# Patient Record
Sex: FEMALE | Race: WHITE | NOT HISPANIC OR LATINO | Employment: OTHER | URBAN - METROPOLITAN AREA
[De-identification: names, ages, dates, MRNs, and addresses within clinical notes are randomized per-mention and may not be internally consistent; named-entity substitution may affect disease eponyms.]

---

## 2017-01-06 ENCOUNTER — OFFICE VISIT (OUTPATIENT)
Dept: MEDICAL GROUP | Facility: MEDICAL CENTER | Age: 72
End: 2017-01-06
Payer: MEDICARE

## 2017-01-06 VITALS
RESPIRATION RATE: 16 BRPM | OXYGEN SATURATION: 90 % | TEMPERATURE: 98.1 F | WEIGHT: 231 LBS | HEART RATE: 84 BPM | HEIGHT: 60 IN | SYSTOLIC BLOOD PRESSURE: 108 MMHG | DIASTOLIC BLOOD PRESSURE: 58 MMHG | BODY MASS INDEX: 45.35 KG/M2

## 2017-01-06 DIAGNOSIS — M25.562 CHRONIC PAIN OF LEFT KNEE: ICD-10-CM

## 2017-01-06 DIAGNOSIS — G89.29 CHRONIC PAIN OF LEFT KNEE: ICD-10-CM

## 2017-01-06 PROCEDURE — 20610 DRAIN/INJ JOINT/BURSA W/O US: CPT | Performed by: FAMILY MEDICINE

## 2017-01-06 PROCEDURE — 99214 OFFICE O/P EST MOD 30 MIN: CPT | Mod: 25 | Performed by: FAMILY MEDICINE

## 2017-01-06 RX ORDER — METHYLPREDNISOLONE ACETATE 80 MG/ML
80 INJECTION, SUSPENSION INTRA-ARTICULAR; INTRALESIONAL; INTRAMUSCULAR; SOFT TISSUE ONCE
Status: COMPLETED | OUTPATIENT
Start: 2017-01-06 | End: 2017-01-06

## 2017-01-06 RX ADMIN — METHYLPREDNISOLONE ACETATE 80 MG: 80 INJECTION, SUSPENSION INTRA-ARTICULAR; INTRALESIONAL; INTRAMUSCULAR; SOFT TISSUE at 10:22

## 2017-01-06 NOTE — PROGRESS NOTES
CC: Left knee pain    HPI:   Kishan presents today with left knee pain, she is here to get steroid intraarticular knee joint injection. She has h/o bilateral osteoarthritis of the knee joints, associated with effusion. Had a right knee injections two times , last one was 3 month ago, and helped her a lot for good 3 month, was the best treatmernt that helped her knee pain. Came in today to have an other intraarticular steroid injection for the left knee joints.     Patient Active Problem List    Diagnosis Date Noted   • Morbid obesity ([facility]) 06/16/2015     Priority: High   • Diastolic congestive heart failure ([facility]) 12/05/2011     Priority: High   • COPD (chronic obstructive pulmonary disease) ([facility]) 04/13/2011     Priority: High   • Aortic stenosis 06/07/2012     Priority: Medium   • Aortic regurgitation 12/05/2011     Priority: Medium   • Anxiety 04/04/2016   • Carotid bruit 08/23/2013   • MEDICAL HOME 11/09/2012   • DJD (degenerative joint disease) 10/19/2012   • Obesity 04/13/2011   • Osteoporosis, idiopathic 04/13/2011       Current Outpatient Prescriptions   Medication Sig Dispense Refill   • prometh-phenylephrine-codeine (PHENERGAN VC CODEINE) 5-6.25-10 MG/5ML Syrup Take 5 mL by mouth every 8 hours as needed. 280 mL 0   • furosemide (LASIX) 20 MG Tab Take 1 Tab by mouth 3 times a day. 270 Tab 1   • potassium chloride SA (K-DUR) 20 MEQ Tab CR Take 1 Tab by mouth every day. 90 Tab 1   • albuterol 108 (90 BASE) MCG/ACT Aero Soln inhalation aerosol Inhale 2 Puffs by mouth every 6 hours as needed. 3 Inhaler 3   • SPIRIVA HANDIHALER 18 MCG Cap INHALE 1 CAP BY MOUTH EVERY DAY. 90 Cap 1   • albuterol (PROVENTIL) 2.5mg/3ml Nebu Soln solution for nebulization USE 3 ML BY NEBULIZATION ROUTE EVERY 6 HOURS AS NEEDED FOR SHORTNESS OF BREATH. 375 mL 2   • triamcinolone acetonide (KENALOG) 0.025 % Cream APPLY TO AFFECTED AREA(S) 2 TIMES A DAY. 15 g 1   • Misc. Devices Misc Oxygen concentrator to be used at night and prn at 2 lpm.  Dx: COPD, CHF, hypoxemia. 1 Units 0   • ciprofloxacin (CILOXIN) 0.3 % Solution Place 1 Drop in both eyes every 12 hours. 1 Bottle 0   • busPIRone (BUSPAR) 7.5 MG tablet Take 1 Tab by mouth every day. TAKE 1 TAB BY MOUTH EVERY DAY. 90 Tab 1   • omeprazole (PRILOSEC) 20 MG delayed-release capsule Take 1 Cap by mouth every day. 90 Cap 1   • clobetasol (TEMOVATE) 0.05 % external solution Apply sparingly bid 50 mL 0   • clotrimazole-betamethasone (LOTRISONE) 1-0.05 % CREA Apply 1 Application to affected area(s) 2 times a day. 1 Tube 1   • Lactobacillus Rhamnosus, GG, (CULTURELLE PO) Take  by mouth.     • Clobetasol Propionate 0.05 % LOTN Apply to affected area bid 1 Bottle 1   • Misc. Devices MISC 4 wheel walker, needed for osteoarthritis of the knees. 1 Each 0   • vitamin D (CHOLECALCIFEROL) 1000 UNIT TABS Take 2,000 Units by mouth every day.     • Multiple Vitamin (MULTI-VITAMIN PO) Take  by mouth.     • aspirin 81 MG tablet Take 81 mg by mouth every day.       Current Facility-Administered Medications   Medication Dose Route Frequency Provider Last Rate Last Dose   • methylPREDNISolone acetate (DEPO-MEDROL) injection 80 mg  80 mg Intramuscular Once Ana Hartley M.D.             Allergies as of 01/06/2017 - Kevin as Reviewed 01/06/2017   Allergen Reaction Noted   • Fosamax  09/15/2011   • Other drug  07/06/2013   • Pcn [penicillins]  04/13/2011   • Sulfa drugs  04/13/2011        ROS: Denies any chest pain, Shortness of breath, Changes bowel or bladder, Lower extremity edema.    Physical Exam:  /58 mmHg  Pulse 84  Temp(Src) 36.7 °C (98.1 °F)  Resp 16  Ht 1.524 m (5')  Wt 104.781 kg (231 lb)  BMI 45.11 kg/m2  SpO2 90%  Gen.: Obese, no apparent distress,pleasant and cooperative with the examination  Left Knee joint: Swelling( effusion),mild tenderness, mild decrease in ROM    Procedure:  Patient was consented. Risks and benefits discussed.  Left knee was exposed. Site of injection was marked ( A  point between lateral patellar ligament, lateral tibial plateau, and lateral femoral condyle). The skin was sterilized with (2% chlorhexidine , and 70% Isopropyl alcohol), the area was numbed with ethyl cholride , then 1 ml of DepoMedrol 80 mg + 4 ml of Lidocaine 1% were injected into the joint.      Assessment and Plan.   71 y.o. female     1. Chronic pain of left knee  Chronic osteoarthritis.  Intra-articular injection of the left knee  joint is given.Patient joint movement improved to about 50% immediately after the injection.Precaustion was given to avoid bleeding . Patient advised to to uses worm compresses 2 times daily for 3 days, and RTC if any continuous pain on the joint.  Weight loss is recommended.    - [place] DRAIN/INJECT LARGE JOINT/BURSA  - methylPREDNISolone acetate (DEPO-MEDROL) injection 80 mg; 1 mL by Intramuscular route Once.

## 2017-01-30 DIAGNOSIS — J44.1 COPD WITH EXACERBATION (HCC): ICD-10-CM

## 2017-01-30 RX ORDER — TIOTROPIUM BROMIDE 18 UG/1
CAPSULE ORAL; RESPIRATORY (INHALATION)
Qty: 90 CAP | Refills: 2 | Status: SHIPPED | OUTPATIENT
Start: 2017-01-30 | End: 2018-01-19 | Stop reason: SDUPTHER

## 2017-02-06 DIAGNOSIS — J44.9 CHRONIC OBSTRUCTIVE PULMONARY DISEASE, UNSPECIFIED COPD TYPE (HCC): ICD-10-CM

## 2017-02-06 RX ORDER — ALBUTEROL SULFATE 90 UG/1
2 AEROSOL, METERED RESPIRATORY (INHALATION) EVERY 6 HOURS PRN
Qty: 3 INHALER | Refills: 0 | Status: SHIPPED | OUTPATIENT
Start: 2017-02-06 | End: 2018-04-04 | Stop reason: SDUPTHER

## 2017-02-23 ENCOUNTER — TELEPHONE (OUTPATIENT)
Dept: CARDIOLOGY | Facility: MEDICAL CENTER | Age: 72
End: 2017-02-23

## 2017-02-23 NOTE — TELEPHONE ENCOUNTER
LM for pt to call back to possibly reschedule FU appointment if pt is unable to get Echo sooner.     ----- Message -----      From: Harini Linton      Sent: 2/23/2017   2:06 PM        To: Karin Spears R.N.   Subject: Pt has question about echo in regards to den*     REHANA/Derick,     Patient states her Echo is scheduled for 3/22 after her scheduled appt with REHANA 3/17. Pt wants to know if she should still come to appt?can be reached at [phone] for a call back.

## 2017-03-02 ENCOUNTER — HOSPITAL ENCOUNTER (OUTPATIENT)
Dept: RADIOLOGY | Facility: MEDICAL CENTER | Age: 72
End: 2017-03-02
Attending: INTERNAL MEDICINE
Payer: MEDICARE

## 2017-03-02 DIAGNOSIS — Z13.9 SCREENING: ICD-10-CM

## 2017-03-02 PROCEDURE — 77063 BREAST TOMOSYNTHESIS BI: CPT

## 2017-03-15 ENCOUNTER — OFFICE VISIT (OUTPATIENT)
Dept: MEDICAL GROUP | Facility: MEDICAL CENTER | Age: 72
End: 2017-03-15
Payer: MEDICARE

## 2017-03-15 VITALS
HEART RATE: 82 BPM | WEIGHT: 231.2 LBS | OXYGEN SATURATION: 91 % | BODY MASS INDEX: 45.39 KG/M2 | TEMPERATURE: 99 F | HEIGHT: 60 IN | DIASTOLIC BLOOD PRESSURE: 68 MMHG | RESPIRATION RATE: 16 BRPM | SYSTOLIC BLOOD PRESSURE: 90 MMHG

## 2017-03-15 DIAGNOSIS — G89.29 CHRONIC PAIN OF RIGHT KNEE: ICD-10-CM

## 2017-03-15 DIAGNOSIS — M25.561 CHRONIC PAIN OF RIGHT KNEE: ICD-10-CM

## 2017-03-15 PROCEDURE — 99214 OFFICE O/P EST MOD 30 MIN: CPT | Mod: 25 | Performed by: FAMILY MEDICINE

## 2017-03-15 PROCEDURE — 3017F COLORECTAL CA SCREEN DOC REV: CPT | Performed by: FAMILY MEDICINE

## 2017-03-15 PROCEDURE — 1036F TOBACCO NON-USER: CPT | Performed by: FAMILY MEDICINE

## 2017-03-15 PROCEDURE — 3014F SCREEN MAMMO DOC REV: CPT | Performed by: FAMILY MEDICINE

## 2017-03-15 PROCEDURE — 20610 DRAIN/INJ JOINT/BURSA W/O US: CPT | Performed by: FAMILY MEDICINE

## 2017-03-15 PROCEDURE — 4040F PNEUMOC VAC/ADMIN/RCVD: CPT | Performed by: FAMILY MEDICINE

## 2017-03-15 PROCEDURE — G8482 FLU IMMUNIZE ORDER/ADMIN: HCPCS | Performed by: FAMILY MEDICINE

## 2017-03-15 PROCEDURE — 1101F PT FALLS ASSESS-DOCD LE1/YR: CPT | Performed by: FAMILY MEDICINE

## 2017-03-15 PROCEDURE — G8419 CALC BMI OUT NRM PARAM NOF/U: HCPCS | Performed by: FAMILY MEDICINE

## 2017-03-15 RX ORDER — METHYLPREDNISOLONE ACETATE 80 MG/ML
80 INJECTION, SUSPENSION INTRA-ARTICULAR; INTRALESIONAL; INTRAMUSCULAR; SOFT TISSUE ONCE
Status: COMPLETED | OUTPATIENT
Start: 2017-03-15 | End: 2017-03-15

## 2017-03-15 RX ADMIN — METHYLPREDNISOLONE ACETATE 80 MG: 80 INJECTION, SUSPENSION INTRA-ARTICULAR; INTRALESIONAL; INTRAMUSCULAR; SOFT TISSUE at 12:13

## 2017-03-15 NOTE — PROGRESS NOTES
CC: Chronic pain of the right knee     HPI:     Kishan presents today with chronic pain of the right knee , she is here to get steroid intraarticular knee joint injection. She has h/o bilateral osteoarthritis of the knee joints, associated with effusion. Had a right knee injections two times , last one was 6 month ago, and helped her a lot for good 3 month, was the best treatmernt that helped her knee pain. Came in today to have an other intraarticular steroid injection. Has he also had one for the left knee joint 3 month ago, and has helped.     Patient Active Problem List    Diagnosis Date Noted   • Morbid obesity ([facility]) 06/16/2015     Priority: High   • Diastolic congestive heart failure ([facility]) 12/05/2011     Priority: High   • COPD (chronic obstructive pulmonary disease) ([facility]) 04/13/2011     Priority: High   • Aortic stenosis 06/07/2012     Priority: Medium   • Aortic regurgitation 12/05/2011     Priority: Medium   • Anxiety 04/04/2016   • Carotid bruit 08/23/2013   • MEDICAL HOME 11/09/2012   • DJD (degenerative joint disease) 10/19/2012   • Obesity 04/13/2011   • Osteoporosis, idiopathic 04/13/2011       Current Outpatient Prescriptions   Medication Sig Dispense Refill   • albuterol 108 (90 BASE) MCG/ACT Aero Soln inhalation aerosol Inhale 2 Puffs by mouth every 6 hours as needed. 3 Inhaler 0   • tiotropium (SPIRIVA HANDIHALER) 18 MCG Cap INHALE 1 CAP BY MOUTH EVERY DAY. 90 Cap 2   • prometh-phenylephrine-codeine (PHENERGAN VC CODEINE) 5-6.25-10 MG/5ML Syrup Take 5 mL by mouth every 8 hours as needed. 280 mL 0   • furosemide (LASIX) 20 MG Tab Take 1 Tab by mouth 3 times a day. 270 Tab 1   • potassium chloride SA (K-DUR) 20 MEQ Tab CR Take 1 Tab by mouth every day. 90 Tab 1   • albuterol (PROVENTIL) 2.5mg/3ml Nebu Soln solution for nebulization USE 3 ML BY NEBULIZATION ROUTE EVERY 6 HOURS AS NEEDED FOR SHORTNESS OF BREATH. 375 mL 2   • triamcinolone acetonide (KENALOG) 0.025 % Cream APPLY TO AFFECTED  AREA(S) 2 TIMES A DAY. 15 g 1   • Misc. Devices Misc Oxygen concentrator to be used at night and prn at 2 lpm. Dx: COPD, CHF, hypoxemia. 1 Units 0   • ciprofloxacin (CILOXIN) 0.3 % Solution Place 1 Drop in both eyes every 12 hours. 1 Bottle 0   • busPIRone (BUSPAR) 7.5 MG tablet Take 1 Tab by mouth every day. TAKE 1 TAB BY MOUTH EVERY DAY. 90 Tab 1   • omeprazole (PRILOSEC) 20 MG delayed-release capsule Take 1 Cap by mouth every day. 90 Cap 1   • clobetasol (TEMOVATE) 0.05 % external solution Apply sparingly bid 50 mL 0   • clotrimazole-betamethasone (LOTRISONE) 1-0.05 % CREA Apply 1 Application to affected area(s) 2 times a day. 1 Tube 1   • Lactobacillus Rhamnosus, GG, (CULTURELLE PO) Take  by mouth.     • Clobetasol Propionate 0.05 % LOTN Apply to affected area bid 1 Bottle 1   • Misc. Devices MISC 4 wheel walker, needed for osteoarthritis of the knees. 1 Each 0   • vitamin D (CHOLECALCIFEROL) 1000 UNIT TABS Take 2,000 Units by mouth every day.     • Multiple Vitamin (MULTI-VITAMIN PO) Take  by mouth.     • aspirin 81 MG tablet Take 81 mg by mouth every day.       Current Facility-Administered Medications   Medication Dose Route Frequency Provider Last Rate Last Dose   • methylPREDNISolone acetate (DEPO-MEDROL) injection 80 mg  80 mg Intramuscular Once Ana Hartley M.D.             Allergies as of 03/15/2017 - Kevin as Reviewed 01/06/2017   Allergen Reaction Noted   • Fosamax  09/15/2011   • Other drug  07/06/2013   • Pcn [penicillins]  04/13/2011   • Sulfa drugs  04/13/2011        ROS: Denies any chest pain, Shortness of breath, Changes bowel or bladder, Lower extremity edema.    Physical Exam:  BP 90/68 mmHg  Pulse 82  Temp(Src) 37.2 °C (99 °F)  Resp 16  Ht 1.524 m (5')  Wt 104.872 kg (231 lb 3.2 oz)  BMI 45.15 kg/m2  SpO2 91%  Gen.: Well-developed, well-nourished, no apparent distress,pleasant and cooperative with the examination  Right Knee joint: Swelling( effusion),mild tenderness, mild  decrease in ROM    Procedure:  Patient was consented. Risks and benefits discussed.  Right knee was exposed. Site of injection was marked ( A point between lateral patellar ligament, lateral tibial plateau, and lateral femoral condyle). The skin was sterilized with (2% chlorhexidine , and 70% Isopropyl alcohol), the area was numbed with ethyl cholride , then 1 ml of DepoMedrol 80 mg + 4 ml of Lidocaine 1% were injected into the joint.        Assessment and Plan.   71 y.o. female     1. Chronic pain of right knee  Patient has a chronic osteoarthritis.  Intra-articular injection of the right knee  joint is given.Patient joint movement improved to about 50% immediately after the injection.Precaustion was given to avoid bleeding . Patient advised to to uses worm compresses 2 times daily for 3 days, and RTC if any continuous pain on the joint.  Weight loss discussed continously    - [place] DRAIN/INJECT LARGE JOINT/BURSA  - methylPREDNISolone acetate (DEPO-MEDROL) injection 80 mg; 1 mL by Intramuscular route Once.

## 2017-03-15 NOTE — MR AVS SNAPSHOT
Kishan Mcintyre   3/15/2017 11:20 AM   Office Visit   MRN: [id number]    Department:  [address] [place] [address]   Dept Phone:  [phone]    Description:  Female : 1945   Provider:  Ana Hartley M.D.           Reason for Visit     Injections Rt knee injection    Orders Needed Wheelchair needs face to face notes per insurance.    Medication Refill Compression stockings 20-30 mmHg Size 3xL      Allergies as of 3/15/2017     Allergen Noted Reactions    Fosamax 09/15/2011       Bone pain    Other Drug 2013       Also allergies to Actenol, Bee stings and surgical tape    Pcn [Penicillins] 2011       Sulfa Drugs 2011         You were diagnosed with     Chronic pain of right knee   [[id number]]         Vital Signs     Blood Pressure Pulse Temperature Respirations Height Weight    90/68 mmHg 82 37.2 °C (99 °F) 16 1.524 m (5') 104.872 kg (231 lb 3.2 oz)    Body Mass Index Oxygen Saturation Smoking Status             45.15 kg/m2 91% Former Smoker         Basic Information     Date Of Birth Sex Race Ethnicity Preferred Language    1945 Female White Non- English      Your appointments     Mar 22, 2017  1:15 PM   ECHO with ECHO [facility] EXAM 12   ECHOCARDIOLOGY [facility] ([facility])    [address] [facility] [address]   [phone]           No prep            2017 12:40 PM   FOLLOW UP with Rufus Rodrigez M.D.   [facility] (--)    [address], [facility] [address]  [facility] [id number] [phone]              Problem List              ICD-10-CM Priority Class Noted - Resolved    COPD (chronic obstructive pulmonary disease) ([facility]) J44.9 High  2011 - Present    Obesity E66.9   2011 - Present    Osteoporosis, idiopathic M81.8   2011 - Present    Diastolic congestive heart failure ([facility]) I50.30 High  2011 - Present    Aortic regurgitation I35.1 Medium  2011 - Present    Aortic stenosis I35.0 Medium  2012 -  Present    DJD (degenerative joint disease) M19.90   10/19/2012 - Present    MEDICAL HOME    11/9/2012 - Present    Carotid bruit R09.89   8/23/2013 - Present    Morbid obesity ([facility]) E66.01 High  6/16/2015 - Present    Anxiety F41.9   4/4/2016 - Present      Health Maintenance        Date Due Completion Dates    PAP SMEAR 4/9/1967 ---    IMM ZOSTER VACCINE 4/9/2006 ---    COLON CANCER SCREENING ANNUAL FIT 9/6/2017 9/6/2016    BONE DENSITY 11/5/2017 11/5/2012    MAMMOGRAM 3/2/2018 3/2/2017, 7/7/2014, 4/19/2011    IMM DTaP/Tdap/Td Vaccine (2 - Td) 12/10/2025 12/10/2015            Current Immunizations     13-VALENT PCV PREVNAR 6/24/2016  9:36 AM    Influenza Vaccine Adult HD 9/9/2016, 9/11/2015, 9/2/2014    Pneumococcal polysaccharide vaccine (PPSV-23) 9/2/2014    Tdap Vaccine 12/10/2015  5:56 PM      Below and/or attached are the medications your provider expects you to take. Review all of your home medications and newly ordered medications with your provider and/or pharmacist. Follow medication instructions as directed by your provider and/or pharmacist. Please keep your medication list with you and share with your provider. Update the information when medications are discontinued, doses are changed, or new medications (including over-the-counter products) are added; and carry medication information at all times in the event of emergency situations     Allergies:  FOSAMAX - (reactions not documented)     OTHER DRUG - (reactions not documented)     PCN - (reactions not documented)     SULFA DRUGS - (reactions not documented)               Medications  Valid as of: March 15, 2017 - 12:03 PM    Generic Name Brand Name Tablet Size Instructions for use    Albuterol Sulfate (Nebu Soln) PROVENTIL 2.5mg/3ml USE 3 ML BY NEBULIZATION ROUTE EVERY 6 HOURS AS NEEDED FOR SHORTNESS OF BREATH.        Albuterol Sulfate (Aero Soln) albuterol 108 (90 BASE) MCG/ACT Inhale 2 Puffs by mouth every 6 hours as needed.        Aspirin  (Tab) aspirin 81 MG Take 81 mg by mouth every day.        BusPIRone HCl (Tab) BUSPAR 7.5 MG Take 1 Tab by mouth every day. TAKE 1 TAB BY MOUTH EVERY DAY.        Cholecalciferol (Tab) cholecalciferol 1000 UNIT Take 2,000 Units by mouth every day.        Ciprofloxacin HCl (Solution) CILOXIN 0.3 % Place 1 Drop in both eyes every 12 hours.        Clobetasol Propionate (Lotion) Clobetasol Propionate 0.05 % Apply to affected area bid        Clobetasol Propionate (Solution) TEMOVATE 0.05 % Apply sparingly bid        Clotrimazole-Betamethasone (Cream) LOTRISONE 1-0.05 % Apply 1 Application to affected area(s) 2 times a day.        Furosemide (Tab) LASIX 20 MG Take 1 Tab by mouth 3 times a day.        Lactobacillus Rhamnosus (GG)   Take  by mouth.        Misc. Devices (Misc) Misc. Devices  4 wheel walker, needed for osteoarthritis of the knees.        Misc. Devices (Misc) Misc. Devices  Oxygen concentrator to be used at night and prn at 2 lpm. Dx: COPD, CHF, hypoxemia.        Multiple Vitamin   Take  by mouth.        Omeprazole (CAPSULE DELAYED RELEASE) PRILOSEC 20 MG Take 1 Cap by mouth every day.        Phenyleph-Promethazine-Cod (Syrup) PHENERGAN VC CODEINE 5-6.25-10 MG/5ML Take 5 mL by mouth every 8 hours as needed.        Potassium Chloride Mandi CR (Tab CR) Kdur 20 MEQ Take 1 Tab by mouth every day.        Tiotropium Bromide Monohydrate (Cap) SPIRIVA 18 MCG INHALE 1 CAP BY MOUTH EVERY DAY.        Triamcinolone Acetonide (Cream) KENALOG 0.025 % APPLY TO AFFECTED AREA(S) 2 TIMES A DAY.        .                 Medicines prescribed today were sent to:     [id number] - [place] - [address] [address]    Phone: [phone] Fax: [phone]    Open 24 Hours?: No      Medication refill instructions:       If your prescription bottle indicates you have medication refills left, it is not necessary to call your provider’s office. Please contact your pharmacy and they will refill your medication.      If your prescription bottle indicates you do not have any refills left, you may request refills at any time through one of the following ways: The online [employer] system (except Urgent Care), by calling your provider’s office, or by asking your pharmacy to contact your provider’s office with a refill request. Medication refills are processed only during regular business hours and may not be available until the next business day. Your provider may request additional information or to have a follow-up visit with you prior to refilling your medication.   *Please Note: Medication refills are assigned a new Rx number when refilled electronically. Your pharmacy may indicate that no refills were authorized even though a new prescription for the same medication is available at the pharmacy. Please request the medicine by name with the pharmacy before contacting your provider for a refill.        Other Notes About Your Plan     Patient is enrolled in [facility] with Dr. Rushing.             Lindsay Status: Patient Declined

## 2017-03-22 ENCOUNTER — HOSPITAL ENCOUNTER (OUTPATIENT)
Dept: CARDIOLOGY | Facility: MEDICAL CENTER | Age: 72
End: 2017-03-22
Attending: INTERNAL MEDICINE
Payer: MEDICARE

## 2017-03-22 DIAGNOSIS — I35.0 AORTIC STENOSIS: ICD-10-CM

## 2017-03-22 DIAGNOSIS — I35.1 AORTIC REGURGITATION: ICD-10-CM

## 2017-03-22 DIAGNOSIS — I35.0 AORTIC VALVE STENOSIS, UNSPECIFIED ETIOLOGY: ICD-10-CM

## 2017-03-22 PROCEDURE — 93306 TTE W/DOPPLER COMPLETE: CPT

## 2017-03-22 PROCEDURE — 93306 TTE W/DOPPLER COMPLETE: CPT | Performed by: INTERNAL MEDICINE

## 2017-03-23 LAB
LV EJECT FRACT  99904: 55
LV EJECT FRACT MOD 2C 99903: 57.79
LV EJECT FRACT MOD 4C 99902: 57.79

## 2017-03-27 ENCOUNTER — PATIENT OUTREACH (OUTPATIENT)
Dept: HEALTH INFORMATION MANAGEMENT | Facility: OTHER | Age: 72
End: 2017-03-27

## 2017-03-27 NOTE — PROGRESS NOTES
Attempt #:1    Verify PCP: yes    Communication Preference Obtained: yes     Annual Wellness Visit Scheduling  1. Scheduling Status:Scheduled          Care Gap Scheduling (Attempt to Schedule EACH Overdue Care Gap!)     Health Maintenance Due   Topic Date Due   • Annual Wellness Visit  1945   • IMM ZOSTER VACCINE  04/09/2006   • PFT SCREENING-FEV1 AND FEV/FVC RATIO / SPIROMETRY SHOULD BE PERFORMED ANNUALLY  07/03/2016         [employer] Activation: declined  [employer] Shannon: no  Virtual Visits: no  Opt In to Text Messages: no

## 2017-03-30 DIAGNOSIS — I10 ESSENTIAL HYPERTENSION: ICD-10-CM

## 2017-03-30 RX ORDER — FUROSEMIDE 20 MG/1
20 TABLET ORAL 3 TIMES DAILY
Qty: 270 TAB | Refills: 1 | Status: SHIPPED | OUTPATIENT
Start: 2017-03-30 | End: 2017-10-03 | Stop reason: SDUPTHER

## 2017-03-30 RX ORDER — POTASSIUM CHLORIDE 20 MEQ/1
20 TABLET, EXTENDED RELEASE ORAL
Qty: 90 TAB | Refills: 1 | Status: SHIPPED | OUTPATIENT
Start: 2017-03-30 | End: 2017-10-03 | Stop reason: SDUPTHER

## 2017-03-30 NOTE — TELEPHONE ENCOUNTER
Was the patient seen in the last year in this department? Yes   3/15/17    Does patient have an active prescription for medications requested? No     Received Request Via: Pharmacy

## 2017-04-06 ENCOUNTER — OFFICE VISIT (OUTPATIENT)
Dept: CARDIOLOGY | Facility: MEDICAL CENTER | Age: 72
End: 2017-04-06
Payer: MEDICARE

## 2017-04-06 VITALS
HEIGHT: 60 IN | HEART RATE: 78 BPM | OXYGEN SATURATION: 86 % | DIASTOLIC BLOOD PRESSURE: 60 MMHG | BODY MASS INDEX: 44.37 KG/M2 | WEIGHT: 226 LBS | SYSTOLIC BLOOD PRESSURE: 98 MMHG

## 2017-04-06 DIAGNOSIS — I50.32 CHRONIC DIASTOLIC CONGESTIVE HEART FAILURE (HCC): ICD-10-CM

## 2017-04-06 DIAGNOSIS — I35.0 AORTIC VALVE STENOSIS, UNSPECIFIED ETIOLOGY: ICD-10-CM

## 2017-04-06 DIAGNOSIS — J43.8 OTHER EMPHYSEMA (HCC): ICD-10-CM

## 2017-04-06 DIAGNOSIS — I35.1 AORTIC VALVE INSUFFICIENCY, UNSPECIFIED ETIOLOGY: ICD-10-CM

## 2017-04-06 PROCEDURE — G8419 CALC BMI OUT NRM PARAM NOF/U: HCPCS | Performed by: INTERNAL MEDICINE

## 2017-04-06 PROCEDURE — 4040F PNEUMOC VAC/ADMIN/RCVD: CPT | Performed by: INTERNAL MEDICINE

## 2017-04-06 PROCEDURE — 1036F TOBACCO NON-USER: CPT | Performed by: INTERNAL MEDICINE

## 2017-04-06 PROCEDURE — 3017F COLORECTAL CA SCREEN DOC REV: CPT | Performed by: INTERNAL MEDICINE

## 2017-04-06 PROCEDURE — G8432 DEP SCR NOT DOC, RNG: HCPCS | Performed by: INTERNAL MEDICINE

## 2017-04-06 PROCEDURE — 3014F SCREEN MAMMO DOC REV: CPT | Performed by: INTERNAL MEDICINE

## 2017-04-06 PROCEDURE — 99214 OFFICE O/P EST MOD 30 MIN: CPT | Performed by: INTERNAL MEDICINE

## 2017-04-06 PROCEDURE — 1101F PT FALLS ASSESS-DOCD LE1/YR: CPT | Performed by: INTERNAL MEDICINE

## 2017-04-06 RX ORDER — IBUPROFEN 200 MG
400 TABLET ORAL EVERY MORNING
Status: ON HOLD | COMMUNITY
End: 2019-01-25

## 2017-04-06 ASSESSMENT — ENCOUNTER SYMPTOMS
HEADACHES: 0
DOUBLE VISION: 0
SPUTUM PRODUCTION: 0
VOMITING: 0
NERVOUS/ANXIOUS: 0
NAUSEA: 0
MYALGIAS: 0
EYES NEGATIVE: 1
GASTROINTESTINAL NEGATIVE: 1
SHORTNESS OF BREATH: 1
DIZZINESS: 0
DEPRESSION: 0
PALPITATIONS: 0
NEUROLOGICAL NEGATIVE: 1
PSYCHIATRIC NEGATIVE: 1
HEMOPTYSIS: 0
BRUISES/BLEEDS EASILY: 0
FOCAL WEAKNESS: 0
WEIGHT LOSS: 1
BLURRED VISION: 0

## 2017-04-06 NOTE — PROGRESS NOTES
Subjective:   Kishan Mcintyre is a 71 y.o. female who presents for annual follow up of aortic stenosis.    HPI    Since the patients last visit on 03/11/16, she has been experiencing significant shortness of breath. She denies chest pain, palpitations, nausea/vomiting or diaphoresis. She is active enjoying her grandchildren. She enjoyed her [place] cruise.    Review of Systems   Constitutional: Positive for weight loss. Negative for malaise/fatigue.   HENT: Negative.  Negative for hearing loss.    Eyes: Negative.  Negative for blurred vision and double vision.   Respiratory: Positive for shortness of breath. Negative for hemoptysis and sputum production.    Cardiovascular: Negative for chest pain, palpitations and leg swelling.   Gastrointestinal: Negative.  Negative for nausea and vomiting.   Genitourinary: Negative.  Negative for dysuria and urgency.   Musculoskeletal: Positive for joint pain. Negative for myalgias.   Skin: Negative.  Negative for itching and rash.   Neurological: Negative.  Negative for dizziness, focal weakness and headaches.   Endo/Heme/Allergies: Negative.  Does not bruise/bleed easily.   Psychiatric/Behavioral: Negative.  Negative for depression. The patient is not nervous/anxious.         Objective:     BP 98/60 mmHg  Pulse 78  Ht 1.524 m (5')  Wt 102.513 kg (226 lb)  BMI 44.14 kg/m2  SpO2 86%    Physical Exam   Constitutional: She is oriented to person, place, and time. She appears well-developed and well-nourished.   Using walker.   HENT:   Head: Normocephalic and atraumatic.   Eyes: Pupils are equal, round, and reactive to light.   Neck: Normal range of motion. Neck supple.   Cardiovascular: Normal rate and regular rhythm.    Murmur heard.   Crescendo systolic murmur is present with a grade of 2/6   Pulmonary/Chest: Effort normal and breath sounds normal.   Abdominal: Soft. Bowel sounds are normal.   Musculoskeletal: Normal range of motion. She exhibits edema.   Neurological: She is  alert and oriented to person, place, and time.   Skin: Skin is warm and dry.   Psychiatric: She has a normal mood and affect. Thought content normal.     Medications reviewed.    Current Outpatient Prescriptions   Medication   • Ibuprofen (ADVIL) 200 MG Cap   • furosemide (LASIX) 20 MG Tab   • potassium chloride SA (KDUR) 20 MEQ Tab CR   • tiotropium (SPIRIVA HANDIHALER) 18 MCG Cap   • albuterol (PROVENTIL) 2.5mg/3ml Nebu Soln solution for nebulization   • triamcinolone acetonide (KENALOG) 0.025 % Cream   • Misc. Devices Misc   • busPIRone (BUSPAR) 7.5 MG tablet   • omeprazole (PRILOSEC) 20 MG delayed-release capsule   • clobetasol (TEMOVATE) 0.05 % external solution   • clotrimazole-betamethasone (LOTRISONE) 1-0.05 % CREA   • Lactobacillus Rhamnosus, GG, (CULTURELLE PO)   • Clobetasol Propionate 0.05 % LOTN   • vitamin D (CHOLECALCIFEROL) 1000 UNIT TABS   • Multiple Vitamin (MULTI-VITAMIN PO)   • aspirin 81 MG tablet   • albuterol 108 (90 BASE) MCG/ACT Aero Soln inhalation aerosol   • prometh-phenylephrine-codeine (PHENERGAN VC CODEINE) 5-6.25-10 MG/5ML Syrup   • ciprofloxacin (CILOXIN) 0.3 % Solution   • Misc. Devices MISC     No current facility-administered medications for this visit.     CARDIAC STUDIES/PROCEDURES:    CAROTID ULTRASOUND (08/30/13)  Tortuous but no significant plaque in carotid arteries bilaterally.  Normal vertebral flow.    ECHOCARDIOGRAM CONCLUSIONS (03/22/17)  Prior study done on 02/18/16,  Normal left ventricular systolic function.   Moderate aortic stenosis. Vmax is 3.4  m/s.   Transvalvular gradients are - Peak: 47 mmHg, Mean: 24  mmHg.  Compared to the images of the prior study done -  there has been no significant change.     ECHOCARDIOGRAM CONCLUSIONS (02/18/16)  Normal left ventricular systolic function.  Left ventricular ejection fraction is visually estimated to be 60%.  Grade I diastolic dysfunction.  Mild to moderate aortic stenosis.  Vmax 3.0 m/s. Transvalvular gradients are  Peak: 40 mmHg, Mean: 24 mmHg.  Mild aortic insufficiency.  Mild tricuspid regurgitation.  Right ventricular systolic pressure is estimated to be 60 mmHg.    ECHOCARDIOGRAM CONCLUSIONS (02/05/15)  Mild aortic stenosis. Transvalvular gradients are - Peak: 32 mmHg   Mean: 20 mmHg.  Transvalvular gradients are - Peak: 32 mmHg Mean: 20 mmHg.  Calcification of the noncoronary cusp Mild aortic insufficiency.  Mild concentric left ventricular hypertrophy.  Left ventricular ejection fraction is 60% to 65%.  Grade I diastolic dysfunction - mitral inflow E/A is <1.0.  Moderately dilated right ventricle.  Moderately dilated left atrium.  Right ventricular systolic pressure is estimated to be 46-51 mmHg.  Trace pulmonic insufficiency.    ECHOCARDIOGRAM CONCLUSIONS (08/30/13)  Normal left ventricular systolic function.   Left ventricular ejection fraction is 60% to 65%.   Mild to moderate aortic stenosis.  Mild aortic insufficiency.    ECHOCARDIOGRAM (04/23/12)  Echocardiogram showing ejection fraction of 60-65%, mild aortic stenosis and mild mitral regurgitation.    EKG performed on (08/23/13) EKG shows normal sinus rhythm.    Laboratory results of (07/23/16) were reviewed. Cholesterol profile of 171/101/60/91 noted.    LOWER EXTREMITY VENOUS ULTRASOUND (04/10/10)  Lower extremity venous study showing patent bilateral common femoral veins.    MPI CONCLUSIONS (03/02/17)  1. Small reversible defect seen in apical inferior and lateral wall    consistent with ischemia. Prominent subdiaphragmatic activity seen in stress    than rest images could attenuation defect but cannot r/o ischemia. Consider    clinical correlation.  2. Normal left ventricular wall motion, with EF of  69 %. Normal left    ventricular wall thickening. Calculated TID 1.21.    MPI CONCLUSIONS (02/18/16)  1. Small reversible defect seen in apical inferior and lateral wall   consistent with ischemia. Prominent subdiaphragmatic activity seen in stress   than rest  images could attenuation defect but cannot r/o ischemia. Consider   clinical correlation.  2. Normal left ventricular wall motion, with EF of 69 %. Normal left   ventricular wall thickening. Calculated TID 1.21.    PULMONARY FUNCTION INTERPRETATION (06/30/15)  INTERPRETATION:  Spirometry reveals severely reduced FVC at 0.94 L, 37% of    predicted and severely reduced FEV1 at 0.55 L, 28% of predicted.  FEV1/FVC    ratio is mildly reduced at 58%.  There is a very positive response to    bronchodilator with 20% improvement in FVC and 52% improvement in FEV1.  Lung    volumes could not be measured by plethysmography.  Technician could not get    the door close to the body box secondary to obesity and inability to get her    leg to bend enough to close the door per the technician notes.  Diffusion    capacity was measured and DLCO was very severely reduced at 1.18, 8% of    predicted.     ASSESSMENT:  1.  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  2.  Very positive response to bronchodilator.  3.  Inability to perform lung volume measured by plethysmography due to    inability to close the door to the body box.  4.  Severe loss of gas transfer consisted loss of alveolar capillary exchange units.  5.  Compared to prior study dated 04/14/2011.  The patient's weight has gone    up.  Spirometric volumes have significantly reduced.  There is a much larger    positive response to bronchodilator, although it was positive back then.  Room   air saturations are similar.  Lung volume measurements were performed at the    last evaluation and she had mild restrictive lung disease with TLC, 3.28, 69%    of predicted.  Diffusion capacity is dramatically worse, down from 16.95 to    1.18, down from 103% of predicted to 8% of predicted.  Clinical correlation,    especially in relation to loss of gas transfer is recommended.    Assessment:     Patient Active Problem List   Diagnoses Date Noted   • Aortic stenosis 06/07/2012      Priority: High   • Aortic regurgitation 12/05/2011     Priority: High   • Atrial flutter 12/05/2011     Priority: Medium   • Diastolic congestive heart failure 12/05/2011     Priority: Low   • COPD (chronic obstructive pulmonary disease) 04/13/2011     Plan:     1. Aortic stenosis and aortic regurgitation: She is clinically doing well. Her recent echocardiogram shows moderate aortic stensois. We will repeat an echocardiogram in 6 months.  2. Atrial Flutter (single atrial flutter event associated with respiratory failure/pnuemonia without recurrence): Sinus rhythm is maintained.  3. History of diastolic congestive heart failure: The overall volume status is adequate.  4. Chronic obstructive pulmonary disease: Her shortness of breath is severe. [facility].    We will follow up the patient in 6 months with echocardiogram.    CC Clement Webb

## 2017-04-06 NOTE — MR AVS SNAPSHOT
Kishan Corbin Miguelina   2017 12:40 PM   Office Visit   MRN: [id number]    Department:  [facility]   Dept Phone:  [phone]    Description:  Female : 1945   Provider:  Rufus Rodrigez M.D.           Reason for Visit     Follow-Up           Allergies as of 2017     Allergen Noted Reactions    Fosamax 09/15/2011       Bone pain    Other Drug 2013       Also allergies to Actenol, Bee stings and surgical tape    Pcn [Penicillins] 2011       Sulfa Drugs 2011         You were diagnosed with     Aortic valve stenosis, unspecified etiology   [8118571]       Aortic valve insufficiency, unspecified etiology   [0015650]       Chronic diastolic congestive heart failure (CMS-HCC)   [817233]       Other emphysema (CMS-[facility])   [492.8.ICD-9-CM]         Vital Signs     Blood Pressure Pulse Height Weight Body Mass Index Oxygen Saturation    98/60 mmHg 78 1.524 m (5') 102.513 kg (226 lb) 44.14 kg/m2 86%    Smoking Status                   Former Smoker           Basic Information     Date Of Birth Sex Race Ethnicity Preferred Language    1945 Female White Non- English      Your appointments     Apr 10, 2017 10:00 AM   ANNUAL WELLNESS with Ana Hartley M.D., [facility]    [facility] [address]    [address] [facility] [address]  [place] [address]   [phone]            Sep 11, 2017 12:15 PM   ECHO with ECHO [facility] EXAM 9   ECHOCARDIOLOGY [facility] ([facility])    [address] [place] [address]   [phone]           No prep            Oct 12, 2017 11:20 AM   FOLLOW UP with Rufus Rodrigez M.D.   [facility] (--)    [address]  [place] [address] [phone]              Problem List              ICD-10-CM Priority Class Noted - Resolved    COPD (chronic obstructive pulmonary disease) (CMS-HCC) J44.9 High  2011 - Present    Obesity E66.9   2011 - Present    Osteoporosis,  idiopathic M81.8   4/13/2011 - Present    Diastolic congestive heart failure (CMS-HCC) I50.30 High  12/5/2011 - Present    Aortic regurgitation I35.1 Medium  12/5/2011 - Present    Aortic stenosis I35.0 Medium  6/7/2012 - Present    DJD (degenerative joint disease) M19.90   10/19/2012 - Present    MEDICAL HOME    11/9/2012 - Present    Carotid bruit R09.89   8/23/2013 - Present    Morbid obesity (CMS-HCC) E66.01 High  6/16/2015 - Present    Anxiety F41.9   4/4/2016 - Present      Health Maintenance        Date Due Completion Dates    IMM ZOSTER VACCINE 4/9/2006 ---    COLON CANCER SCREENING ANNUAL FIT 9/6/2017 9/6/2016    BONE DENSITY 11/5/2017 11/5/2012    MAMMOGRAM 3/2/2018 3/2/2017, 7/7/2014, 4/19/2011    IMM DTaP/Tdap/Td Vaccine (2 - Td) 12/10/2025 12/10/2015            Current Immunizations     13-VALENT PCV PREVNAR 6/24/2016  9:36 AM    Influenza Vaccine Adult HD 9/9/2016, 9/11/2015, 9/2/2014    Pneumococcal polysaccharide vaccine (PPSV-23) 9/2/2014    Tdap Vaccine 12/10/2015  5:56 PM      Below and/or attached are the medications your provider expects you to take. Review all of your home medications and newly ordered medications with your provider and/or pharmacist. Follow medication instructions as directed by your provider and/or pharmacist. Please keep your medication list with you and share with your provider. Update the information when medications are discontinued, doses are changed, or new medications (including over-the-counter products) are added; and carry medication information at all times in the event of emergency situations     Allergies:  FOSAMAX - (reactions not documented)     OTHER DRUG - (reactions not documented)     PCN - (reactions not documented)     SULFA DRUGS - (reactions not documented)               Medications  Valid as of: April 06, 2017 -  1:25 PM    Generic Name Brand Name Tablet Size Instructions for use    Albuterol Sulfate (Nebu Soln) PROVENTIL 2.5mg/3ml USE 3 ML BY  NEBULIZATION ROUTE EVERY 6 HOURS AS NEEDED FOR SHORTNESS OF BREATH.        Albuterol Sulfate (Aero Soln) albuterol 108 (90 BASE) MCG/ACT Inhale 2 Puffs by mouth every 6 hours as needed.        Aspirin (Tab) aspirin 81 MG Take 81 mg by mouth every day.        BusPIRone HCl (Tab) BUSPAR 7.5 MG Take 1 Tab by mouth every day. TAKE 1 TAB BY MOUTH EVERY DAY.        Cholecalciferol (Tab) cholecalciferol 1000 UNIT Take 2,000 Units by mouth every day.        Ciprofloxacin HCl (Solution) CILOXIN 0.3 % Place 1 Drop in both eyes every 12 hours.        Clobetasol Propionate (Lotion) Clobetasol Propionate 0.05 % Apply to affected area bid        Clobetasol Propionate (Solution) TEMOVATE 0.05 % Apply sparingly bid        Clotrimazole-Betamethasone (Cream) LOTRISONE 1-0.05 % Apply 1 Application to affected area(s) 2 times a day.        Furosemide (Tab) LASIX 20 MG Take 1 Tab by mouth 3 times a day.        Ibuprofen (Cap) Ibuprofen 200 MG Take  by mouth.        Lactobacillus Rhamnosus (GG)   Take  by mouth.        Misc. Devices (Misc) Misc. Devices  4 wheel walker, needed for osteoarthritis of the knees.        Misc. Devices (Misc) Misc. Devices  Oxygen concentrator to be used at night and prn at 2 lpm. Dx: COPD, CHF, hypoxemia.        Multiple Vitamin   Take  by mouth.        Omeprazole (CAPSULE DELAYED RELEASE) PRILOSEC 20 MG Take 1 Cap by mouth every day.        Phenyleph-Promethazine-Cod (Syrup) PHENERGAN VC CODEINE 5-6.25-10 MG/5ML Take 5 mL by mouth every 8 hours as needed.        Potassium Chloride Mandi CR (Tab CR) Kdur 20 MEQ Take 1 Tab by mouth every day.        Tiotropium Bromide Monohydrate (Cap) SPIRIVA 18 MCG INHALE 1 CAP BY MOUTH EVERY DAY.        Triamcinolone Acetonide (Cream) KENALOG 0.025 % APPLY TO AFFECTED AREA(S) 2 TIMES A DAY.        .                 Medicines prescribed today were sent to:     [id number] - [place] - [address] [place] [address]    Phone: [phone] Fax: [phone]       Open 24 Hours?: No      Medication refill instructions:       If your prescription bottle indicates you have medication refills left, it is not necessary to call your provider’s office. Please contact your pharmacy and they will refill your medication.    If your prescription bottle indicates you do not have any refills left, you may request refills at any time through one of the following ways: The online [employer] system (except Urgent Care), by calling your provider’s office, or by asking your pharmacy to contact your provider’s office with a refill request. Medication refills are processed only during regular business hours and may not be available until the next business day. Your provider may request additional information or to have a follow-up visit with you prior to refilling your medication.   *Please Note: Medication refills are assigned a new Rx number when refilled electronically. Your pharmacy may indicate that no refills were authorized even though a new prescription for the same medication is available at the pharmacy. Please request the medicine by name with the pharmacy before contacting your provider for a refill.        Your To Do List     Future Labs/Procedures Complete By Expires    ECHOCARDIOGRAM COMP W/O CONT  As directed 4/7/2018      Referral     A referral request has been sent to our patient care coordination department. Please allow 3-5 business days for us to process this request and contact you either by phone or mail. If you do not hear from us by the 5th business day, please call us at [phone].        Other Notes About Your Plan     Patient is enrolled in [facility] with Dr. Rushing.             [facility] Status: Patient Declined

## 2017-04-07 ENCOUNTER — TELEPHONE (OUTPATIENT)
Dept: MEDICAL GROUP | Facility: MEDICAL CENTER | Age: 72
End: 2017-04-07

## 2017-04-07 NOTE — TELEPHONE ENCOUNTER
Future Appointments       Provider Department Center    4/10/2017 10:00 AM Ana Hartley M.D.; [place]  [facility] [address]    9/11/2017 12:15 PM [facility] EXAM 9; [facility] [address]    10/12/2017 11:20 AM Rufus Rodrigez M.D. [facility]       ANNUAL WELLNESS VISIT PRE-VISIT PLANNING     1.  Reviewed last PCP office visit assessment and plan notes: Yes    2.  If any orders were placed last visit do we have Results/Consult Notes?        •  Labs? No order       •  Imaging? Yes cardio       •  Referrals? Yes lung doctor    3.  Patient Care Coordination Note was updated with diagnosis information:  Yes    4.  Patient is due for these Health Maintenance Topics:   Health Maintenance Due   Topic Date Due   • Annual Wellness Visit  1945   • IMM ZOSTER VACCINE  04/09/2006   • PFT SCREENING-FEV1 AND FEV/FVC RATIO / SPIROMETRY SHOULD BE PERFORMED ANNUALLY  07/03/2016             5.  Immunizations were updated in [employer] using [employer]?: Yes       •  [employer] Recommendations:  Shingles, patient states that she had already here a while ago, not seen in chart       •  Is patient due for Tdap/Shingles? Yes.  If yes, was patient alerted of copay? Yes    6.  Patient has:       •   COPD: will see pulmonary          7.  Updated Care Team with [employer] and all specialists?        •   Gait devices, O2, CPAP, etc: yes        •   Eye professional: yes       •   Other specialists (GYN, cardiology, endo, etc): yes    8.  Is patient in need of any refills prior to office visit? No       •    Separate refill encounter created?: N\A    9.  Patient was informed to arrive 15 min prior to their scheduled appointment and bring in their medication bottles? yes    10.  Patient was advised: “This is a free wellness visit. The provider will screen for medical conditions to help you stay healthy. If you have other concerns to address you may be  asked to discuss these at a separate visit or there may be an additional fee.”  Yes

## 2017-04-10 ENCOUNTER — TELEPHONE (OUTPATIENT)
Dept: MEDICAL GROUP | Facility: MEDICAL CENTER | Age: 72
End: 2017-04-10

## 2017-04-10 ENCOUNTER — OFFICE VISIT (OUTPATIENT)
Dept: MEDICAL GROUP | Facility: MEDICAL CENTER | Age: 72
End: 2017-04-10
Payer: MEDICARE

## 2017-04-10 VITALS
HEIGHT: 59 IN | RESPIRATION RATE: 16 BRPM | HEART RATE: 70 BPM | BODY MASS INDEX: 46.16 KG/M2 | WEIGHT: 229 LBS | TEMPERATURE: 98.1 F | SYSTOLIC BLOOD PRESSURE: 100 MMHG | OXYGEN SATURATION: 93 % | DIASTOLIC BLOOD PRESSURE: 60 MMHG

## 2017-04-10 DIAGNOSIS — M17.0 PRIMARY OSTEOARTHRITIS OF BOTH KNEES: ICD-10-CM

## 2017-04-10 DIAGNOSIS — I35.0 AORTIC VALVE STENOSIS, UNSPECIFIED ETIOLOGY: ICD-10-CM

## 2017-04-10 DIAGNOSIS — F41.9 ANXIETY: ICD-10-CM

## 2017-04-10 DIAGNOSIS — Z00.00 HEALTH CARE MAINTENANCE: ICD-10-CM

## 2017-04-10 DIAGNOSIS — E66.01 MORBID OBESITY, UNSPECIFIED OBESITY TYPE (HCC): ICD-10-CM

## 2017-04-10 DIAGNOSIS — M81.0 OSTEOPOROSIS: ICD-10-CM

## 2017-04-10 DIAGNOSIS — J44.9 CHRONIC OBSTRUCTIVE PULMONARY DISEASE, UNSPECIFIED COPD TYPE (HCC): ICD-10-CM

## 2017-04-10 DIAGNOSIS — K21.9 GASTROESOPHAGEAL REFLUX DISEASE WITHOUT ESOPHAGITIS: ICD-10-CM

## 2017-04-10 PROCEDURE — 3017F COLORECTAL CA SCREEN DOC REV: CPT | Performed by: FAMILY MEDICINE

## 2017-04-10 PROCEDURE — 3014F SCREEN MAMMO DOC REV: CPT | Performed by: FAMILY MEDICINE

## 2017-04-10 PROCEDURE — 1036F TOBACCO NON-USER: CPT | Performed by: FAMILY MEDICINE

## 2017-04-10 PROCEDURE — 99212 OFFICE O/P EST SF 10 MIN: CPT | Mod: 25 | Performed by: FAMILY MEDICINE

## 2017-04-10 PROCEDURE — G8419 CALC BMI OUT NRM PARAM NOF/U: HCPCS | Performed by: FAMILY MEDICINE

## 2017-04-10 PROCEDURE — 1101F PT FALLS ASSESS-DOCD LE1/YR: CPT | Performed by: FAMILY MEDICINE

## 2017-04-10 PROCEDURE — 4040F PNEUMOC VAC/ADMIN/RCVD: CPT | Performed by: FAMILY MEDICINE

## 2017-04-10 ASSESSMENT — PATIENT HEALTH QUESTIONNAIRE - PHQ9: CLINICAL INTERPRETATION OF PHQ2 SCORE: 0

## 2017-04-10 ASSESSMENT — PAIN SCALES - GENERAL: PAINLEVEL: NO PAIN

## 2017-04-10 NOTE — PROGRESS NOTES
Chief Complaint   Patient presents with   • Annual Exam     AWV         HPI:  Kishan is a 72 y.o. female here for Medicare Annual Wellness Visit    Bilateral chronic knee pain because of osteoarthritis, patient has been on otc pain medication, has had cortisone injection few times, and it helped for few month. Patient uses a walker, but for out doors activity she uses a wheel chair, hers is broken needs a new one.    Patient Active Problem List    Diagnosis Date Noted   • Morbid obesity (CMS-HCC) 06/16/2015     Priority: High   • Diastolic congestive heart failure (CMS-HCC) 12/05/2011     Priority: High   • COPD (chronic obstructive pulmonary disease) (CMS-HCC) 04/13/2011     Priority: High   • Aortic stenosis 06/07/2012     Priority: Medium   • Aortic regurgitation 12/05/2011     Priority: Medium   • Anxiety 04/04/2016   • Carotid bruit 08/23/2013   • MEDICAL HOME 11/09/2012   • DJD (degenerative joint disease) 10/19/2012   • Obesity 04/13/2011   • Osteoporosis, idiopathic 04/13/2011       Current Outpatient Prescriptions   Medication Sig Dispense Refill   • Ibuprofen (ADVIL) 200 MG Cap Take  by mouth.     • furosemide (LASIX) 20 MG Tab Take 1 Tab by mouth 3 times a day. 270 Tab 1   • potassium chloride SA (KDUR) 20 MEQ Tab CR Take 1 Tab by mouth every day. 90 Tab 1   • albuterol 108 (90 BASE) MCG/ACT Aero Soln inhalation aerosol Inhale 2 Puffs by mouth every 6 hours as needed. 3 Inhaler 0   • tiotropium (SPIRIVA HANDIHALER) 18 MCG Cap INHALE 1 CAP BY MOUTH EVERY DAY. 90 Cap 2   • albuterol (PROVENTIL) 2.5mg/3ml Nebu Soln solution for nebulization USE 3 ML BY NEBULIZATION ROUTE EVERY 6 HOURS AS NEEDED FOR SHORTNESS OF BREATH. 375 mL 2   • triamcinolone acetonide (KENALOG) 0.025 % Cream APPLY TO AFFECTED AREA(S) 2 TIMES A DAY. 15 g 1   • Misc. Devices Misc Oxygen concentrator to be used at night and prn at 2 lpm. Dx: COPD, CHF, hypoxemia. 1 Units 0   • busPIRone (BUSPAR) 7.5 MG tablet Take 1 Tab by mouth every day.  TAKE 1 TAB BY MOUTH EVERY DAY. 90 Tab 1   • omeprazole (PRILOSEC) 20 MG delayed-release capsule Take 1 Cap by mouth every day. 90 Cap 1   • clobetasol (TEMOVATE) 0.05 % external solution Apply sparingly bid 50 mL 0   • clotrimazole-betamethasone (LOTRISONE) 1-0.05 % CREA Apply 1 Application to affected area(s) 2 times a day. 1 Tube 1   • Lactobacillus Rhamnosus, GG, (CULTURELLE PO) Take  by mouth.     • Clobetasol Propionate 0.05 % LOTN Apply to affected area bid 1 Bottle 1   • Misc. Devices MISC 4 wheel walker, needed for osteoarthritis of the knees. 1 Each 0   • vitamin D (CHOLECALCIFEROL) 1000 UNIT TABS Take 2,000 Units by mouth every day.     • Multiple Vitamin (MULTI-VITAMIN PO) Take  by mouth.     • aspirin 81 MG tablet Take 81 mg by mouth every day.     • prometh-phenylephrine-codeine (PHENERGAN VC CODEINE) 5-6.25-10 MG/5ML Syrup Take 5 mL by mouth every 8 hours as needed. 280 mL 0   • ciprofloxacin (CILOXIN) 0.3 % Solution Place 1 Drop in both eyes every 12 hours. 1 Bottle 0     No current facility-administered medications for this visit.      Current supplements as per medication list.   Chronic narcotic pain medicines: no  Allergies: Fosamax; Other drug; Pcn; and Sulfa drugs    Current social contact/activities: [facility]     Is patient current with immunizations?  Yes.     She  reports that she quit smoking about 10 years ago. Her smoking use included Cigarettes. She has quit using smokeless tobacco. She reports that she drinks alcohol. She reports that she does not use illicit drugs.  Counseling given: Not Answered        DPA/Advanced Directive:  Patient has Advanced Directive on file.     ROS:    Gait: Uses a walker   Ostomy: no   Other tubes: no   Amputations: no   Chronic oxygen use yes   Last eye exam Last year   Wears hearing aids: no   : Denies incontinence.       Screening:    COPD  1. Is patient under the care of a pulmonologist? No Appt coming up      a. If yes, Care Teams was  updated: No. Date of last visit: None    2. Has patient ever completed a PFT or spirometry? yes      a. If yes, when? 6 years ago    3.  If applicable, was CareTeam updated with oxygen/CPAP supplier? yes    4. Has patient ever had instruction on inhaler technique by health care professional? Yes    5. Does patient have an action plan for when they have trouble breathing? yes    6. Is patient interested in a referral to respiratory therapy for more information on COPD, inhaler technique, and/or information on establishing an action plan?  yes  Depression Screening    Little interest or pleasure in doing things?  0 - not at all  Feeling down, depressed, or hopeless?  0 - not at all  Patient Health Questionnaire Score: 0  If depressive symptoms identified deferred to follow up visit unless specifically addressed in assessment and plan.    Screening for Cognitive Impairment    Three Minute Recall (banana, sunrise, fence)  3/3    Draw clock face with all 12 numbers set to the hand to show 10 minutes past 11 o'clock  1 5/5  If cognitive concerns identified deferred to follow up visit unless specifically addressed in assessment and plan.    Fall Risk Assessment    Has the patient had two or more falls in the last year or any fall with injury in the last year?  No  If Fall Risk identified deferred to follow up visit unless specifically addressed in assessment and plan.    Safety Assessment    Throw rugs on floor.  Yes  Handrails on all stairs.  Yes  Good lighting in all hallways.  Yes  Difficulty hearing.  No  Patient counseled about all safety risks that were identified.    Functional Assessment ADLs    Are there any barriers preventing you from cooking for yourself or meeting nutritional needs?  No.    Are there any barriers preventing you from driving safely or obtaining transportation?  No.    Are there any barriers preventing you from using a telephone or calling for help?  No.    Are there any barriers preventing you  from shopping?  No.    Are there any barriers preventing you from taking care of your own finances?  No.    Are there any barriers preventing you from managing your medications?  No.    Are currently engaging any exercise or physical activity?  Yes.  Stretching everyday of legs    Health Maintenance Summary                Annual Wellness Visit Overdue 1945     PFT SCREENING-FEV1 AND FEV/FVC RATIO / SPIROMETRY SHOULD BE PERFORMED ANNUALLY Overdue 7/3/2016      Done 7/3/2015 PFT DICTATED RESULTS     Patient has more history with this topic...    COLON CANCER SCREENING ANNUAL FIT Next Due 9/6/2017      Done 9/6/2016 OCCULT BLOOD FECES IMMUNOASSAY (A)    BONE DENSITY Next Due 11/5/2017      Done 11/5/2012 DS-BONE DENSITY STUDY (DEXA)    MAMMOGRAM Next Due 3/2/2018      Done 3/2/2017 MA-MAMMO SCREENING BILAT W/TOMOSYNTHESIS W/CAD     Patient has more history with this topic...    IMM DTaP/Tdap/Td Vaccine Next Due 12/10/2025      Done 12/10/2015 Imm Admin: Tdap Vaccine          Patient Care Team:  Ana Hartley M.D. as PCP - General (Geriatrics)  Rufus Rodrigez M.D. as Consulting Physician (Cardiology)    Social History   Substance Use Topics   • Smoking status: Former Smoker     Types: Cigarettes     Quit date: 06/24/2006   • Smokeless tobacco: Former User      Comment: quit 25 years ago   • Alcohol Use: 0.0 oz/week     0 Standard drinks or equivalent per week      Comment: occasionally     Family History   Problem Relation Age of Onset   • Heart Disease Father    • Alcohol/Drug Mother    • Cancer Maternal Aunt      She  has a past medical history of COPD; CHF (congestive heart failure) (CMS-HCC); Arrhythmia; Arthritis; and MEDICAL HOME (11/9/2012). She also has no past medical history of Depression.   Past Surgical History   Procedure Laterality Date   • Tonsillectomy     • Appendectomy     • Primary c section     • Tubal coagulation laparoscopic bilateral     • Gastric bypass laparoscopic    "          Exam:     Blood pressure 100/60, pulse 70, temperature 36.7 °C (98.1 °F), resp. rate 16, height 1.505 m (4' 11.25\"), weight 103.874 kg (229 lb), SpO2 93 %. Body mass index is 45.86 kg/(m^2).    Hearing good    Dentition , good  Alert, oriented in no acute distress.  Eye contact is good, speech goal directed, affect calm      Assessment and Plan. The following treatment and monitoring plan is recommended:    72 y.o. female     1. Chronic obstructive pulmonary disease, unspecified COPD type (CMS-HCC)  Stable.  Has been on Spiriva daily, and albuterol as needed. Recommend adding adviair/ or Symbicort. However patient has an appointment with pulmonology next week.  Uses oxygen at night only.    2. Aortic valve stenosis, unspecified etiology  Moderate. Asymptomatic.  Continue follow up with cardiology.    3. Osteoporosis  Tried the fosamax before she did not like .  Decided to take  calcium, and vit D.    4. Morbid obesity, unspecified obesity type (CMS-HCC)  BMI is 45.86.  Patient is counseled about life style modification( low carb and fat diet, and exercise).    5. Health care maintenance  Pneumonia vaccine, mammogram, flu shot are UTD.    6. Gastroesophageal reflux disease without esophagitis  Has been doing fine on Omeprazole .    7. Primary osteoarthritis of both knees  Chronic, tried cortisone knee injections couple of times and it stays for few month. Has been using a walker, but for out doors activity she uses her wheel chair, hers is broken need a new one because :  The patient has mobility limitation that significantly impairs his/her ability to participate in one or more mobility-related activities of daily living    The mobility limitation cannot sufficiently be resolved by the use of a walker    The patient is able to safely use the wheelchair  The functional mobility deficit can be sufficiently resolved with use of a wheelchair    Will send an order for wheel chair.    8. Anxiety  Mainly at " night, and Buspar has been working.      Health Care Screening recommendations as per orders if indicated.  Referrals offered: PT/OT/Nutrition counseling/Behavioral Health/Smoking cessation as per orders if indicated.    Discussion today about general wellness and lifestyle habits:    · Prevent falls and reduce trip hazards; Cautioned about securing or removing rugs.  · Have a working fire alarm and carbon monoxide detector;   · Engage in regular physical activity and social activities

## 2017-04-10 NOTE — MR AVS SNAPSHOT
"        Kishan Mcintyre   4/10/2017 10:00 AM   Office Visit   MRN: [id number]    Department:  [address] [place] [address]   Dept Phone:  [phone]    Description:  Female : 1945   Provider:  Ana Hartley M.D.; HEALTH            Reason for Visit     Annual Exam AWV      Allergies as of 4/10/2017     Allergen Noted Reactions    Fosamax 09/15/2011       Bone pain    Other Drug 2013       Also allergies to Actenol, Bee stings and surgical tape    Pcn [Penicillins] 2011       Sulfa Drugs 2011         You were diagnosed with     Chronic obstructive pulmonary disease, unspecified COPD type (CMS-HCC)   [6512988]       Aortic valve stenosis, unspecified etiology   [8564603]       Osteoporosis   [5748980]       Morbid obesity, unspecified obesity type (CMS-HCC)   [3474345]       Health care maintenance   [020991]       Gastroesophageal reflux disease without esophagitis   [360531]       Primary osteoarthritis of both knees   [474675]       Anxiety   [525452]         Vital Signs     Blood Pressure Pulse Temperature Respirations Height Weight    100/60 mmHg 70 36.7 °C (98.1 °F) 16 1.505 m (4' 11.25\") 103.874 kg (229 lb)    Body Mass Index Oxygen Saturation Smoking Status             45.86 kg/m2 93% Former Smoker         Basic Information     Date Of Birth Sex Race Ethnicity Preferred Language    1945 Female White Non- English      Your appointments     Sep 11, 2017 12:15 PM   ECHO with [facility] EXAM 9   [facility]    [address] [place] [address]   [phone]           No prep            Oct 12, 2017 11:20 AM   FOLLOW UP with Rufus Rodrigez M.D.   [facility] (--)    [address]  [place] [id number] [phone]              Problem List              ICD-10-CM Priority Class Noted - Resolved    COPD (chronic obstructive pulmonary disease) (CMS-HCC) J44.9 High  2011 - Present    Obesity " E66.9   4/13/2011 - Present    Osteoporosis, idiopathic M81.8   4/13/2011 - Present    Diastolic congestive heart failure (CMS-HCC) I50.30 High  12/5/2011 - Present    Aortic regurgitation I35.1 Medium  12/5/2011 - Present    Aortic stenosis I35.0 Medium  6/7/2012 - Present    DJD (degenerative joint disease) M19.90   10/19/2012 - Present    MEDICAL HOME    11/9/2012 - Present    Carotid bruit R09.89   8/23/2013 - Present    Morbid obesity (CMS-HCC) E66.01 High  6/16/2015 - Present    Anxiety F41.9   4/4/2016 - Present      Health Maintenance        Date Due Completion Dates    COLON CANCER SCREENING ANNUAL FIT 9/6/2017 9/6/2016    BONE DENSITY 11/5/2017 11/5/2012    MAMMOGRAM 3/2/2018 3/2/2017, 7/7/2014, 4/19/2011    IMM DTaP/Tdap/Td Vaccine (2 - Td) 12/10/2025 12/10/2015            Current Immunizations     13-VALENT PCV PREVNAR 6/24/2016  9:36 AM    Influenza Vaccine Adult HD 9/9/2016, 9/11/2015, 9/2/2014    Pneumococcal polysaccharide vaccine (PPSV-23) 9/2/2014    Tdap Vaccine 12/10/2015  5:56 PM      Below and/or attached are the medications your provider expects you to take. Review all of your home medications and newly ordered medications with your provider and/or pharmacist. Follow medication instructions as directed by your provider and/or pharmacist. Please keep your medication list with you and share with your provider. Update the information when medications are discontinued, doses are changed, or new medications (including over-the-counter products) are added; and carry medication information at all times in the event of emergency situations     Allergies:  FOSAMAX - (reactions not documented)     OTHER DRUG - (reactions not documented)     PCN - (reactions not documented)     SULFA DRUGS - (reactions not documented)               Medications  Valid as of: April 10, 2017 - 11:06 AM    Generic Name Brand Name Tablet Size Instructions for use    Albuterol Sulfate (Nebu Soln) PROVENTIL 2.5mg/3ml USE 3 ML  BY NEBULIZATION ROUTE EVERY 6 HOURS AS NEEDED FOR SHORTNESS OF BREATH.        Albuterol Sulfate (Aero Soln) albuterol 108 (90 BASE) MCG/ACT Inhale 2 Puffs by mouth every 6 hours as needed.        Aspirin (Tab) aspirin 81 MG Take 81 mg by mouth every day.        BusPIRone HCl (Tab) BUSPAR 7.5 MG Take 1 Tab by mouth every day. TAKE 1 TAB BY MOUTH EVERY DAY.        Cholecalciferol (Tab) cholecalciferol 1000 UNIT Take 2,000 Units by mouth every day.        Ciprofloxacin HCl (Solution) CILOXIN 0.3 % Place 1 Drop in both eyes every 12 hours.        Clobetasol Propionate (Lotion) Clobetasol Propionate 0.05 % Apply to affected area bid        Clobetasol Propionate (Solution) TEMOVATE 0.05 % Apply sparingly bid        Clotrimazole-Betamethasone (Cream) LOTRISONE 1-0.05 % Apply 1 Application to affected area(s) 2 times a day.        Furosemide (Tab) LASIX 20 MG Take 1 Tab by mouth 3 times a day.        Ibuprofen (Cap) Ibuprofen 200 MG Take  by mouth.        Lactobacillus Rhamnosus (GG)   Take  by mouth.        Misc. Devices (Misc) Misc. Devices  4 wheel walker, needed for osteoarthritis of the knees.        Misc. Devices (Misc) Misc. Devices  Oxygen concentrator to be used at night and prn at 2 lpm. Dx: COPD, CHF, hypoxemia.        Multiple Vitamin   Take  by mouth.        Omeprazole (CAPSULE DELAYED RELEASE) PRILOSEC 20 MG Take 1 Cap by mouth every day.        Phenyleph-Promethazine-Cod (Syrup) PHENERGAN VC CODEINE 5-6.25-10 MG/5ML Take 5 mL by mouth every 8 hours as needed.        Potassium Chloride Mandi CR (Tab CR) Kdur 20 MEQ Take 1 Tab by mouth every day.        Tiotropium Bromide Monohydrate (Cap) SPIRIVA 18 MCG INHALE 1 CAP BY MOUTH EVERY DAY.        Triamcinolone Acetonide (Cream) KENALOG 0.025 % APPLY TO AFFECTED AREA(S) 2 TIMES A DAY.        .                 Medicines prescribed today were sent to:     [id number] - [place] - [address] [place] [address]    Phone: [phone] Fax:  [phone]    Open 24 Hours?: No      Medication refill instructions:       If your prescription bottle indicates you have medication refills left, it is not necessary to call your provider’s office. Please contact your pharmacy and they will refill your medication.    If your prescription bottle indicates you do not have any refills left, you may request refills at any time through one of the following ways: The online [email or web address] system (except Urgent Care), by calling your provider’s office, or by asking your pharmacy to contact your provider’s office with a refill request. Medication refills are processed only during regular business hours and may not be available until the next business day. Your provider may request additional information or to have a follow-up visit with you prior to refilling your medication.   *Please Note: Medication refills are assigned a new Rx number when refilled electronically. Your pharmacy may indicate that no refills were authorized even though a new prescription for the same medication is available at the pharmacy. Please request the medicine by name with the pharmacy before contacting your provider for a refill.        Other Notes About Your Plan     Patient is enrolled in [facility] with Dr. Rushing.             SatishGowen Status: Patient Declined

## 2017-04-10 NOTE — TELEPHONE ENCOUNTER
1. Caller Name: Kishan                      Call Back Number: [phone] (home)     2. Message: I left VM stating to call us back in regards to wheelchair order. I want to find out what durable medical equipment supplier is? I need to know this so I can order a wheelchair for Pt when she calls us back.    3. Patient approves office to leave a detailed voicemail/MyChart message: N\A

## 2017-04-25 ENCOUNTER — TELEPHONE (OUTPATIENT)
Dept: MEDICAL GROUP | Facility: MEDICAL CENTER | Age: 72
End: 2017-04-25

## 2017-04-25 NOTE — TELEPHONE ENCOUNTER
1. Caller Name: Kishan                      Call Back Number: [phone] (home)     2. Message: States is leaving town at the end of June. She just had a knee injection in April. She is wondering if she needs another knee injection before she goes out of town in June?    3. Patient approves office to leave a detailed voicemail/MyChart message: N\A

## 2017-05-25 ENCOUNTER — TELEPHONE (OUTPATIENT)
Dept: MEDICAL GROUP | Facility: MEDICAL CENTER | Age: 72
End: 2017-05-25

## 2017-06-05 DIAGNOSIS — G47.9 SLEEP DISORDER: ICD-10-CM

## 2017-06-05 RX ORDER — BUSPIRONE HYDROCHLORIDE 7.5 MG/1
7.5 TABLET ORAL
Qty: 90 TAB | Refills: 1 | Status: SHIPPED | OUTPATIENT
Start: 2017-06-05 | End: 2017-12-10 | Stop reason: SDUPTHER

## 2017-06-15 ENCOUNTER — OFFICE VISIT (OUTPATIENT)
Dept: MEDICAL GROUP | Facility: MEDICAL CENTER | Age: 72
End: 2017-06-15
Payer: MEDICARE

## 2017-06-15 VITALS
OXYGEN SATURATION: 88 % | DIASTOLIC BLOOD PRESSURE: 60 MMHG | SYSTOLIC BLOOD PRESSURE: 98 MMHG | TEMPERATURE: 98.4 F | WEIGHT: 202.38 LBS | HEIGHT: 59 IN | BODY MASS INDEX: 40.8 KG/M2 | RESPIRATION RATE: 14 BRPM | HEART RATE: 81 BPM

## 2017-06-15 DIAGNOSIS — M17.11 PRIMARY OSTEOARTHRITIS OF RIGHT KNEE: ICD-10-CM

## 2017-06-15 PROCEDURE — 20610 DRAIN/INJ JOINT/BURSA W/O US: CPT | Performed by: FAMILY MEDICINE

## 2017-06-15 PROCEDURE — 99999 PR NO CHARGE: CPT | Performed by: FAMILY MEDICINE

## 2017-06-15 RX ORDER — METHYLPREDNISOLONE ACETATE 80 MG/ML
80 INJECTION, SUSPENSION INTRA-ARTICULAR; INTRALESIONAL; INTRAMUSCULAR; SOFT TISSUE ONCE
Status: COMPLETED | OUTPATIENT
Start: 2017-06-15 | End: 2017-06-15

## 2017-06-15 RX ADMIN — METHYLPREDNISOLONE ACETATE 80 MG: 80 INJECTION, SUSPENSION INTRA-ARTICULAR; INTRALESIONAL; INTRAMUSCULAR; SOFT TISSUE at 11:05

## 2017-06-15 NOTE — MR AVS SNAPSHOT
"        Kishan Corbin Miguelina   6/15/2017 10:20 AM   Office Visit   MRN: [id number]    Department:  [address] [place] [address]   Dept Phone:  [phone]    Description:  Female : 1945   Provider:  Ana Hartley M.D.           Reason for Visit     Injections right knee injection      Allergies as of 6/15/2017     Allergen Noted Reactions    Fosamax 09/15/2011       Bone pain    Other Drug 2013       Also allergies to Actenol, Bee stings and surgical tape    Pcn [Penicillins] 2011       Sulfa Drugs 2011         You were diagnosed with     Primary osteoarthritis of right knee   [387116]         Vital Signs     Blood Pressure Pulse Temperature Respirations Height Weight    98/60 mmHg 81 36.9 °C (98.4 °F) 14 1.505 m (4' 11.25\") 91.8 kg (202 lb 6.1 oz)    Body Mass Index Oxygen Saturation Smoking Status             40.53 kg/m2 88% Former Smoker         Basic Information     Date Of Birth Sex Race Ethnicity Preferred Language    1945 Female White Non- English      Your appointments     Aug 02, 2017  8:30 AM   Pulmonary Function Test with PFT-[address] Pulmonary Medicine (--)    [address]  [address]  [place] [id number] [phone]            Aug 02, 2017  9:40 AM   XRAY 15 with PULMONARY DX 1   [facility] - Pulmonary ([address])    [address] [facility]  [place] [address]               Aug 02, 2017 10:00 AM   New Patient Pulmonary with A Rotation   [facility] (--)    [address]  [address]  [place] [id number] [phone]            Sep 11, 2017 12:15 PM   ECHO with ECHO [facility], [facility] EXAM 9   ECHOCARDIOLOGY [facility] ([facility])    [address] [facility]  [place] [id number] [phone]            Oct 12, 2017 11:20 AM   FOLLOW UP with Rufus Rodrigez M.D.   [facility] (--)    [address], [address]  [place] [id number] [phone]              Problem List              ICD-10-CM Priority Class Noted - Resolved  "    COPD (chronic obstructive pulmonary disease) (CMS-HCC) J44.9 High  4/13/2011 - Present    Obesity E66.9   4/13/2011 - Present    Osteoporosis, idiopathic M81.8   4/13/2011 - Present    Diastolic congestive heart failure (CMS-HCC) I50.30 High  12/5/2011 - Present    Aortic regurgitation I35.1 Medium  12/5/2011 - Present    Aortic stenosis I35.0 Medium  6/7/2012 - Present    DJD (degenerative joint disease) M19.90   10/19/2012 - Present    MEDICAL HOME    11/9/2012 - Present    Carotid bruit R09.89   8/23/2013 - Present    Morbid obesity (CMS-HCC) E66.01 High  6/16/2015 - Present    Anxiety F41.9   4/4/2016 - Present      Health Maintenance        Date Due Completion Dates    COLON CANCER SCREENING ANNUAL FIT 9/6/2017 9/6/2016    BONE DENSITY 11/5/2017 11/5/2012    MAMMOGRAM 3/2/2018 3/2/2017, 7/7/2014, 4/19/2011    IMM DTaP/Tdap/Td Vaccine (2 - Td) 12/10/2025 12/10/2015            Current Immunizations     13-VALENT PCV PREVNAR 6/24/2016  9:36 AM    Influenza Vaccine Adult HD 9/9/2016, 9/11/2015, 9/2/2014    Pneumococcal polysaccharide vaccine (PPSV-23) 9/2/2014    Tdap Vaccine 12/10/2015  5:56 PM      Below and/or attached are the medications your provider expects you to take. Review all of your home medications and newly ordered medications with your provider and/or pharmacist. Follow medication instructions as directed by your provider and/or pharmacist. Please keep your medication list with you and share with your provider. Update the information when medications are discontinued, doses are changed, or new medications (including over-the-counter products) are added; and carry medication information at all times in the event of emergency situations     Allergies:  FOSAMAX - (reactions not documented)     OTHER DRUG - (reactions not documented)     PCN - (reactions not documented)     SULFA DRUGS - (reactions not documented)               Medications  Valid as of: Asmita 15, 2017 - 10:53 AM    Generic Name Brand  Name Tablet Size Instructions for use    Albuterol Sulfate (Nebu Soln) PROVENTIL 2.5mg/3ml USE 3 ML BY NEBULIZATION ROUTE EVERY 6 HOURS AS NEEDED FOR SHORTNESS OF BREATH.        Albuterol Sulfate (Aero Soln) albuterol 108 (90 BASE) MCG/ACT Inhale 2 Puffs by mouth every 6 hours as needed.        Aspirin (Tab) aspirin 81 MG Take 81 mg by mouth every day.        BusPIRone HCl (Tab) BUSPAR 7.5 MG Take 1 Tab by mouth every day. TAKE 1 TAB BY MOUTH EVERY DAY.        Cholecalciferol (Tab) cholecalciferol 1000 UNIT Take 2,000 Units by mouth every day.        Ciprofloxacin HCl (Solution) CILOXIN 0.3 % Place 1 Drop in both eyes every 12 hours.        Clobetasol Propionate (Lotion) Clobetasol Propionate 0.05 % Apply to affected area bid        Clobetasol Propionate (Solution) TEMOVATE 0.05 % Apply sparingly bid        Clotrimazole-Betamethasone (Cream) LOTRISONE 1-0.05 % Apply 1 Application to affected area(s) 2 times a day.        Furosemide (Tab) LASIX 20 MG Take 1 Tab by mouth 3 times a day.        Ibuprofen (Cap) Ibuprofen 200 MG Take  by mouth.        Lactobacillus Rhamnosus (GG)   Take  by mouth.        Misc. Devices (Misc) Misc. Devices  4 wheel walker, needed for osteoarthritis of the knees.        Misc. Devices (Misc) Misc. Devices  Oxygen concentrator to be used at night and prn at 2 lpm. Dx: COPD, CHF, hypoxemia.        Multiple Vitamin   Take  by mouth.        Omeprazole (CAPSULE DELAYED RELEASE) PRILOSEC 20 MG Take 1 Cap by mouth every day.        Phenyleph-Promethazine-Cod (Syrup) PHENERGAN VC CODEINE 5-6.25-10 MG/5ML Take 5 mL by mouth every 8 hours as needed.        Potassium Chloride Mandi CR (Tab CR) Kdur 20 MEQ Take 1 Tab by mouth every day.        Tiotropium Bromide Monohydrate (Cap) SPIRIVA 18 MCG INHALE 1 CAP BY MOUTH EVERY DAY.        Triamcinolone Acetonide (Cream) KENALOG 0.025 % APPLY TO AFFECTED AREA(S) 2 TIMES A DAY.        .                 Medicines prescribed today were sent to:     [id number] [place] - [address] [place] [address]    Phone: [phone] Fax: [phone]    Open 24 Hours?: No      Medication refill instructions:       If your prescription bottle indicates you have medication refills left, it is not necessary to call your provider’s office. Please contact your pharmacy and they will refill your medication.    If your prescription bottle indicates you do not have any refills left, you may request refills at any time through one of the following ways: The online [employer] system (except Urgent Care), by calling your provider’s office, or by asking your pharmacy to contact your provider’s office with a refill request. Medication refills are processed only during regular business hours and may not be available until the next business day. Your provider may request additional information or to have a follow-up visit with you prior to refilling your medication.   *Please Note: Medication refills are assigned a new Rx number when refilled electronically. Your pharmacy may indicate that no refills were authorized even though a new prescription for the same medication is available at the pharmacy. Please request the medicine by name with the pharmacy before contacting your provider for a refill.        Other Notes About Your Plan     Patient is enrolled in [facility] with Dr. Rushing.             [employer] Status: Patient Declined

## 2017-07-25 ENCOUNTER — TELEPHONE (OUTPATIENT)
Dept: PULMONOLOGY | Facility: HOSPICE | Age: 72
End: 2017-07-25

## 2017-07-25 DIAGNOSIS — R06.00 DYSPNEA, UNSPECIFIED TYPE: ICD-10-CM

## 2017-07-25 NOTE — MR AVS SNAPSHOT
Kishan Mcintyre   2017 10:00 AM   Office Visit   MRN: [id number]    Department:  [address] [place] [address]   Dept Phone:  [phone]    Description:  Female : 1945   Provider:  Ana Hartley M.D.           Reason for Visit     Injections left knee injection      Allergies as of 2017     Allergen Noted Reactions    Fosamax 09/15/2011       Bone pain    Other Drug 2013       Also allergies to Actenol, Bee stings and surgical tape    Pcn [Penicillins] 2011       Sulfa Drugs 2011         You were diagnosed with     Chronic pain of left knee   [444640]         Vital Signs     Blood Pressure Pulse Temperature Respirations Height Weight    108/58 mmHg 84 36.7 °C (98.1 °F) 16 1.524 m (5') 104.781 kg (231 lb)    Body Mass Index Oxygen Saturation Smoking Status             45.11 kg/m2 90% Former Smoker         Basic Information     Date Of Birth Sex Race Ethnicity Preferred Language    1945 Female White Non- English      Your appointments     2017  1:30 PM   MA [id number] with [facility] MG 3   [facility] ([facility])    [address]  [place] [address] [phone]           No deodorant, powder, perfume or lotion under the arm or breast area.            Mar 15, 2017 11:20 AM   Established Patient with Ana Hartley M.D.   [facility] [address]    [address] [place] [address] [address]  [place] [address] [phone]           You will be receiving a confirmation call a few days before your appointment from our automated call confirmation system.              Problem List              ICD-10-CM Priority Class Noted - Resolved    COPD (chronic obstructive pulmonary disease) (HCC) J44.9 High  2011 - Present    Obesity E66.9   2011 - Present    Osteoporosis, idiopathic M81.8   2011 - Present    Diastolic congestive heart failure (HCC) I50.30 High  2011 - Present    Aortic regurgitation  I35.1 Medium  12/5/2011 - Present    Aortic stenosis I35.0 Medium  6/7/2012 - Present    DJD (degenerative joint disease) M19.90   10/19/2012 - Present    MEDICAL HOME    11/9/2012 - Present    Carotid bruit R09.89   8/23/2013 - Present    Morbid obesity (HCC) E66.01 High  6/16/2015 - Present    Anxiety F41.9   4/4/2016 - Present      Health Maintenance        Date Due Completion Dates    PAP SMEAR 4/9/1967 ---    IMM ZOSTER VACCINE 4/9/2006 ---    MAMMOGRAM 7/7/2015 7/7/2014, 4/19/2011    BONE DENSITY 11/5/2017 11/5/2012    COLONOSCOPY 10/13/2020 10/13/2010 (Done)    Override on 10/13/2010: Done (approx)    IMM DTaP/Tdap/Td Vaccine (2 - Td) 12/10/2025 12/10/2015            Current Immunizations     13-VALENT PCV PREVNAR 6/24/2016  9:36 AM    Influenza Vaccine Adult HD 9/9/2016, 9/11/2015, 9/2/2014    Pneumococcal polysaccharide vaccine (PPSV-23) 9/2/2014    Tdap Vaccine 12/10/2015  5:56 PM      Below and/or attached are the medications your provider expects you to take. Review all of your home medications and newly ordered medications with your provider and/or pharmacist. Follow medication instructions as directed by your provider and/or pharmacist. Please keep your medication list with you and share with your provider. Update the information when medications are discontinued, doses are changed, or new medications (including over-the-counter products) are added; and carry medication information at all times in the event of emergency situations     Allergies:  FOSAMAX - (reactions not documented)     OTHER DRUG - (reactions not documented)     PCN - (reactions not documented)     SULFA DRUGS - (reactions not documented)               Medications  Valid as of: January 06, 2017 - 10:20 AM    Generic Name Brand Name Tablet Size Instructions for use    Albuterol Sulfate (Nebu Soln) PROVENTIL 2.5mg/3ml USE 3 ML BY NEBULIZATION ROUTE EVERY 6 HOURS AS NEEDED FOR SHORTNESS OF BREATH.        Albuterol Sulfate (Aero Soln)  25-Jul-2017 06:27 albuterol 108 (90 BASE) MCG/ACT Inhale 2 Puffs by mouth every 6 hours as needed.        Aspirin (Tab) aspirin 81 MG Take 81 mg by mouth every day.        BusPIRone HCl (Tab) BUSPAR 7.5 MG Take 1 Tab by mouth every day. TAKE 1 TAB BY MOUTH EVERY DAY.        Cholecalciferol (Tab) cholecalciferol 1000 UNIT Take 2,000 Units by mouth every day.        Ciprofloxacin HCl (Solution) CILOXIN 0.3 % Place 1 Drop in both eyes every 12 hours.        Clobetasol Propionate (Lotion) Clobetasol Propionate 0.05 % Apply to affected area bid        Clobetasol Propionate (Solution) TEMOVATE 0.05 % Apply sparingly bid        Clotrimazole-Betamethasone (Cream) LOTRISONE 1-0.05 % Apply 1 Application to affected area(s) 2 times a day.        Furosemide (Tab) LASIX 20 MG Take 1 Tab by mouth 3 times a day.        Lactobacillus Rhamnosus (GG)   Take  by mouth.        Misc. Devices (Misc) Misc. Devices  4 wheel walker, needed for osteoarthritis of the knees.        Misc. Devices (Misc) Misc. Devices  Oxygen concentrator to be used at night and prn at 2 lpm. Dx: COPD, CHF, hypoxemia.        Multiple Vitamin   Take  by mouth.        Omeprazole (CAPSULE DELAYED RELEASE) PRILOSEC 20 MG Take 1 Cap by mouth every day.        Phenyleph-Promethazine-Cod (Syrup) PHENERGAN VC CODEINE 5-6.25-10 MG/5ML Take 5 mL by mouth every 8 hours as needed.        Potassium Chloride Mandi CR (Tab CR) K-DUR 20 MEQ Take 1 Tab by mouth every day.        Tiotropium Bromide Monohydrate (Cap) SPIRIVA HANDIHALER 18 MCG INHALE 1 CAP BY MOUTH EVERY DAY.        Triamcinolone Acetonide (Cream) KENALOG 0.025 % APPLY TO AFFECTED AREA(S) 2 TIMES A DAY.        .                 Medicines prescribed today were sent to:     [id number] - [place] - [address] [place] [address]    Phone: [phone] Fax: [phone]    Open 24 Hours?: No      Medication refill instructions:       If your prescription bottle indicates you have medication refills left, it is not  necessary to call your provider’s office. Please contact your pharmacy and they will refill your medication.    If your prescription bottle indicates you do not have any refills left, you may request refills at any time through one of the following ways: The online [employer] system (except Urgent Care), by calling your provider’s office, or by asking your pharmacy to contact your provider’s office with a refill request. Medication refills are processed only during regular business hours and may not be available until the next business day. Your provider may request additional information or to have a follow-up visit with you prior to refilling your medication.   *Please Note: Medication refills are assigned a new Rx number when refilled electronically. Your pharmacy may indicate that no refills were authorized even though a new prescription for the same medication is available at the pharmacy. Please request the medicine by name with the pharmacy before contacting your provider for a refill.        Other Notes About Your Plan     Patient is enrolled in [facility] with Dr. Rushing.             [facility] Status: Patient Declined

## 2017-08-02 ENCOUNTER — NON-PROVIDER VISIT (OUTPATIENT)
Dept: PULMONOLOGY | Facility: HOSPICE | Age: 72
End: 2017-08-02
Payer: MEDICARE

## 2017-08-02 ENCOUNTER — OFFICE VISIT (OUTPATIENT)
Dept: PULMONOLOGY | Facility: HOSPICE | Age: 72
End: 2017-08-02
Payer: MEDICARE

## 2017-08-02 ENCOUNTER — TELEPHONE (OUTPATIENT)
Dept: PULMONOLOGY | Facility: HOSPICE | Age: 72
End: 2017-08-02

## 2017-08-02 VITALS
RESPIRATION RATE: 16 BRPM | TEMPERATURE: 98.1 F | WEIGHT: 202 LBS | OXYGEN SATURATION: 93 % | HEIGHT: 59 IN | DIASTOLIC BLOOD PRESSURE: 80 MMHG | HEART RATE: 72 BPM | SYSTOLIC BLOOD PRESSURE: 124 MMHG | BODY MASS INDEX: 40.72 KG/M2

## 2017-08-02 DIAGNOSIS — I50.32 CHRONIC DIASTOLIC CONGESTIVE HEART FAILURE (HCC): ICD-10-CM

## 2017-08-02 DIAGNOSIS — R09.89 PULMONARY HYPERINFLATION: ICD-10-CM

## 2017-08-02 DIAGNOSIS — R06.00 DYSPNEA, UNSPECIFIED TYPE: ICD-10-CM

## 2017-08-02 DIAGNOSIS — J44.9 CHRONIC OBSTRUCTIVE PULMONARY DISEASE, UNSPECIFIED COPD TYPE (HCC): ICD-10-CM

## 2017-08-02 DIAGNOSIS — I35.0 AORTIC VALVE STENOSIS, UNSPECIFIED ETIOLOGY: ICD-10-CM

## 2017-08-02 PROCEDURE — 94060 EVALUATION OF WHEEZING: CPT | Performed by: INTERNAL MEDICINE

## 2017-08-02 PROCEDURE — 94726 PLETHYSMOGRAPHY LUNG VOLUMES: CPT | Performed by: INTERNAL MEDICINE

## 2017-08-02 PROCEDURE — 99204 OFFICE O/P NEW MOD 45 MIN: CPT | Performed by: INTERNAL MEDICINE

## 2017-08-02 PROCEDURE — 94729 DIFFUSING CAPACITY: CPT | Performed by: INTERNAL MEDICINE

## 2017-08-02 RX ORDER — BUDESONIDE AND FORMOTEROL FUMARATE DIHYDRATE 160; 4.5 UG/1; UG/1
2 AEROSOL RESPIRATORY (INHALATION) 2 TIMES DAILY
Qty: 1 INHALER | Refills: 11 | Status: SHIPPED | OUTPATIENT
Start: 2017-08-02 | End: 2018-01-03

## 2017-08-02 ASSESSMENT — PULMONARY FUNCTION TESTS
FEV1: .94
FEV1_PREDICTED: 1.77
FEV1/FVC_PERCENT_PREDICTED: 75
FEV1/FVC_PERCENT_PREDICTED: 104
FVC_PREDICTED: 2.36
FVC: 1.22
FEV1_PERCENT_PREDICTED: 53
FVC: 1
FEV1/FVC_PERCENT_PREDICTED: 90
FEV1_PERCENT_CHANGE: 36
FEV1/FVC: 77.05
FEV1_PERCENT_CHANGE: 21
FEV1/FVC_PERCENT_CHANGE: 171
FEV1_PERCENT_PREDICTED: 38
FVC_PERCENT_PREDICTED: 51
FVC_PERCENT_PREDICTED: 42
FEV1/FVC: 69
FEV1: .69

## 2017-08-02 NOTE — PROGRESS NOTES
Chief Complaint   Patient presents with   • New Patient     Shortness of Breath       HPI:  The patient is a 72-year-old woman with a history of chronic obstructive pulmonary disease with a strong asthmatic component. Her dyspnea has been worsening gradually over the past 8 years. She also has an aortic valve problem and is being followed closely by Dr. Rodrigez. In general she does okay at rest but gets short of breath with minimal activity. She does have a daily cough which is not changed much and she does bring up discolored sputum on a regular basis. Her wheezing is occasional. She does say that she gets some chest tightness several times a month. She can only walk about 20 feet before she gets short of breath. She was hospitalized several years ago with significant peripheral edema that required aggressive diuresis. She does have pulmonary hypertension. Besides her valvular problem she has diastolic dysfunction. Her most recent echocardiogram was in March 2017 showing moderate aortic stenosis with normal left ventricular systolic function. She does have left ventricular hypertrophy, severely dilated right ventricle, with previous pulmonary artery pressures estimated at about 60 mmHg. On her most recent echo the RVSP was not able to be measured.  Pulmonary function testing today demonstrated an FEV1 of 0.69 L which is 38% of predicted. She had a significant response to an inhaled bronchodilator improving to 0.94 L which is 53% of predicted. This is relative 36% improvement. Her total lung capacity was mildly reduced at 76%. Her DLCO is in the normal range at 112% of predicted. A prior DLCO was very low and was most likely spurious.  Her current medications include Spiriva, ProAir, home nebulizer, and supplemental oxygen at night at 2.5 L/m. She has not been on an inhaled steroid or long-acting beta adrenergic agent. Besides her shortness of breath with activity she also gets a bit short of breath when exposed to  nonspecific irritants. She is retired hairdresser.  She tells me that she has always had issues post operatively. She was hospitalized after a  and spent time in an ICU. After gastric bypass surgery she also spent time in the intensive care unit. She is a former smoker of one half a pack of cigarettes per day for 26 years. She quit smoking 33 years ago.    Past Medical History   Diagnosis Date   • COPD    • CHF (congestive heart failure) (CMS-HCC)    • Arrhythmia    • Arthritis    • MEDICAL HOME 2012   • Weight loss    • Toothache    • Palpitations    • Swelling of lower extremity    • Difficulty breathing    • Shortness of breath    • Daytime sleepiness    • Heartburn    • Painful joint    • Rash    • Chickenpox    • Chinese measles    • Mumps        ROS:   Constitutional: Denies fevers, chills, night sweats, fatigue or weight loss  Eyes: Denies vision loss, pain, drainage, double vision  Ears, Nose, Throat: Denies earache, tinnitus, hoarseness  Cardiovascular: Denies chest pain, tightness, palpitations currently  Respiratory: See history of present illness  Sleep: Denies, snoring, apnea. She is on supplemental oxygen at night.  GI: Denies abdominal pain, nausea, vomiting, diarrhea  : Denies frequent urination, hematuria, painful urination  Musculoskeletal: She has chronic painful knees. Surgery has been avoided.  Neurological: Denies headaches, seizures  Skin: She has chronic discoloration and edema of her lower extremities right greater than left  Psychiatric: Denies depression or thoughts of suicide  Hematologic: Denies bleeding tendency or clotting tendency  Allergic/Immunologic: Denies rhinitis, skin sensitivity    Social History     Social History   • Marital Status:      Spouse Name: N/A   • Number of Children: N/A   • Years of Education: N/A     Occupational History   • Not on file.     Social History Main Topics   • Smoking status: Former Smoker     Types: Cigarettes     Quit date:  06/24/2006   • Smokeless tobacco: Former User      Comment: quit 25 years ago   • Alcohol Use: 0.0 oz/week     0 Standard drinks or equivalent per week      Comment: occasionally   • Drug Use: No   • Sexual Activity: Not Currently     Other Topics Concern   • Not on file     Social History Narrative     Fosamax; Other drug; Pcn; and Sulfa drugs  Current Outpatient Prescriptions on File Prior to Visit   Medication Sig Dispense Refill   • busPIRone (BUSPAR) 7.5 MG tablet Take 1 Tab by mouth every day. TAKE 1 TAB BY MOUTH EVERY DAY. 90 Tab 1   • Ibuprofen (ADVIL) 200 MG Cap Take  by mouth.     • furosemide (LASIX) 20 MG Tab Take 1 Tab by mouth 3 times a day. 270 Tab 1   • potassium chloride SA (KDUR) 20 MEQ Tab CR Take 1 Tab by mouth every day. 90 Tab 1   • albuterol 108 (90 BASE) MCG/ACT Aero Soln inhalation aerosol Inhale 2 Puffs by mouth every 6 hours as needed. 3 Inhaler 0   • tiotropium (SPIRIVA HANDIHALER) 18 MCG Cap INHALE 1 CAP BY MOUTH EVERY DAY. 90 Cap 2   • albuterol (PROVENTIL) 2.5mg/3ml Nebu Soln solution for nebulization USE 3 ML BY NEBULIZATION ROUTE EVERY 6 HOURS AS NEEDED FOR SHORTNESS OF BREATH. 375 mL 2   • triamcinolone acetonide (KENALOG) 0.025 % Cream APPLY TO AFFECTED AREA(S) 2 TIMES A DAY. 15 g 1   • omeprazole (PRILOSEC) 20 MG delayed-release capsule Take 1 Cap by mouth every day. 90 Cap 1   • clobetasol (TEMOVATE) 0.05 % external solution Apply sparingly bid 50 mL 0   • clotrimazole-betamethasone (LOTRISONE) 1-0.05 % CREA Apply 1 Application to affected area(s) 2 times a day. 1 Tube 1   • Lactobacillus Rhamnosus, GG, (CULTURELLE PO) Take  by mouth.     • Clobetasol Propionate 0.05 % LOTN Apply to affected area bid 1 Bottle 1   • Misc. Devices MISC 4 wheel walker, needed for osteoarthritis of the knees. 1 Each 0   • vitamin D (CHOLECALCIFEROL) 1000 UNIT TABS Take 2,000 Units by mouth every day.     • Multiple Vitamin (MULTI-VITAMIN PO) Take  by mouth.     • aspirin 81 MG tablet Take 81 mg by  "mouth every day.     • prometh-phenylephrine-codeine (PHENERGAN VC CODEINE) 5-6.25-10 MG/5ML Syrup Take 5 mL by mouth every 8 hours as needed. 280 mL 0   • Misc. Devices Misc Oxygen concentrator to be used at night and prn at 2 lpm. Dx: COPD, CHF, hypoxemia. 1 Units 0   • ciprofloxacin (CILOXIN) 0.3 % Solution Place 1 Drop in both eyes every 12 hours. 1 Bottle 0     No current facility-administered medications on file prior to visit.     Blood pressure 124/80, pulse 72, temperature 36.7 °C (98.1 °F), resp. rate 16, height 1.505 m (4' 11.25\"), weight 91.627 kg (202 lb), SpO2 93 %.  Family History   Problem Relation Age of Onset   • Heart Disease Father    • Alcohol/Drug Mother    • Cancer Maternal Aunt        Physical Exam:  No distress at rest on room air. She is in wheelchair because of her knees  HEENT: PERRLA, EOMI, no scleral icterus, no nasal or oral lesions  Neck: No thyromegaly, no adenopathy, no bruits  Mallampatti: Grade III  Lungs: Equal breath sounds, no wheezes or crackles  Heart: Regular rate and rhythm, she has a 3/6 systolic murmur  Abdomen: Soft, benign, no organomegaly  Extremities: Chronic skin changes and discoloration with chronic edema right greater than left  Neurologic: Cranial nerve, motor, and sensory exam are normal    1. Dyspnea, unspecified type    2. Chronic obstructive pulmonary disease, unspecified COPD type (CMS-HCC)    3. Chronic diastolic congestive heart failure (CMS-[facility])    4. Aortic valve stenosis, unspecified etiology    5. Pulmonary hyperinflation        Her dyspnea is due to combined cardiac and respiratory issues.  Cardiology is following her aortic stenosis on a regular basis.  From a pulmonary standpoint she has significant COPD with a strong asthmatic component.  The addition of a topical steroid and long-acting beta adrenergic may be beneficial. We will prescribe Symbicort 160/4.52 puffs twice a day with a spacer and rinse  She will continue her supplemental oxygen at " night  We will see her back in 6-8 weeks for reassessment to see if the addition of Symbicort has helped.

## 2017-08-02 NOTE — MR AVS SNAPSHOT
Kishansunil Chaideze Miguelina   2017 8:30 AM   Non-Provider Visit   MRN: [id number]    Department:  [facility]   Dept Phone:  [phone]    Description:  Female : 1945   Provider:  Sammy Martinez M.D.           Reason for Visit     COPD     Shortness of Breath           Allergies as of 2017     Allergen Noted Reactions    Fosamax 09/15/2011       Bone pain    Other Drug 2013       Also allergies to Actenol, Bee stings and surgical tape    Pcn [Penicillins] 2011       Sulfa Drugs 2011         You were diagnosed with     Dyspnea, unspecified type   [4927311]         Vital Signs     Smoking Status                   Former Smoker           Basic Information     Date Of Birth Sex Race Ethnicity Preferred Language    1945 Female White Non- English      Your appointments     Aug 02, 2017 10:00 AM   New Patient Pulmonary with A Rotation   [facility] (--)    [address]  [address]  [place] [id number] [phone]            Sep 11, 2017 12:15 PM   [facility] with [facility] EXAM 9   [facility] [place] [facility])    [address] [facility]  [place] [address]   [phone]            Oct 12, 2017 11:20 AM   FOLLOW UP with Rufus Rodrigez M.D.   [facility] (--)    [address], [address]  [place] [id number] [phone]              Problem List              ICD-10-CM Priority Class Noted - Resolved    COPD (chronic obstructive pulmonary disease) (CMS-HCC) J44.9 High  2011 - Present    Obesity E66.9   2011 - Present    Osteoporosis, idiopathic M81.8   2011 - Present    Diastolic congestive heart failure (CMS-HCC) I50.30 High  2011 - Present    Aortic regurgitation I35.1 Medium  2011 - Present    Aortic stenosis I35.0 Medium  2012 - Present    DJD (degenerative joint disease) M19.90   10/19/2012 - Present    MEDICAL HOME    2012 - Present    Carotid bruit R09.89   2013 -  Present    Morbid obesity (CMS-[facility]) E66.01 High  6/16/2015 - Present    Anxiety F41.9   4/4/2016 - Present      Health Maintenance        Date Due Completion Dates    IMM INFLUENZA (1) 9/1/2017 9/9/2016, 9/11/2015, 9/2/2014    COLON CANCER SCREENING ANNUAL FIT 9/6/2017 9/6/2016    BONE DENSITY 11/5/2017 11/5/2012    MAMMOGRAM 3/2/2018 3/2/2017, 7/7/2014, 4/19/2011    IMM DTaP/Tdap/Td Vaccine (2 - Td) 12/10/2025 12/10/2015            Current Immunizations     13-VALENT PCV PREVNAR 6/24/2016  9:36 AM    Influenza Vaccine Adult HD 9/9/2016, 9/11/2015, 9/2/2014    Pneumococcal polysaccharide vaccine (PPSV-23) 9/2/2014    Tdap Vaccine 12/10/2015  5:56 PM      Below and/or attached are the medications your provider expects you to take. Review all of your home medications and newly ordered medications with your provider and/or pharmacist. Follow medication instructions as directed by your provider and/or pharmacist. Please keep your medication list with you and share with your provider. Update the information when medications are discontinued, doses are changed, or new medications (including over-the-counter products) are added; and carry medication information at all times in the event of emergency situations     Allergies:  FOSAMAX - (reactions not documented)     OTHER DRUG - (reactions not documented)     PCN - (reactions not documented)     SULFA DRUGS - (reactions not documented)               Medications  Valid as of: August 02, 2017 -  9:05 AM    Generic Name Brand Name Tablet Size Instructions for use    Albuterol Sulfate (Nebu Soln) PROVENTIL 2.5mg/3ml USE 3 ML BY NEBULIZATION ROUTE EVERY 6 HOURS AS NEEDED FOR SHORTNESS OF BREATH.        Albuterol Sulfate (Aero Soln) albuterol 108 (90 BASE) MCG/ACT Inhale 2 Puffs by mouth every 6 hours as needed.        Aspirin (Tab) aspirin 81 MG Take 81 mg by mouth every day.        BusPIRone HCl (Tab) BUSPAR 7.5 MG Take 1 Tab by mouth every day. TAKE 1 TAB BY MOUTH EVERY DAY.          Cholecalciferol (Tab) cholecalciferol 1000 UNIT Take 2,000 Units by mouth every day.        Ciprofloxacin HCl (Solution) CILOXIN 0.3 % Place 1 Drop in both eyes every 12 hours.        Clobetasol Propionate (Lotion) Clobetasol Propionate 0.05 % Apply to affected area bid        Clobetasol Propionate (Solution) TEMOVATE 0.05 % Apply sparingly bid        Clotrimazole-Betamethasone (Cream) LOTRISONE 1-0.05 % Apply 1 Application to affected area(s) 2 times a day.        Furosemide (Tab) LASIX 20 MG Take 1 Tab by mouth 3 times a day.        Ibuprofen (Cap) Ibuprofen 200 MG Take  by mouth.        Lactobacillus Rhamnosus (GG)   Take  by mouth.        Misc. Devices (Misc) Misc. Devices  4 wheel walker, needed for osteoarthritis of the knees.        Misc. Devices (Misc) Misc. Devices  Oxygen concentrator to be used at night and prn at 2 lpm. Dx: COPD, CHF, hypoxemia.        Multiple Vitamin   Take  by mouth.        Omeprazole (CAPSULE DELAYED RELEASE) PRILOSEC 20 MG Take 1 Cap by mouth every day.        Phenyleph-Promethazine-Cod (Syrup) PHENERGAN VC CODEINE 5-6.25-10 MG/5ML Take 5 mL by mouth every 8 hours as needed.        Potassium Chloride Mandi CR (Tab CR) Kdur 20 MEQ Take 1 Tab by mouth every day.        Tiotropium Bromide Monohydrate (Cap) SPIRIVA 18 MCG INHALE 1 CAP BY MOUTH EVERY DAY.        Triamcinolone Acetonide (Cream) KENALOG 0.025 % APPLY TO AFFECTED AREA(S) 2 TIMES A DAY.        .                 Medicines prescribed today were sent to:     [facility] #[id number] - [place] - [address] [address]    Phone: [phone] Fax: [phone]    Open 24 Hours?: No      Medication refill instructions:       If your prescription bottle indicates you have medication refills left, it is not necessary to call your provider’s office. Please contact your pharmacy and they will refill your medication.    If your prescription bottle indicates you do not have any refills left, you may request refills  at any time through one of the following ways: The online [employer] system (except Urgent Care), by calling your provider’s office, or by asking your pharmacy to contact your provider’s office with a refill request. Medication refills are processed only during regular business hours and may not be available until the next business day. Your provider may request additional information or to have a follow-up visit with you prior to refilling your medication.   *Please Note: Medication refills are assigned a new Rx number when refilled electronically. Your pharmacy may indicate that no refills were authorized even though a new prescription for the same medication is available at the pharmacy. Please request the medicine by name with the pharmacy before contacting your provider for a refill.        Other Notes About Your Plan     Patient is enrolled in [facility] with Dr. Rushing.             Lindsay Status: Patient Declined

## 2017-08-02 NOTE — MR AVS SNAPSHOT
"        Kishan Yoderjustine   2017 10:00 AM   Office Visit   MRN: [id number]    Department:  [facility]   Dept Phone:  [phone]    Description:  Female : 1945   Provider:  Sammy Martinez M.D.           Reason for Visit     New Patient Shortness of Breath      Allergies as of 2017     Allergen Noted Reactions    Fosamax 09/15/2011       Bone pain    Other Drug 2013       Also allergies to Actenol, Bee stings and surgical tape    Pcn [Penicillins] 2011       Sulfa Drugs 2011         You were diagnosed with     Dyspnea, unspecified type   [[id number]]       Chronic obstructive pulmonary disease, unspecified COPD type (CMS-[facility])   [9852931]       Chronic diastolic congestive heart failure (CMS-HCC)   [761556]       Aortic valve stenosis, unspecified etiology   [1335872]       Pulmonary hyperinflation   [231330]         Vital Signs     Blood Pressure Pulse Temperature Respirations Height Weight    124/80 mmHg 72 36.7 °C (98.1 °F) 16 1.505 m (4' 11.25\") 91.627 kg (202 lb)    Body Mass Index Oxygen Saturation Smoking Status             40.45 kg/m2 93% Former Smoker         Basic Information     Date Of Birth Sex Race Ethnicity Preferred Language    1945 Female White Non- English      Your appointments     Sep 11, 2017 12:15 PM   [place] [facility] [place]AM 9   [place] [facility] [place]    [address] [facility] [address]   [phone]            Oct 12, 2017 11:20 AM   FOLLOW UP with Rufus Rodrigez M.D.   [facility] (--)    [address], [facility] [address]  [facility] [id number] [phone]              Problem List              ICD-10-CM Priority Class Noted - Resolved    COPD (chronic obstructive pulmonary disease) (CMS-HCC) J44.9 High  2011 - Present    Obesity E66.9   2011 - Present    Osteoporosis, idiopathic M81.8   2011 - Present    Diastolic congestive heart failure (CMS-[facility]) I50.30 High  2011 - Present  "    Aortic regurgitation I35.1 Medium  12/5/2011 - Present    Aortic stenosis I35.0 Medium  6/7/2012 - Present    DJD (degenerative joint disease) M19.90   10/19/2012 - Present    MEDICAL HOME    11/9/2012 - Present    Carotid bruit R09.89   8/23/2013 - Present    Morbid obesity (CMS-HCC) E66.01 High  6/16/2015 - Present    Anxiety F41.9   4/4/2016 - Present      Health Maintenance        Date Due Completion Dates    IMM INFLUENZA (1) 9/1/2017 9/9/2016, 9/11/2015, 9/2/2014    COLON CANCER SCREENING ANNUAL FIT 9/6/2017 9/6/2016    BONE DENSITY 11/5/2017 11/5/2012    MAMMOGRAM 3/2/2018 3/2/2017, 7/7/2014, 4/19/2011    IMM DTaP/Tdap/Td Vaccine (2 - Td) 12/10/2025 12/10/2015            Current Immunizations     13-VALENT PCV PREVNAR 6/24/2016  9:36 AM    Influenza Vaccine Adult HD 9/9/2016, 9/11/2015, 9/2/2014    Pneumococcal polysaccharide vaccine (PPSV-23) 9/2/2014    Tdap Vaccine 12/10/2015  5:56 PM      Below and/or attached are the medications your provider expects you to take. Review all of your home medications and newly ordered medications with your provider and/or pharmacist. Follow medication instructions as directed by your provider and/or pharmacist. Please keep your medication list with you and share with your provider. Update the information when medications are discontinued, doses are changed, or new medications (including over-the-counter products) are added; and carry medication information at all times in the event of emergency situations     Allergies:  FOSAMAX - (reactions not documented)     OTHER DRUG - (reactions not documented)     PCN - (reactions not documented)     SULFA DRUGS - (reactions not documented)               Medications  Valid as of: August 02, 2017 - 10:24 AM    Generic Name Brand Name Tablet Size Instructions for use    Albuterol Sulfate (Nebu Soln) PROVENTIL 2.5mg/3ml USE 3 ML BY NEBULIZATION ROUTE EVERY 6 HOURS AS NEEDED FOR SHORTNESS OF BREATH.        Albuterol Sulfate (Aero  Soln) albuterol 108 (90 BASE) MCG/ACT Inhale 2 Puffs by mouth every 6 hours as needed.        Aspirin (Tab) aspirin 81 MG Take 81 mg by mouth every day.        Budesonide-Formoterol Fumarate (Aerosol) SYMBICORT 160-4.5 MCG/ACT Inhale 2 Puffs by mouth 2 Times a Day. Use with spacer.  Rinse mouth after each use.        BusPIRone HCl (Tab) BUSPAR 7.5 MG Take 1 Tab by mouth every day. TAKE 1 TAB BY MOUTH EVERY DAY.        Cholecalciferol (Tab) cholecalciferol 1000 UNIT Take 2,000 Units by mouth every day.        Ciprofloxacin HCl (Solution) CILOXIN 0.3 % Place 1 Drop in both eyes every 12 hours.        Clobetasol Propionate (Lotion) Clobetasol Propionate 0.05 % Apply to affected area bid        Clobetasol Propionate (Solution) TEMOVATE 0.05 % Apply sparingly bid        Clotrimazole-Betamethasone (Cream) LOTRISONE 1-0.05 % Apply 1 Application to affected area(s) 2 times a day.        Furosemide (Tab) LASIX 20 MG Take 1 Tab by mouth 3 times a day.        Ibuprofen (Cap) Ibuprofen 200 MG Take  by mouth.        Lactobacillus Rhamnosus (GG)   Take  by mouth.        Misc. Devices (Misc) Misc. Devices  4 wheel walker, needed for osteoarthritis of the knees.        Misc. Devices (Misc) Misc. Devices  Oxygen concentrator to be used at night and prn at 2 lpm. Dx: COPD, CHF, hypoxemia.        Multiple Vitamin   Take  by mouth.        Omeprazole (CAPSULE DELAYED RELEASE) PRILOSEC 20 MG Take 1 Cap by mouth every day.        Phenyleph-Promethazine-Cod (Syrup) PHENERGAN VC CODEINE 5-6.25-10 MG/5ML Take 5 mL by mouth every 8 hours as needed.        Potassium Chloride Mandi CR (Tab CR) Kdur 20 MEQ Take 1 Tab by mouth every day.        Tiotropium Bromide Monohydrate (Cap) SPIRIVA 18 MCG INHALE 1 CAP BY MOUTH EVERY DAY.        Triamcinolone Acetonide (Cream) KENALOG 0.025 % APPLY TO AFFECTED AREA(S) 2 TIMES A DAY.        .                 Medicines prescribed today were sent to:     [facility]/PHARMACY #[id number] - [place] - [address]  [place] [address]    Phone: [phone] Fax: [phone]    Open 24 Hours?: No      Medication refill instructions:       If your prescription bottle indicates you have medication refills left, it is not necessary to call your provider’s office. Please contact your pharmacy and they will refill your medication.    If your prescription bottle indicates you do not have any refills left, you may request refills at any time through one of the following ways: The online [employer] system (except Urgent Care), by calling your provider’s office, or by asking your pharmacy to contact your provider’s office with a refill request. Medication refills are processed only during regular business hours and may not be available until the next business day. Your provider may request additional information or to have a follow-up visit with you prior to refilling your medication.   *Please Note: Medication refills are assigned a new Rx number when refilled electronically. Your pharmacy may indicate that no refills were authorized even though a new prescription for the same medication is available at the pharmacy. Please request the medicine by name with the pharmacy before contacting your provider for a refill.        Other Notes About Your Plan     Patient is enrolled in [facility] with Dr. Rushing.             Grow [employer]itch Status: Patient Declined

## 2017-08-02 NOTE — PROCEDURES
FEV1 is 0.69 L which is 38% of predicted. FEV1 FVC ratio is reduced at 69%. MVV is severely reduced at 34% of predicted.  After administration of an inhaled bronchodilator there is improvement in FEV1 up to 0.94 L which is 53% of predicted. This is a relative 36% improvement.  Total lung capacity is mildly reduced at 76% of predicted. RV/TLC ratio is increased to 154% of predicted.  DLCO is 112% of predicted.  Airways resistance is increased.    This study demonstrates presence of severe obstructive lung disease with a definite reversible component. The lack of severe hyperinflation indicates a coexisting restrictive process which may be due to the patient's weight. Her diffusing capacity is normal.

## 2017-09-11 ENCOUNTER — HOSPITAL ENCOUNTER (OUTPATIENT)
Dept: CARDIOLOGY | Facility: MEDICAL CENTER | Age: 72
End: 2017-09-11
Attending: INTERNAL MEDICINE
Payer: MEDICARE

## 2017-09-11 DIAGNOSIS — I35.0 AORTIC VALVE STENOSIS, UNSPECIFIED ETIOLOGY: ICD-10-CM

## 2017-09-11 LAB
LV EJECT FRACT  99904: 55
LV EJECT FRACT MOD 2C 99903: 58.64
LV EJECT FRACT MOD 4C 99902: 54.94
LV EJECT FRACT MOD BP 99901: 56.11

## 2017-09-11 PROCEDURE — 93306 TTE W/DOPPLER COMPLETE: CPT | Mod: 26 | Performed by: INTERNAL MEDICINE

## 2017-09-11 PROCEDURE — 93306 TTE W/DOPPLER COMPLETE: CPT

## 2017-09-13 ENCOUNTER — TELEPHONE (OUTPATIENT)
Dept: CARDIOLOGY | Facility: MEDICAL CENTER | Age: 72
End: 2017-09-13

## 2017-09-13 NOTE — TELEPHONE ENCOUNTER
Called pt and no answer. LM to please call office back regarding echo results.     1400: pt called back and notified her of results. She appreciated.     ----- Message from Rufus Rodrigez M.D. sent at 9/11/2017  5:13 PM PDT -----  Please call patient with results showing mild to moderate aortic stenosis without change from last study. Please repeat an echocardiogram in one year.    Thanks.  EDWARD

## 2017-10-03 DIAGNOSIS — I10 ESSENTIAL HYPERTENSION: ICD-10-CM

## 2017-10-03 RX ORDER — FUROSEMIDE 20 MG/1
20 TABLET ORAL 3 TIMES DAILY
Qty: 270 TAB | Refills: 1 | Status: SHIPPED | OUTPATIENT
Start: 2017-10-03 | End: 2018-03-18 | Stop reason: SDUPTHER

## 2017-10-03 RX ORDER — POTASSIUM CHLORIDE 20 MEQ/1
20 TABLET, EXTENDED RELEASE ORAL
Qty: 90 TAB | Refills: 1 | Status: SHIPPED | OUTPATIENT
Start: 2017-10-03 | End: 2018-03-20 | Stop reason: SDUPTHER

## 2017-10-12 ENCOUNTER — OFFICE VISIT (OUTPATIENT)
Dept: CARDIOLOGY | Facility: MEDICAL CENTER | Age: 72
End: 2017-10-12
Payer: MEDICARE

## 2017-10-12 VITALS
SYSTOLIC BLOOD PRESSURE: 122 MMHG | BODY MASS INDEX: 39.07 KG/M2 | OXYGEN SATURATION: 94 % | HEIGHT: 60 IN | HEART RATE: 70 BPM | WEIGHT: 199 LBS | DIASTOLIC BLOOD PRESSURE: 76 MMHG

## 2017-10-12 DIAGNOSIS — I35.0 AORTIC VALVE STENOSIS, ETIOLOGY OF CARDIAC VALVE DISEASE UNSPECIFIED: ICD-10-CM

## 2017-10-12 DIAGNOSIS — I50.30 DIASTOLIC CONGESTIVE HEART FAILURE, UNSPECIFIED CONGESTIVE HEART FAILURE CHRONICITY: ICD-10-CM

## 2017-10-12 DIAGNOSIS — I35.1 AORTIC VALVE INSUFFICIENCY, ETIOLOGY OF CARDIAC VALVE DISEASE UNSPECIFIED: ICD-10-CM

## 2017-10-12 PROCEDURE — 99213 OFFICE O/P EST LOW 20 MIN: CPT | Performed by: INTERNAL MEDICINE

## 2017-10-12 ASSESSMENT — ENCOUNTER SYMPTOMS
BRUISES/BLEEDS EASILY: 0
DIZZINESS: 0
SHORTNESS OF BREATH: 1
VOMITING: 0
WEIGHT LOSS: 1
EYES NEGATIVE: 1
NEUROLOGICAL NEGATIVE: 1
MYALGIAS: 0
FOCAL WEAKNESS: 0
SPUTUM PRODUCTION: 0
DEPRESSION: 0
BLURRED VISION: 0
NERVOUS/ANXIOUS: 0
NAUSEA: 0
PALPITATIONS: 0
HEADACHES: 0
GASTROINTESTINAL NEGATIVE: 1
PSYCHIATRIC NEGATIVE: 1
DOUBLE VISION: 0
HEMOPTYSIS: 0

## 2017-10-12 NOTE — PROGRESS NOTES
Subjective:   Kishan Mcintyre is a 72 y.o. female who presents for annual follow up of aortic stenosis.    HPI    Since the patients last visit on 04/06/17, she has been experiencing significant shortness of breath. She denies chest pain, palpitations, nausea/vomiting or diaphoresis. She underwent catarct surgery. She is active enjoying her grandchildren. She enjoyed her [place] cruise.    Review of Systems   Constitutional: Positive for weight loss. Negative for malaise/fatigue.   HENT: Negative.  Negative for hearing loss.    Eyes: Negative.  Negative for blurred vision and double vision.   Respiratory: Positive for shortness of breath. Negative for hemoptysis and sputum production.    Cardiovascular: Negative for chest pain, palpitations and leg swelling.   Gastrointestinal: Negative.  Negative for nausea and vomiting.   Genitourinary: Negative.  Negative for dysuria and urgency.   Musculoskeletal: Positive for joint pain. Negative for myalgias.   Skin: Negative.  Negative for itching and rash.   Neurological: Negative.  Negative for dizziness, focal weakness and headaches.   Endo/Heme/Allergies: Negative.  Does not bruise/bleed easily.   Psychiatric/Behavioral: Negative.  Negative for depression. The patient is not nervous/anxious.         Objective:     /76   Pulse 70   Ht 1.524 m (5')   Wt 90.3 kg (199 lb)   SpO2 94%   BMI 38.86 kg/m²     Physical Exam   Constitutional: She is oriented to person, place, and time. She appears well-developed and well-nourished.   Using walker.   HENT:   Head: Normocephalic and atraumatic.   Eyes: Pupils are equal, round, and reactive to light.   Neck: Normal range of motion. Neck supple.   Cardiovascular: Normal rate and regular rhythm.    Murmur heard.   Crescendo systolic murmur is present with a grade of 2/6   Pulmonary/Chest: Effort normal and breath sounds normal.   Abdominal: Soft. Bowel sounds are normal.   Musculoskeletal: Normal range of motion. She exhibits  edema.   Neurological: She is alert and oriented to person, place, and time.   Skin: Skin is warm and dry.   Psychiatric: She has a normal mood and affect. Thought content normal.     Medications reviewed.    Current Outpatient Prescriptions   Medication   • potassium chloride SA (KDUR) 20 MEQ Tab CR   • furosemide (LASIX) 20 MG Tab   • budesonide-formoterol (SYMBICORT) 160-4.5 MCG/ACT Aerosol   • busPIRone (BUSPAR) 7.5 MG tablet   • Ibuprofen (ADVIL) 200 MG Cap   • albuterol 108 (90 BASE) MCG/ACT Aero Soln inhalation aerosol   • tiotropium (SPIRIVA HANDIHALER) 18 MCG Cap   • albuterol (PROVENTIL) 2.5mg/3ml Nebu Soln solution for nebulization   • triamcinolone acetonide (KENALOG) 0.025 % Cream   • Misc. Devices Misc   • ciprofloxacin (CILOXIN) 0.3 % Solution   • omeprazole (PRILOSEC) 20 MG delayed-release capsule   • clobetasol (TEMOVATE) 0.05 % external solution   • clotrimazole-betamethasone (LOTRISONE) 1-0.05 % CREA   • Lactobacillus Rhamnosus, GG, (CULTURELLE PO)   • Clobetasol Propionate 0.05 % LOTN   • Misc. Devices MISC   • vitamin D (CHOLECALCIFEROL) 1000 UNIT TABS   • Multiple Vitamin (MULTI-VITAMIN PO)   • aspirin 81 MG tablet   • prometh-phenylephrine-codeine (PHENERGAN VC CODEINE) 5-6.25-10 MG/5ML Syrup     No current facility-administered medications for this visit.      CARDIAC STUDIES/PROCEDURES:    CAROTID ULTRASOUND (08/30/13)  Tortuous but no significant plaque in carotid arteries bilaterally.  Normal vertebral flow.    ECHOCARDIOGRAM CONCLUSIONS (09/11/17)  Prior echo 2-23-17; compared to the report of the study done - there   has been no significant change.   Normal left ventricular systolic function.  Left ventricular ejection fraction is visually estimated to be 55%.  Mild mitral regurgitation.  Mild to moderate aortic stenosis.  Vmax is 3.03 m/s.  Transvalvular gradients are - Peak: 37 mmHg, Mean: 21 mmHg.  Mild aortic insufficiency.  Mild tricuspid regurgitation.  Estimated right ventricular  systolic pressure is 50 mmHg.    ECHOCARDIOGRAM CONCLUSIONS (03/22/17)  Prior study done on 02/18/16,  Normal left ventricular systolic function.   Moderate aortic stenosis. Vmax is 3.4  m/s.   Transvalvular gradients are - Peak: 47 mmHg, Mean: 24  mmHg.  Compared to the images of the prior study done -  there has been no significant change.     ECHOCARDIOGRAM CONCLUSIONS (02/18/16)  Normal left ventricular systolic function.  Left ventricular ejection fraction is visually estimated to be 60%.  Grade I diastolic dysfunction.  Mild to moderate aortic stenosis.  Vmax 3.0 m/s. Transvalvular gradients are Peak: 40 mmHg, Mean: 24 mmHg.  Mild aortic insufficiency.  Mild tricuspid regurgitation.  Right ventricular systolic pressure is estimated to be 60 mmHg.    ECHOCARDIOGRAM CONCLUSIONS (02/05/15)  Mild aortic stenosis. Transvalvular gradients are - Peak: 32 mmHg   Mean: 20 mmHg.  Transvalvular gradients are - Peak: 32 mmHg Mean: 20 mmHg.  Calcification of the noncoronary cusp Mild aortic insufficiency.  Mild concentric left ventricular hypertrophy.  Left ventricular ejection fraction is 60% to 65%.  Grade I diastolic dysfunction - mitral inflow E/A is <1.0.  Moderately dilated right ventricle.  Moderately dilated left atrium.  Right ventricular systolic pressure is estimated to be 46-51 mmHg.  Trace pulmonic insufficiency.    ECHOCARDIOGRAM CONCLUSIONS (08/30/13)  Normal left ventricular systolic function.   Left ventricular ejection fraction is 60% to 65%.   Mild to moderate aortic stenosis.  Mild aortic insufficiency.    ECHOCARDIOGRAM (04/23/12)  Echocardiogram showing ejection fraction of 60-65%, mild aortic stenosis and mild mitral regurgitation.    EKG performed on (08/23/13) EKG shows normal sinus rhythm.    Laboratory results of (07/23/16) were reviewed. Cholesterol profile of 171/101/60/91 noted.    LOWER EXTREMITY VENOUS ULTRASOUND (04/10/10)  Lower extremity venous study showing patent bilateral common  femoral veins.    MPI CONCLUSIONS (03/02/17)  1. Small reversible defect seen in apical inferior and lateral wall    consistent with ischemia. Prominent subdiaphragmatic activity seen in stress    than rest images could attenuation defect but cannot r/o ischemia. Consider    clinical correlation.  2. Normal left ventricular wall motion, with EF of  69 %. Normal left    ventricular wall thickening. Calculated TID 1.21.    MPI CONCLUSIONS (02/18/16)  1. Small reversible defect seen in apical inferior and lateral wall   consistent with ischemia. Prominent subdiaphragmatic activity seen in stress   than rest images could attenuation defect but cannot r/o ischemia. Consider   clinical correlation.  2. Normal left ventricular wall motion, with EF of 69 %. Normal left   ventricular wall thickening. Calculated TID 1.21.    PULMONARY FUNCTION INTERPRETATION (08/02/17)  FEV1 is 0.69 L which is 38% of predicted. FEV1 FVC ratio is reduced at 69%. MVV is severely reduced at 34% of predicted.  After administration of an inhaled bronchodilator there is improvement in FEV1 up to 0.94 L which is 53% of predicted. This is a relative 36% improvement.  Total lung capacity is mildly reduced at 76% of predicted. RV/TLC ratio is increased to 154% of predicted.  DLCO is 112% of predicted.  Airways resistance is increased.   This study demonstrates presence of severe obstructive lung disease with a definite reversible component. The lack of severe hyperinflation indicates a coexisting restrictive process which may be due to the patient's weight. Her diffusing capacity is normal.    Assessment:     Patient Active Problem List   Diagnoses Date Noted   • Aortic stenosis 06/07/2012     Priority: High   • Aortic regurgitation 12/05/2011     Priority: High   • Atrial flutter 12/05/2011     Priority: Medium   • Diastolic congestive heart failure 12/05/2011     Priority: Low   • COPD (chronic obstructive pulmonary disease) 04/13/2011     Plan:      1. Aortic stenosis and aortic regurgitation: She is clinically doing well. Her recent echocardiogram shows mild to moderate aortic stensois. We will repeat an echocardiogram in one year  2. Atrial flutter (single atrial flutter event associated with respiratory failure/pnuemonia without recurrence): Sinus rhythm is maintained.  3. History of diastolic congestive heart failure: The overall volume status is adequate.  4. Chronic obstructive pulmonary disease (managed by Dr. Martinez)    We will follow up the patient in one year with echocardiogram.    CC Clement Webb and Sammy Davis

## 2017-11-13 ENCOUNTER — TELEPHONE (OUTPATIENT)
Dept: PULMONOLOGY | Facility: HOSPICE | Age: 72
End: 2017-11-13

## 2017-11-13 NOTE — TELEPHONE ENCOUNTER
Patient was seen 08/02/17, she was instructed to call back if the addition of Symbicort didn't work. It has not helped, she feels she is worse. She mentioned there were 3 others she could try. If so, please write trial rx to be faxed to the [facility] pharmacy.     Thanks, Dinora

## 2017-11-14 ENCOUNTER — TELEPHONE (OUTPATIENT)
Dept: PULMONOLOGY | Facility: HOSPICE | Age: 72
End: 2017-11-14

## 2017-11-14 NOTE — TELEPHONE ENCOUNTER
Pt called to inform us that the [facility] is out of the Breo 200 samples and requested that an rx for 30 day only be called into her local pharmacy.  I called one month supply into her local pharmacy and spoke to Shairta.

## 2017-11-20 ENCOUNTER — OFFICE VISIT (OUTPATIENT)
Dept: MEDICAL GROUP | Facility: MEDICAL CENTER | Age: 72
End: 2017-11-20
Payer: MEDICARE

## 2017-11-20 VITALS
HEIGHT: 60 IN | RESPIRATION RATE: 16 BRPM | DIASTOLIC BLOOD PRESSURE: 72 MMHG | TEMPERATURE: 98.2 F | WEIGHT: 199 LBS | SYSTOLIC BLOOD PRESSURE: 120 MMHG | HEART RATE: 72 BPM | BODY MASS INDEX: 39.07 KG/M2 | OXYGEN SATURATION: 94 %

## 2017-11-20 DIAGNOSIS — E66.9 OBESITY (BMI 30-39.9): ICD-10-CM

## 2017-11-20 DIAGNOSIS — M25.561 CHRONIC PAIN OF RIGHT KNEE: ICD-10-CM

## 2017-11-20 DIAGNOSIS — J44.9 CHRONIC OBSTRUCTIVE PULMONARY DISEASE, UNSPECIFIED COPD TYPE (HCC): ICD-10-CM

## 2017-11-20 DIAGNOSIS — I27.20 PULMONARY HYPERTENSION (HCC): ICD-10-CM

## 2017-11-20 DIAGNOSIS — G89.29 CHRONIC PAIN OF RIGHT KNEE: ICD-10-CM

## 2017-11-20 PROCEDURE — 20610 DRAIN/INJ JOINT/BURSA W/O US: CPT | Performed by: FAMILY MEDICINE

## 2017-11-20 PROCEDURE — 99213 OFFICE O/P EST LOW 20 MIN: CPT | Mod: 25 | Performed by: FAMILY MEDICINE

## 2017-11-20 RX ORDER — METHYLPREDNISOLONE ACETATE 80 MG/ML
80 INJECTION, SUSPENSION INTRA-ARTICULAR; INTRALESIONAL; INTRAMUSCULAR; SOFT TISSUE ONCE
Status: COMPLETED | OUTPATIENT
Start: 2017-11-20 | End: 2017-11-20

## 2017-11-20 RX ADMIN — METHYLPREDNISOLONE ACETATE 80 MG: 80 INJECTION, SUSPENSION INTRA-ARTICULAR; INTRALESIONAL; INTRAMUSCULAR; SOFT TISSUE at 14:57

## 2017-12-10 DIAGNOSIS — G47.9 SLEEP DISORDER: ICD-10-CM

## 2017-12-12 RX ORDER — BUSPIRONE HYDROCHLORIDE 7.5 MG/1
7.5 TABLET ORAL
Qty: 90 TAB | Refills: 1 | Status: SHIPPED | OUTPATIENT
Start: 2017-12-12 | End: 2018-12-06 | Stop reason: SDUPTHER

## 2018-01-03 ENCOUNTER — TELEPHONE (OUTPATIENT)
Dept: PULMONOLOGY | Facility: HOSPICE | Age: 73
End: 2018-01-03

## 2018-01-03 DIAGNOSIS — G47.9 SLEEP DISORDER: ICD-10-CM

## 2018-01-03 RX ORDER — BUSPIRONE HYDROCHLORIDE 7.5 MG/1
7.5 TABLET ORAL
Qty: 90 TAB | Refills: 1 | Status: SHIPPED | OUTPATIENT
Start: 2018-01-03 | End: 2018-03-12

## 2018-01-03 NOTE — TELEPHONE ENCOUNTER
1. Caller Name: Pt                      Call Back Number: [phone] (home)     2. Message: She has completed her sample of Breo 200-25mcg. She would like an rx refill for Breo 200-25mcg sent to her [facility] pharmacy on [address]. She also asks if there is another alternate to Breo that she could get a sample of as well.    3. Patient approves office to leave a detailed voicemail/MyChart message: yes

## 2018-01-05 NOTE — TELEPHONE ENCOUNTER
I called the patient to let her know that Breo has been sent to her pharmacy and [facility] pharmacy.

## 2018-01-16 ENCOUNTER — TELEPHONE (OUTPATIENT)
Dept: MEDICAL GROUP | Facility: MEDICAL CENTER | Age: 73
End: 2018-01-16

## 2018-01-16 NOTE — TELEPHONE ENCOUNTER
VOICEMAIL  1. Caller Name: Kishan Mcintyre                        Call Back Number: [phone] (home)       2. Message: would like to know if you could prescribe here doxicillin for the infection in her lungs or do you want her to come in and see her    3. Patient approves office to leave a detailed voicemail/MyChart message: yes

## 2018-01-18 ENCOUNTER — OFFICE VISIT (OUTPATIENT)
Dept: MEDICAL GROUP | Facility: MEDICAL CENTER | Age: 73
End: 2018-01-18
Payer: MEDICARE

## 2018-01-18 VITALS
TEMPERATURE: 98.3 F | WEIGHT: 202 LBS | HEART RATE: 81 BPM | BODY MASS INDEX: 39.66 KG/M2 | SYSTOLIC BLOOD PRESSURE: 126 MMHG | DIASTOLIC BLOOD PRESSURE: 74 MMHG | OXYGEN SATURATION: 86 % | RESPIRATION RATE: 16 BRPM | HEIGHT: 60 IN

## 2018-01-18 DIAGNOSIS — J44.1 COPD WITH EXACERBATION (HCC): ICD-10-CM

## 2018-01-18 PROCEDURE — 99214 OFFICE O/P EST MOD 30 MIN: CPT | Performed by: FAMILY MEDICINE

## 2018-01-18 RX ORDER — DOXYCYCLINE HYCLATE 100 MG
100 TABLET ORAL 2 TIMES DAILY
Qty: 14 TAB | Refills: 0 | Status: SHIPPED | OUTPATIENT
Start: 2018-01-18 | End: 2018-10-18

## 2018-01-18 RX ORDER — PREDNISONE 10 MG/1
TABLET ORAL
Qty: 30 TAB | Refills: 0 | Status: SHIPPED | OUTPATIENT
Start: 2018-01-18 | End: 2018-03-12

## 2018-01-18 RX ORDER — ALBUTEROL SULFATE 90 UG/1
2 AEROSOL, METERED RESPIRATORY (INHALATION) EVERY 6 HOURS PRN
Qty: 8.5 G | Refills: 3 | Status: SHIPPED | OUTPATIENT
Start: 2018-01-18 | End: 2018-01-19 | Stop reason: SDUPTHER

## 2018-01-18 RX ORDER — GUAIFENESIN AND DEXTROMETHORPHAN HYDROBROMIDE 100; 10 MG/5ML; MG/5ML
10 SOLUTION ORAL EVERY 6 HOURS PRN
Qty: 560 ML | Refills: 0 | Status: SHIPPED | OUTPATIENT
Start: 2018-01-18 | End: 2018-01-19 | Stop reason: SDUPTHER

## 2018-01-18 RX ORDER — PREDNISONE 10 MG/1
TABLET ORAL
Qty: 30 TAB | Refills: 0 | Status: SHIPPED | OUTPATIENT
Start: 2018-01-18 | End: 2018-01-18 | Stop reason: SDUPTHER

## 2018-01-18 RX ORDER — GUAIFENESIN AND DEXTROMETHORPHAN HYDROBROMIDE 100; 10 MG/5ML; MG/5ML
10 SOLUTION ORAL EVERY 6 HOURS PRN
Qty: 560 ML | Refills: 0 | Status: SHIPPED | OUTPATIENT
Start: 2018-01-18 | End: 2018-01-18 | Stop reason: SDUPTHER

## 2018-01-18 RX ORDER — DOXYCYCLINE HYCLATE 100 MG
100 TABLET ORAL 2 TIMES DAILY
Qty: 14 TAB | Refills: 0 | Status: SHIPPED | OUTPATIENT
Start: 2018-01-18 | End: 2018-01-18 | Stop reason: SDUPTHER

## 2018-01-18 NOTE — PROGRESS NOTES
CC: Cough/ SOB    HPI:   Kishan with h/o COPD, presents today because she has a productive cough with yellow thick phlegm for a week, started as a running nose, and mild cough that gradually becomes productive. She reported feeling hot sometimes however her temp in the clinic today is normal. She has has been having SOB.Has been on oxygen at home, at 2.5 L/min . Her oxygen saturation today is 86 % in RA, she does not have a portable oxygen. It goes up to 92% on 2L/min. Denies any chest pain.      Patient Active Problem List    Diagnosis Date Noted   • Morbid obesity (CMS-HCC) 06/16/2015     Priority: High   • Diastolic congestive heart failure (CMS-HCC) 12/05/2011     Priority: High   • COPD (chronic obstructive pulmonary disease) (CMS-HCC) 04/13/2011     Priority: High   • Aortic stenosis 06/07/2012     Priority: Medium   • Aortic regurgitation 12/05/2011     Priority: Medium   • Obesity (BMI 35.0-39.9 without comorbidity) ([facility]) 11/20/2017   • Obesity (BMI 30-39.9) 11/20/2017   • Anxiety 04/04/2016   • Carotid bruit 08/23/2013   • MEDICAL HOME 11/09/2012   • DJD (degenerative joint disease) 10/19/2012   • Obesity 04/13/2011   • Osteoporosis, idiopathic 04/13/2011       Current Outpatient Prescriptions   Medication Sig Dispense Refill   • predniSONE (DELTASONE) 10 MG Tab Take 20 mg bid for 2 days, 10 mg bid for 2 days, 10 mg daily for 2 days, and 5 mg daily for 2 days. 30 Tab 0   • doxycycline (VIBRAMYCIN) 100 MG Tab Take 1 Tab by mouth 2 times a day. 14 Tab 0   • albuterol 108 (90 Base) MCG/ACT Aero Soln inhalation aerosol Inhale 2 Puffs by mouth every 6 hours as needed for Shortness of Breath. 8.5 g 3   • busPIRone (BUSPAR) 7.5 MG tablet TAKE 1 TAB BY MOUTH EVERY DAY. TAKE 1 TAB BY MOUTH EVERY DAY. 90 Tab 1   • Fluticasone Furoate-Vilanterol (BREO ELLIPTA) 200-25 MCG/INH AEROSOL POWDER, BREATH ACTIVATED Inhale 1 Puff by mouth every day. Rinse mouth after use. 2 Each 0   • busPIRone (BUSPAR) 7.5 MG tablet TAKE 1 TAB  BY MOUTH EVERY DAY. TAKE 1 TAB BY MOUTH EVERY DAY. 90 Tab 1   • potassium chloride SA (KDUR) 20 MEQ Tab CR Take 1 Tab by mouth every day. 90 Tab 1   • furosemide (LASIX) 20 MG Tab Take 1 Tab by mouth 3 times a day. 270 Tab 1   • Ibuprofen (ADVIL) 200 MG Cap Take  by mouth.     • albuterol 108 (90 BASE) MCG/ACT Aero Soln inhalation aerosol Inhale 2 Puffs by mouth every 6 hours as needed. 3 Inhaler 0   • tiotropium (SPIRIVA HANDIHALER) 18 MCG Cap INHALE 1 CAP BY MOUTH EVERY DAY. 90 Cap 2   • albuterol (PROVENTIL) 2.5mg/3ml Nebu Soln solution for nebulization USE 3 ML BY NEBULIZATION ROUTE EVERY 6 HOURS AS NEEDED FOR SHORTNESS OF BREATH. 375 mL 2   • triamcinolone acetonide (KENALOG) 0.025 % Cream APPLY TO AFFECTED AREA(S) 2 TIMES A DAY. 15 g 1   • Misc. Devices Misc Oxygen concentrator to be used at night and prn at 2 lpm. Dx: COPD, CHF, hypoxemia. 1 Units 0   • ciprofloxacin (CILOXIN) 0.3 % Solution Place 1 Drop in both eyes every 12 hours. 1 Bottle 0   • omeprazole (PRILOSEC) 20 MG delayed-release capsule Take 1 Cap by mouth every day. 90 Cap 1   • clobetasol (TEMOVATE) 0.05 % external solution Apply sparingly bid 50 mL 0   • clotrimazole-betamethasone (LOTRISONE) 1-0.05 % CREA Apply 1 Application to affected area(s) 2 times a day. 1 Tube 1   • Lactobacillus Rhamnosus, GG, (CULTURELLE PO) Take  by mouth.     • Clobetasol Propionate 0.05 % LOTN Apply to affected area bid 1 Bottle 1   • Misc. Devices MISC 4 wheel walker, needed for osteoarthritis of the knees. 1 Each 0   • vitamin D (CHOLECALCIFEROL) 1000 UNIT TABS Take 2,000 Units by mouth every day.     • Multiple Vitamin (MULTI-VITAMIN PO) Take  by mouth.     • aspirin 81 MG tablet Take 81 mg by mouth every day.       No current facility-administered medications for this visit.          Allergies as of 01/18/2018 - Reviewed 01/18/2018   Allergen Reaction Noted   • Fosamax  09/15/2011   • Other drug  07/06/2013   • Pcn [penicillins]  04/13/2011   • Sulfa drugs   04/13/2011        ROS: Denies any chest pain, Shortness of breath, Changes bowel or bladder, Lower extremity edema.    Physical Exam:  /74   Pulse 81   Temp 36.8 °C (98.3 °F)   Resp 16   Ht 1.524 m (5')   Wt 91.6 kg (202 lb)   SpO2 (!) 86%   BMI 39.45 kg/m²   Gen.: Well-developed, well-nourished, no apparent distress,pleasant and cooperative with the examination  Skin:  Warm and dry with good turgor. No rashes or suspicious lesions in visible areas  HEENT:Sinuses nontender with palpation, TMs clear, nares patent with pink mucosa and clear rhinorrhea,no septal deviation ,polyps or lesions. lips without lesions, oropharynx clear.  Neck: Trachea midline,no masses or adenopathy. No JVD.  Cor: Regular rate and rhythm without murmur, gallop or rub.  Lungs: Respirations unlabored.Decreased breath sounds bilaterally. Scattered wheezes, no crackles.  Extremities: No cyanosis, no clubbing, mild edema.          Assessment and Plan.   72 y.o. female     1. COPD with exacerbation (CMS-HCC)  Oral steroid ( tapering dose), oral Abx.  Cough Supressant.  Albuterol inhaler q 6h as needed.  Sup[portive treatment as follows:  A. Drink plenty of fluids to keep yourself hydrated. Warm fluids like tea and hot soup are better.  B. Increase humidity in the house with a humidifier or place your head over a steaming pot.  C. Use Tylenol or Motrin for aches, pains, or fever.  D. Get plenty of rest to allow your body time to heal.  E. Use OTC Mucinex to loosen mucous.  F. Contact the office or return for appt. if you develope worsening symptoms or do not improve in the next week.  G. Avoid lung irritants such as tobacco smoke or blowing dust.    Need a portable oxygen, Has a big tank home oxygen at 2.5L/ min.    - predniSONE (DELTASONE) 10 MG Tab; Take 20 mg bid for 2 days, 10 mg bid for 2 days, 10 mg daily for 2 days, and 5 mg daily for 2 days.  Dispense: 30 Tab; Refill: 0  - doxycycline (VIBRAMYCIN) 100 MG Tab; Take 1 Tab by  mouth 2 times a day.  Dispense: 14 Tab; Refill: 0  - albuterol 108 (90 Base) MCG/ACT Aero Soln inhalation aerosol; Inhale 2 Puffs by mouth every 6 hours as needed for Shortness of Breath.  Dispense: 8.5 g; Refill: 3  - Dextromethorphan-Guaifenesin (TUSSIN DM)  MG/5ML Syrup; Take 10 mL by mouth every 6 hours as needed.  Dispense: 560 mL; Refill: 0

## 2018-01-19 ENCOUNTER — TELEPHONE (OUTPATIENT)
Dept: MEDICAL GROUP | Facility: MEDICAL CENTER | Age: 73
End: 2018-01-19

## 2018-01-19 DIAGNOSIS — J44.9 CHRONIC OBSTRUCTIVE PULMONARY DISEASE, UNSPECIFIED COPD TYPE (HCC): ICD-10-CM

## 2018-01-19 DIAGNOSIS — J44.1 COPD WITH EXACERBATION (HCC): ICD-10-CM

## 2018-01-19 RX ORDER — GUAIFENESIN AND DEXTROMETHORPHAN HYDROBROMIDE 100; 10 MG/5ML; MG/5ML
10 SOLUTION ORAL EVERY 6 HOURS PRN
Qty: 560 ML | Refills: 0 | Status: SHIPPED | OUTPATIENT
Start: 2018-01-19 | End: 2018-10-18

## 2018-01-19 RX ORDER — TIOTROPIUM BROMIDE 18 UG/1
CAPSULE ORAL; RESPIRATORY (INHALATION)
Qty: 90 CAP | Refills: 2 | Status: SHIPPED | OUTPATIENT
Start: 2018-01-19 | End: 2019-01-18 | Stop reason: CLARIF

## 2018-01-19 NOTE — TELEPHONE ENCOUNTER
Was the patient seen in the last year in this department? Yes     Does patient have an active prescription for medications requested? Yes     Received Request Via: Pharmacy     Sent to the wrong pharmacy

## 2018-01-19 NOTE — TELEPHONE ENCOUNTER
Was the patient seen in the last year in this department? Yes     Does patient have an active prescription for medications requested? Yes     Received Request Via: Patient   Sent to the wrong pharmacy

## 2018-01-19 NOTE — TELEPHONE ENCOUNTER
1. Caller Name: Kishan Clarkgiovani                                           Call Back Number: [phone] (home)       Patient approves a detailed voicemail message: N\A    Pt would like an order for a portable oxygen tank?

## 2018-01-21 RX ORDER — ALBUTEROL SULFATE 90 UG/1
2 AEROSOL, METERED RESPIRATORY (INHALATION) EVERY 6 HOURS PRN
Qty: 8.5 G | Refills: 3 | Status: SHIPPED | OUTPATIENT
Start: 2018-01-21 | End: 2018-04-05 | Stop reason: SDUPTHER

## 2018-01-22 RX ORDER — ALBUTEROL SULFATE 2.5 MG/3ML
SOLUTION RESPIRATORY (INHALATION)
Qty: 375 ML | Refills: 2 | Status: SHIPPED | OUTPATIENT
Start: 2018-01-22 | End: 2018-01-23 | Stop reason: SDUPTHER

## 2018-01-23 RX ORDER — ALBUTEROL SULFATE 2.5 MG/3ML
SOLUTION RESPIRATORY (INHALATION)
Qty: 375 ML | Refills: 2 | Status: SHIPPED | OUTPATIENT
Start: 2018-01-23 | End: 2018-01-24 | Stop reason: SDUPTHER

## 2018-01-23 NOTE — TELEPHONE ENCOUNTER
VOICEMAIL  1. Caller Name: ***                      Call Back Number: ***    2. Message: ***    3. Patient approves office to leave a detailed voicemail/MyChart message: {YES/NO:63}

## 2018-01-24 DIAGNOSIS — J44.9 CHRONIC OBSTRUCTIVE PULMONARY DISEASE, UNSPECIFIED COPD TYPE (HCC): ICD-10-CM

## 2018-01-24 RX ORDER — ALBUTEROL SULFATE 2.5 MG/3ML
SOLUTION RESPIRATORY (INHALATION)
Qty: 375 ML | Refills: 2 | Status: SHIPPED | OUTPATIENT
Start: 2018-01-24 | End: 2018-01-29 | Stop reason: SDUPTHER

## 2018-01-29 RX ORDER — ALBUTEROL SULFATE 2.5 MG/3ML
SOLUTION RESPIRATORY (INHALATION)
Qty: 375 ML | Refills: 0 | Status: SHIPPED | OUTPATIENT
Start: 2018-01-29

## 2018-02-15 ENCOUNTER — PATIENT OUTREACH (OUTPATIENT)
Dept: HEALTH INFORMATION MANAGEMENT | Facility: OTHER | Age: 73
End: 2018-02-15

## 2018-02-15 NOTE — PROGRESS NOTES
1. Attempt #: 1    2. [facility] Verified: yes    3. Verify PCP: yes    4. Care Team Updated:       •   DME Company (gait device, O2, CPAP, etc.): YES       •   Other Specialists (eye doctor, derm, GYN, cardiology, endo, etc): YES    5.  Reviewed/Updated the following with patient:       •   Communication Preference Obtained? NO       •   Preferred Pharmacy? YES       •   Preferred Lab? YES       •   Family History (document living status of immediate family members and if + hx of cancer, diabetes, hypertension, hyperlipidemia, heart attack, stroke) YES. Was Abstract Encounter opened and chart updated? YES    6. [employer] Activation: declined    7. [employer] Shannon: no    8. Annual Wellness Visit Scheduling  Scheduling Status:Scheduled      9. Care Gap Scheduling (Attempt to Schedule EACH Overdue Care Gap!)     Health Maintenance Due   Topic Date Due   • Annual Wellness Visit  1945   • IMM INFLUENZA (1) 09/01/2017   • COLON CANCER SCREENING ANNUAL FIT  09/06/2017   • BONE DENSITY  11/05/2017        Scheduled patient for Annual Wellness Visit and Mammogram    10. Patient was advised: “This is a free wellness visit. The provider will screen for medical conditions to help you stay healthy. If you have other concerns to address you may be asked to discuss these at a separate visit or there may be an additional fee.”     11. Patient was informed to arrive 15 min prior to their scheduled appointment and bring in their medication bottles.

## 2018-03-05 DIAGNOSIS — J44.9 CHRONIC OBSTRUCTIVE PULMONARY DISEASE, UNSPECIFIED COPD TYPE (HCC): ICD-10-CM

## 2018-03-06 NOTE — TELEPHONE ENCOUNTER
Caller Name: Kishan Mcintyre                 Call Back Number: [phone] (home)         Patient approves a detailed voicemail message: yes    Have we ever prescribed this med? Yes.  If yes, what date? 1/3/18    Last OV: 8/2/17- Dr. Martinez    Next OV: 6-8 week return- no appt on file    DX: COPD    Medications: sample of Breo

## 2018-03-12 ENCOUNTER — APPOINTMENT (OUTPATIENT)
Dept: RADIOLOGY | Facility: MEDICAL CENTER | Age: 73
End: 2018-03-12
Attending: FAMILY MEDICINE
Payer: MEDICARE

## 2018-03-12 ENCOUNTER — OFFICE VISIT (OUTPATIENT)
Dept: MEDICAL GROUP | Facility: MEDICAL CENTER | Age: 73
End: 2018-03-12
Payer: MEDICARE

## 2018-03-12 VITALS
TEMPERATURE: 99 F | HEIGHT: 59 IN | DIASTOLIC BLOOD PRESSURE: 62 MMHG | WEIGHT: 207 LBS | HEART RATE: 79 BPM | OXYGEN SATURATION: 90 % | SYSTOLIC BLOOD PRESSURE: 104 MMHG | BODY MASS INDEX: 41.73 KG/M2 | RESPIRATION RATE: 16 BRPM

## 2018-03-12 DIAGNOSIS — I27.20 PULMONARY HYPERTENSION (HCC): ICD-10-CM

## 2018-03-12 DIAGNOSIS — J44.9 CHRONIC OBSTRUCTIVE PULMONARY DISEASE, UNSPECIFIED COPD TYPE (HCC): ICD-10-CM

## 2018-03-12 DIAGNOSIS — Z12.11 COLON CANCER SCREENING: ICD-10-CM

## 2018-03-12 DIAGNOSIS — I35.0 AORTIC VALVE STENOSIS, ETIOLOGY OF CARDIAC VALVE DISEASE UNSPECIFIED: ICD-10-CM

## 2018-03-12 DIAGNOSIS — F41.9 ANXIETY: ICD-10-CM

## 2018-03-12 DIAGNOSIS — Z12.31 SCREENING MAMMOGRAM, ENCOUNTER FOR: ICD-10-CM

## 2018-03-12 DIAGNOSIS — E66.01 MORBID OBESITY (HCC): ICD-10-CM

## 2018-03-12 DIAGNOSIS — I35.1 AORTIC VALVE INSUFFICIENCY, ETIOLOGY OF CARDIAC VALVE DISEASE UNSPECIFIED: ICD-10-CM

## 2018-03-12 DIAGNOSIS — K21.9 GASTROESOPHAGEAL REFLUX DISEASE WITHOUT ESOPHAGITIS: ICD-10-CM

## 2018-03-12 DIAGNOSIS — J96.11 CHRONIC RESPIRATORY FAILURE WITH HYPOXIA (HCC): ICD-10-CM

## 2018-03-12 DIAGNOSIS — Z00.00 MEDICARE ANNUAL WELLNESS VISIT, SUBSEQUENT: ICD-10-CM

## 2018-03-12 PROBLEM — F32.A ANXIETY AND DEPRESSION: Status: RESOLVED | Noted: 2018-03-12 | Resolved: 2018-03-12

## 2018-03-12 PROBLEM — F32.A ANXIETY AND DEPRESSION: Status: ACTIVE | Noted: 2018-03-12

## 2018-03-12 PROCEDURE — 77063 BREAST TOMOSYNTHESIS BI: CPT

## 2018-03-12 PROCEDURE — G0439 PPPS, SUBSEQ VISIT: HCPCS | Performed by: FAMILY MEDICINE

## 2018-03-12 RX ORDER — CHLORAL HYDRATE 500 MG
1000 CAPSULE ORAL DAILY
COMMUNITY
End: 2020-01-11

## 2018-03-12 ASSESSMENT — ACTIVITIES OF DAILY LIVING (ADL): BATHING_REQUIRES_ASSISTANCE: 0

## 2018-03-12 ASSESSMENT — PATIENT HEALTH QUESTIONNAIRE - PHQ9: CLINICAL INTERPRETATION OF PHQ2 SCORE: 0

## 2018-03-12 NOTE — PROGRESS NOTES
Chief Complaint   Patient presents with   • Annual Wellness Visit         HPI:  Kishan is a 72 y.o. here for Medicare Annual Wellness Visit    Chronic obstructive pulmonary disease, unspecified COPD type (CMS-HCC)  She has been symptomatic only with excessive exertion.She has been on Albuterol as needed , she usually uses it 2-3 a week, she also has been on Breo Ellipta .. Has been on home oxygen 2L.min 24/7.    Anxiety  Her anxiety has been adequately controlled with Buspar. She denies any h/o depression    Pulmonary hypertension  Has been mostly asymptomatic. Denies leg swelling, however has been having SOB only with exertion. Her last echo showed normal EF( 55%), high RVSP ( 50 mmHg), and mild to moderate AS, mild AR. She has been on lasix 60 mg daily.     Gastroesophageal reflux disease without esophagitis  Denies epigastric pain, heart burn, nausea, and vomiting.She h as been doing fine on Omeprazole    Morbid obesity (CMS-HCC)  BMI is 41. Patient is counseled about the medical rationale for weight loss in obese individuals is that obesity is associated with a significant increase in mortality, and many health risks including type 2 diabetes mellitus, hypertension, dyslipidemia, and coronary heart disease. The benefits of weight loss include a reduction in the rate of progression from impaired glucose tolerance to diabetes, blood pressure in hypertensive patients, and lipid levels in higher risk patients. Other noncardiac benefits of weight loss include reductions in urinary incontinence, sleep apnea, and depression, and improvements in quality of life, physical functioning, and mobility.Recommend lifestyle modification: exercise 30 minutes per day 5 days per week. Recommend also portion control.    Aortic valve stenosis/ Regurgitation,   Last echo showed mild to moderate Aortic Stenosis,and mild AR,  jenn patient has been asymptomatic.    Medicare annual wellness visit, subsequent  Preventive, and chronic  medical issues were reviewed.    She is due for colon cancer screening         Patient Active Problem List    Diagnosis Date Noted   • Morbid obesity (CMS-HCC) 06/16/2015     Priority: High   • Diastolic congestive heart failure (CMS-HCC) 12/05/2011     Priority: High   • COPD (chronic obstructive pulmonary disease) (CMS-HCC) 04/13/2011     Priority: High   • Aortic stenosis 06/07/2012     Priority: Medium   • Aortic regurgitation 12/05/2011     Priority: Medium   • Obesity (BMI 35.0-39.9 without comorbidity) ([facility]) 11/20/2017   • Obesity (BMI 30-39.9) 11/20/2017   • Anxiety 04/04/2016   • Carotid bruit 08/23/2013   • MEDICAL HOME 11/09/2012   • DJD (degenerative joint disease) 10/19/2012   • Obesity 04/13/2011   • Osteoporosis, idiopathic 04/13/2011       Current Outpatient Prescriptions   Medication Sig Dispense Refill   • CALCIUM PO Take  by mouth.     • Omega-3 Fatty Acids (FISH OIL) 1000 MG Cap capsule Take 1,000 mg by mouth 3 times a day, with meals.     • Fluticasone Furoate-Vilanterol (BREO ELLIPTA) 200-25 MCG/INH AEROSOL POWDER, BREATH ACTIVATED Inhale 1 Puff by mouth every day. Rinse mouth after use. 2 Each 0   • albuterol (PROVENTIL) 2.5mg/3ml Nebu Soln solution for nebulization USE 3 ML BY NEBULIZATION ROUTE EVERY 6 HOURS AS NEEDED FOR SHORTNESS OF BREATH. 375 mL 0   • Misc. Devices Misc Need a battery operated portable oxygen concentrator . 1 Each 0   • albuterol 108 (90 Base) MCG/ACT Aero Soln inhalation aerosol Inhale 2 Puffs by mouth every 6 hours as needed for Shortness of Breath. 8.5 g 3   • Dextromethorphan-Guaifenesin (TUSSIN DM)  MG/5ML Syrup Take 10 mL by mouth every 6 hours as needed. 560 mL 0   • tiotropium (SPIRIVA HANDIHALER) 18 MCG Cap INHALE 1 CAP BY MOUTH EVERY DAY. 90 Cap 2   • doxycycline (VIBRAMYCIN) 100 MG Tab Take 1 Tab by mouth 2 times a day. 14 Tab 0   • busPIRone (BUSPAR) 7.5 MG tablet TAKE 1 TAB BY MOUTH EVERY DAY. TAKE 1 TAB BY MOUTH EVERY DAY. 90 Tab 1   • potassium  chloride SA (KDUR) 20 MEQ Tab CR Take 1 Tab by mouth every day. 90 Tab 1   • furosemide (LASIX) 20 MG Tab Take 1 Tab by mouth 3 times a day. 270 Tab 1   • Ibuprofen (ADVIL) 200 MG Cap Take  by mouth.     • albuterol 108 (90 BASE) MCG/ACT Aero Soln inhalation aerosol Inhale 2 Puffs by mouth every 6 hours as needed. 3 Inhaler 0   • triamcinolone acetonide (KENALOG) 0.025 % Cream APPLY TO AFFECTED AREA(S) 2 TIMES A DAY. 15 g 1   • Misc. Devices Misc Oxygen concentrator to be used at night and prn at 2 lpm. Dx: COPD, CHF, hypoxemia. 1 Units 0   • omeprazole (PRILOSEC) 20 MG delayed-release capsule Take 1 Cap by mouth every day. 90 Cap 1   • clobetasol (TEMOVATE) 0.05 % external solution Apply sparingly bid 50 mL 0   • clotrimazole-betamethasone (LOTRISONE) 1-0.05 % CREA Apply 1 Application to affected area(s) 2 times a day. 1 Tube 1   • Lactobacillus Rhamnosus, GG, (CULTURELLE PO) Take  by mouth.     • Clobetasol Propionate 0.05 % LOTN Apply to affected area bid 1 Bottle 1   • Misc. Devices MISC 4 wheel walker, needed for osteoarthritis of the knees. 1 Each 0   • vitamin D (CHOLECALCIFEROL) 1000 UNIT TABS Take 5,000 Units by mouth every day.     • Multiple Vitamin (MULTI-VITAMIN PO) Take  by mouth.     • aspirin 81 MG tablet Take 81 mg by mouth every day.     • predniSONE (DELTASONE) 10 MG Tab Take 20 mg bid for 2 days, 10 mg bid for 2 days, 10 mg daily for 2 days, and 5 mg daily for 2 days. (Patient not taking: Reported on 3/12/2018) 30 Tab 0   • busPIRone (BUSPAR) 7.5 MG tablet TAKE 1 TAB BY MOUTH EVERY DAY. TAKE 1 TAB BY MOUTH EVERY DAY. (Patient not taking: Reported on 3/12/2018) 90 Tab 1   • ciprofloxacin (CILOXIN) 0.3 % Solution Place 1 Drop in both eyes every 12 hours. (Patient not taking: Reported on 3/12/2018) 1 Bottle 0     No current facility-administered medications for this visit.         Patient is taking medications as noted in medication list.  Current supplements as per medication list.     Allergies:  Bee; Fosamax; Iodine; Other drug; Pcn [penicillins]; Sulfa drugs; and Tape    Current social contact/activities: visit with family and frieneds     Patient's perception of their health: fair    Is patient current with immunizations? Yes.    She  reports that she quit smoking about 34 years ago. Her smoking use included Cigarettes. She has a 13.00 pack-year smoking history. She has never used smokeless tobacco. She reports that she drinks about 4.2 oz of alcohol per week . She reports that she does not use drugs.  Counseling given: Not Answered        DPA/Advanced directive: Patient has Advanced Directive on file.     ROS:    Gait: Uses a 4 wheel walker with seat   Ostomy: no   Other tubes: no   Amputations: no   Chronic oxygen use yes continuous at 3 L  Last eye exam 4 months ago   Wears hearing aids: no   : Denies any urinary leakage during the last 6 months    Screening:    CHF    1.  Date of last echo: 09/11/17  2.  Has patient ever had education regarding CHF? Yes, and is NOT interested in more at this time.  3.  Is patient under the care of a cardiologist? Yes, CareTeam was updated.    COPD    1.  Is patient under the care of a pulmonologist? Yes, CareTeam was updated.  2.  Has patient ever completed a PFT or spirometry? Yes, on 08/02/17.  3.  Is patient on oxygen or CPAP? Yes, CareTeam was updated.  4.  Has patient ever had instruction on inhaler technique by health care professional? Yes  5.  Is patient interested in a referral to respiratory therapy for more information on COPD, inhaler technique, and/or information on establishing an action plan?  Yes, and is NOT interested in more at this time.      Depression Screening    Little interest or pleasure in doing things?  0 - not at all  Feeling down, depressed, or hopeless? 0 - not at all  Patient Health Questionnaire Score: 0    If depressive symptoms identified deferred to follow up visit unless specifically addressed in assessment and  plan.    Interpretation of PHQ-9 Total Score   Score Severity   1-4 No Depression   5-9 Mild Depression   10-14 Moderate Depression   15-19 Moderately Severe Depression   20-27 Severe Depression    Screening for Cognitive Impairment    Three Minute Recall (apple, watch, jaswinder)  3/3 Village, Kitchen, Baby  Draw clock face with all 12 numbers set to the hand to show 10 minutes past 11 o'clock  1 4/5 Time 3:40  If cognitive concerns identified, deferred for follow up unless specifically addressed in assessment and plan.    Fall Risk Assessment    Has the patient had two or more falls in the last year or any fall with injury in the last year?  No  If fall risk identified, deferred for follow up unless specifically addressed in assessment and plan.    Safety Assessment    Throw rugs on floor.  No  Handrails on all stairs.  Yes  Good lighting in all hallways.  Yes  Difficulty hearing.  No  Patient counseled about all safety risks that were identified.    Functional Assessment ADLs    Are there any barriers preventing you from cooking for yourself or meeting nutritional needs?  No.    Are there any barriers preventing you from driving safely or obtaining transportation?  No.    Are there any barriers preventing you from using a telephone or calling for help?  No.    Are there any barriers preventing you from shopping?  No.    Are there any barriers preventing you from taking care of your own finances?  No.    Are there any barriers preventing you from managing your medications?  No.    Are there any barriers preventing you from showering, bathing or dressing yourself?  No.    Are you currently engaging any exercise or physical activity?  Yes.       Health Maintenance Summary                Annual Wellness Visit Overdue 1945     IMM INFLUENZA Overdue 9/1/2017      Done 9/9/2016 Imm Admin: Influenza Vaccine Adult HD     Patient has more history with this topic...    COLON CANCER SCREENING ANNUAL FIT Overdue 9/6/2017       Done 9/6/2016 OCCULT BLOOD FECES IMMUNOASSAY     BONE DENSITY Overdue 11/5/2017      Done 11/5/2012 DS-BONE DENSITY STUDY (DEXA)    MAMMOGRAM Overdue 3/2/2018      Done 3/2/2017 MA-MAMMO SCREENING BILAT W/TOMOSYNTHESIS W/CAD     Patient has more history with this topic...    PFT SCREENING-FEV1 AND FEV/FVC RATIO / SPIROMETRY SHOULD BE PERFORMED ANNUALLY Next Due 8/2/2018      Done 8/2/2017 AMB PULMONARY FUNCTION TEST/LAB     Patient has more history with this topic...    IMM DTaP/Tdap/Td Vaccine Next Due 12/10/2025      Done 12/10/2015 Imm Admin: Tdap Vaccine          Patient Care Team:  Ana Hartley M.D. as PCP - General (Geriatrics)  Rufus Rodrigez M.D. as Consulting Physician (Cardiology)  Yasir Love O.D. as Consulting Physician (Optometry)  [facility]    Social History   Substance Use Topics   • Smoking status: Former Smoker     Packs/day: 0.50     Years: 26.00     Types: Cigarettes     Quit date: 1/1/1984   • Smokeless tobacco: Never Used      Comment: Started smoking at age 13   • Alcohol use 4.2 oz/week     7 Shots of liquor per week     Family History   Problem Relation Age of Onset   • Prostate cancer Father    • Dementia Father    • Alcohol abuse Mother      Cirrosis of liver   • Cancer Maternal Aunt    • Lung Disease Sister      COPD/Smoker   • Asthma Sister    • Hyperlipidemia Sister    • Breast Cancer Sister    • No Known Problems Maternal Grandmother    • No Known Problems Maternal Grandfather    • No Known Problems Paternal Grandmother    • Other Daughter    • Hypertension Daughter    • Thyroid Daughter      She  has a past medical history of Arrhythmia; Arthritis; CHF (congestive heart failure) (CMS-HCC); Chickenpox; COPD; Daytime sleepiness; Difficulty breathing; Hungarian measles; Heartburn; MEDICAL HOME (11/9/2012); Mumps; Painful joint; Palpitations; Rash; Shortness of breath; Swelling of lower extremity; Toothache; and Weight loss. She also has no past medical history of  "Depression.   Past Surgical History:   Procedure Laterality Date   • APPENDECTOMY     • GASTRIC BYPASS LAPAROSCOPIC     • PRIMARY C SECTION     • TONSILLECTOMY     • TUBAL COAGULATION LAPAROSCOPIC BILATERAL             Exam:     Blood pressure 104/62, pulse 79, temperature 37.2 °C (99 °F), resp. rate 16, height 1.499 m (4' 11\"), weight 93.9 kg (207 lb), SpO2 90 %. Body mass index is 41.81 kg/m².    Hearing, good .    Dentition, good.   Alert oriented in no acute distress.  Eye contact is good, speech goal directed, affect calm      Assessment and Plan. The following treatment and monitoring plan is recommended:    72 y.o. female     1. Chronic obstructive pulmonary disease, unspecified COPD type (CMS-HCC)  Symptomatic with excessive exertion.Continue on Albuterol as needed ( uses it once daily), she also has been on Breo Ellipta .    - Annual Wellness Visit - Includes PPPS Subsequent ()    2. Anxiety  Has been doing fine on Buspar. Denies depression    - Annual Wellness Visit - Includes PPPS Subsequent ()    3. Pulmonary hypertension  Probably as a result of Cor pulmonale.  Last echo showed normal EF( 55%), high RVSP ( 50 mmHg), and mild to moderate AS.  Continue on lasix 60 mg daily.     - Annual Wellness Visit - Includes PPPS Subsequent ()    4. Gastroesophageal reflux disease without esophagitis  Has been doing fine on Omeprazole.    - Annual Wellness Visit - Includes PPPS Subsequent ()    5. Colon cancer screening    - OCCULT BLOOD FECES IMMUNOASSAY; Future  - Annual Wellness Visit - Includes PPPS Subsequent ()    6. Morbid obesity (CMS-HCC)  BMI is 41  Patient is counseled about life style modification.    - Annual Wellness Visit - Includes PPPS Subsequent ()    7. Aortic valve stenosis, etiology of cardiac valve disease unspecified  Last echo showed mild to moderate Aortic Stenosis, simeone patient has been asymptomatic.    - Annual Wellness Visit - Includes PPPS Subsequent " ()    8. Aortic valve insufficiency, etiology of cardiac valve disease unspecified  Mild. Asymptomatic.  Will continue watch.    - Annual Wellness Visit - Includes PPPS Subsequent ()    9. Chronic respiratory failure with hypoxia (CMS-[facility])  Has been on Oxygen 24/7 for COPD.    - Annual Wellness Visit - Includes PPPS Subsequent ()    10. Medicare annual wellness visit, subsequent  Preventive, and chronic medical issues were reviewed.    - Annual Wellness Visit - Includes PPPS Subsequent ()                Services suggested:   Health Care Screening: Age-appropriate preventive services recommended by USPTF and ACIP covered by Medicare were discussed today. Services ordered if indicated and agreed upon by the patient.  Referrals offered: PT/OT/Nutrition counseling/Behavioral Health/Smoking cessation as per orders if indicated.    Discussion today about general wellness and lifestyle habits:    · Prevent falls and reduce trip hazards; Cautioned about securing or removing rugs.  · Have a working fire alarm and carbon monoxide detector;   · Engage in regular physical activity and social activities       Follow-up: No Follow-up on file.

## 2018-03-18 DIAGNOSIS — I10 ESSENTIAL HYPERTENSION: ICD-10-CM

## 2018-03-19 RX ORDER — FUROSEMIDE 20 MG/1
20 TABLET ORAL 3 TIMES DAILY
Qty: 90 TAB | Refills: 1 | Status: SHIPPED | OUTPATIENT
Start: 2018-03-19 | End: 2018-03-22 | Stop reason: SDUPTHER

## 2018-03-20 DIAGNOSIS — I10 ESSENTIAL HYPERTENSION: ICD-10-CM

## 2018-03-20 RX ORDER — POTASSIUM CHLORIDE 20 MEQ/1
20 TABLET, EXTENDED RELEASE ORAL
Qty: 90 TAB | Refills: 1 | Status: SHIPPED | OUTPATIENT
Start: 2018-03-20 | End: 2018-09-19 | Stop reason: SDUPTHER

## 2018-03-22 DIAGNOSIS — I10 ESSENTIAL HYPERTENSION: ICD-10-CM

## 2018-03-22 RX ORDER — FUROSEMIDE 20 MG/1
20 TABLET ORAL 3 TIMES DAILY
Qty: 90 TAB | Refills: 2 | Status: SHIPPED | OUTPATIENT
Start: 2018-03-22 | End: 2018-09-19 | Stop reason: SDUPTHER

## 2018-04-02 ENCOUNTER — HOSPITAL ENCOUNTER (OUTPATIENT)
Facility: MEDICAL CENTER | Age: 73
End: 2018-04-02
Attending: FAMILY MEDICINE
Payer: MEDICARE

## 2018-04-02 DIAGNOSIS — I10 ESSENTIAL HYPERTENSION: ICD-10-CM

## 2018-04-02 PROCEDURE — 82274 ASSAY TEST FOR BLOOD FECAL: CPT

## 2018-04-02 RX ORDER — FUROSEMIDE 20 MG/1
20 TABLET ORAL 3 TIMES DAILY
Qty: 270 TAB | Refills: 1 | Status: SHIPPED | OUTPATIENT
Start: 2018-04-02 | End: 2019-01-18

## 2018-04-04 DIAGNOSIS — J44.9 CHRONIC OBSTRUCTIVE PULMONARY DISEASE, UNSPECIFIED COPD TYPE (HCC): ICD-10-CM

## 2018-04-04 RX ORDER — ALBUTEROL SULFATE 90 UG/1
2 AEROSOL, METERED RESPIRATORY (INHALATION) EVERY 6 HOURS PRN
Qty: 3 INHALER | Refills: 3 | Status: SHIPPED | OUTPATIENT
Start: 2018-04-04 | End: 2018-09-19 | Stop reason: SDUPTHER

## 2018-04-05 ENCOUNTER — HOSPITAL ENCOUNTER (OUTPATIENT)
Dept: CARDIOLOGY | Facility: MEDICAL CENTER | Age: 73
End: 2018-04-05
Attending: INTERNAL MEDICINE
Payer: MEDICARE

## 2018-04-05 DIAGNOSIS — J44.1 COPD WITH EXACERBATION (HCC): ICD-10-CM

## 2018-04-05 DIAGNOSIS — I35.1 AORTIC VALVE INSUFFICIENCY, ETIOLOGY OF CARDIAC VALVE DISEASE UNSPECIFIED: ICD-10-CM

## 2018-04-05 DIAGNOSIS — I35.0 AORTIC VALVE STENOSIS, ETIOLOGY OF CARDIAC VALVE DISEASE UNSPECIFIED: ICD-10-CM

## 2018-04-05 LAB
LV EJECT FRACT  99904: 65
LV EJECT FRACT MOD 2C 99903: 52.15
LV EJECT FRACT MOD 4C 99902: 56.04
LV EJECT FRACT MOD BP 99901: 53.55

## 2018-04-05 PROCEDURE — 93306 TTE W/DOPPLER COMPLETE: CPT

## 2018-04-05 PROCEDURE — 93306 TTE W/DOPPLER COMPLETE: CPT | Mod: 26 | Performed by: INTERNAL MEDICINE

## 2018-04-05 RX ORDER — ALBUTEROL SULFATE 90 UG/1
2 AEROSOL, METERED RESPIRATORY (INHALATION) EVERY 6 HOURS PRN
Qty: 8.5 G | Refills: 3 | Status: SHIPPED | OUTPATIENT
Start: 2018-04-05

## 2018-04-06 ENCOUNTER — TELEPHONE (OUTPATIENT)
Dept: CARDIOLOGY | Facility: MEDICAL CENTER | Age: 73
End: 2018-04-06

## 2018-04-06 DIAGNOSIS — Z12.11 COLON CANCER SCREENING: ICD-10-CM

## 2018-04-06 DIAGNOSIS — I35.0 MODERATE AORTIC STENOSIS: ICD-10-CM

## 2018-04-06 LAB — HEMOCCULT STL QL IA: NEGATIVE

## 2018-04-06 NOTE — TELEPHONE ENCOUNTER
Message   Received: Yesterday   Message Contents   YUE Trinh, R.N.             Please call patient with results showing moderate aortic stenosis, which is unchanged. Please repeat an echocardiogram in 6 months.     Thanks.  REHANA.      Questions   Received: Yesterday   Message Contents   Johananju EMMYSalty Milligan, [facility]  Karin Crow, R.N.   Phone Number: [phone]             Kishan López called and stated that she had her echo done today. She wants to know if Dr. Rodrigez wants her to make an appt and when.     She would like a call back at: [phone].     Thank you so much,     Padmaja      Notified pt regarding echo results and Dr. Rodrigez's recommendations to repeat echo in 6 months. Pt states she is recovering from another bout of pneumonia but otherwise is doing okay. She feels comfortable waiting to see Dr. Rodrigez in 6 months which will be 1 year from her last appt. She will call once closer to October to schedule repeat echo and f/u.

## 2018-04-11 DIAGNOSIS — J44.9 CHRONIC OBSTRUCTIVE PULMONARY DISEASE, UNSPECIFIED COPD TYPE (HCC): ICD-10-CM

## 2018-04-11 NOTE — TELEPHONE ENCOUNTER
Caller Name: Kishan Mcintyre                 Call Back Number: [phone] (home)         Patient approves a detailed voicemail message: yes    Have we ever prescribed this med? Yes.  If yes, what date? 3/5/18    Last OV: 8/2/17- Dr. Martinez    Next OV:10/25/18    DX: COPD    Medications: sample of Breo

## 2018-04-24 ENCOUNTER — OFFICE VISIT (OUTPATIENT)
Dept: MEDICAL GROUP | Facility: MEDICAL CENTER | Age: 73
End: 2018-04-24
Payer: MEDICARE

## 2018-04-24 VITALS
WEIGHT: 208 LBS | SYSTOLIC BLOOD PRESSURE: 108 MMHG | RESPIRATION RATE: 16 BRPM | DIASTOLIC BLOOD PRESSURE: 60 MMHG | OXYGEN SATURATION: 95 % | HEART RATE: 84 BPM | BODY MASS INDEX: 41.93 KG/M2 | TEMPERATURE: 99.3 F | HEIGHT: 59 IN

## 2018-04-24 DIAGNOSIS — L40.9 PSORIASIS: ICD-10-CM

## 2018-04-24 DIAGNOSIS — M25.561 CHRONIC PAIN OF RIGHT KNEE: ICD-10-CM

## 2018-04-24 DIAGNOSIS — B37.2 INTERTRIGINOUS CANDIDIASIS: ICD-10-CM

## 2018-04-24 DIAGNOSIS — G89.29 CHRONIC PAIN OF RIGHT KNEE: ICD-10-CM

## 2018-04-24 PROCEDURE — 99214 OFFICE O/P EST MOD 30 MIN: CPT | Mod: 25 | Performed by: FAMILY MEDICINE

## 2018-04-24 PROCEDURE — 20610 DRAIN/INJ JOINT/BURSA W/O US: CPT | Performed by: FAMILY MEDICINE

## 2018-04-24 RX ORDER — CLOBETASOL PROPIONATE 0.46 MG/ML
SOLUTION TOPICAL
Qty: 50 ML | Refills: 0 | Status: SHIPPED | OUTPATIENT
Start: 2018-04-24 | End: 2019-09-06

## 2018-04-24 RX ORDER — METHYLPREDNISOLONE ACETATE 80 MG/ML
80 INJECTION, SUSPENSION INTRA-ARTICULAR; INTRALESIONAL; INTRAMUSCULAR; SOFT TISSUE ONCE
Status: COMPLETED | OUTPATIENT
Start: 2018-04-24 | End: 2018-04-24

## 2018-04-24 RX ORDER — CLOTRIMAZOLE AND BETAMETHASONE DIPROPIONATE 10; .64 MG/G; MG/G
1 CREAM TOPICAL 2 TIMES DAILY
Qty: 1 TUBE | Refills: 1 | Status: SHIPPED | OUTPATIENT
Start: 2018-04-24 | End: 2020-01-11

## 2018-04-24 RX ADMIN — METHYLPREDNISOLONE ACETATE 80 MG: 80 INJECTION, SUSPENSION INTRA-ARTICULAR; INTRALESIONAL; INTRAMUSCULAR; SOFT TISSUE at 15:57

## 2018-04-24 NOTE — TELEPHONE ENCOUNTER
Please send to patient's current pharmacy, mistakenly sent to [facility].

## 2018-04-24 NOTE — PROGRESS NOTES
CC: Chronic right knee pain, came in for intraarticular knee injection./ refill medication for psoriasis     HPI:   Kishan presents today for intraarticular knee injection.     Chronic right knee pain   She has chronic osteoarthritis, she is here to get steroid intraarticular knee joint injection, she has been taking it every 4-6 month and has been helping.Has had last one had it 4 month ago . Patient has h/o bilateral osteoarthritis of the knee joints, associated with effusion.      Psoriasis  Patient has h/o psoriasis which has been very stable, and mostly asymptomatic. She uses clobetasol  0.05 % external solution as needed. She uses it only if she has exacerbation of the skin rash)    Patient Active Problem List    Diagnosis Date Noted   • Morbid obesity (CMS-HCC) 06/16/2015     Priority: High   • Diastolic congestive heart failure (CMS-HCC) 12/05/2011     Priority: High   • COPD (chronic obstructive pulmonary disease) (CMS-HCC) 04/13/2011     Priority: High   • Aortic stenosis 06/07/2012     Priority: Medium   • Aortic regurgitation 12/05/2011     Priority: Medium   • Chronic respiratory failure with hypoxia (CMS-HCC) 03/12/2018   • Obesity (BMI 35.0-39.9 without comorbidity) ([facility]) 11/20/2017   • Obesity (BMI 30-39.9) 11/20/2017   • Anxiety 04/04/2016   • Carotid bruit 08/23/2013   • MEDICAL HOME 11/09/2012   • DJD (degenerative joint disease) 10/19/2012   • Obesity 04/13/2011       Current Outpatient Prescriptions   Medication Sig Dispense Refill   • clobetasol (TEMOVATE) 0.05 % external solution Apply sparingly bid 50 mL 0   • furosemide (LASIX) 20 MG Tab Take 1 Tab by mouth 3 times a day. 90 Tab 2   • potassium chloride SA (KDUR) 20 MEQ Tab CR TAKE 1 TAB BY MOUTH EVERY DAY. 90 Tab 1   • CALCIUM PO Take  by mouth.     • Omega-3 Fatty Acids (FISH OIL) 1000 MG Cap capsule Take 1,000 mg by mouth 3 times a day, with meals.     • albuterol (PROVENTIL) 2.5mg/3ml Nebu Soln solution for nebulization USE 3 ML BY  NEBULIZATION ROUTE EVERY 6 HOURS AS NEEDED FOR SHORTNESS OF BREATH. 375 mL 0   • Misc. Devices Misc Need a battery operated portable oxygen concentrator . 1 Each 0   • Dextromethorphan-Guaifenesin (TUSSIN DM)  MG/5ML Syrup Take 10 mL by mouth every 6 hours as needed. 560 mL 0   • doxycycline (VIBRAMYCIN) 100 MG Tab Take 1 Tab by mouth 2 times a day. 14 Tab 0   • busPIRone (BUSPAR) 7.5 MG tablet TAKE 1 TAB BY MOUTH EVERY DAY. TAKE 1 TAB BY MOUTH EVERY DAY. 90 Tab 1   • Ibuprofen (ADVIL) 200 MG Cap Take  by mouth.     • omeprazole (PRILOSEC) 20 MG delayed-release capsule Take 1 Cap by mouth every day. 90 Cap 1   • clotrimazole-betamethasone (LOTRISONE) 1-0.05 % CREA Apply 1 Application to affected area(s) 2 times a day. 1 Tube 1   • Lactobacillus Rhamnosus, GG, (CULTURELLE PO) Take  by mouth.     • Clobetasol Propionate 0.05 % LOTN Apply to affected area bid 1 Bottle 1   • Misc. Devices MISC 4 wheel walker, needed for osteoarthritis of the knees. 1 Each 0   • vitamin D (CHOLECALCIFEROL) 1000 UNIT TABS Take 5,000 Units by mouth every day.     • Multiple Vitamin (MULTI-VITAMIN PO) Take  by mouth.     • aspirin 81 MG tablet Take 81 mg by mouth every day.     • Fluticasone Furoate-Vilanterol (BREO ELLIPTA) 200-25 MCG/INH AEROSOL POWDER, BREATH ACTIVATED Inhale 1 Puff by mouth every day. Rinse mouth after use. 2 Each 0   • albuterol 108 (90 Base) MCG/ACT Aero Soln inhalation aerosol Inhale 2 Puffs by mouth every 6 hours as needed for Shortness of Breath. 8.5 g 3   • albuterol 108 (90 Base) MCG/ACT Aero Soln inhalation aerosol Inhale 2 Puffs by mouth every 6 hours as needed. 3 Inhaler 3   • furosemide (LASIX) 20 MG Tab TAKE 1 TAB BY MOUTH 3 TIMES A DAY. 270 Tab 1   • tiotropium (SPIRIVA HANDIHALER) 18 MCG Cap INHALE 1 CAP BY MOUTH EVERY DAY. 90 Cap 2   • triamcinolone acetonide (KENALOG) 0.025 % Cream APPLY TO AFFECTED AREA(S) 2 TIMES A DAY. 15 g 1   • Misc. Devices Misc Oxygen concentrator to be used at night and prn  "at 2 lpm. Dx: COPD, CHF, hypoxemia. 1 Units 0     No current facility-administered medications for this visit.          Allergies as of 04/24/2018 - Reviewed 04/24/2018   Allergen Reaction Noted   • Bee Swelling 03/12/2018   • Fosamax  09/15/2011   • Iodine Rash 03/12/2018   • Other drug  07/06/2013   • Pcn [penicillins]  04/13/2011   • Sulfa drugs  04/13/2011   • Tape  03/12/2018        ROS: Denies any chest pain, Shortness of breath, Changes bowel or bladder, Lower extremity edema.    Physical Exam:  /60   Pulse 84   Temp 37.4 °C (99.3 °F)   Resp 16   Ht 1.499 m (4' 11\")   Wt 94.3 kg (208 lb)   SpO2 95%   BMI 42.01 kg/m²   Gen.: Well-developed, well-nourished, no apparent distress,pleasant and cooperative with the examination  Right knee; Swollen, no tenderness, or redness ( joint effusion)    Procedure ( Knee injection):    Knee injection.  Patient was consented. Risks and benefits discussed.  Rt knee was exposed. Site of injection was marked ( A point between lateral patellar ligament, lateral tibial plateau, and lateral femoral condyle). The skin was sterilized with (2% chlorhexidine , and 70% Isopropyl alcohol), the area was numbed with ethyl cholride , then 1 ml of DepoMedrol 80 mg + 4 ml of Lidocaine 1% were injected into the joint.      Assessment and Plan.   73 y.o. female     1. Chronic pain of right knee  Intra-articular injection of the right knee joint is given.Patient joint movement improved to about 50% immediately after the injection.Precaustion was given to avoid bleeding . Patient advised to to uses worm compresses 2 times daily for 3 days, and RTC if any continuous pain on the joint    - [place] DRAIN/INJECT LARGE JOINT/BURSA  - methylPREDNISolone acetate (DEPO-MEDROL) injection 80 mg; 1 mL by Intramuscular route Once.    2. Psoriasis  Stable. Needs the cream to be refilled ( uses it only if she has exacerbation of the skin rash).    - clobetasol (TEMOVATE) 0.05 % external solution; Apply " sparingly bid  Dispense: 50 mL; Refill: 0

## 2018-05-15 DIAGNOSIS — J44.9 CHRONIC OBSTRUCTIVE PULMONARY DISEASE, UNSPECIFIED COPD TYPE (HCC): ICD-10-CM

## 2018-05-15 NOTE — TELEPHONE ENCOUNTER
Caller Name: Kishan Mcintyre                 Call Back Number: [phone] (home)         Patient approves a detailed voicemail message: yes     Have we ever prescribed this med? Yes.  If yes, what date? 3/5/18     Last OV: 8/2/17- Dr. Martinez     Next OV:10/25/18- TERRANCE    DX: COPD     Medications: sample of Breo

## 2018-06-11 DIAGNOSIS — J44.9 CHRONIC OBSTRUCTIVE PULMONARY DISEASE, UNSPECIFIED COPD TYPE (HCC): ICD-10-CM

## 2018-06-11 NOTE — TELEPHONE ENCOUNTER
Patient calling for Breo 200/5mcg samples from the [facility] unit for one month supply. Refilled last 5/15/18 by [facility] appt 10/25/18 with Dr Martinez

## 2018-06-15 ENCOUNTER — OFFICE VISIT (OUTPATIENT)
Dept: PULMONOLOGY | Facility: HOSPICE | Age: 73
End: 2018-06-15
Payer: MEDICARE

## 2018-06-15 VITALS
HEIGHT: 59 IN | WEIGHT: 210 LBS | DIASTOLIC BLOOD PRESSURE: 80 MMHG | OXYGEN SATURATION: 94 % | TEMPERATURE: 98.1 F | BODY MASS INDEX: 42.33 KG/M2 | SYSTOLIC BLOOD PRESSURE: 124 MMHG | HEART RATE: 64 BPM | RESPIRATION RATE: 18 BRPM

## 2018-06-15 DIAGNOSIS — I27.20 PULMONARY HYPERTENSION (HCC): ICD-10-CM

## 2018-06-15 DIAGNOSIS — R06.00 DYSPNEA, UNSPECIFIED TYPE: ICD-10-CM

## 2018-06-15 DIAGNOSIS — I50.32 CHRONIC DIASTOLIC CONGESTIVE HEART FAILURE (HCC): ICD-10-CM

## 2018-06-15 DIAGNOSIS — I35.0 AORTIC VALVE STENOSIS, UNSPECIFIED ETIOLOGY: ICD-10-CM

## 2018-06-15 DIAGNOSIS — J44.9 CHRONIC OBSTRUCTIVE PULMONARY DISEASE, UNSPECIFIED COPD TYPE (HCC): ICD-10-CM

## 2018-06-15 PROCEDURE — 99214 OFFICE O/P EST MOD 30 MIN: CPT | Performed by: INTERNAL MEDICINE

## 2018-06-15 NOTE — PROGRESS NOTES
Chief Complaint   Patient presents with   • Follow-Up     COPD       HPI:  The patient is a 73-year-old woman with severe COPD.  Her FEV1 is 0.69 L.  She does have a significant response to an inhaled bronchodilator.  She also has aortic stenosis, diastolic dysfunction, and pulmonary hypertension.  Her current medications include Breo and an albuterol nebulizer as well as HFA.  She cannot afford Spiriva.  She is now on supplemental oxygen at 3 L/min 24/7.  In January of this year she had a respiratory tract infection.  Since then she has not been able to discontinue use of oxygen during the day.  She is not having any acute respiratory issues today.  She does have severe dyspnea with minimal activity.  Her most recent echo does show some improvement in her diastolic dysfunction and her pulmonary pressures.    Past Medical History:   Diagnosis Date   • Arrhythmia    • Arthritis    • CHF (congestive heart failure) (Formerly [facility])    • Chickenpox    • COPD    • Daytime sleepiness    • Difficulty breathing    • [place] measles    • Heartburn    • [facility] HOME 11/9/2012   • Mumps    • Painful joint    • Palpitations    • Rash    • Shortness of breath    • Swelling of lower extremity    • Toothache    • Weight loss        ROS:  Constitutional: Denies fevers, chills, night sweats, fatigue or weight loss  Eyes: Denies vision loss, pain, drainage, double vision  Ears, Nose, Throat: Denies earache, tinnitus, hoarseness  Cardiovascular: Denies chest pain, tightness, palpitations at this time  Respiratory: See HPI  Sleep: Denies, snoring, apnea  GI: Denies abdominal pain, nausea, vomiting, diarrhea  : Denies frequent urination, hematuria, painful urination  Musculoskeletal: Denies back pain, painful joints, sore muscles  Neurological: Denies headaches, seizures  Skin: Denies rashes, color changes  Psychiatric: Denies depression or thoughts of suicide  Hematologic: Denies bleeding tendency or clotting tendency  Allergic/Immunologic:  Denies rhinitis, skin sensitivity    Social History     Social History   • Marital status:      Spouse name: N/A   • Number of children: N/A   • Years of education: N/A     Occupational History   • Not on file.     Social History Main Topics   • Smoking status: Former Smoker     Packs/day: 0.50     Years: 26.00     Types: Cigarettes     Quit date: 1/1/1984   • Smokeless tobacco: Never Used      Comment: Started smoking at age 13   • Alcohol use 4.2 oz/week     7 Shots of liquor per week   • Drug use: No   • Sexual activity: No     Other Topics Concern   • Not on file     Social History Narrative   • No narrative on file     Bee; Fosamax; Iodine; Other drug; Pcn [penicillins]; Sulfa drugs; and Tape  Current Outpatient Prescriptions on File Prior to Visit   Medication Sig Dispense Refill   • Fluticasone Furoate-Vilanterol (BREO ELLIPTA) 200-25 MCG/INH AEROSOL POWDER, BREATH ACTIVATED Inhale 1 Puff by mouth every day. Rinse mouth after use. 2 Each 0   • albuterol 108 (90 Base) MCG/ACT Aero Soln inhalation aerosol Inhale 2 Puffs by mouth every 6 hours as needed. 3 Inhaler 3   • furosemide (LASIX) 20 MG Tab Take 1 Tab by mouth 3 times a day. 90 Tab 2   • potassium chloride SA (KDUR) 20 MEQ Tab CR TAKE 1 TAB BY MOUTH EVERY DAY. 90 Tab 1   • CALCIUM PO Take  by mouth.     • Omega-3 Fatty Acids (FISH OIL) 1000 MG Cap capsule Take 1,000 mg by mouth 3 times a day, with meals.     • albuterol (PROVENTIL) 2.5mg/3ml Nebu Soln solution for nebulization USE 3 ML BY NEBULIZATION ROUTE EVERY 6 HOURS AS NEEDED FOR SHORTNESS OF BREATH. 375 mL 0   • tiotropium (SPIRIVA HANDIHALER) 18 MCG Cap INHALE 1 CAP BY MOUTH EVERY DAY. 90 Cap 2   • busPIRone (BUSPAR) 7.5 MG tablet TAKE 1 TAB BY MOUTH EVERY DAY. TAKE 1 TAB BY MOUTH EVERY DAY. 90 Tab 1   • Ibuprofen (ADVIL) 200 MG Cap Take  by mouth.     • omeprazole (PRILOSEC) 20 MG delayed-release capsule Take 1 Cap by mouth every day. 90 Cap 1   • vitamin D (CHOLECALCIFEROL) 1000 UNIT TABS  "Take 5,000 Units by mouth every day.     • Multiple Vitamin (MULTI-VITAMIN PO) Take  by mouth.     • aspirin 81 MG tablet Take 81 mg by mouth every day.     • Fluticasone Furoate-Vilanterol (BREO ELLIPTA) 200-25 MCG/INH AEROSOL POWDER, BREATH ACTIVATED Inhale 1 Puff by mouth every day. Rinse mouth after use. 2 Each 0   • clobetasol (TEMOVATE) 0.05 % external solution Apply sparingly bid 50 mL 0   • clotrimazole-betamethasone (LOTRISONE) 1-0.05 % Cream Apply 1 Application to affected area(s) 2 times a day. 1 Tube 1   • albuterol 108 (90 Base) MCG/ACT Aero Soln inhalation aerosol Inhale 2 Puffs by mouth every 6 hours as needed for Shortness of Breath. 8.5 g 3   • furosemide (LASIX) 20 MG Tab TAKE 1 TAB BY MOUTH 3 TIMES A DAY. 270 Tab 1   • Misc. Devices Misc Need a battery operated portable oxygen concentrator . 1 Each 0   • Dextromethorphan-Guaifenesin (TUSSIN DM)  MG/5ML Syrup Take 10 mL by mouth every 6 hours as needed. 560 mL 0   • doxycycline (VIBRAMYCIN) 100 MG Tab Take 1 Tab by mouth 2 times a day. 14 Tab 0   • triamcinolone acetonide (KENALOG) 0.025 % Cream APPLY TO AFFECTED AREA(S) 2 TIMES A DAY. 15 g 1   • Misc. Devices Misc Oxygen concentrator to be used at night and prn at 2 lpm. Dx: COPD, CHF, hypoxemia. 1 Units 0   • Lactobacillus Rhamnosus, GG, (CULTURELLE PO) Take  by mouth.     • Clobetasol Propionate 0.05 % LOTN Apply to affected area bid 1 Bottle 1   • Misc. Devices MISC 4 wheel walker, needed for osteoarthritis of the knees. 1 Each 0     No current facility-administered medications on file prior to visit.      Blood pressure 124/80, pulse 64, temperature 36.7 °C (98.1 °F), resp. rate 18, height 1.499 m (4' 11\"), weight 95.3 kg (210 lb), SpO2 94 %.  Family History   Problem Relation Age of Onset   • Prostate cancer Father    • Dementia Father    • Alcohol abuse Mother      Cirrosis of liver   • Cancer Maternal Aunt    • Lung Disease Sister      COPD/Smoker   • Asthma Sister    • Hyperlipidemia " Sister    • Breast Cancer Sister    • No Known Problems Maternal Grandmother    • No Known Problems Maternal Grandfather    • No Known Problems Paternal Grandmother    • Other Daughter    • Hypertension Daughter    • Thyroid Daughter        Physical Exam:  No distress at rest on supplemental oxygen using a four-wheel walker.  HEENT: PERRLA, EOMI, no scleral icterus, no nasal or oral lesions  Neck: No thyromegaly, no adenopathy, no bruits  Mallampatti: Grade III  Lungs: Distant breath sounds, no wheezes or crackles  Heart: Regular rate and rhythm, 3/6 systolic murmur  Abdomen: Soft, benign, no organomegaly  Extremities: She has chronic skin changes and discoloration.  Chronic edema.  Neurologic: Cranial nerve, motor, and sensory exam are normal    1. Chronic obstructive pulmonary disease, unspecified COPD type (HCC)    2. Dyspnea, unspecified type    3. Chronic diastolic congestive heart failure (HCC)    4. Aortic valve stenosis, unspecified etiology    5. Pulmonary hypertension (HCC)    6. BMI 40.0-44.9, adult (HCC)        Her dyspnea is chronic.  She now requires supplemental oxygen 24/ 7.  She is now on Breo.  She cannot afford Spiriva.  We will make the switch to Trelegy.  We will see her back in 6 months or sooner if there are any changes.

## 2018-08-07 ENCOUNTER — TELEPHONE (OUTPATIENT)
Dept: PULMONOLOGY | Facility: HOSPICE | Age: 73
End: 2018-08-07

## 2018-08-07 NOTE — TELEPHONE ENCOUNTER
Patient requesting another months samples of Tre[facility] inhalers [facility]. Rx called into sample pharmacy I spoke with Aries the pharmacist. Patient aware request completed.

## 2018-09-10 ENCOUNTER — TELEPHONE (OUTPATIENT)
Dept: PULMONOLOGY | Facility: HOSPICE | Age: 73
End: 2018-09-10

## 2018-09-10 DIAGNOSIS — J44.9 CHRONIC OBSTRUCTIVE PULMONARY DISEASE, UNSPECIFIED COPD TYPE (HCC): ICD-10-CM

## 2018-09-10 NOTE — TELEPHONE ENCOUNTER
1. Caller Name: Patient                      Call Back Number: [phone] (home)     2. Message: Patient requesting another months samples of Tre[facility] inhalers from [facility].     3. Patient approves office to leave a detailed voicemail/MyChart message: yes    Last OV: 06/15/18 with   Last Sample given on 08/07/18

## 2018-09-19 ENCOUNTER — OFFICE VISIT (OUTPATIENT)
Dept: MEDICAL GROUP | Facility: MEDICAL CENTER | Age: 73
End: 2018-09-19
Payer: MEDICARE

## 2018-09-19 VITALS
DIASTOLIC BLOOD PRESSURE: 60 MMHG | HEIGHT: 59 IN | HEART RATE: 77 BPM | BODY MASS INDEX: 44.15 KG/M2 | SYSTOLIC BLOOD PRESSURE: 108 MMHG | OXYGEN SATURATION: 91 % | RESPIRATION RATE: 16 BRPM | WEIGHT: 219 LBS | TEMPERATURE: 98.4 F

## 2018-09-19 DIAGNOSIS — J44.9 CHRONIC OBSTRUCTIVE PULMONARY DISEASE, UNSPECIFIED COPD TYPE (HCC): ICD-10-CM

## 2018-09-19 DIAGNOSIS — M25.562 CHRONIC PAIN OF LEFT KNEE: ICD-10-CM

## 2018-09-19 DIAGNOSIS — G89.29 CHRONIC PAIN OF LEFT KNEE: ICD-10-CM

## 2018-09-19 DIAGNOSIS — I10 ESSENTIAL HYPERTENSION: ICD-10-CM

## 2018-09-19 DIAGNOSIS — M17.0 PRIMARY OSTEOARTHRITIS OF BOTH KNEES: ICD-10-CM

## 2018-09-19 PROCEDURE — 20610 DRAIN/INJ JOINT/BURSA W/O US: CPT | Performed by: FAMILY MEDICINE

## 2018-09-19 PROCEDURE — 99214 OFFICE O/P EST MOD 30 MIN: CPT | Mod: 25 | Performed by: FAMILY MEDICINE

## 2018-09-19 RX ORDER — METHYLPREDNISOLONE ACETATE 80 MG/ML
80 INJECTION, SUSPENSION INTRA-ARTICULAR; INTRALESIONAL; INTRAMUSCULAR; SOFT TISSUE ONCE
Status: CANCELLED | OUTPATIENT
Start: 2018-09-19 | End: 2018-09-20

## 2018-09-19 RX ORDER — FUROSEMIDE 20 MG/1
20 TABLET ORAL 3 TIMES DAILY
Qty: 90 TAB | Refills: 2 | Status: SHIPPED | OUTPATIENT
Start: 2018-09-19 | End: 2018-10-18

## 2018-09-19 RX ORDER — METHYLPREDNISOLONE ACETATE 80 MG/ML
80 INJECTION, SUSPENSION INTRA-ARTICULAR; INTRALESIONAL; INTRAMUSCULAR; SOFT TISSUE ONCE
Status: COMPLETED | OUTPATIENT
Start: 2018-09-19 | End: 2018-09-19

## 2018-09-19 RX ORDER — ALBUTEROL SULFATE 90 UG/1
2 AEROSOL, METERED RESPIRATORY (INHALATION) EVERY 6 HOURS PRN
Qty: 3 INHALER | Refills: 3 | Status: SHIPPED | OUTPATIENT
Start: 2018-09-19 | End: 2018-10-18

## 2018-09-19 RX ORDER — METHYLPREDNISOLONE ACETATE 80 MG/ML
80 INJECTION, SUSPENSION INTRA-ARTICULAR; INTRALESIONAL; INTRAMUSCULAR; SOFT TISSUE ONCE
Status: DISCONTINUED | OUTPATIENT
Start: 2018-09-19 | End: 2018-09-19

## 2018-09-19 RX ORDER — POTASSIUM CHLORIDE 20 MEQ/1
20 TABLET, EXTENDED RELEASE ORAL
Qty: 90 TAB | Refills: 1 | Status: SHIPPED | OUTPATIENT
Start: 2018-09-19 | End: 2019-01-18

## 2018-09-19 RX ADMIN — METHYLPREDNISOLONE ACETATE 80 MG: 80 INJECTION, SUSPENSION INTRA-ARTICULAR; INTRALESIONAL; INTRAMUSCULAR; SOFT TISSUE at 15:36

## 2018-09-19 NOTE — PROGRESS NOTES
CC: Right knee pain ( need intraarticular cortisone shot), OA needs a walker    HPI:   Kishan presents today to discuss the following medical issues:    Chronic right knee pain   She has chronic osteoarthritis, she is here to get steroid intraarticular knee joint injection, she has been taking it every 4-6 month and has been helping.Has had last one had it 4 month ago . Patient has h/o bilateral osteoarthritis of the knee joints, associated with effusion.     Primary osteoarthritis of both knees  Patient has bilateral osteoarthritis. Has been using a walker, hers is broken, needs a new one.The patient has mobility limitation that significantly impairs her ability to participate in one or more mobility-related activities of daily living   andThe mobility limitation cannot sufficiently be resolved by the use of a cane or crutches and The patient is able to safely use the walker and the functional mobility deficit can be sufficiently resolved with use of a walker.    Patient Active Problem List    Diagnosis Date Noted   • Morbid obesity ([facility]) 06/16/2015     Priority: High   • Diastolic congestive heart failure ([facility]) 12/05/2011     Priority: High   • COPD (chronic obstructive pulmonary disease) ([facility]) 04/13/2011     Priority: High   • Aortic stenosis 06/07/2012     Priority: Medium   • Aortic regurgitation 12/05/2011     Priority: Medium   • Chronic respiratory failure with hypoxia ([facility]) 03/12/2018   • Obesity (BMI 35.0-39.9 without comorbidity) ([facility]) 11/20/2017   • Obesity (BMI 30-39.9) 11/20/2017   • Anxiety 04/04/2016   • Carotid bruit 08/23/2013   • [facility] HOME 11/09/2012   • DJD (degenerative joint disease) 10/19/2012   • Obesity 04/13/2011       Current Outpatient Prescriptions   Medication Sig Dispense Refill   • Misc. Devices Misc Need a 4 wheel walker with seat and a brake 1 Each 0   • Fluticasone Furoate-Vilanterol (BREO ELLIPTA) 200-25 MCG/INH AEROSOL POWDER, BREATH ACTIVATED Inhale 1 Puff by mouth every day.  Rinse mouth after use. 2 Each 0   • albuterol 108 (90 Base) MCG/ACT Aero Soln inhalation aerosol Inhale 2 Puffs by mouth every 6 hours as needed. 3 Inhaler 3   • furosemide (LASIX) 20 MG Tab Take 1 Tab by mouth 3 times a day. 90 Tab 2   • potassium chloride SA (KDUR) 20 MEQ Tab CR TAKE 1 TAB BY MOUTH EVERY DAY. 90 Tab 1   • CALCIUM PO Take  by mouth.     • Omega-3 Fatty Acids (FISH OIL) 1000 MG Cap capsule Take 1,000 mg by mouth 3 times a day, with meals.     • albuterol (PROVENTIL) 2.5mg/3ml Nebu Soln solution for nebulization USE 3 ML BY NEBULIZATION ROUTE EVERY 6 HOURS AS NEEDED FOR SHORTNESS OF BREATH. 375 mL 0   • Misc. Devices Misc Need a battery operated portable oxygen concentrator . 1 Each 0   • Dextromethorphan-Guaifenesin (TUSSIN DM)  MG/5ML Syrup Take 10 mL by mouth every 6 hours as needed. 560 mL 0   • tiotropium (SPIRIVA HANDIHALER) 18 MCG Cap INHALE 1 CAP BY MOUTH EVERY DAY. 90 Cap 2   • doxycycline (VIBRAMYCIN) 100 MG Tab Take 1 Tab by mouth 2 times a day. 14 Tab 0   • busPIRone (BUSPAR) 7.5 MG tablet TAKE 1 TAB BY MOUTH EVERY DAY. TAKE 1 TAB BY MOUTH EVERY DAY. 90 Tab 1   • Ibuprofen (ADVIL) 200 MG Cap Take  by mouth.     • triamcinolone acetonide (KENALOG) 0.025 % Cream APPLY TO AFFECTED AREA(S) 2 TIMES A DAY. 15 g 1   • Misc. Devices Misc Oxygen concentrator to be used at night and prn at 2 lpm. Dx: COPD, CHF, hypoxemia. 1 Units 0   • omeprazole (PRILOSEC) 20 MG delayed-release capsule Take 1 Cap by mouth every day. 90 Cap 1   • Lactobacillus Rhamnosus, GG, (CULTURELLE PO) Take  by mouth.     • Clobetasol Propionate 0.05 % LOTN Apply to affected area bid 1 Bottle 1   • Misc. Devices MISC 4 wheel walker, needed for osteoarthritis of the knees. 1 Each 0   • vitamin D (CHOLECALCIFEROL) 1000 UNIT TABS Take 5,000 Units by mouth every day.     • Multiple Vitamin (MULTI-VITAMIN PO) Take  by mouth.     • aspirin 81 MG tablet Take 81 mg by mouth every day.     • Fluticasone-Umeclidin-Vilant (TRELEGY  "ELLIPTA) 100-62.5-25 MCG/INH AEROSOL POWDER, BREATH ACTIVATED Inhale 1 Puff by mouth every day. 2 Each 0   • Fluticasone-Umeclidin-Vilant (TRELEGY ELLIPTA) 100-62.5-25 MCG/INH AEROSOL POWDER, BREATH ACTIVATED Inhale 1 Puff by mouth every day. 2 Each 0   • Fluticasone Furoate-Vilanterol (BREO ELLIPTA) 200-25 MCG/INH AEROSOL POWDER, BREATH ACTIVATED Inhale 1 Puff by mouth every day. Rinse mouth after use. 2 Each 0   • clobetasol (TEMOVATE) 0.05 % external solution Apply sparingly bid 50 mL 0   • clotrimazole-betamethasone (LOTRISONE) 1-0.05 % Cream Apply 1 Application to affected area(s) 2 times a day. 1 Tube 1   • albuterol 108 (90 Base) MCG/ACT Aero Soln inhalation aerosol Inhale 2 Puffs by mouth every 6 hours as needed for Shortness of Breath. 8.5 g 3   • furosemide (LASIX) 20 MG Tab TAKE 1 TAB BY MOUTH 3 TIMES A DAY. 270 Tab 1     No current facility-administered medications for this visit.          Allergies as of 09/19/2018 - Reviewed 09/19/2018   Allergen Reaction Noted   • Bee Swelling 03/12/2018   • Fosamax  09/15/2011   • Iodine Rash 03/12/2018   • Other drug  07/06/2013   • Pcn [penicillins]  04/13/2011   • Sulfa drugs  04/13/2011   • Tape  03/12/2018        ROS: Denies any chest pain, Shortness of breath, Changes bowel or bladder, Lower extremity edema.    Physical Exam:  /60 (BP Location: Right arm, Patient Position: Sitting, BP Cuff Size: Large adult)   Pulse 77   Temp 36.9 °C (98.4 °F)   Resp 16   Ht 1.499 m (4' 11\")   Wt 99.3 kg (219 lb)   SpO2 91%   BMI 44.23 kg/m²   Gen.: Well-developed, well-nourished, no apparent distress,pleasant and cooperative with the examination    Right knee; Swollen, no tenderness, or redness ( joint effusion)     Procedure ( Knee injection):     Knee injection.  Patient was consented. Risks and benefits discussed.  Rt knee was exposed. Site of injection was marked ( A point between lateral patellar ligament, lateral tibial plateau, and lateral femoral condyle). " The skin was sterilized with (2% chlorhexidine , and 70% Isopropyl alcohol), the area was numbed with ethyl cholride , then 1 ml of DepoMedrol 80 mg + 4 ml of Lidocaine 1% were injected into the joint.          Assessment and Plan.   73 y.o. female     1. Chronic pain of left knee  Intra-articular injection of the right knee joint is given.Patient joint movement improved to about 50% immediately after the injection.Precaustion was given to avoid bleeding . Patient advised to to uses worm compresses 2 times daily for 3 days, and RTC if any continuous pain on the joint    - MS DRAIN/INJECT LARGE JOINT/BURSA  - methylPREDNISolone acetate (DEPO-MEDROL) injection 80 mg; 1 mL by Intramuscular route Once.    2. Primary osteoarthritis of both knees  Patient has bilateral osteoarthritis. The patient has mobility limitation that significantly impairs her ability to participate in one or more mobility-related activities of daily living   andThe mobility limitation cannot sufficiently be resolved by the use of a cane or crutches and   The patient is able to safely use the walker and   The functional mobility deficit can be sufficiently resolved with use of a walker.    - Misc. Devices Misc; Need a 4 wheel walker with seat and a brake  Dispense: 1 Each; Refill: 0

## 2018-10-08 ENCOUNTER — HOSPITAL ENCOUNTER (OUTPATIENT)
Dept: CARDIOLOGY | Facility: MEDICAL CENTER | Age: 73
End: 2018-10-08
Attending: INTERNAL MEDICINE
Payer: MEDICARE

## 2018-10-08 DIAGNOSIS — I35.0 MODERATE AORTIC STENOSIS: ICD-10-CM

## 2018-10-08 PROCEDURE — 93306 TTE W/DOPPLER COMPLETE: CPT | Mod: 26 | Performed by: INTERNAL MEDICINE

## 2018-10-08 PROCEDURE — 93306 TTE W/DOPPLER COMPLETE: CPT

## 2018-10-08 NOTE — TELEPHONE ENCOUNTER
Have we ever prescribed this med? Yes.  If yes, what date? 9/10/18    Last OV: 6/15/18- Dr. Martinez    Next OV: 6 month return- no appt on file    DX: COPD    Medications: sample of Trelegy    Message sent to Dr. Juan joaquin ins't in today.

## 2018-10-09 ENCOUNTER — TELEPHONE (OUTPATIENT)
Dept: CARDIOLOGY | Facility: MEDICAL CENTER | Age: 73
End: 2018-10-09

## 2018-10-09 DIAGNOSIS — I35.1 AORTIC VALVE INSUFFICIENCY, ETIOLOGY OF CARDIAC VALVE DISEASE UNSPECIFIED: ICD-10-CM

## 2018-10-09 DIAGNOSIS — I35.0 AORTIC VALVE STENOSIS, ETIOLOGY OF CARDIAC VALVE DISEASE UNSPECIFIED: ICD-10-CM

## 2018-10-09 LAB
LV EJECT FRACT  99904: 60
LV EJECT FRACT MOD 2C 99903: 49.33
LV EJECT FRACT MOD 4C 99902: 64.57
LV EJECT FRACT MOD BP 99901: 59.22

## 2018-10-09 NOTE — TELEPHONE ENCOUNTER
----- Message from Rufus Rodrigez M.D. sent at 10/9/2018 12:50 PM PDT -----  Please call patient with results showing still moderate aortic stenosis. Please repeat an echocardiogram in 6 months.    Thanks.  EDWARD

## 2018-10-09 NOTE — TELEPHONE ENCOUNTER
Pt notified that sample Trelegy was approved and at the [facility] for , [phone].

## 2018-10-15 ENCOUNTER — TELEPHONE (OUTPATIENT)
Dept: CARDIOLOGY | Facility: MEDICAL CENTER | Age: 73
End: 2018-10-15

## 2018-10-15 NOTE — TELEPHONE ENCOUNTER
question about upcoming appt   Received: Today   Message Contents   ZACK Chacko/Derick       Patient wants to know since her echo came back unchanged if she still needs to keep her appt or if she can re-schedule it. Pt. #[phone].        Advised pt that she keep appt with Dr. Rodrigez as it has been a year since her last visit. Pt agrees.

## 2018-10-18 ENCOUNTER — OFFICE VISIT (OUTPATIENT)
Dept: CARDIOLOGY | Facility: MEDICAL CENTER | Age: 73
End: 2018-10-18
Payer: MEDICARE

## 2018-10-18 VITALS
BODY MASS INDEX: 43.14 KG/M2 | OXYGEN SATURATION: 91 % | HEART RATE: 76 BPM | WEIGHT: 214 LBS | SYSTOLIC BLOOD PRESSURE: 108 MMHG | DIASTOLIC BLOOD PRESSURE: 62 MMHG | HEIGHT: 59 IN

## 2018-10-18 DIAGNOSIS — I35.0 AORTIC VALVE STENOSIS, ETIOLOGY OF CARDIAC VALVE DISEASE UNSPECIFIED: ICD-10-CM

## 2018-10-18 DIAGNOSIS — I35.1 AORTIC VALVE INSUFFICIENCY, ETIOLOGY OF CARDIAC VALVE DISEASE UNSPECIFIED: ICD-10-CM

## 2018-10-18 DIAGNOSIS — I50.32 CHRONIC DIASTOLIC CONGESTIVE HEART FAILURE (HCC): ICD-10-CM

## 2018-10-18 PROCEDURE — 99214 OFFICE O/P EST MOD 30 MIN: CPT | Performed by: INTERNAL MEDICINE

## 2018-10-18 ASSESSMENT — ENCOUNTER SYMPTOMS
SHORTNESS OF BREATH: 1
PALPITATIONS: 0
MYALGIAS: 0
FEVER: 0
CHILLS: 0
LOSS OF CONSCIOUSNESS: 0
INSOMNIA: 0
PND: 0
ABDOMINAL PAIN: 0
ORTHOPNEA: 0
BLURRED VISION: 0
DIZZINESS: 0

## 2018-10-18 NOTE — PROGRESS NOTES
Chief Complaint   Patient presents with   • Aortic Stenosis       Subjective:   Kishan Mcintyre is a 73 y.o. female who presents today for annual follow up of aortic stenosis.    Since the patient's last visit on 10/12/17, she has been doing well clinically. She admits to chronic shortness of breath, dyspnea on exertion. She denies fatigue, chest pain, dizziness or syncope. She is going on a [place] cruise this winter.    Past Medical History:   Diagnosis Date   • Aortic stenosis    • Arrhythmia    • Arthritis    • CHF (congestive heart failure) (HCC)    • Chickenpox    • COPD    • Daytime sleepiness    • Difficulty breathing    • [place] measles    • Heartburn    • MEDICAL HOME 11/9/2012   • Mumps    • Painful joint    • Palpitations    • Rash    • Shortness of breath    • Swelling of lower extremity    • Toothache    • Weight loss      Past Surgical History:   Procedure Laterality Date   • APPENDECTOMY     • GASTRIC BYPASS LAPAROSCOPIC     • PRIMARY C SECTION     • TONSILLECTOMY     • TUBAL COAGULATION LAPAROSCOPIC BILATERAL       Family History   Problem Relation Age of Onset   • Prostate cancer Father    • Dementia Father    • Alcohol abuse Mother         Cirrosis of liver   • Cancer Maternal Aunt    • Lung Disease Sister         COPD/Smoker   • Asthma Sister    • Hyperlipidemia Sister    • Breast Cancer Sister    • No Known Problems Maternal Grandmother    • No Known Problems Maternal Grandfather    • No Known Problems Paternal Grandmother    • Other Daughter    • Hypertension Daughter    • Thyroid Daughter      Social History     Social History   • Marital status:      Spouse name: N/A   • Number of children: N/A   • Years of education: N/A     Occupational History   • Not on file.     Social History Main Topics   • Smoking status: Former Smoker     Packs/day: 0.50     Years: 26.00     Types: Cigarettes     Quit date: 1/1/1984   • Smokeless tobacco: Never Used      Comment: Started smoking at age 13    • Alcohol use 4.2 oz/week     7 Shots of liquor per week   • Drug use: No   • Sexual activity: No     Other Topics Concern   • Not on file     Social History Narrative   • No narrative on file     Allergies   Allergen Reactions   • Bee Swelling   • Fosamax      Bone pain   • Iodine Rash     RASH   • Other Drug      Also allergies to Actenol   • Pcn [Penicillins]    • Sulfa Drugs    • Tape      Medications reviewed.    Outpatient Encounter Prescriptions as of 10/18/2018   Medication Sig Dispense Refill   • Fluticasone-Umeclidin-Vilant (TRELEGY ELLIPTA) 100-62.5-25 MCG/INH AEROSOL POWDER, BREATH ACTIVATED Inhale 1 Puff by mouth every day. 2 Each 0   • potassium chloride SA (KDUR) 20 MEQ Tab CR Take 1 Tab by mouth every day. 90 Tab 1   • Fluticasone Furoate-Vilanterol (BREO ELLIPTA) 200-25 MCG/INH AEROSOL POWDER, BREATH ACTIVATED Inhale 1 Puff by mouth every day. Rinse mouth after use. 2 Each 0   • clobetasol (TEMOVATE) 0.05 % external solution Apply sparingly bid 50 mL 0   • clotrimazole-betamethasone (LOTRISONE) 1-0.05 % Cream Apply 1 Application to affected area(s) 2 times a day. 1 Tube 1   • albuterol 108 (90 Base) MCG/ACT Aero Soln inhalation aerosol Inhale 2 Puffs by mouth every 6 hours as needed for Shortness of Breath. 8.5 g 3   • furosemide (LASIX) 20 MG Tab TAKE 1 TAB BY MOUTH 3 TIMES A DAY. 270 Tab 1   • CALCIUM PO Take  by mouth.     • Omega-3 Fatty Acids (FISH OIL) 1000 MG Cap capsule Take 1,000 mg by mouth 3 times a day, with meals.     • albuterol (PROVENTIL) 2.5mg/3ml Nebu Soln solution for nebulization USE 3 ML BY NEBULIZATION ROUTE EVERY 6 HOURS AS NEEDED FOR SHORTNESS OF BREATH. 375 mL 0   • tiotropium (SPIRIVA HANDIHALER) 18 MCG Cap INHALE 1 CAP BY MOUTH EVERY DAY. 90 Cap 2   • busPIRone (BUSPAR) 7.5 MG tablet TAKE 1 TAB BY MOUTH EVERY DAY. TAKE 1 TAB BY MOUTH EVERY DAY. 90 Tab 1   • Ibuprofen (ADVIL) 200 MG Cap Take  by mouth.     • triamcinolone acetonide (KENALOG) 0.025 % Cream APPLY TO  AFFECTED AREA(S) 2 TIMES A DAY. 15 g 1   • omeprazole (PRILOSEC) 20 MG delayed-release capsule Take 1 Cap by mouth every day. 90 Cap 1   • Lactobacillus Rhamnosus, GG, (CULTURELLE PO) Take  by mouth.     • vitamin D (CHOLECALCIFEROL) 1000 UNIT TABS Take 5,000 Units by mouth every day.     • Multiple Vitamin (MULTI-VITAMIN PO) Take  by mouth.     • aspirin 81 MG tablet Take 81 mg by mouth every day.     • furosemide (LASIX) 20 MG Tab Take 1 Tab by mouth 3 times a day. 90 Tab 2   • albuterol 108 (90 Base) MCG/ACT Aero Soln inhalation aerosol Inhale 2 Puffs by mouth every 6 hours as needed. 3 Inhaler 3   • Misc. Devices Misc Need a 4 wheel walker with seat and a brake 1 Each 0   • Fluticasone-Umeclidin-Vilant (TRELEGY ELLIPTA) 100-62.5-25 MCG/INH AEROSOL POWDER, BREATH ACTIVATED Inhale 1 Puff by mouth every day. 2 Each 0   • Fluticasone Furoate-Vilanterol (BREO ELLIPTA) 200-25 MCG/INH AEROSOL POWDER, BREATH ACTIVATED Inhale 1 Puff by mouth every day. Rinse mouth after use. 2 Each 0   • Misc. Devices Misc Need a battery operated portable oxygen concentrator . 1 Each 0   • Dextromethorphan-Guaifenesin (TUSSIN DM)  MG/5ML Syrup Take 10 mL by mouth every 6 hours as needed. 560 mL 0   • doxycycline (VIBRAMYCIN) 100 MG Tab Take 1 Tab by mouth 2 times a day. 14 Tab 0   • Misc. Devices Misc Oxygen concentrator to be used at night and prn at 2 lpm. Dx: COPD, CHF, hypoxemia. 1 Units 0   • Clobetasol Propionate 0.05 % LOTN Apply to affected area bid 1 Bottle 1   • Misc. Devices MISC 4 wheel walker, needed for osteoarthritis of the knees. 1 Each 0     No facility-administered encounter medications on file as of 10/18/2018.      Review of Systems   Constitutional: Negative for chills and fever.   HENT: Negative for congestion.    Eyes: Negative for blurred vision.   Respiratory: Positive for shortness of breath.    Cardiovascular: Negative for chest pain, palpitations, orthopnea, leg swelling and PND.   Gastrointestinal:  "Negative for abdominal pain.   Genitourinary: Negative for dysuria.   Musculoskeletal: Negative for joint pain and myalgias.   Skin: Negative for rash.   Neurological: Negative for dizziness and loss of consciousness.   Psychiatric/Behavioral: The patient does not have insomnia.         Objective:   /62 (BP Location: Left arm, Patient Position: Sitting, BP Cuff Size: Adult)   Pulse 76   Ht 1.499 m (4' 11\")   Wt 97.1 kg (214 lb)   SpO2 91%   BMI 43.22 kg/m²     Physical Exam   Constitutional: She is oriented to person, place, and time. She appears well-developed and well-nourished.   Using oxygen.  Using wheelchair.   HENT:   Head: Normocephalic and atraumatic.   Eyes: Pupils are equal, round, and reactive to light. Conjunctivae are normal.   Neck: Normal range of motion. Neck supple.   Cardiovascular: Normal rate and regular rhythm.    Murmur heard.  Pulmonary/Chest: Effort normal and breath sounds normal.   Abdominal: Soft. Bowel sounds are normal.   Musculoskeletal: Normal range of motion.   Neurological: She is alert and oriented to person, place, and time.   Skin: Skin is warm and dry.   Psychiatric: She has a normal mood and affect.     CARDIAC STUDIES/PROCEDURES:     CAROTID ULTRASOUND (08/30/13)  Tortuous but no significant plaque in carotid arteries bilaterally.  Normal vertebral flow.    ECHOCARDIOGRAM CONCLUSIONS (10/08/18)  Prior ; compared to the report of the study done - there has   been no significant change.   Normal left ventricular systolic function.  Left ventricular ejection fraction is visually estimated to be 60%.  Moderate aortic stenosis.  Aortic valve area calculated from the continuity equation is 1.1 cm2.  Vmax is  3.03  m/s.  Transvalvular gradients are - Peak: 37 mmHg, Mean: 23 mmHg.   Mild tricuspid regurgitation.  Estimated right ventricular systolic pressure is 40 mmHg.  (study result reviewed)     ECHOCARDIOGRAM CONCLUSIONS (09/11/17)  Prior echo 2-23-17; compared " to the report of the study done - there   has been no significant change.   Normal left ventricular systolic function.  Left ventricular ejection fraction is visually estimated to be 55%.  Mild mitral regurgitation.  Mild to moderate aortic stenosis.  Vmax is 3.03 m/s.  Transvalvular gradients are - Peak: 37 mmHg, Mean: 21 mmHg.  Mild aortic insufficiency.  Mild tricuspid regurgitation.  Estimated right ventricular systolic pressure is 50 mmHg.     ECHOCARDIOGRAM CONCLUSIONS (03/22/17)  Prior study done on 02/18/16,  Normal left ventricular systolic function.   Moderate aortic stenosis. Vmax is 3.4  m/s.   Transvalvular gradients are - Peak: 47 mmHg, Mean: 24  mmHg.  Compared to the images of the prior study done -  there has been no significant change.     ECHOCARDIOGRAM CONCLUSIONS (02/18/16)  Normal left ventricular systolic function.  Left ventricular ejection fraction is visually estimated to be 60%.  Grade I diastolic dysfunction.  Mild to moderate aortic stenosis.  Vmax 3.0 m/s. Transvalvular gradients are Peak: 40 mmHg, Mean: 24 mmHg.  Mild aortic insufficiency.  Mild tricuspid regurgitation.  Right ventricular systolic pressure is estimated to be 60 mmHg.     ECHOCARDIOGRAM CONCLUSIONS (02/05/15)  Mild aortic stenosis. Transvalvular gradients are - Peak: 32 mmHg   Mean: 20 mmHg.  Transvalvular gradients are - Peak: 32 mmHg Mean: 20 mmHg.  Calcification of the noncoronary cusp Mild aortic insufficiency.  Mild concentric left ventricular hypertrophy.  Left ventricular ejection fraction is 60% to 65%.  Grade I diastolic dysfunction - mitral inflow E/A is <1.0.  Moderately dilated right ventricle.  Moderately dilated left atrium.  Right ventricular systolic pressure is estimated to be 46-51 mmHg.  Trace pulmonic insufficiency.     ECHOCARDIOGRAM CONCLUSIONS (08/30/13)  Normal left ventricular systolic function.   Left ventricular ejection fraction is 60% to 65%.   Mild to moderate aortic stenosis.  Mild aortic  insufficiency.     ECHOCARDIOGRAM (04/23/12)  Echocardiogram showing ejection fraction of 60-65%, mild aortic stenosis and mild mitral regurgitation.     EKG performed on (08/23/13) EKG shows normal sinus rhythm.     Laboratory results of (07/23/16) Cholesterol profile of 171/101/60/91 noted.     LOWER EXTREMITY VENOUS ULTRASOUND (04/10/10)  Lower extremity venous study showing patent bilateral common femoral veins.     MPI CONCLUSIONS (03/02/17)  1. Small reversible defect seen in apical inferior and lateral wall    consistent with ischemia. Prominent subdiaphragmatic activity seen in stress    than rest images could attenuation defect but cannot r/o ischemia. Consider    clinical correlation.  2. Normal left ventricular wall motion, with EF of  69 %. Normal left    ventricular wall thickening. Calculated TID 1.21.     MPI CONCLUSIONS (02/18/16)  1. Small reversible defect seen in apical inferior and lateral wall   consistent with ischemia. Prominent subdiaphragmatic activity seen in stress   than rest images could attenuation defect but cannot r/o ischemia. Consider   clinical correlation.  2. Normal left ventricular wall motion, with EF of 69 %. Normal left   ventricular wall thickening. Calculated TID 1.21.     PULMONARY FUNCTION INTERPRETATION (08/02/17)  FEV1 is 0.69 L which is 38% of predicted. FEV1 FVC ratio is reduced at 69%. MVV is severely reduced at 34% of predicted.  After administration of an inhaled bronchodilator there is improvement in FEV1 up to 0.94 L which is 53% of predicted. This is a relative 36% improvement.  Total lung capacity is mildly reduced at 76% of predicted. RV/TLC ratio is increased to 154% of predicted.  DLCO is 112% of predicted.  Airways resistance is increased.   This study demonstrates presence of severe obstructive lung disease with a definite reversible component. The lack of severe hyperinflation indicates a coexisting restrictive process which may be due to the patient's  weight. Her diffusing capacity is normal.    Assessment:     1. Aortic valve stenosis, etiology of cardiac valve disease unspecified     2. Aortic valve insufficiency, etiology of cardiac valve disease unspecified     3. Chronic diastolic congestive heart failure (HCC)         Medical Decision Making:  Today's Assessment / Status / Plan:     1. Aortic stenosis and aortic regurgitation: She is clinically doing well. Her recent echocardiogram shows mild to moderate aortic stensois. We will repeat an echocardiogram in one year  2. Atrial flutter (single atrial flutter event associated with respiratory failure/pnuemonia without recurrence): Sinus rhythm is maintained.  3. History of diastolic congestive heart failure: The overall volume status is adequate.  4. Chronic obstructive pulmonary disease on chronic oxygen use 2-3 L/min for one year (managed by Dr. Martinez)     We will follow up the patient in one year with echocardiogram.     CC Clement Webb and Sammy Davis

## 2018-10-18 NOTE — LETTER
[facility]   [address]  [place] [address]  Phone: [phone]  Fax: [phone]              Kishan Mcintyre  1945    Encounter Date: 10/18/2018    Rufus Rodrigez M.D.          PROGRESS NOTE:  Chief Complaint   Patient presents with   • Aortic Stenosis       Subjective:   Kishan Mcintyre is a 73 y.o. female who presents today for annual follow up of aortic stenosis.    Since the patient's last visit on 10/12/17, she has been doing well clinically. She denies fatigue, shortness of breath, dyspnea on exertion, chest pain, dizziness or syncope.    Past Medical History:   Diagnosis Date   • Aortic stenosis    • Arrhythmia    • Arthritis    • CHF (congestive heart failure) (HCC)    • Chickenpox    • COPD    • Daytime sleepiness    • Difficulty breathing    • Trinid[place] measles    • Heartburn    • MEDICAL HOME 11/9/2012   • Mumps    • Painful joint    • Palpitations    • Rash    • Shortness of breath    • Swelling of lower extremity    • Toothache    • Weight loss      Past Surgical History:   Procedure Laterality Date   • APPENDECTOMY     • GASTRIC BYPASS LAPAROSCOPIC     • PRIMARY C SECTION     • TONSILLECTOMY     • TUBAL COAGULATION LAPAROSCOPIC BILATERAL       Family History   Problem Relation Age of Onset   • Prostate cancer Father    • Dementia Father    • Alcohol abuse Mother         Cirrosis of liver   • Cancer Maternal Aunt    • Lung Disease Sister         COPD/Smoker   • Asthma Sister    • Hyperlipidemia Sister    • Breast Cancer Sister    • No Known Problems Maternal Grandmother    • No Known Problems Maternal Grandfather    • No Known Problems Paternal Grandmother    • Other Daughter    • Hypertension Daughter    • Thyroid Daughter      Social History     Social History   • Marital status:      Spouse name: N/A   • Number of children: N/A   • Years of education: N/A     Occupational History   • Not on file.     Social History Main  Topics   • Smoking status: Former Smoker     Packs/day: 0.50     Years: 26.00     Types: Cigarettes     Quit date: 1/1/1984   • Smokeless tobacco: Never Used      Comment: Started smoking at age 13   • Alcohol use 4.2 oz/week     7 Shots of liquor per week   • Drug use: No   • Sexual activity: No     Other Topics Concern   • Not on file     Social History Narrative   • No narrative on file     Allergies   Allergen Reactions   • Bee Swelling   • Fosamax      Bone pain   • Iodine Rash     RASH   • Other Drug      Also allergies to Actenol   • Pcn [Penicillins]    • Sulfa Drugs    • Tape      Medications reviewed.    Outpatient Encounter Prescriptions as of 10/18/2018   Medication Sig Dispense Refill   • Fluticasone-Umeclidin-Vilant (TRELEGY ELLIPTA) 100-62.5-25 MCG/INH AEROSOL POWDER, BREATH ACTIVATED Inhale 1 Puff by mouth every day. 2 Each 0   • potassium chloride SA (KDUR) 20 MEQ Tab CR Take 1 Tab by mouth every day. 90 Tab 1   • Fluticasone Furoate-Vilanterol (BREO ELLIPTA) 200-25 MCG/INH AEROSOL POWDER, BREATH ACTIVATED Inhale 1 Puff by mouth every day. Rinse mouth after use. 2 Each 0   • clobetasol (TEMOVATE) 0.05 % external solution Apply sparingly bid 50 mL 0   • clotrimazole-betamethasone (LOTRISONE) 1-0.05 % Cream Apply 1 Application to affected area(s) 2 times a day. 1 Tube 1   • albuterol 108 (90 Base) MCG/ACT Aero Soln inhalation aerosol Inhale 2 Puffs by mouth every 6 hours as needed for Shortness of Breath. 8.5 g 3   • furosemide (LASIX) 20 MG Tab TAKE 1 TAB BY MOUTH 3 TIMES A DAY. 270 Tab 1   • CALCIUM PO Take  by mouth.     • Omega-3 Fatty Acids (FISH OIL) 1000 MG Cap capsule Take 1,000 mg by mouth 3 times a day, with meals.     • albuterol (PROVENTIL) 2.5mg/3ml Nebu Soln solution for nebulization USE 3 ML BY NEBULIZATION ROUTE EVERY 6 HOURS AS NEEDED FOR SHORTNESS OF BREATH. 375 mL 0   • tiotropium (SPIRIVA HANDIHALER) 18 MCG Cap INHALE 1 CAP BY MOUTH EVERY DAY. 90 Cap 2   • busPIRone (BUSPAR) 7.5 MG  tablet TAKE 1 TAB BY MOUTH EVERY DAY. TAKE 1 TAB BY MOUTH EVERY DAY. 90 Tab 1   • Ibuprofen (ADVIL) 200 MG Cap Take  by mouth.     • triamcinolone acetonide (KENALOG) 0.025 % Cream APPLY TO AFFECTED AREA(S) 2 TIMES A DAY. 15 g 1   • omeprazole (PRILOSEC) 20 MG delayed-release capsule Take 1 Cap by mouth every day. 90 Cap 1   • Lactobacillus Rhamnosus, GG, (CULTURELLE PO) Take  by mouth.     • vitamin D (CHOLECALCIFEROL) 1000 UNIT TABS Take 5,000 Units by mouth every day.     • Multiple Vitamin (MULTI-VITAMIN PO) Take  by mouth.     • aspirin 81 MG tablet Take 81 mg by mouth every day.     • furosemide (LASIX) 20 MG Tab Take 1 Tab by mouth 3 times a day. 90 Tab 2   • albuterol 108 (90 Base) MCG/ACT Aero Soln inhalation aerosol Inhale 2 Puffs by mouth every 6 hours as needed. 3 Inhaler 3   • Misc. Devices Misc Need a 4 wheel walker with seat and a brake 1 Each 0   • Fluticasone-Umeclidin-Vilant (TRELEGY ELLIPTA) 100-62.5-25 MCG/INH AEROSOL POWDER, BREATH ACTIVATED Inhale 1 Puff by mouth every day. 2 Each 0   • Fluticasone Furoate-Vilanterol (BREO ELLIPTA) 200-25 MCG/INH AEROSOL POWDER, BREATH ACTIVATED Inhale 1 Puff by mouth every day. Rinse mouth after use. 2 Each 0   • Misc. Devices Misc Need a battery operated portable oxygen concentrator . 1 Each 0   • Dextromethorphan-Guaifenesin (TUSSIN DM)  MG/5ML Syrup Take 10 mL by mouth every 6 hours as needed. 560 mL 0   • doxycycline (VIBRAMYCIN) 100 MG Tab Take 1 Tab by mouth 2 times a day. 14 Tab 0   • Misc. Devices Misc Oxygen concentrator to be used at night and prn at 2 lpm. Dx: COPD, CHF, hypoxemia. 1 Units 0   • Clobetasol Propionate 0.05 % LOTN Apply to affected area bid 1 Bottle 1   • Misc. Devices MISC 4 wheel walker, needed for osteoarthritis of the knees. 1 Each 0     No facility-administered encounter medications on file as of 10/18/2018.      Review of Systems   Constitutional: Negative for chills and fever.   HENT: Negative for congestion.    Eyes:  "Negative for blurred vision.   Respiratory: Negative for shortness of breath.    Cardiovascular: Negative for chest pain, palpitations, orthopnea, leg swelling and PND.   Gastrointestinal: Negative for abdominal pain.   Genitourinary: Negative for dysuria.   Musculoskeletal: Negative for joint pain and myalgias.   Skin: Negative for rash.   Neurological: Negative for dizziness and loss of consciousness.   Psychiatric/Behavioral: The patient does not have insomnia.         Objective:   /62 (BP Location: Left arm, Patient Position: Sitting, BP Cuff Size: Adult)   Pulse 76   Ht 1.499 m (4' 11\")   Wt 97.1 kg (214 lb)   SpO2 91%   BMI 43.22 kg/m²      Physical Exam   Constitutional: She is oriented to person, place, and time. She appears well-developed and well-nourished.   HENT:   Head: Normocephalic and atraumatic.   Eyes: Pupils are equal, round, and reactive to light. Conjunctivae are normal.   Neck: Normal range of motion. Neck supple.   Cardiovascular: Normal rate and regular rhythm.    Murmur heard.  Pulmonary/Chest: Effort normal and breath sounds normal.   Abdominal: Soft. Bowel sounds are normal.   Musculoskeletal: Normal range of motion.   Neurological: She is alert and oriented to person, place, and time.   Skin: Skin is warm and dry.   Psychiatric: She has a normal mood and affect.     CARDIAC STUDIES/PROCEDURES:     CAROTID ULTRASOUND (08/30/13)  Tortuous but no significant plaque in carotid arteries bilaterally.  Normal vertebral flow.    ECHOCARDIOGRAM CONCLUSIONS (10/08/18)  Prior ; compared to the report of the study done - there has   been no significant change.   Normal left ventricular systolic function.  Left ventricular ejection fraction is visually estimated to be 60%.  Moderate aortic stenosis.  Aortic valve area calculated from the continuity equation is 1.1 cm2.  Vmax is  3.03  m/s.  Transvalvular gradients are - Peak: 37 mmHg, Mean: 23 mmHg.   Mild tricuspid " regurgitation.  Estimated right ventricular systolic pressure is 40 mmHg.  (study result reviewed)     ECHOCARDIOGRAM CONCLUSIONS (09/11/17)  Prior echo 2-23-17; compared to the report of the study done - there   has been no significant change.   Normal left ventricular systolic function.  Left ventricular ejection fraction is visually estimated to be 55%.  Mild mitral regurgitation.  Mild to moderate aortic stenosis.  Vmax is 3.03 m/s.  Transvalvular gradients are - Peak: 37 mmHg, Mean: 21 mmHg.  Mild aortic insufficiency.  Mild tricuspid regurgitation.  Estimated right ventricular systolic pressure is 50 mmHg.     ECHOCARDIOGRAM CONCLUSIONS (03/22/17)  Prior study done on 02/18/16,  Normal left ventricular systolic function.   Moderate aortic stenosis. Vmax is 3.4  m/s.   Transvalvular gradients are - Peak: 47 mmHg, Mean: 24  mmHg.  Compared to the images of the prior study done -  there has been no significant change.     ECHOCARDIOGRAM CONCLUSIONS (02/18/16)  Normal left ventricular systolic function.  Left ventricular ejection fraction is visually estimated to be 60%.  Grade I diastolic dysfunction.  Mild to moderate aortic stenosis.  Vmax 3.0 m/s. Transvalvular gradients are Peak: 40 mmHg, Mean: 24 mmHg.  Mild aortic insufficiency.  Mild tricuspid regurgitation.  Right ventricular systolic pressure is estimated to be 60 mmHg.     ECHOCARDIOGRAM CONCLUSIONS (02/05/15)  Mild aortic stenosis. Transvalvular gradients are - Peak: 32 mmHg   Mean: 20 mmHg.  Transvalvular gradients are - Peak: 32 mmHg Mean: 20 mmHg.  Calcification of the noncoronary cusp Mild aortic insufficiency.  Mild concentric left ventricular hypertrophy.  Left ventricular ejection fraction is 60% to 65%.  Grade I diastolic dysfunction - mitral inflow E/A is <1.0.  Moderately dilated right ventricle.  Moderately dilated left atrium.  Right ventricular systolic pressure is estimated to be 46-51 mmHg.  Trace pulmonic insufficiency.      ECHOCARDIOGRAM CONCLUSIONS (08/30/13)  Normal left ventricular systolic function.   Left ventricular ejection fraction is 60% to 65%.   Mild to moderate aortic stenosis.  Mild aortic insufficiency.     ECHOCARDIOGRAM (04/23/12)  Echocardiogram showing ejection fraction of 60-65%, mild aortic stenosis and mild mitral regurgitation.     EKG performed on (08/23/13) EKG shows normal sinus rhythm.     Laboratory results of (07/23/16) Cholesterol profile of 171/101/60/91 noted.     LOWER EXTREMITY VENOUS ULTRASOUND (04/10/10)  Lower extremity venous study showing patent bilateral common femoral veins.     MPI CONCLUSIONS (03/02/17)  1. Small reversible defect seen in apical inferior and lateral wall    consistent with ischemia. Prominent subdiaphragmatic activity seen in stress    than rest images could attenuation defect but cannot r/o ischemia. Consider    clinical correlation.  2. Normal left ventricular wall motion, with EF of  69 %. Normal left    ventricular wall thickening. Calculated TID 1.21.     MPI CONCLUSIONS (02/18/16)  1. Small reversible defect seen in apical inferior and lateral wall   consistent with ischemia. Prominent subdiaphragmatic activity seen in stress   than rest images could attenuation defect but cannot r/o ischemia. Consider   clinical correlation.  2. Normal left ventricular wall motion, with EF of 69 %. Normal left   ventricular wall thickening. Calculated TID 1.21.     PULMONARY FUNCTION INTERPRETATION (08/02/17)  FEV1 is 0.69 L which is 38% of predicted. FEV1 FVC ratio is reduced at 69%. MVV is severely reduced at 34% of predicted.  After administration of an inhaled bronchodilator there is improvement in FEV1 up to 0.94 L which is 53% of predicted. This is a relative 36% improvement.  Total lung capacity is mildly reduced at 76% of predicted. RV/TLC ratio is increased to 154% of predicted.  DLCO is 112% of predicted.  Airways resistance is increased.   This study demonstrates presence  of severe obstructive lung disease with a definite reversible component. The lack of severe hyperinflation indicates a coexisting restrictive process which may be due to the patient's weight. Her diffusing capacity is normal.    Assessment:     1. Aortic valve stenosis, etiology of cardiac valve disease unspecified     2. Aortic valve insufficiency, etiology of cardiac valve disease unspecified     3. Chronic diastolic congestive heart failure (HCC)         Medical Decision Making:  Today's Assessment / Status / Plan:     1. Aortic stenosis and aortic regurgitation: She is clinically doing well. Her recent echocardiogram shows mild to moderate aortic stensois. We will repeat an echocardiogram in one year  2. Atrial flutter (single atrial flutter event associated with respiratory failure/pnuemonia without recurrence): Sinus rhythm is maintained.  3. History of diastolic congestive heart failure: The overall volume status is adequate.  4. Chronic obstructive pulmonary disease (managed by Dr. Martinez)     We will follow up the patient in one year with echocardiogram.     CC Clement Webb and Sammy Davis              No Recipients

## 2018-11-05 DIAGNOSIS — J44.9 CHRONIC OBSTRUCTIVE PULMONARY DISEASE, UNSPECIFIED COPD TYPE (HCC): ICD-10-CM

## 2018-11-05 NOTE — TELEPHONE ENCOUNTER
Have we ever prescribed this med? Yes.  If yes, what date? 10/8/18    Last OV: 6/15/18- Dr. Martinez    Next OV: 4/9/19    DX: COPD    Medications: sample of Trelegy    Message sent to Dr. Juan joaquin isn't in.

## 2018-12-03 DIAGNOSIS — J44.9 CHRONIC OBSTRUCTIVE PULMONARY DISEASE, UNSPECIFIED COPD TYPE (HCC): ICD-10-CM

## 2018-12-03 NOTE — TELEPHONE ENCOUNTER
Have we ever prescribed this med? Yes.  If yes, what date? 11/05/18    Last OV: 06/15/18    Next OV: 12/06/18    DX: COPD    Medications: Trelegy samples

## 2018-12-04 ENCOUNTER — TELEPHONE (OUTPATIENT)
Dept: PULMONOLOGY | Facility: HOSPICE | Age: 73
End: 2018-12-04

## 2018-12-04 DIAGNOSIS — J44.9 CHRONIC OBSTRUCTIVE PULMONARY DISEASE, UNSPECIFIED COPD TYPE (HCC): ICD-10-CM

## 2018-12-04 NOTE — TELEPHONE ENCOUNTER
Dr. Martinez,    Please resend sample rx Trelegy to the [facility] for it was sent to the [email or web address] in error.    Last seen: 6/15/18- Dr. Martinez    COPD

## 2018-12-06 ENCOUNTER — OFFICE VISIT (OUTPATIENT)
Dept: PULMONOLOGY | Facility: HOSPICE | Age: 73
End: 2018-12-06
Payer: MEDICARE

## 2018-12-06 VITALS
HEIGHT: 59 IN | SYSTOLIC BLOOD PRESSURE: 128 MMHG | DIASTOLIC BLOOD PRESSURE: 82 MMHG | TEMPERATURE: 98.1 F | BODY MASS INDEX: 43.34 KG/M2 | OXYGEN SATURATION: 93 % | HEART RATE: 70 BPM | RESPIRATION RATE: 18 BRPM | WEIGHT: 215 LBS

## 2018-12-06 DIAGNOSIS — I35.0 AORTIC VALVE STENOSIS, UNSPECIFIED ETIOLOGY: ICD-10-CM

## 2018-12-06 DIAGNOSIS — R06.00 DYSPNEA, UNSPECIFIED TYPE: ICD-10-CM

## 2018-12-06 DIAGNOSIS — I50.32 CHRONIC DIASTOLIC CONGESTIVE HEART FAILURE (HCC): ICD-10-CM

## 2018-12-06 DIAGNOSIS — G47.9 SLEEP DISORDER: ICD-10-CM

## 2018-12-06 DIAGNOSIS — I27.20 PULMONARY HYPERTENSION (HCC): ICD-10-CM

## 2018-12-06 DIAGNOSIS — J44.9 CHRONIC OBSTRUCTIVE PULMONARY DISEASE, UNSPECIFIED COPD TYPE (HCC): ICD-10-CM

## 2018-12-06 PROCEDURE — 99214 OFFICE O/P EST MOD 30 MIN: CPT | Performed by: INTERNAL MEDICINE

## 2018-12-06 RX ORDER — BUSPIRONE HYDROCHLORIDE 7.5 MG/1
7.5 TABLET ORAL
Qty: 90 TAB | Refills: 3 | Status: SHIPPED | OUTPATIENT
Start: 2018-12-06 | End: 2019-01-18

## 2018-12-06 ASSESSMENT — PAIN SCALES - GENERAL: PAINLEVEL: NO PAIN

## 2018-12-06 NOTE — PROGRESS NOTES
Chief Complaint   Patient presents with   • Follow-Up     COPD       HPI:  The patient is a 73-year-old woman with severe COPD.  Her FEV1 is 0.69 L.  She does have a significant response to an inhaled bronchodilator.  She also has aortic stenosis, diastolic dysfunction, and pulmonary hypertension.  Her current medications include Trelegy and an albuterol nebulizer as well as HFA.    She is now on supplemental oxygen at 3 L/min 24/7.    She is not having any acute respiratory issues today.  She does have severe dyspnea with minimal activity.  Her most recent echo does show some improvement in her diastolic dysfunction and her pulmonary pressures.  She is scheduled to go on a cruise to [place] in the near future.  She intends to take her home oxygen concentrator as well as a portable oxygen concentrator with her.  She cannot afford inhalers.  We have been giving her samples.    Past Medical History:   Diagnosis Date   • Aortic stenosis    • Arrhythmia    • Arthritis    • CHF (congestive heart failure) ([facility])    • Chickenpox    • COPD    • Daytime sleepiness    • Difficulty breathing    • Romansh measles    • Heartburn    • MEDICAL HOME 11/9/2012   • Mumps    • Painful joint    • Palpitations    • Rash    • Shortness of breath    • Swelling of lower extremity    • Toothache    • Weight loss        ROS:   Constitutional: Denies fevers, chills, night sweats, fatigue or weight loss  Eyes: Denies vision loss, pain, drainage, double vision  Ears, Nose, Throat: Denies earache, tinnitus, hoarseness  Cardiovascular: Denies chest pain, tightness, palpitations at this time  Respiratory: See HPI  Sleep: Denies, snoring, apnea  GI: Denies abdominal pain, nausea, vomiting, diarrhea  : Denies frequent urination, hematuria, painful urination  Musculoskeletal: Denies back pain, painful joints, sore muscles.  Has chronic lower extremity edema  Neurological: Denies headaches, seizures  Skin: Denies rashes, color changes  Psychiatric:  Denies depression or thoughts of suicide  Hematologic: Denies bleeding tendency or clotting tendency  Allergic/Immunologic: Denies rhinitis, skin sensitivity    Social History     Social History   • Marital status:      Spouse name: N/A   • Number of children: N/A   • Years of education: N/A     Occupational History   • Not on file.     Social History Main Topics   • Smoking status: Former Smoker     Packs/day: 0.50     Years: 26.00     Types: Cigarettes     Quit date: 1/1/1984   • Smokeless tobacco: Never Used      Comment: Started smoking at age 13   • Alcohol use 4.2 oz/week     7 Shots of liquor per week   • Drug use: No   • Sexual activity: No     Other Topics Concern   • Not on file     Social History Narrative   • No narrative on file     Bee; Fosamax; Iodine; Pcn [penicillins]; Sulfa drugs; Other drug; and Tape  Current Outpatient Prescriptions on File Prior to Visit   Medication Sig Dispense Refill   • Fluticasone-Umeclidin-Vilant (TRELEGY ELLIPTA) 100-62.5-25 MCG/INH AEROSOL POWDER, BREATH ACTIVATED Inhale 1 Puff by mouth every day. 3 Each 4   • Fluticasone Furoate-Vilanterol (BREO ELLIPTA) 200-25 MCG/INH AEROSOL POWDER, BREATH ACTIVATED Inhale 1 Puff by mouth every day. Rinse mouth after use. 2 Each 0   • clobetasol (TEMOVATE) 0.05 % external solution Apply sparingly bid 50 mL 0   • clotrimazole-betamethasone (LOTRISONE) 1-0.05 % Cream Apply 1 Application to affected area(s) 2 times a day. 1 Tube 1   • albuterol 108 (90 Base) MCG/ACT Aero Soln inhalation aerosol Inhale 2 Puffs by mouth every 6 hours as needed for Shortness of Breath. 8.5 g 3   • furosemide (LASIX) 20 MG Tab TAKE 1 TAB BY MOUTH 3 TIMES A DAY. 270 Tab 1   • CALCIUM PO Take  by mouth.     • Omega-3 Fatty Acids (FISH OIL) 1000 MG Cap capsule Take 1,000 mg by mouth 3 times a day, with meals.     • albuterol (PROVENTIL) 2.5mg/3ml Nebu Soln solution for nebulization USE 3 ML BY NEBULIZATION ROUTE EVERY 6 HOURS AS NEEDED FOR SHORTNESS OF  "BREATH. 375 mL 0   • tiotropium (SPIRIVA HANDIHALER) 18 MCG Cap INHALE 1 CAP BY MOUTH EVERY DAY. 90 Cap 2   • busPIRone (BUSPAR) 7.5 MG tablet TAKE 1 TAB BY MOUTH EVERY DAY. TAKE 1 TAB BY MOUTH EVERY DAY. 90 Tab 1   • Ibuprofen (ADVIL) 200 MG Cap Take  by mouth.     • omeprazole (PRILOSEC) 20 MG delayed-release capsule Take 1 Cap by mouth every day. 90 Cap 1   • Lactobacillus Rhamnosus, GG, (CULTURELLE PO) Take  by mouth.     • vitamin D (CHOLECALCIFEROL) 1000 UNIT TABS Take 5,000 Units by mouth every day.     • Multiple Vitamin (MULTI-VITAMIN PO) Take  by mouth.     • aspirin 81 MG tablet Take 81 mg by mouth every day.     • Fluticasone-Umeclidin-Vilant (TRELEGY ELLIPTA) 100-62.5-25 MCG/INH AEROSOL POWDER, BREATH ACTIVATED Inhale 1 Puff by mouth every day. 2 Each 0   • potassium chloride SA (KDUR) 20 MEQ Tab CR Take 1 Tab by mouth every day. (Patient not taking: Reported on 12/6/2018) 90 Tab 1   • Misc. Devices Misc Need a 4 wheel walker with seat and a brake 1 Each 0   • Misc. Devices Misc Need a battery operated portable oxygen concentrator . 1 Each 0   • triamcinolone acetonide (KENALOG) 0.025 % Cream APPLY TO AFFECTED AREA(S) 2 TIMES A DAY. 15 g 1   • Misc. Devices Misc Oxygen concentrator to be used at night and prn at 2 lpm. Dx: COPD, CHF, hypoxemia. 1 Units 0   • Misc. Devices MISC 4 wheel walker, needed for osteoarthritis of the knees. 1 Each 0     No current facility-administered medications on file prior to visit.      Blood pressure 128/82, pulse 70, temperature 36.7 °C (98.1 °F), temperature source Oral, resp. rate 18, height 1.499 m (4' 11\"), weight 97.5 kg (215 lb), SpO2 93 %, not currently breastfeeding.  Family History   Problem Relation Age of Onset   • Prostate cancer Father    • Dementia Father    • Alcohol abuse Mother         Cirrosis of liver   • Cancer Maternal Aunt    • Lung Disease Sister         COPD/Smoker   • Asthma Sister    • Hyperlipidemia Sister    • Breast Cancer Sister    • No " Known Problems Maternal Grandmother    • No Known Problems Maternal Grandfather    • No Known Problems Paternal Grandmother    • Other Daughter    • Hypertension Daughter    • Thyroid Daughter        Physical Exam:  No distress in a wheelchair on supplemental oxygen  HEENT: PERRLA, EOMI, no scleral icterus, no nasal or oral lesions  Neck: No thyromegaly, no adenopathy, no bruits  [place]atti: Grade III  Lungs: Very distant breath sounds, no wheezes or crackles on today's exam  Heart: Regular rate and rhythm, no gallops or murmurs  Abdomen: Soft, benign, no organomegaly  Extremities: No clubbing, cyanosis, she has bipedal edema  Neurologic: Cranial nerve, motor, and sensory exam are normal    1. Chronic obstructive pulmonary disease, unspecified COPD type (HCC)    2. Dyspnea, unspecified type    3. Chronic diastolic congestive heart failure (HCC)    4. Aortic valve stenosis, unspecified etiology    5. Pulmonary hypertension (HCC)    6. BMI 40.0-44.9, adult (HCC)       Her dyspnea is chronic.  She now requires supplemental oxygen 24/ 7.  She is now on  Trelegy.  However, she depends on samples.  We will see her back in 6 months or sooner if there are any changes.

## 2018-12-10 ENCOUNTER — OFFICE VISIT (OUTPATIENT)
Dept: MEDICAL GROUP | Facility: MEDICAL CENTER | Age: 73
End: 2018-12-10
Payer: MEDICARE

## 2018-12-10 VITALS
WEIGHT: 219.36 LBS | HEIGHT: 59 IN | OXYGEN SATURATION: 90 % | SYSTOLIC BLOOD PRESSURE: 138 MMHG | RESPIRATION RATE: 20 BRPM | TEMPERATURE: 98.2 F | DIASTOLIC BLOOD PRESSURE: 70 MMHG | HEART RATE: 92 BPM | BODY MASS INDEX: 44.22 KG/M2

## 2018-12-10 DIAGNOSIS — M25.561 CHRONIC PAIN OF RIGHT KNEE: ICD-10-CM

## 2018-12-10 DIAGNOSIS — G89.29 CHRONIC PAIN OF RIGHT KNEE: ICD-10-CM

## 2018-12-10 DIAGNOSIS — I35.0 AORTIC VALVE STENOSIS, ETIOLOGY OF CARDIAC VALVE DISEASE UNSPECIFIED: ICD-10-CM

## 2018-12-10 DIAGNOSIS — I50.32 CHRONIC DIASTOLIC CONGESTIVE HEART FAILURE (HCC): ICD-10-CM

## 2018-12-10 DIAGNOSIS — J96.11 CHRONIC RESPIRATORY FAILURE WITH HYPOXIA (HCC): ICD-10-CM

## 2018-12-10 DIAGNOSIS — J44.9 CHRONIC OBSTRUCTIVE PULMONARY DISEASE, UNSPECIFIED COPD TYPE (HCC): ICD-10-CM

## 2018-12-10 PROCEDURE — 99214 OFFICE O/P EST MOD 30 MIN: CPT | Mod: 25 | Performed by: FAMILY MEDICINE

## 2018-12-10 PROCEDURE — 20610 DRAIN/INJ JOINT/BURSA W/O US: CPT | Performed by: FAMILY MEDICINE

## 2018-12-10 RX ORDER — METHYLPREDNISOLONE ACETATE 80 MG/ML
80 INJECTION, SUSPENSION INTRA-ARTICULAR; INTRALESIONAL; INTRAMUSCULAR; SOFT TISSUE ONCE
Status: COMPLETED | OUTPATIENT
Start: 2018-12-10 | End: 2018-12-10

## 2018-12-10 RX ADMIN — METHYLPREDNISOLONE ACETATE 80 MG: 80 INJECTION, SUSPENSION INTRA-ARTICULAR; INTRALESIONAL; INTRAMUSCULAR; SOFT TISSUE at 13:12

## 2018-12-10 NOTE — PROGRESS NOTES
CC: COPD, pulmonary hypertension, aortic stenosis, chronic knee pain.  HPI:   Kishan presents today to discuss the following medical issues     Chronic obstructive pulmonary disease, unspecified COPD type (HCC)  Patient has been symptomatic with mild exertion, however she has been stable, is able to walk around with her walker without a problem.  She is currently onTrelegy inhalers daily, albuterol 1-3 times a day as needed, and oxygen at 3 L/min, 24/7.    Pulmonary hypertension  Patient has history of COPD, she has been having shortness of breath with mild exertion.  However she has been tolerating it.  Her oxygen saturation has been within normal limit on 3 L/min of oxygen, 24/7.  Last echocardiogram showed normal ejection fraction, right ventricular systolic pressure was 40 mmHg, and moderate aortic stenosis.  She has been having chronic bilateral leg swelling, has been on Lasix 60 mg daily.    Aortic valve stenosis, etiology of cardiac valve disease unspecified   Most recent echo showed moderate aortic stenosis, patient denies any chest pain, and syncopal episodes.  She follows up with cardiology and regular basis    Chronic pain of right knee  Patient has a chronic bilateral knee osteoarthritis.  More in the right side. Patient stated that she was told it in the past that she needs replacement but she refused. Intra-articular knee injection has been helping her, and also she has been taking it in a regular basis.  However her fluids in the right knee has increased, patient denies any except for the pain that has been there for long time and usually improves with using of intra-articular steroids.        Patient Active Problem List    Diagnosis Date Noted   • Morbid obesity ([facility]) 06/16/2015     Priority: High   • Diastolic congestive heart failure ([facility]) 12/05/2011     Priority: High   • COPD (chronic obstructive pulmonary disease) ([facility]) 04/13/2011     Priority: High   • Aortic stenosis 06/07/2012     Priority:  Medium   • Aortic regurgitation 12/05/2011     Priority: Medium   • Chronic respiratory failure with hypoxia ([facility]) 03/12/2018   • Obesity (BMI 35.0-39.9 without comorbidity) ([facility]) 11/20/2017   • Obesity (BMI 30-39.9) 11/20/2017   • Anxiety 04/04/2016   • MEDICAL HOME 11/09/2012   • DJD (degenerative joint disease) 10/19/2012   • Obesity 04/13/2011       Current Outpatient Prescriptions   Medication Sig Dispense Refill   • busPIRone (BUSPAR) 7.5 MG tablet Take 1 Tab by mouth every day. TAKE 1 TAB BY MOUTH EVERY DAY. 90 Tab 3   • Fluticasone-Umeclidin-Vilant (TRELEGY ELLIPTA) 100-62.5-25 MCG/INH AEROSOL POWDER, BREATH ACTIVATED Inhale 1 Puff by mouth every day. 1 Each 11   • Fluticasone-Umeclidin-Vilant (TRELEGY ELLIPTA) 100-62.5-25 MCG/INH AEROSOL POWDER, BREATH ACTIVATED Inhale 1 Puff by mouth every day. 3 Each 4   • Fluticasone-Umeclidin-Vilant (TRELEGY ELLIPTA) 100-62.5-25 MCG/INH AEROSOL POWDER, BREATH ACTIVATED Inhale 1 Puff by mouth every day. 2 Each 0   • potassium chloride SA (KDUR) 20 MEQ Tab CR Take 1 Tab by mouth every day. (Patient not taking: Reported on 12/6/2018) 90 Tab 1   • Misc. Devices Misc Need a 4 wheel walker with seat and a brake 1 Each 0   • Fluticasone Furoate-Vilanterol (BREO ELLIPTA) 200-25 MCG/INH AEROSOL POWDER, BREATH ACTIVATED Inhale 1 Puff by mouth every day. Rinse mouth after use. 2 Each 0   • clobetasol (TEMOVATE) 0.05 % external solution Apply sparingly bid 50 mL 0   • clotrimazole-betamethasone (LOTRISONE) 1-0.05 % Cream Apply 1 Application to affected area(s) 2 times a day. 1 Tube 1   • albuterol 108 (90 Base) MCG/ACT Aero Soln inhalation aerosol Inhale 2 Puffs by mouth every 6 hours as needed for Shortness of Breath. 8.5 g 3   • furosemide (LASIX) 20 MG Tab TAKE 1 TAB BY MOUTH 3 TIMES A DAY. 270 Tab 1   • CALCIUM PO Take  by mouth.     • Omega-3 Fatty Acids (FISH OIL) 1000 MG Cap capsule Take 1,000 mg by mouth 3 times a day, with meals.     • albuterol (PROVENTIL) 2.5mg/3ml Nebu  "Soln solution for nebulization USE 3 ML BY NEBULIZATION ROUTE EVERY 6 HOURS AS NEEDED FOR SHORTNESS OF BREATH. 375 mL 0   • Misc. Devices Misc Need a battery operated portable oxygen concentrator . 1 Each 0   • tiotropium (SPIRIVA HANDIHALER) 18 MCG Cap INHALE 1 CAP BY MOUTH EVERY DAY. 90 Cap 2   • Ibuprofen (ADVIL) 200 MG Cap Take  by mouth.     • triamcinolone acetonide (KENALOG) 0.025 % Cream APPLY TO AFFECTED AREA(S) 2 TIMES A DAY. 15 g 1   • Misc. Devices Misc Oxygen concentrator to be used at night and prn at 2 lpm. Dx: COPD, CHF, hypoxemia. 1 Units 0   • omeprazole (PRILOSEC) 20 MG delayed-release capsule Take 1 Cap by mouth every day. 90 Cap 1   • Lactobacillus Rhamnosus, GG, (CULTURELLE PO) Take  by mouth.     • Misc. Devices MISC 4 wheel walker, needed for osteoarthritis of the knees. 1 Each 0   • vitamin D (CHOLECALCIFEROL) 1000 UNIT TABS Take 5,000 Units by mouth every day.     • Multiple Vitamin (MULTI-VITAMIN PO) Take  by mouth.     • aspirin 81 MG tablet Take 81 mg by mouth every day.       Current Facility-Administered Medications   Medication Dose Route Frequency Provider Last Rate Last Dose   • methylPREDNISolone acetate (DEPO-MEDROL) injection 80 mg  80 mg Intra-articular Once Ana Hartley M.D.             Allergies as of 12/10/2018 - Reviewed 12/10/2018   Allergen Reaction Noted   • Bee Swelling 03/12/2018   • Fosamax  09/15/2011   • Iodine Rash 03/12/2018   • Pcn [penicillins] Rash 04/13/2011   • Sulfa drugs Rash 04/13/2011   • Other drug Unspecified 07/06/2013   • Tape Unspecified 03/12/2018        ROS: Denies any chest pain, Shortness of breath, Changes bowel or bladder, Lower extremity edema.    Physical Exam:  /70 (BP Location: Right arm, Patient Position: Sitting, BP Cuff Size: Adult)   Pulse 92   Temp 36.8 °C (98.2 °F)   Resp 20   Ht 1.499 m (4' 11\")   Wt 99.5 kg (219 lb 5.7 oz)   SpO2 90%   BMI 44.30 kg/m²   Gen.: Well-developed, well-nourished, no apparent " distress,pleasant and cooperative with the examination  Skin:  Warm and dry with good turgor. No rashes or suspicious lesions in visible areas  HEENT:Sinuses nontender with palpation, TMs clear, nares patent with pink mucosa and clear rhinorrhea,no septal deviation ,polyps or lesions. lips without lesions, oropharynx clear.  Neck: Trachea midline,no masses or adenopathy. No JVD.  Cor: Regular rate and rhythm without murmur, gallop or rub.  Lungs: Respirations unlabored.decreased breath sounds bilaterally. No wheezes, rhonchi.  Extremities: No cyanosis, clubbing or edema.    Right knee: Swollen(due to joint fusion), no tenderness, decreased range of motion.    Procedure (knee injection.)  Patient was consented. Risks and benefits discussed.  Rt knee was exposed. Site of injection was marked ( A point between lateral patellar ligament, lateral tibial plateau, and lateral femoral condyle). The skin was sterilized with (2% chlorhexidine , and 70% Isopropyl alcohol), the area was numbed with ethyl cholride , then 1 ml of DepoMedrol 80 mg + 4 ml of Lidocaine 1% were injected into the joint.      Assessment and Plan.   73 y.o. female    1. Chronic obstructive pulmonary disease, unspecified COPD type (HCC)  Symptomatic with exertion.  Continue onTrelegy inhalers daily, albuterol 1-3 times a day as needed.  Oxygen at 3 L/min, 24/7.    2.  Pulmonary hypertension  Probably as a result of the COPD (cor pulmonale), she has been symptomatic with exertion.  Last echocardiogram showed normal ejection fraction, right ventricular systolic pressure was 40 mmHg, and moderate aortic stenosis.  Continue on Lasix 60 mg daily.    3. Chronic respiratory failure with hypoxia (HCC)  Has been on 3 L/min, 24/7, for COPD    4. Aortic valve stenosis, etiology of cardiac valve disease unspecified  Stable.  Most recent echo showed moderate aortic stenosis, patient denies any syncopal episodes.  Continue follow-up with cardiology.    5. Chronic pain  of right knee  Intra-articular injection of the right knee joint is given.Patient joint movement improved to about 50% immediately after the injection.Precaustion was given to avoid bleeding . Patient advised to to uses worm compresses 2 times daily for 3 days, and RTC if any continuous pain on the joint  Discussed with patient referral to an orthopedist, for reevaluation.  Patient stated that she was told it in the past that she needs replacement but she refused.  Intra-articular knee injection has been helping her, and also she has been taking it in a regular basis.    - methylPREDNISolone acetate (DEPO-MEDROL) injection 80 mg; 1 mL by Intra-articular route Once.

## 2018-12-17 ENCOUNTER — TELEPHONE (OUTPATIENT)
Dept: MEDICAL GROUP | Facility: MEDICAL CENTER | Age: 73
End: 2018-12-17

## 2018-12-17 NOTE — TELEPHONE ENCOUNTER
1. Caller Name: Kishan         Call Back Number: [phone] (home)       2. Message: Patient is leaving on a cruise tomorrow and they are requiring a letter stating she needs her O2 continuously so she can take her tank on the ship. She will stop by this afternoon around 3 to grab the letter.    3. Patient approves office to leave a detailed voicemail/MyChart message: N\A

## 2019-01-08 DIAGNOSIS — J44.9 CHRONIC OBSTRUCTIVE PULMONARY DISEASE, UNSPECIFIED COPD TYPE (HCC): ICD-10-CM

## 2019-01-08 NOTE — TELEPHONE ENCOUNTER
Have we ever prescribed this med? Yes.  If yes, what date? 12/06/2018    Last OV: 12/06/2018 - DR. MCCORMACK     Next OV: 06/06/2019  - DR. MCCORMACK     DX: COPD    Medications: TRELEGY

## 2019-01-17 ENCOUNTER — TELEPHONE (OUTPATIENT)
Dept: SCHEDULING | Facility: IMAGING CENTER | Age: 74
End: 2019-01-17

## 2019-01-17 NOTE — TELEPHONE ENCOUNTER
Spoke to pt, she made an appointment for tomorrow. She stated that her shortness of breath is only when she does anything active.

## 2019-01-18 ENCOUNTER — APPOINTMENT (OUTPATIENT)
Dept: RADIOLOGY | Facility: MEDICAL CENTER | Age: 74
DRG: 291 | End: 2019-01-18
Attending: EMERGENCY MEDICINE
Payer: MEDICARE

## 2019-01-18 ENCOUNTER — HOSPITAL ENCOUNTER (INPATIENT)
Facility: MEDICAL CENTER | Age: 74
LOS: 7 days | DRG: 291 | End: 2019-01-25
Attending: EMERGENCY MEDICINE | Admitting: INTERNAL MEDICINE
Payer: MEDICARE

## 2019-01-18 ENCOUNTER — APPOINTMENT (OUTPATIENT)
Dept: CARDIOLOGY | Facility: MEDICAL CENTER | Age: 74
DRG: 291 | End: 2019-01-18
Attending: INTERNAL MEDICINE
Payer: MEDICARE

## 2019-01-18 DIAGNOSIS — R79.89 ELEVATED TROPONIN: ICD-10-CM

## 2019-01-18 DIAGNOSIS — R06.02 SOB (SHORTNESS OF BREATH): ICD-10-CM

## 2019-01-18 DIAGNOSIS — J96.21 ACUTE ON CHRONIC RESPIRATORY FAILURE WITH HYPOXIA AND HYPERCAPNIA (HCC): ICD-10-CM

## 2019-01-18 DIAGNOSIS — J44.1 COPD EXACERBATION (HCC): ICD-10-CM

## 2019-01-18 DIAGNOSIS — J96.22 ACUTE ON CHRONIC RESPIRATORY FAILURE WITH HYPOXIA AND HYPERCAPNIA (HCC): ICD-10-CM

## 2019-01-18 DIAGNOSIS — I50.33 ACUTE ON CHRONIC DIASTOLIC CONGESTIVE HEART FAILURE (HCC): ICD-10-CM

## 2019-01-18 PROBLEM — J96.20 ACUTE ON CHRONIC RESPIRATORY FAILURE (HCC): Status: ACTIVE | Noted: 2019-01-18

## 2019-01-18 LAB
ACTION RANGE TRIGGERED IACRT: YES
ALBUMIN SERPL BCP-MCNC: 3.6 G/DL (ref 3.2–4.9)
ALBUMIN/GLOB SERPL: 1.2 G/DL
ALP SERPL-CCNC: 54 U/L (ref 30–99)
ALT SERPL-CCNC: 5 U/L (ref 2–50)
ANION GAP SERPL CALC-SCNC: 7 MMOL/L (ref 0–11.9)
APPEARANCE UR: CLEAR
AST SERPL-CCNC: 18 U/L (ref 12–45)
BACTERIA #/AREA URNS HPF: ABNORMAL /HPF
BASE EXCESS BLDA CALC-SCNC: 13 MMOL/L (ref -4–3)
BASE EXCESS BLDA CALC-SCNC: 14 MMOL/L (ref -4–3)
BASE EXCESS BLDA CALC-SCNC: 14 MMOL/L (ref -4–3)
BASOPHILS # BLD AUTO: 0.2 % (ref 0–1.8)
BASOPHILS # BLD: 0.01 K/UL (ref 0–0.12)
BILIRUB SERPL-MCNC: 0.4 MG/DL (ref 0.1–1.5)
BILIRUB UR QL STRIP.AUTO: NEGATIVE
BNP SERPL-MCNC: 372 PG/ML (ref 0–100)
BODY TEMPERATURE: 37.1 CENTIGRADE
BODY TEMPERATURE: ABNORMAL CENTIGRADE
BODY TEMPERATURE: ABNORMAL DEGREES
BUN SERPL-MCNC: 14 MG/DL (ref 8–22)
CALCIUM SERPL-MCNC: 9.5 MG/DL (ref 8.5–10.5)
CHLORIDE SERPL-SCNC: 90 MMOL/L (ref 96–112)
CO2 BLDA-SCNC: 45 MMOL/L (ref 20–33)
CO2 SERPL-SCNC: 44 MMOL/L (ref 20–33)
COLOR UR: YELLOW
CREAT SERPL-MCNC: 0.47 MG/DL (ref 0.5–1.4)
EKG IMPRESSION: NORMAL
EOSINOPHIL # BLD AUTO: 0.03 K/UL (ref 0–0.51)
EOSINOPHIL NFR BLD: 0.5 % (ref 0–6.9)
EPI CELLS #/AREA URNS HPF: NEGATIVE /HPF
ERYTHROCYTE [DISTWIDTH] IN BLOOD BY AUTOMATED COUNT: 47 FL (ref 35.9–50)
FLUAV RNA SPEC QL NAA+PROBE: NEGATIVE
FLUBV RNA SPEC QL NAA+PROBE: NEGATIVE
GLOBULIN SER CALC-MCNC: 3.1 G/DL (ref 1.9–3.5)
GLUCOSE SERPL-MCNC: 133 MG/DL (ref 65–99)
GLUCOSE UR STRIP.AUTO-MCNC: NEGATIVE MG/DL
HCO3 BLDA-SCNC: 42.6 MMOL/L (ref 17–25)
HCO3 BLDA-SCNC: 45 MMOL/L (ref 17–25)
HCO3 BLDA-SCNC: 45 MMOL/L (ref 17–25)
HCT VFR BLD AUTO: 38.9 % (ref 37–47)
HGB BLD-MCNC: 11.7 G/DL (ref 12–16)
HOROWITZ INDEX BLDA+IHG-RTO: 190 MM[HG]
HYALINE CASTS #/AREA URNS LPF: ABNORMAL /LPF
IMM GRANULOCYTES # BLD AUTO: 0.02 K/UL (ref 0–0.11)
IMM GRANULOCYTES NFR BLD AUTO: 0.4 % (ref 0–0.9)
INHALED O2 FLOW RATE: 4 L/MIN (ref 2–10)
INST. QUALIFIED PATIENT IIQPT: YES
KETONES UR STRIP.AUTO-MCNC: NEGATIVE MG/DL
LACTATE BLD-SCNC: 1.5 MMOL/L (ref 0.5–2)
LEUKOCYTE ESTERASE UR QL STRIP.AUTO: ABNORMAL
LYMPHOCYTES # BLD AUTO: 0.91 K/UL (ref 1–4.8)
LYMPHOCYTES NFR BLD: 16.5 % (ref 22–41)
MAGNESIUM SERPL-MCNC: 1.7 MG/DL (ref 1.5–2.5)
MCH RBC QN AUTO: 28.7 PG (ref 27–33)
MCHC RBC AUTO-ENTMCNC: 30.1 G/DL (ref 33.6–35)
MCV RBC AUTO: 95.6 FL (ref 81.4–97.8)
MICRO URNS: ABNORMAL
MONOCYTES # BLD AUTO: 0.49 K/UL (ref 0–0.85)
MONOCYTES NFR BLD AUTO: 8.9 % (ref 0–13.4)
MUCOUS THREADS #/AREA URNS HPF: ABNORMAL /HPF
NEUTROPHILS # BLD AUTO: 4.06 K/UL (ref 2–7.15)
NEUTROPHILS NFR BLD: 73.5 % (ref 44–72)
NITRITE UR QL STRIP.AUTO: NEGATIVE
NRBC # BLD AUTO: 0 K/UL
NRBC BLD-RTO: 0 /100 WBC
O2/TOTAL GAS SETTING VFR VENT: 40 %
PCO2 BLDA: 104.1 MMHG (ref 26–37)
PCO2 BLDA: 77.7 MMHG (ref 26–37)
PCO2 BLDA: 95.1 MMHG (ref 26–37)
PCO2 TEMP ADJ BLDA: 104.6 MMHG (ref 26–37)
PCO2 TEMP ADJ BLDA: 78.4 MMHG (ref 26–37)
PH BLDA: 7.26 [PH] (ref 7.4–7.5)
PH BLDA: 7.29 [PH] (ref 7.4–7.5)
PH BLDA: 7.35 [PH] (ref 7.4–7.5)
PH TEMP ADJ BLDA: 7.26 [PH] (ref 7.4–7.5)
PH TEMP ADJ BLDA: 7.34 [PH] (ref 7.4–7.5)
PH UR STRIP.AUTO: 8 [PH]
PHOSPHATE SERPL-MCNC: 3.2 MG/DL (ref 2.5–4.5)
PLATELET # BLD AUTO: 200 K/UL (ref 164–446)
PMV BLD AUTO: 9.6 FL (ref 9–12.9)
PO2 BLDA: 104.9 MMHG (ref 64–87)
PO2 BLDA: 132.4 MMHG (ref 64–87)
PO2 BLDA: 76 MMHG (ref 64–87)
PO2 TEMP ADJ BLDA: 133 MMHG (ref 64–87)
PO2 TEMP ADJ BLDA: 77 MMHG (ref 64–87)
POTASSIUM SERPL-SCNC: 5.1 MMOL/L (ref 3.6–5.5)
PROCALCITONIN SERPL-MCNC: 0.07 NG/ML
PROT SERPL-MCNC: 6.7 G/DL (ref 6–8.2)
PROT UR QL STRIP: NEGATIVE MG/DL
RBC # BLD AUTO: 4.07 M/UL (ref 4.2–5.4)
RBC # URNS HPF: ABNORMAL /HPF
RBC UR QL AUTO: NEGATIVE
SAO2 % BLDA: 93 % (ref 93–99)
SAO2 % BLDA: 97 % (ref 93–99)
SAO2 % BLDA: 98 % (ref 93–99)
SODIUM SERPL-SCNC: 141 MMOL/L (ref 135–145)
SP GR UR STRIP.AUTO: 1.01
SPECIMEN DRAWN FROM PATIENT: ABNORMAL
TROPONIN I SERPL-MCNC: 0.28 NG/ML (ref 0–0.04)
TROPONIN I SERPL-MCNC: 0.3 NG/ML (ref 0–0.04)
TROPONIN I SERPL-MCNC: 0.31 NG/ML (ref 0–0.04)
UROBILINOGEN UR STRIP.AUTO-MCNC: 0.2 MG/DL
WBC # BLD AUTO: 5.5 K/UL (ref 4.8–10.8)
WBC #/AREA URNS HPF: ABNORMAL /HPF

## 2019-01-18 PROCEDURE — 85025 COMPLETE CBC W/AUTO DIFF WBC: CPT

## 2019-01-18 PROCEDURE — 71275 CT ANGIOGRAPHY CHEST: CPT

## 2019-01-18 PROCEDURE — 700111 HCHG RX REV CODE 636 W/ 250 OVERRIDE (IP): Performed by: STUDENT IN AN ORGANIZED HEALTH CARE EDUCATION/TRAINING PROGRAM

## 2019-01-18 PROCEDURE — 80053 COMPREHEN METABOLIC PANEL: CPT

## 2019-01-18 PROCEDURE — 700101 HCHG RX REV CODE 250: Performed by: INTERNAL MEDICINE

## 2019-01-18 PROCEDURE — 51702 INSERT TEMP BLADDER CATH: CPT

## 2019-01-18 PROCEDURE — 700111 HCHG RX REV CODE 636 W/ 250 OVERRIDE (IP): Performed by: EMERGENCY MEDICINE

## 2019-01-18 PROCEDURE — 71045 X-RAY EXAM CHEST 1 VIEW: CPT

## 2019-01-18 PROCEDURE — 83880 ASSAY OF NATRIURETIC PEPTIDE: CPT

## 2019-01-18 PROCEDURE — 87502 INFLUENZA DNA AMP PROBE: CPT

## 2019-01-18 PROCEDURE — 700102 HCHG RX REV CODE 250 W/ 637 OVERRIDE(OP): Performed by: EMERGENCY MEDICINE

## 2019-01-18 PROCEDURE — 36600 WITHDRAWAL OF ARTERIAL BLOOD: CPT

## 2019-01-18 PROCEDURE — 99291 CRITICAL CARE FIRST HOUR: CPT

## 2019-01-18 PROCEDURE — 94002 VENT MGMT INPAT INIT DAY: CPT

## 2019-01-18 PROCEDURE — 82803 BLOOD GASES ANY COMBINATION: CPT | Mod: 91

## 2019-01-18 PROCEDURE — 770022 HCHG ROOM/CARE - ICU (200)

## 2019-01-18 PROCEDURE — 87040 BLOOD CULTURE FOR BACTERIA: CPT

## 2019-01-18 PROCEDURE — 81001 URINALYSIS AUTO W/SCOPE: CPT

## 2019-01-18 PROCEDURE — A9270 NON-COVERED ITEM OR SERVICE: HCPCS | Performed by: INTERNAL MEDICINE

## 2019-01-18 PROCEDURE — 84145 PROCALCITONIN (PCT): CPT

## 2019-01-18 PROCEDURE — 84100 ASSAY OF PHOSPHORUS: CPT

## 2019-01-18 PROCEDURE — 304561 HCHG STAT O2

## 2019-01-18 PROCEDURE — 99291 CRITICAL CARE FIRST HOUR: CPT | Performed by: INTERNAL MEDICINE

## 2019-01-18 PROCEDURE — 83735 ASSAY OF MAGNESIUM: CPT

## 2019-01-18 PROCEDURE — 700105 HCHG RX REV CODE 258: Performed by: INTERNAL MEDICINE

## 2019-01-18 PROCEDURE — 83605 ASSAY OF LACTIC ACID: CPT

## 2019-01-18 PROCEDURE — 700102 HCHG RX REV CODE 250 W/ 637 OVERRIDE(OP): Performed by: INTERNAL MEDICINE

## 2019-01-18 PROCEDURE — 700111 HCHG RX REV CODE 636 W/ 250 OVERRIDE (IP): Performed by: INTERNAL MEDICINE

## 2019-01-18 PROCEDURE — A9270 NON-COVERED ITEM OR SERVICE: HCPCS | Performed by: EMERGENCY MEDICINE

## 2019-01-18 PROCEDURE — 94640 AIRWAY INHALATION TREATMENT: CPT

## 2019-01-18 PROCEDURE — 36415 COLL VENOUS BLD VENIPUNCTURE: CPT

## 2019-01-18 PROCEDURE — 84484 ASSAY OF TROPONIN QUANT: CPT

## 2019-01-18 PROCEDURE — 96375 TX/PRO/DX INJ NEW DRUG ADDON: CPT

## 2019-01-18 PROCEDURE — 303105 HCHG CATHETER EXTRA

## 2019-01-18 PROCEDURE — 700117 HCHG RX CONTRAST REV CODE 255: Performed by: EMERGENCY MEDICINE

## 2019-01-18 PROCEDURE — 93005 ELECTROCARDIOGRAM TRACING: CPT | Performed by: EMERGENCY MEDICINE

## 2019-01-18 PROCEDURE — 99222 1ST HOSP IP/OBS MODERATE 55: CPT | Performed by: INTERNAL MEDICINE

## 2019-01-18 PROCEDURE — 96374 THER/PROPH/DIAG INJ IV PUSH: CPT

## 2019-01-18 RX ORDER — ACETAMINOPHEN 500 MG
500 TABLET ORAL EVERY 6 HOURS PRN
Status: DISCONTINUED | OUTPATIENT
Start: 2019-01-18 | End: 2019-01-25 | Stop reason: HOSPADM

## 2019-01-18 RX ORDER — BUSPIRONE HYDROCHLORIDE 10 MG/1
7.5 TABLET ORAL NIGHTLY PRN
Status: DISCONTINUED | OUTPATIENT
Start: 2019-01-18 | End: 2019-01-25 | Stop reason: HOSPADM

## 2019-01-18 RX ORDER — IPRATROPIUM BROMIDE AND ALBUTEROL SULFATE 2.5; .5 MG/3ML; MG/3ML
3 SOLUTION RESPIRATORY (INHALATION)
Status: DISCONTINUED | OUTPATIENT
Start: 2019-01-18 | End: 2019-01-24 | Stop reason: ALTCHOICE

## 2019-01-18 RX ORDER — FUROSEMIDE 10 MG/ML
40 INJECTION INTRAMUSCULAR; INTRAVENOUS ONCE
Status: COMPLETED | OUTPATIENT
Start: 2019-01-18 | End: 2019-01-18

## 2019-01-18 RX ORDER — BUSPIRONE HYDROCHLORIDE 7.5 MG/1
7.5 TABLET ORAL
COMMUNITY

## 2019-01-18 RX ORDER — AMOXICILLIN 250 MG
2 CAPSULE ORAL 2 TIMES DAILY
Status: DISCONTINUED | OUTPATIENT
Start: 2019-01-18 | End: 2019-01-25

## 2019-01-18 RX ORDER — METHYLPREDNISOLONE SODIUM SUCCINATE 40 MG/ML
40 INJECTION, POWDER, LYOPHILIZED, FOR SOLUTION INTRAMUSCULAR; INTRAVENOUS EVERY 6 HOURS
Status: DISCONTINUED | OUTPATIENT
Start: 2019-01-18 | End: 2019-01-20

## 2019-01-18 RX ORDER — FUROSEMIDE 20 MG/1
60 TABLET ORAL DAILY
Status: ON HOLD | COMMUNITY
End: 2019-01-25

## 2019-01-18 RX ORDER — OMEPRAZOLE 20 MG/1
20 CAPSULE, DELAYED RELEASE ORAL DAILY
Status: DISCONTINUED | OUTPATIENT
Start: 2019-01-19 | End: 2019-01-25 | Stop reason: HOSPADM

## 2019-01-18 RX ORDER — POTASSIUM CHLORIDE 20 MEQ/1
20 TABLET, EXTENDED RELEASE ORAL EVERY MORNING
COMMUNITY
End: 2019-02-05 | Stop reason: SDUPTHER

## 2019-01-18 RX ORDER — AZITHROMYCIN 250 MG/1
250 TABLET, FILM COATED ORAL DAILY
Status: COMPLETED | OUTPATIENT
Start: 2019-01-19 | End: 2019-01-22

## 2019-01-18 RX ORDER — FLUTICASONE PROPIONATE 44 UG/1
2 AEROSOL, METERED RESPIRATORY (INHALATION)
Status: DISCONTINUED | OUTPATIENT
Start: 2019-01-18 | End: 2019-01-25 | Stop reason: HOSPADM

## 2019-01-18 RX ORDER — IBUPROFEN 200 MG
600 CAPSULE ORAL DAILY
Status: DISCONTINUED | OUTPATIENT
Start: 2019-01-18 | End: 2019-01-18

## 2019-01-18 RX ORDER — POLYETHYLENE GLYCOL 3350 17 G/17G
1 POWDER, FOR SOLUTION ORAL
Status: DISCONTINUED | OUTPATIENT
Start: 2019-01-18 | End: 2019-01-25

## 2019-01-18 RX ORDER — HALOPERIDOL 5 MG/ML
1 INJECTION INTRAMUSCULAR
Status: DISCONTINUED | OUTPATIENT
Start: 2019-01-18 | End: 2019-01-18

## 2019-01-18 RX ORDER — DIPHENHYDRAMINE HYDROCHLORIDE 50 MG/ML
25 INJECTION INTRAMUSCULAR; INTRAVENOUS ONCE
Status: COMPLETED | OUTPATIENT
Start: 2019-01-18 | End: 2019-01-18

## 2019-01-18 RX ORDER — ASPIRIN 81 MG/1
162 TABLET, CHEWABLE ORAL ONCE
Status: COMPLETED | OUTPATIENT
Start: 2019-01-18 | End: 2019-01-18

## 2019-01-18 RX ORDER — IPRATROPIUM BROMIDE AND ALBUTEROL SULFATE 2.5; .5 MG/3ML; MG/3ML
3 SOLUTION RESPIRATORY (INHALATION)
Status: DISCONTINUED | OUTPATIENT
Start: 2019-01-18 | End: 2019-01-22

## 2019-01-18 RX ORDER — AZITHROMYCIN 250 MG/1
500 TABLET, FILM COATED ORAL ONCE
Status: COMPLETED | OUTPATIENT
Start: 2019-01-18 | End: 2019-01-18

## 2019-01-18 RX ORDER — CLOTRIMAZOLE AND BETAMETHASONE DIPROPIONATE 10; .64 MG/G; MG/G
1 CREAM TOPICAL 2 TIMES DAILY
Status: DISCONTINUED | OUTPATIENT
Start: 2019-01-18 | End: 2019-01-25 | Stop reason: HOSPADM

## 2019-01-18 RX ORDER — BISACODYL 10 MG
10 SUPPOSITORY, RECTAL RECTAL
Status: CANCELLED | OUTPATIENT
Start: 2019-01-18

## 2019-01-18 RX ORDER — POLYETHYLENE GLYCOL 3350 17 G/17G
1 POWDER, FOR SOLUTION ORAL
Status: CANCELLED | OUTPATIENT
Start: 2019-01-18

## 2019-01-18 RX ORDER — FUROSEMIDE 10 MG/ML
40 INJECTION INTRAMUSCULAR; INTRAVENOUS
Status: DISCONTINUED | OUTPATIENT
Start: 2019-01-18 | End: 2019-01-21

## 2019-01-18 RX ORDER — BISACODYL 10 MG
10 SUPPOSITORY, RECTAL RECTAL
Status: DISCONTINUED | OUTPATIENT
Start: 2019-01-18 | End: 2019-01-25

## 2019-01-18 RX ORDER — AMOXICILLIN 250 MG
2 CAPSULE ORAL 2 TIMES DAILY
Status: CANCELLED | OUTPATIENT
Start: 2019-01-18

## 2019-01-18 RX ORDER — LIDOCAINE 50 MG/G
2 PATCH TOPICAL
Status: DISCONTINUED | OUTPATIENT
Start: 2019-01-18 | End: 2019-01-25 | Stop reason: HOSPADM

## 2019-01-18 RX ORDER — HALOPERIDOL 5 MG/ML
0.5 INJECTION INTRAMUSCULAR
Status: COMPLETED | OUTPATIENT
Start: 2019-01-18 | End: 2019-01-18

## 2019-01-18 RX ORDER — ALBUTEROL SULFATE 90 UG/1
2 AEROSOL, METERED RESPIRATORY (INHALATION) EVERY 6 HOURS PRN
Status: DISCONTINUED | OUTPATIENT
Start: 2019-01-18 | End: 2019-01-25 | Stop reason: HOSPADM

## 2019-01-18 RX ADMIN — METHYLPREDNISOLONE SODIUM SUCCINATE 40 MG: 40 INJECTION, POWDER, FOR SOLUTION INTRAMUSCULAR; INTRAVENOUS at 23:36

## 2019-01-18 RX ADMIN — HALOPERIDOL LACTATE 0.5 MG: 5 INJECTION, SOLUTION INTRAMUSCULAR at 23:44

## 2019-01-18 RX ADMIN — ENOXAPARIN SODIUM 40 MG: 100 INJECTION SUBCUTANEOUS at 18:12

## 2019-01-18 RX ADMIN — AZITHROMYCIN 500 MG: 250 TABLET, FILM COATED ORAL at 18:13

## 2019-01-18 RX ADMIN — ASPIRIN 81 MG 162 MG: 81 TABLET ORAL at 16:06

## 2019-01-18 RX ADMIN — METHYLPREDNISOLONE SODIUM SUCCINATE 40 MG: 40 INJECTION, POWDER, FOR SOLUTION INTRAMUSCULAR; INTRAVENOUS at 18:13

## 2019-01-18 RX ADMIN — SODIUM CHLORIDE 250 MG: 9 INJECTION, SOLUTION INTRAVENOUS at 18:12

## 2019-01-18 RX ADMIN — FUROSEMIDE 40 MG: 10 INJECTION, SOLUTION INTRAMUSCULAR; INTRAVENOUS at 14:40

## 2019-01-18 RX ADMIN — IPRATROPIUM BROMIDE AND ALBUTEROL SULFATE 3 ML: .5; 3 SOLUTION RESPIRATORY (INHALATION) at 18:21

## 2019-01-18 RX ADMIN — IPRATROPIUM BROMIDE AND ALBUTEROL SULFATE 3 ML: .5; 3 SOLUTION RESPIRATORY (INHALATION) at 22:43

## 2019-01-18 RX ADMIN — IOHEXOL 80 ML: 350 INJECTION, SOLUTION INTRAVENOUS at 17:03

## 2019-01-18 RX ADMIN — DIPHENHYDRAMINE HYDROCHLORIDE 25 MG: 50 INJECTION, SOLUTION INTRAMUSCULAR; INTRAVENOUS at 16:40

## 2019-01-18 ASSESSMENT — ENCOUNTER SYMPTOMS
SPEECH CHANGE: 0
FOCAL WEAKNESS: 0
PHOTOPHOBIA: 0
COUGH: 1
ABDOMINAL PAIN: 0
SPEECH DIFFICULTY: 0
BLURRED VISION: 0
PALPITATIONS: 0
NECK PAIN: 0
WHEEZING: 1
DOUBLE VISION: 0
CONFUSION: 1
VOMITING: 0
DEPRESSION: 0
COLOR CHANGE: 1
NUMBNESS: 0
SEIZURES: 0
SHORTNESS OF BREATH: 1
CHILLS: 0
UNEXPECTED WEIGHT CHANGE: 0
SPUTUM PRODUCTION: 1
MYALGIAS: 0
FEVER: 0
WHEEZING: 0
DIARRHEA: 0
WEAKNESS: 0
NAUSEA: 0
CONSTIPATION: 0
VOICE CHANGE: 0
FATIGUE: 0
ARTHRALGIAS: 0
SORE THROAT: 0
HEADACHES: 0
TROUBLE SWALLOWING: 0
SENSORY CHANGE: 0

## 2019-01-18 ASSESSMENT — PULMONARY FUNCTION TESTS
EPAP_CMH2O: 5

## 2019-01-18 ASSESSMENT — LIFESTYLE VARIABLES: EVER_SMOKED: YES

## 2019-01-18 ASSESSMENT — COPD QUESTIONNAIRES
HAVE YOU SMOKED AT LEAST 100 CIGARETTES IN YOUR ENTIRE LIFE: YES
DURING THE PAST 4 WEEKS HOW MUCH DID YOU FEEL SHORT OF BREATH: SOME OF THE TIME
COPD SCREENING SCORE: 6
DO YOU EVER COUGH UP ANY MUCUS OR PHLEGM?: NO/ONLY WITH OCCASIONAL COLDS OR INFECTIONS

## 2019-01-18 ASSESSMENT — PAIN SCALES - GENERAL
PAINLEVEL_OUTOF10: 0

## 2019-01-18 NOTE — ED NOTES
Pt tolerated quiros insertion without difficulty.  Will continue to monitor.    Family returned to bedside.

## 2019-01-18 NOTE — CONSULTS
Reason for Consult:  Asked by Dr Coby Cheney M.D. to see this patient with respiratory failure noted to have positive troponin  Patient's PCP: Ana Hartley M.D.    CC:   Chief Complaint   Patient presents with   • Shortness of Breath     x2 weeks       HPI: This is a 73-year-old woman with a history of morbid obesity moderate aortic stenosis coronary disease based on abnormal stress test with mild ischemia in the past who presents with progressive shortness of breath and increased lower extremity edema after she was on a cruise and was unable to walk around as much although she often uses scooter and wheelchair and is long-term O2 dependent she is noted to be in hypercarbic respiratory failure and was placed on BiPAP immediately in the emergency room.  She also has gotten a [place] and will be getting diuresis for progressive lower extremity edema.  She denies any changes in her medications or changes in diet although she was just on a cruise so likely had some dietary indiscretion with salt    Medications / Drug list prior to admission:  No current facility-administered medications on file prior to encounter.      Current Outpatient Prescriptions on File Prior to Encounter   Medication Sig Dispense Refill   • Fluticasone-Umeclidin-Vilant (TRELEGY ELLIPTA) 100-62.5-25 MCG/INH AEROSOL POWDER, BREATH ACTIVATED Inhale 1 Puff by mouth every day. 2 Each 0   • clobetasol (TEMOVATE) 0.05 % external solution Apply sparingly bid 50 mL 0   • clotrimazole-betamethasone (LOTRISONE) 1-0.05 % Cream Apply 1 Application to affected area(s) 2 times a day. 1 Tube 1   • albuterol 108 (90 Base) MCG/ACT Aero Soln inhalation aerosol Inhale 2 Puffs by mouth every 6 hours as needed for Shortness of Breath. 8.5 g 3   • CALCIUM PO Take 1 Tab by mouth every day.     • Omega-3 Fatty Acids (FISH OIL) 1000 MG Cap capsule Take 1,000 mg by mouth every day.     • albuterol (PROVENTIL) 2.5mg/3ml Nebu Soln solution for nebulization USE 3 ML  BY NEBULIZATION ROUTE EVERY 6 HOURS AS NEEDED FOR SHORTNESS OF BREATH. 375 mL 0   • ibuprofen (ADVIL) 200 MG Tab Take 400 mg by mouth every morning.     • triamcinolone acetonide (KENALOG) 0.025 % Cream APPLY TO AFFECTED AREA(S) 2 TIMES A DAY. 15 g 1   • omeprazole (PRILOSEC) 20 MG delayed-release capsule Take 1 Cap by mouth every day. 90 Cap 1   • Lactobacillus Rhamnosus, GG, (CULTURELLE PO) Take 1 Cap by mouth every morning.     • vitamin D (CHOLECALCIFEROL) 1000 UNIT TABS Take 5,000 Units by mouth every day.     • Multiple Vitamin (MULTI-VITAMIN PO) Take 1 Tab by mouth every morning.     • aspirin 81 MG tablet Take 81 mg by mouth every day.         Current list of administered Medications:  No current facility-administered medications for this encounter.     Current Outpatient Prescriptions:   •  busPIRone (BUSPAR) 7.5 MG tablet, Take 7.5 mg by mouth at bedtime as needed., Disp: , Rfl:   •  furosemide (LASIX) 20 MG Tab, Take 60 mg by mouth every day., Disp: , Rfl:   •  potassium chloride SA (KDUR) 20 MEQ Tab CR, Take 20 mEq by mouth every morning., Disp: , Rfl:   •  Fluticasone-Umeclidin-Vilant (TRELEGY ELLIPTA) 100-62.5-25 MCG/INH AEROSOL POWDER, BREATH ACTIVATED, Inhale 1 Puff by mouth every day., Disp: 2 Each, Rfl: 0  •  clobetasol (TEMOVATE) 0.05 % external solution, Apply sparingly bid, Disp: 50 mL, Rfl: 0  •  clotrimazole-betamethasone (LOTRISONE) 1-0.05 % Cream, Apply 1 Application to affected area(s) 2 times a day., Disp: 1 Tube, Rfl: 1  •  albuterol 108 (90 Base) MCG/ACT Aero Soln inhalation aerosol, Inhale 2 Puffs by mouth every 6 hours as needed for Shortness of Breath., Disp: 8.5 g, Rfl: 3  •  CALCIUM PO, Take 1 Tab by mouth every day., Disp: , Rfl:   •  Omega-3 Fatty Acids (FISH OIL) 1000 MG Cap capsule, Take 1,000 mg by mouth every day., Disp: , Rfl:   •  albuterol (PROVENTIL) 2.5mg/3ml Nebu Soln solution for nebulization, USE 3 ML BY NEBULIZATION ROUTE EVERY 6 HOURS AS NEEDED FOR SHORTNESS OF  BREATH., Disp: 375 mL, Rfl: 0  •  ibuprofen (ADVIL) 200 MG Tab, Take 400 mg by mouth every morning., Disp: , Rfl:   •  triamcinolone acetonide (KENALOG) 0.025 % Cream, APPLY TO AFFECTED AREA(S) 2 TIMES A DAY., Disp: 15 g, Rfl: 1  •  omeprazole (PRILOSEC) 20 MG delayed-release capsule, Take 1 Cap by mouth every day., Disp: 90 Cap, Rfl: 1  •  Lactobacillus Rhamnosus, GG, (CULTURELLE PO), Take 1 Cap by mouth every morning., Disp: , Rfl:   •  vitamin D (CHOLECALCIFEROL) 1000 UNIT TABS, Take 5,000 Units by mouth every day., Disp: , Rfl:   •  Multiple Vitamin (MULTI-VITAMIN PO), Take 1 Tab by mouth every morning., Disp: , Rfl:   •  aspirin 81 MG tablet, Take 81 mg by mouth every day., Disp: , Rfl:     Past Medical History:   Diagnosis Date   • Aortic stenosis    • Arrhythmia    • Arthritis    • CHF (congestive heart failure) (HCC)    • Chickenpox    • COPD    • Daytime sleepiness    • Difficulty breathing    • Greek measles    • Heartburn    • MEDICAL HOME 11/9/2012   • Mumps    • Painful joint    • Palpitations    • Rash    • Shortness of breath    • Swelling of lower extremity    • Toothache    • Weight loss        Past Surgical History:   Procedure Laterality Date   • APPENDECTOMY     • GASTRIC BYPASS LAPAROSCOPIC     • PRIMARY C SECTION     • TONSILLECTOMY     • TUBAL COAGULATION LAPAROSCOPIC BILATERAL         Family History   Problem Relation Age of Onset   • Prostate cancer Father    • Dementia Father    • Alcohol abuse Mother         Cirrosis of liver   • Cancer Maternal Aunt    • Lung Disease Sister         COPD/Smoker   • Asthma Sister    • Hyperlipidemia Sister    • Breast Cancer Sister    • No Known Problems Maternal Grandmother    • No Known Problems Maternal Grandfather    • No Known Problems Paternal Grandmother    • Other Daughter    • Hypertension Daughter    • Thyroid Daughter      Patient family history was personally reviewed, no pertinent family history to current presentation    Social History      Social History   • Marital status:      Spouse name: N/A   • Number of children: N/A   • Years of education: N/A     Occupational History   • Not on file.     Social History Main Topics   • Smoking status: Former Smoker     Packs/day: 0.50     Years: 26.00     Types: Cigarettes     Quit date: 1/1/1984   • Smokeless tobacco: Never Used      Comment: Started smoking at age 13   • Alcohol use 4.2 oz/week     7 Shots of liquor per week   • Drug use: No   • Sexual activity: No     Other Topics Concern   • Not on file     Social History Narrative   • No narrative on file       ALLERGIES:  Allergies   Allergen Reactions   • Bee Anaphylaxis   • Fosamax Unspecified     Bone pain   • Iodine Rash     RASH   • Pcn [Penicillins] Rash     Rash years ago   • Sulfa Drugs Rash     Terrible rash   • Other Drug Unspecified     Also allergies to Actenol   • Tape Unspecified     Pulls my skin       Review of systems:  A complete review of symptoms was reviewed with patient. This is reviewed in H&P and PMH. ALL OTHERS reviewed and negative    Physical exam:  Patient Vitals for the past 24 hrs:   BP Temp Temp src Pulse Resp SpO2 Height Weight   01/18/19 1453 - - - - - 95 % - -   01/18/19 1445 - - - 89 (!) 24 94 % - -   01/18/19 1415 - - - 86 (!) 25 93 % - -   01/18/19 1400 - - - 87 19 96 % - -   01/18/19 1353 - 37.2 °C (98.9 °F) Temporal - - - - -   01/18/19 1330 - - - 87 (!) 23 96 % - -   01/18/19 1315 - - - 87 16 98 % - -   01/18/19 1300 - - - 76 (!) 24 99 % - -   01/18/19 1200 109/52 - - 85 (!) 23 98 % - -   01/18/19 1107 125/46 - - 91 (!) 22 95 % - -   01/18/19 1105 - - - - - - 1.524 m (5') 103 kg (227 lb 1.2 oz)       General: Mild respiratory distress speaking in short sentences  EYES: no jaundice  HEENT: OP clear   Neck: No bruits No JVD.   CVS: RRR. S1 + S2. No M/R/G  Resp: CTAB. No wheezing or crackles/rhonchi.  Abdomen: Soft, NT, ND,  Skin: Grossly nothing acute no obvious rashes  Neurological: Alert, Moves all  extremities, no cranial nerve defects on limited exam  Extremities: Pulse 2+ in b/l LE. 4+ is 44 is a is been doing meth since high school math was in high school was like it was where the posterior like the biker gangs your patient so on the emergency room edema. No cyanosis.       Data:  Laboratory studies personally reviewed by me:  Recent Results (from the past 24 hour(s))   CBC w/ Differential    Collection Time: 01/18/19 11:12 AM   Result Value Ref Range    WBC 5.5 4.8 - 10.8 K/uL    RBC 4.07 (L) 4.20 - 5.40 M/uL    Hemoglobin 11.7 (L) 12.0 - 16.0 g/dL    Hematocrit 38.9 37.0 - 47.0 %    MCV 95.6 81.4 - 97.8 fL    MCH 28.7 27.0 - 33.0 pg    MCHC 30.1 (L) 33.6 - 35.0 g/dL    RDW 47.0 35.9 - 50.0 fL    Platelet Count 200 164 - 446 K/uL    MPV 9.6 9.0 - 12.9 fL    Neutrophils-Polys 73.50 (H) 44.00 - 72.00 %    Lymphocytes 16.50 (L) 22.00 - 41.00 %    Monocytes 8.90 0.00 - 13.40 %    Eosinophils 0.50 0.00 - 6.90 %    Basophils 0.20 0.00 - 1.80 %    Immature Granulocytes 0.40 0.00 - 0.90 %    Nucleated RBC 0.00 /100 WBC    Neutrophils (Absolute) 4.06 2.00 - 7.15 K/uL    Lymphs (Absolute) 0.91 (L) 1.00 - 4.80 K/uL    Monos (Absolute) 0.49 0.00 - 0.85 K/uL    Eos (Absolute) 0.03 0.00 - 0.51 K/uL    Baso (Absolute) 0.01 0.00 - 0.12 K/uL    Immature Granulocytes (abs) 0.02 0.00 - 0.11 K/uL    NRBC (Absolute) 0.00 K/uL   Complete Metabolic Panel (CMP)    Collection Time: 01/18/19 11:12 AM   Result Value Ref Range    Sodium 141 135 - 145 mmol/L    Potassium 5.1 3.6 - 5.5 mmol/L    Chloride 90 (L) 96 - 112 mmol/L    Co2 44 (HH) 20 - 33 mmol/L    Anion Gap 7.0 0.0 - 11.9    Glucose 133 (H) 65 - 99 mg/dL    Bun 14 8 - 22 mg/dL    Creatinine 0.47 (L) 0.50 - 1.40 mg/dL    Calcium 9.5 8.5 - 10.5 mg/dL    AST(SGOT) 18 12 - 45 U/L    ALT(SGPT) 5 2 - 50 U/L    Alkaline Phosphatase 54 30 - 99 U/L    Total Bilirubin 0.4 0.1 - 1.5 mg/dL    Albumin 3.6 3.2 - 4.9 g/dL    Total Protein 6.7 6.0 - 8.2 g/dL    Globulin 3.1 1.9 - 3.5 g/dL     A-G Ratio 1.2 g/dL   Btype Natriuretic Peptide    Collection Time: 19 11:12 AM   Result Value Ref Range    B Natriuretic Peptide 372 (H) 0 - 100 pg/mL   Troponin STAT    Collection Time: 19 11:12 AM   Result Value Ref Range    Troponin I 0.31 (H) 0.00 - 0.04 ng/mL   Magnesium    Collection Time: 19 11:12 AM   Result Value Ref Range    Magnesium 1.7 1.5 - 2.5 mg/dL   Phosphorus    Collection Time: 19 11:12 AM   Result Value Ref Range    Phosphorus 3.2 2.5 - 4.5 mg/dL   Influenza A/B By PCR    Collection Time: 19 11:12 AM   Result Value Ref Range    Influenza virus A RNA Negative Negative    Influenza virus B, PCR Negative Negative   ESTIMATED GFR    Collection Time: 19 11:12 AM   Result Value Ref Range    GFR If African American >60 >60 mL/min/1.73 m 2    GFR If Non African American >60 >60 mL/min/1.73 m 2   LACTIC ACID    Collection Time: 19 12:35 PM   Result Value Ref Range    Lactic Acid 1.5 0.5 - 2.0 mmol/L   EKG - STAT    Collection Time: 19 12:37 PM   Result Value Ref Range    Report       [facility] Emergency Dept.    Test Date:  2019  Pt Name:    IGOR CISNEROS                 Department: ER  MRN:        [id number]                      Room:       [facility]  Gender:     Female                       Technician: [id number]  :        1945                   Requested By:NIC QUIGLEY  Order #:    [id number]                    Reading MD: NIC QUIGLEY MD    Measurements  Intervals                                Axis  Rate:       90                           P:          38  IL:         156                          QRS:        47  QRSD:       92                           T:          32  QT:         380  QTc:        465    Interpretive Statements  SINUS RHYTHM  PROBABLE LEFT ATRIAL ABNORMALITY  BORDERLINE LOW VOLTAGE IN FRONTAL LEADS  No previous ECG available for comparison    Electronically Signed On 2019 13:58:45 PST by NIC COOK  MD DANN     URINALYSIS    Collection Time: 01/18/19  1:07 PM   Result Value Ref Range    Color Yellow     Character Clear     Specific Gravity 1.012 <1.035    Ph 8.0 5.0 - 8.0    Glucose Negative Negative mg/dL    Ketones Negative Negative mg/dL    Protein Negative Negative mg/dL    Bilirubin Negative Negative    Urobilinogen, Urine 0.2 Negative    Nitrite Negative Negative    Leukocyte Esterase Trace (A) Negative    Occult Blood Negative Negative    Micro Urine Req Microscopic    URINE MICROSCOPIC (W/UA)    Collection Time: 01/18/19  1:07 PM   Result Value Ref Range    WBC 0-2 /hpf    RBC 0-2 /hpf    Bacteria Few (A) None /hpf    Epithelial Cells Negative /hpf    Mucous Threads Rare /hpf    Hyaline Cast 6-10 (A) /lpf   ARTERIAL BLOOD GAS w/ O2 (LAB)    Collection Time: 01/18/19  1:54 PM   Result Value Ref Range    Ph 7.26 (LL) 7.40 - 7.50    Pco2 104.1 (HH) 26.0 - 37.0 mmHg    Po2 132.4 (H) 64.0 - 87.0 mmHg    O2 Saturation 98.0 93.0 - 99.0 %    Hco3 45 (H) 17 - 25 mmol/L    Base Excess 14 (H) -4 - 3 mmol/L    Body Temp 37.1 Centigrade    O2 Therapy 4.0 2.0 - 10.0 L/min    Ph -TC 7.26 (LL) 7.40 - 7.50    Pco2 -.6 (HH) 26.0 - 37.0 mmHg    Po2 -.0 (H) 64.0 - 87.0 mmHg       Imaging:  DX-CHEST-PORTABLE (1 VIEW)   Final Result      Pulmonary edema, pleural effusions, basilar atelectasis and cardiac silhouette enlargement      CT-CTA CHEST PULMONARY ARTERY W/ RECONS    (Results Pending)           EKG : personally reviewed by me sinus rhythm no acute ST changes    All pertinent features of laboratory and imaging reviewed including primary images where applicable      Active Problems:    COPD (chronic obstructive pulmonary disease) (HCC) POA: Yes    Diastolic congestive heart failure (HCC) POA: Yes    Morbid obesity (HCC) POA: Yes    Aortic stenosis POA: Yes      Overview: Mild to moderate on ECHO 2015    Elevated troponin POA: Yes  Resolved Problems:    * No resolved hospital problems. *      Assessment /  Plan:  Elevated troponin likely demand ischemia in the setting of known coronary disease and respiratory failure in addition to having moderate AS  Continue aspirin  Could consider heparin if her troponin is up trending otherwise just treat the underlying cause with COPD treatment and treatment for diastolic heart failure    Acute exacerbation of diastolic heart failure  This is due to her immobility based on prior weights she should be low 215 pounds so continue to diurese to that point    Moderate aortic stenosis  Avoid overdiuresis      I personally discussed her case with  Dr Coby Cheney M.D.    Future Appointments  Date Time Provider Department Center   6/6/2019 1:00 PM Sammy Martinez M.D. [facility] None       It is my pleasure to participate in the care of Ms. Mcintyre.  Please do not hesitate to contact me with questions or concerns.    Trip Enriquez MD PhD [facility]  Cardiologist [facility]    1/18/2019

## 2019-01-18 NOTE — ED TRIAGE NOTES
Chief Complaint   Patient presents with   • Shortness of Breath     x2 weeks     Pt states SOB x 2 weeks, weakness, decreased appetitie, brown productive cough, aches.  Pt normally on 2L NC.  Pt states increased home 02 to 5L for last several weeks.  Increased SOB with activity.

## 2019-01-18 NOTE — ED NOTES
from Lab called with critical result of co2 44 at 1259. Critical lab result read back to .   Dr. Cheney notified of critical lab result at 1300.  Critical lab result read back by Dr. Cheney.

## 2019-01-18 NOTE — ED NOTES
Pt remains resting in bed at this time, BIPAP in place.  No needs verbalized.  Will continue to monitor.  Pending CTA.

## 2019-01-18 NOTE — ED PROVIDER NOTES
ED Provider Note    CHIEF COMPLAINT  Chief Complaint   Patient presents with   • Shortness of Breath     x2 weeks       HPI  Kishan Mcintyre is a 73 y.o. female who presents complaining of shortness of breath.    Patient states she has been congested since she arrived home 2 weeks ago from a cruise out of [place] to [place].  Patient reports a cough that is sometimes productive of clear sputum, shortness of breath at rest, orthopnea, dyspnea on exertion.  Patient denies chest pain, nausea, vomiting, diarrhea, fever, chills, hemoptysis, calf pain, history of DVT/PE.  Patient states she has had to increase her home O2 setting which is typically 2-1/2-3 L and is currently on 5 to be comfortable with her breathing.      ALLERGIES  Allergies   Allergen Reactions   • Bee Swelling   • Fosamax      Bone pain   • Iodine Rash     RASH   • Pcn [Penicillins] Rash     Rash years ago   • Sulfa Drugs Rash     Terrible rash   • Other Drug Unspecified     Also allergies to Actenol   • Tape Unspecified     Pulls my skin       CURRENT MEDICATIONS  Home Medications     Reviewed by Elayne Whittaker (Pharmacy Tech) on 01/18/19 at 1338  Med List Status: Complete   Medication Last Dose Status   albuterol (PROVENTIL) 2.5mg/3ml Nebu Soln solution for nebulization 1/18/2019 Active   albuterol 108 (90 Base) MCG/ACT Aero Soln inhalation aerosol 1/18/2019 Active   aspirin 81 MG tablet 1/18/2019 Active   busPIRone (BUSPAR) 7.5 MG tablet 1/17/2019 Active   CALCIUM PO 1/18/2019 Active   clobetasol (TEMOVATE) 0.05 % external solution 1/16/2019 Active   clotrimazole-betamethasone (LOTRISONE) 1-0.05 % Cream 1/16/2019 Active   Fluticasone-Umeclidin-Vilant (TRELEGY ELLIPTA) 100-62.5-25 MCG/INH AEROSOL POWDER, BREATH ACTIVATED 1/18/2019 Active   furosemide (LASIX) 20 MG Tab 1/18/2019 Active   ibuprofen (ADVIL) 200 MG Tab 1/18/2019 Active   Lactobacillus Rhamnosus, GG, (CULTURELLE PO) 1/18/2019 Active   Multiple Vitamin (MULTI-VITAMIN PO)  1/18/2019 Active   Omega-3 Fatty Acids (FISH OIL) 1000 MG Cap capsule 1/18/2019 Active   omeprazole (PRILOSEC) 20 MG delayed-release capsule 1/15/2019 Active   potassium chloride SA (KDUR) 20 MEQ Tab CR 1/18/2019 Active   triamcinolone acetonide (KENALOG) 0.025 % Cream 1/16/2019 Active   vitamin D (CHOLECALCIFEROL) 1000 UNIT TABS 1/18/2019 Active                PAST MEDICAL HISTORY   has a past medical history of Aortic stenosis; Arrhythmia; Arthritis; CHF (congestive heart failure) (HCC); Chickenpox; COPD; Daytime sleepiness; Difficulty breathing; Norwegian measles; Heartburn; MEDICAL HOME (11/9/2012); Mumps; Painful joint; Palpitations; Rash; Shortness of breath; Swelling of lower extremity; Toothache; and Weight loss.    SURGICAL HISTORY   has a past surgical history that includes tonsillectomy; appendectomy; primary c section; tubal coagulation laparoscopic bilateral; and gastric bypass laparoscopic.    SOCIAL HISTORY  Social History     Social History Main Topics   • Smoking status: Former Smoker     Packs/day: 0.50     Years: 26.00     Types: Cigarettes     Quit date: 1/1/1984   • Smokeless tobacco: Never Used      Comment: Started smoking at age 13   • Alcohol use 4.2 oz/week     7 Shots of liquor per week   • Drug use: No   • Sexual activity: No       Here with her daughter, , and son-in-law    REVIEW OF SYSTEMS  See HPI for further details.  All other systems are negative except as above in HPI.      PHYSICAL EXAM  VITAL SIGNS: /46   Pulse 91   Resp (!) 22   Ht 1.524 m (5')   Wt 103 kg (227 lb 1.2 oz)   SpO2 95%   BMI 44.35 kg/m²     General:  WD obese, chronically ill-appearing, tachypneic; A+Ox3; V/S as above; afebrile, on oxygen  Skin: warm and dry; good color; no rash  HEENT: NCAT; EOMs intact; PERRL; no scleral icterus   Neck: FROM; soft, trachea midline  Cardiovascular: Regular heart rate and rhythm.  Blowing systolic murmur heard best over the aortic area, 3 out of 6; no rubs,  or gallops; pulses 2+ bilaterally radially and DP areas  Lungs: Clear to auscultation with good air movement bilaterally.  No wheezes, rhonchi, or rales.   Abdomen: BS present; soft; NTND; no rebound, guarding, or rigidity.  No organomegaly or pulsatile mass  Extremities: CANDELARIA x 4; bilateral leg edema, right greater than left; no palpable cords; neg Yulissa's  Neurologic: CNs III-XII grossly intact; speech clear; distal sensation intact; strength 5/5 UE/LEs  Psychiatric: Appropriate affect, normal mood    LABS  Results for orders placed or performed during the hospital encounter of 01/18/19   CBC w/ Differential   Result Value Ref Range    WBC 5.5 4.8 - 10.8 K/uL    RBC 4.07 (L) 4.20 - 5.40 M/uL    Hemoglobin 11.7 (L) 12.0 - 16.0 g/dL    Hematocrit 38.9 37.0 - 47.0 %    MCV 95.6 81.4 - 97.8 fL    MCH 28.7 27.0 - 33.0 pg    MCHC 30.1 (L) 33.6 - 35.0 g/dL    RDW 47.0 35.9 - 50.0 fL    Platelet Count 200 164 - 446 K/uL    MPV 9.6 9.0 - 12.9 fL    Neutrophils-Polys 73.50 (H) 44.00 - 72.00 %    Lymphocytes 16.50 (L) 22.00 - 41.00 %    Monocytes 8.90 0.00 - 13.40 %    Eosinophils 0.50 0.00 - 6.90 %    Basophils 0.20 0.00 - 1.80 %    Immature Granulocytes 0.40 0.00 - 0.90 %    Nucleated RBC 0.00 /100 WBC    Neutrophils (Absolute) 4.06 2.00 - 7.15 K/uL    Lymphs (Absolute) 0.91 (L) 1.00 - 4.80 K/uL    Monos (Absolute) 0.49 0.00 - 0.85 K/uL    Eos (Absolute) 0.03 0.00 - 0.51 K/uL    Baso (Absolute) 0.01 0.00 - 0.12 K/uL    Immature Granulocytes (abs) 0.02 0.00 - 0.11 K/uL    NRBC (Absolute) 0.00 K/uL   Complete Metabolic Panel (CMP)   Result Value Ref Range    Sodium 141 135 - 145 mmol/L    Potassium 5.1 3.6 - 5.5 mmol/L    Chloride 90 (L) 96 - 112 mmol/L    Co2 44 (HH) 20 - 33 mmol/L    Anion Gap 7.0 0.0 - 11.9    Glucose 133 (H) 65 - 99 mg/dL    Bun 14 8 - 22 mg/dL    Creatinine 0.47 (L) 0.50 - 1.40 mg/dL    Calcium 9.5 8.5 - 10.5 mg/dL    AST(SGOT) 18 12 - 45 U/L    ALT(SGPT) 5 2 - 50 U/L    Alkaline Phosphatase 54 30 - 99  U/L    Total Bilirubin 0.4 0.1 - 1.5 mg/dL    Albumin 3.6 3.2 - 4.9 g/dL    Total Protein 6.7 6.0 - 8.2 g/dL    Globulin 3.1 1.9 - 3.5 g/dL    A-G Ratio 1.2 g/dL   Btype Natriuretic Peptide   Result Value Ref Range    B Natriuretic Peptide 372 (H) 0 - 100 pg/mL   Troponin STAT   Result Value Ref Range    Troponin I 0.31 (H) 0.00 - 0.04 ng/mL   Magnesium   Result Value Ref Range    Magnesium 1.7 1.5 - 2.5 mg/dL   Phosphorus   Result Value Ref Range    Phosphorus 3.2 2.5 - 4.5 mg/dL   URINALYSIS   Result Value Ref Range    Color Yellow     Character Clear     Specific Gravity 1.012 <1.035    Ph 8.0 5.0 - 8.0    Glucose Negative Negative mg/dL    Ketones Negative Negative mg/dL    Protein Negative Negative mg/dL    Bilirubin Negative Negative    Urobilinogen, Urine 0.2 Negative    Nitrite Negative Negative    Leukocyte Esterase Trace (A) Negative    Occult Blood Negative Negative    Micro Urine Req Microscopic    Influenza A/B By PCR   Result Value Ref Range    Influenza virus A RNA Negative Negative    Influenza virus B, PCR Negative Negative   LACTIC ACID   Result Value Ref Range    Lactic Acid 1.5 0.5 - 2.0 mmol/L   ESTIMATED GFR   Result Value Ref Range    GFR If African American >60 >60 mL/min/1.73 m 2    GFR If Non African American >60 >60 mL/min/1.73 m 2   URINE MICROSCOPIC (W/UA)   Result Value Ref Range    WBC 0-2 /hpf    RBC 0-2 /hpf    Bacteria Few (A) None /hpf    Epithelial Cells Negative /hpf    Mucous Threads Rare /hpf    Hyaline Cast 6-10 (A) /lpf   EKG - STAT   Result Value Ref Range    Report       [facility]    Test Date:  2019  Pt Name:    IGOR CISNEROS                 Department: ER  MRN:        [id number]                      Room:       RD 04  Gender:     Female                       Technician: [id number]  :        [id number]                   Requested By:NIC QUIGLEY  Order #:    [id number]                    Reading MD: NIC QUIGLEY,  "MD    Measurements  Intervals                                Axis  Rate:       90                           P:          38  AR:         156                          QRS:        47  QRSD:       92                           T:          32  QT:         380  QTc:        465    Interpretive Statements  SINUS RHYTHM  PROBABLE LEFT ATRIAL ABNORMALITY  BORDERLINE LOW VOLTAGE IN FRONTAL LEADS  No previous ECG available for comparison    Electronically Signed On 1- 13:58:45 PST by NIC QUIGLEY MD         IMAGING  DX-CHEST-PORTABLE (1 VIEW)   Final Result      Pulmonary edema, pleural effusions, basilar atelectasis and cardiac silhouette enlargement      CT-CTA CHEST PULMONARY ARTERY W/ RECONS    (Results Pending)       MEDICAL RECORD  I have reviewed patient's medical record and pertinent results are listed below.      COURSE & MEDICAL DECISION MAKING  I have reviewed any medical record information, laboratory studies and radiographic results as noted.    Kishan Mcintyre is a 73 y.o. female who presents complaining of shortness of breath and generalized weakness.  Patient has a history of COPD and CHF.  All making PE a possibility.  EKG demonstrates no acute ST changes.    Chest x-ray demonstrates pulmonary edema with bilateral pleural effusions.    Troponin is elevated to 0.31.  Patient was given aspirin 162 mg p.o.  She took a baby aspirin this morning.    BNP elevated to 372.  Serum CO2 is 44.  ABG was ordered.    Patient was given Lasix 40 mg IV.    ABG demonstrates pH of 7.26, PO2 of 132.4, bicarb 45.    Given the patient's ABG results along with elevated BNP, I feel BiPAP would be beneficial for this patient.  I advised the patient and she informed me that \"I can't do it.\"  I offered her Ativan 0.5 mg IV and she is willing to try it.  RT was here to assess her for this as well.    2:25 PM  I discussed the case with Dr. Enriquez from cardiology    2:51 PM  I discussed the case with the [facility] ICU resident " who agrees to admit the patient.  She will contact Dr. Doss who is the critical care attending today.  She is aware that the CT PE study is still pending and that Dr. Enirquez from cardiology will see the patient as well.    The total critical care time on this patient is 40 minutes, resuscitating patient, speaking with admitting physician, and deciphering test results. This 40 minutes is exclusive of separately billable procedures.        FINAL IMPRESSION  1. SOB (shortness of breath)    2. Elevated troponin        Electronically signed by: Coby Cheney, 1/18/2019 11:56 AM

## 2019-01-18 NOTE — FLOWSHEET NOTE
01/18/19 1453   Events/Summary/Plan   Non-Invasive Resp Device Site Inspection Completed Intact   General Vent Information   Pulse Oximetry 95 %   Heart Rate (Monitored) 86   BiPAP for Respiratory Failure   #BiPap Initial Day  Yes   IPAP (cmH2O) 12   EPAP (cm H2O) 5   FiO2 40   Alarms Set / Checked Yes   Respiratory Rate Patient 26   Spontaneous Vt (ml) 549   Spontaneous Ve (LPM) 14.2

## 2019-01-18 NOTE — ED NOTES
Med rec complete per pt at bedside  Ok per Pt to discuss medications with visitor/s present  Allergies have been verified and updated  No oral ABX within the last 30 days  Pt Home Pharmacy:[facility]

## 2019-01-19 ENCOUNTER — APPOINTMENT (OUTPATIENT)
Dept: CARDIOLOGY | Facility: MEDICAL CENTER | Age: 74
DRG: 291 | End: 2019-01-19
Attending: INTERNAL MEDICINE
Payer: MEDICARE

## 2019-01-19 ENCOUNTER — APPOINTMENT (OUTPATIENT)
Dept: RADIOLOGY | Facility: MEDICAL CENTER | Age: 74
DRG: 291 | End: 2019-01-19
Attending: INTERNAL MEDICINE
Payer: MEDICARE

## 2019-01-19 LAB
ACTION RANGE TRIGGERED IACRT: YES
ANION GAP SERPL CALC-SCNC: 5 MMOL/L (ref 0–11.9)
ANISOCYTOSIS BLD QL SMEAR: ABNORMAL
BASE EXCESS BLDA CALC-SCNC: ABNORMAL MMOL/L (ref -4–3)
BASOPHILS # BLD AUTO: 0.3 % (ref 0–1.8)
BASOPHILS # BLD: 0.01 K/UL (ref 0–0.12)
BODY TEMPERATURE: ABNORMAL DEGREES
BUN SERPL-MCNC: 11 MG/DL (ref 8–22)
CALCIUM SERPL-MCNC: 9.5 MG/DL (ref 8.5–10.5)
CHLORIDE SERPL-SCNC: 93 MMOL/L (ref 96–112)
CO2 BLDA-SCNC: ABNORMAL MMOL/L (ref 20–33)
CO2 SERPL-SCNC: 42 MMOL/L (ref 20–33)
COMMENT 1642: NORMAL
CREAT SERPL-MCNC: 0.44 MG/DL (ref 0.5–1.4)
EOSINOPHIL # BLD AUTO: 0 K/UL (ref 0–0.51)
EOSINOPHIL NFR BLD: 0 % (ref 0–6.9)
ERYTHROCYTE [DISTWIDTH] IN BLOOD BY AUTOMATED COUNT: 47 FL (ref 35.9–50)
GLUCOSE SERPL-MCNC: 121 MG/DL (ref 65–99)
HCO3 BLDA-SCNC: ABNORMAL MMOL/L (ref 17–25)
HCT VFR BLD AUTO: 38.5 % (ref 37–47)
HGB BLD-MCNC: 11.4 G/DL (ref 12–16)
HOROWITZ INDEX BLDA+IHG-RTO: 1550 MM[HG]
IMM GRANULOCYTES # BLD AUTO: 0.03 K/UL (ref 0–0.11)
IMM GRANULOCYTES NFR BLD AUTO: 0.8 % (ref 0–0.9)
INST. QUALIFIED PATIENT IIQPT: YES
LV EJECT FRACT  99904: 65
LV EJECT FRACT MOD 2C 99903: 60.61
LV EJECT FRACT MOD 4C 99902: 71.57
LV EJECT FRACT MOD BP 99901: 65.16
LYMPHOCYTES # BLD AUTO: 0.43 K/UL (ref 1–4.8)
LYMPHOCYTES NFR BLD: 10.8 % (ref 22–41)
MACROCYTES BLD QL SMEAR: ABNORMAL
MAGNESIUM SERPL-MCNC: 1.9 MG/DL (ref 1.5–2.5)
MCH RBC QN AUTO: 28.3 PG (ref 27–33)
MCHC RBC AUTO-ENTMCNC: 29.6 G/DL (ref 33.6–35)
MCV RBC AUTO: 95.5 FL (ref 81.4–97.8)
MONOCYTES # BLD AUTO: 0.05 K/UL (ref 0–0.85)
MONOCYTES NFR BLD AUTO: 1.3 % (ref 0–13.4)
MORPHOLOGY BLD-IMP: NORMAL
NEUTROPHILS # BLD AUTO: 3.45 K/UL (ref 2–7.15)
NEUTROPHILS NFR BLD: 86.8 % (ref 44–72)
NRBC # BLD AUTO: 0 K/UL
NRBC BLD-RTO: 0 /100 WBC
O2/TOTAL GAS SETTING VFR VENT: 6 %
PCO2 BLDA: >100 MMHG (ref 26–37)
PH BLDA: 7.28 [PH] (ref 7.4–7.5)
PLATELET # BLD AUTO: 191 K/UL (ref 164–446)
PLATELET BLD QL SMEAR: NORMAL
PMV BLD AUTO: 9.6 FL (ref 9–12.9)
PO2 BLDA: 93 MMHG (ref 64–87)
POTASSIUM SERPL-SCNC: 4.4 MMOL/L (ref 3.6–5.5)
RBC # BLD AUTO: 4.03 M/UL (ref 4.2–5.4)
RBC BLD AUTO: PRESENT
SAO2 % BLDA: ABNORMAL % (ref 93–99)
SODIUM SERPL-SCNC: 140 MMOL/L (ref 135–145)
SPECIMEN DRAWN FROM PATIENT: ABNORMAL
WBC # BLD AUTO: 4 K/UL (ref 4.8–10.8)

## 2019-01-19 PROCEDURE — 83735 ASSAY OF MAGNESIUM: CPT

## 2019-01-19 PROCEDURE — 85025 COMPLETE CBC W/AUTO DIFF WBC: CPT

## 2019-01-19 PROCEDURE — 99291 CRITICAL CARE FIRST HOUR: CPT | Performed by: INTERNAL MEDICINE

## 2019-01-19 PROCEDURE — 93306 TTE W/DOPPLER COMPLETE: CPT | Mod: 26 | Performed by: INTERNAL MEDICINE

## 2019-01-19 PROCEDURE — 99233 SBSQ HOSP IP/OBS HIGH 50: CPT | Performed by: INTERNAL MEDICINE

## 2019-01-19 PROCEDURE — 94640 AIRWAY INHALATION TREATMENT: CPT

## 2019-01-19 PROCEDURE — 700102 HCHG RX REV CODE 250 W/ 637 OVERRIDE(OP): Performed by: INTERNAL MEDICINE

## 2019-01-19 PROCEDURE — 700111 HCHG RX REV CODE 636 W/ 250 OVERRIDE (IP): Performed by: INTERNAL MEDICINE

## 2019-01-19 PROCEDURE — 700101 HCHG RX REV CODE 250: Performed by: INTERNAL MEDICINE

## 2019-01-19 PROCEDURE — 71045 X-RAY EXAM CHEST 1 VIEW: CPT

## 2019-01-19 PROCEDURE — 80048 BASIC METABOLIC PNL TOTAL CA: CPT

## 2019-01-19 PROCEDURE — 93306 TTE W/DOPPLER COMPLETE: CPT

## 2019-01-19 PROCEDURE — 94003 VENT MGMT INPAT SUBQ DAY: CPT

## 2019-01-19 PROCEDURE — 700105 HCHG RX REV CODE 258: Performed by: INTERNAL MEDICINE

## 2019-01-19 PROCEDURE — 770022 HCHG ROOM/CARE - ICU (200)

## 2019-01-19 PROCEDURE — A9270 NON-COVERED ITEM OR SERVICE: HCPCS | Performed by: INTERNAL MEDICINE

## 2019-01-19 RX ORDER — FUROSEMIDE 10 MG/ML
20 INJECTION INTRAMUSCULAR; INTRAVENOUS ONCE
Status: COMPLETED | OUTPATIENT
Start: 2019-01-19 | End: 2019-01-19

## 2019-01-19 RX ORDER — MAGNESIUM SULFATE HEPTAHYDRATE 40 MG/ML
2 INJECTION, SOLUTION INTRAVENOUS ONCE
Status: COMPLETED | OUTPATIENT
Start: 2019-01-19 | End: 2019-01-19

## 2019-01-19 RX ADMIN — ASPIRIN 81 MG: 81 TABLET, COATED ORAL at 05:23

## 2019-01-19 RX ADMIN — CLOTRIMAZOLE AND BETAMETHASONE DIPROPIONATE 1 APPLICATION: 10; .5 CREAM TOPICAL at 05:24

## 2019-01-19 RX ADMIN — IPRATROPIUM BROMIDE AND ALBUTEROL SULFATE 3 ML: .5; 3 SOLUTION RESPIRATORY (INHALATION) at 07:22

## 2019-01-19 RX ADMIN — IPRATROPIUM BROMIDE AND ALBUTEROL SULFATE 3 ML: .5; 3 SOLUTION RESPIRATORY (INHALATION) at 18:57

## 2019-01-19 RX ADMIN — IPRATROPIUM BROMIDE AND ALBUTEROL SULFATE 3 ML: .5; 3 SOLUTION RESPIRATORY (INHALATION) at 03:03

## 2019-01-19 RX ADMIN — IPRATROPIUM BROMIDE AND ALBUTEROL SULFATE 3 ML: .5; 3 SOLUTION RESPIRATORY (INHALATION) at 22:16

## 2019-01-19 RX ADMIN — METHYLPREDNISOLONE SODIUM SUCCINATE 40 MG: 40 INJECTION, POWDER, FOR SOLUTION INTRAMUSCULAR; INTRAVENOUS at 05:23

## 2019-01-19 RX ADMIN — THERA TABS 1 TABLET: TAB at 05:23

## 2019-01-19 RX ADMIN — CLOTRIMAZOLE AND BETAMETHASONE DIPROPIONATE 1 APPLICATION: 10; .5 CREAM TOPICAL at 17:11

## 2019-01-19 RX ADMIN — IPRATROPIUM BROMIDE AND ALBUTEROL SULFATE 3 ML: .5; 3 SOLUTION RESPIRATORY (INHALATION) at 14:54

## 2019-01-19 RX ADMIN — STANDARDIZED SENNA CONCENTRATE AND DOCUSATE SODIUM 2 TABLET: 8.6; 5 TABLET, FILM COATED ORAL at 17:06

## 2019-01-19 RX ADMIN — MAGNESIUM SULFATE IN WATER 2 G: 40 INJECTION, SOLUTION INTRAVENOUS at 14:04

## 2019-01-19 RX ADMIN — BUSPIRONE HYDROCHLORIDE 7.5 MG: 30 TABLET ORAL at 23:09

## 2019-01-19 RX ADMIN — FUROSEMIDE 40 MG: 10 INJECTION, SOLUTION INTRAMUSCULAR; INTRAVENOUS at 17:06

## 2019-01-19 RX ADMIN — SODIUM CHLORIDE 250 MG: 9 INJECTION, SOLUTION INTRAVENOUS at 06:01

## 2019-01-19 RX ADMIN — OMEPRAZOLE 20 MG: 20 CAPSULE, DELAYED RELEASE ORAL at 05:22

## 2019-01-19 RX ADMIN — FUROSEMIDE 20 MG: 10 INJECTION, SOLUTION INTRAMUSCULAR; INTRAVENOUS at 08:07

## 2019-01-19 RX ADMIN — ENOXAPARIN SODIUM 40 MG: 100 INJECTION SUBCUTANEOUS at 05:23

## 2019-01-19 RX ADMIN — METHYLPREDNISOLONE SODIUM SUCCINATE 40 MG: 40 INJECTION, POWDER, FOR SOLUTION INTRAMUSCULAR; INTRAVENOUS at 23:09

## 2019-01-19 RX ADMIN — METHYLPREDNISOLONE SODIUM SUCCINATE 40 MG: 40 INJECTION, POWDER, FOR SOLUTION INTRAMUSCULAR; INTRAVENOUS at 14:04

## 2019-01-19 RX ADMIN — AZITHROMYCIN 250 MG: 250 TABLET, FILM COATED ORAL at 05:23

## 2019-01-19 RX ADMIN — METHYLPREDNISOLONE SODIUM SUCCINATE 40 MG: 40 INJECTION, POWDER, FOR SOLUTION INTRAMUSCULAR; INTRAVENOUS at 17:06

## 2019-01-19 RX ADMIN — FLUTICASONE PROPIONATE 88 MCG: 44 AEROSOL, METERED RESPIRATORY (INHALATION) at 18:57

## 2019-01-19 RX ADMIN — IPRATROPIUM BROMIDE AND ALBUTEROL SULFATE 3 ML: .5; 3 SOLUTION RESPIRATORY (INHALATION) at 11:31

## 2019-01-19 ASSESSMENT — PAIN SCALES - GENERAL
PAINLEVEL_OUTOF10: 0

## 2019-01-19 ASSESSMENT — ENCOUNTER SYMPTOMS
LIGHT-HEADEDNESS: 0
DEPRESSION: 0
WEIGHT LOSS: 0
SORE THROAT: 0
ARTHRALGIAS: 0
COUGH: 1
DIZZINESS: 0
WHEEZING: 1
NUMBNESS: 0
NERVOUS/ANXIOUS: 0
ABDOMINAL DISTENTION: 0
WEAKNESS: 1
HEMOPTYSIS: 0
SHORTNESS OF BREATH: 1
FEVER: 0
ALLERGIC/IMMUNOLOGIC NEGATIVE: 1
BACK PAIN: 0
FOCAL WEAKNESS: 0
NAUSEA: 0
FLANK PAIN: 0
NEUROLOGICAL NEGATIVE: 1
CARDIOVASCULAR NEGATIVE: 1
ABDOMINAL PAIN: 0
SENSORY CHANGE: 0
PALPITATIONS: 0
ENDOCRINE NEGATIVE: 1
PND: 1
HEADACHES: 0
HEMATOLOGIC/LYMPHATIC NEGATIVE: 1
DIAPHORESIS: 0
INSOMNIA: 1
ORTHOPNEA: 1
PSYCHIATRIC NEGATIVE: 1
VOMITING: 0
CHILLS: 0
EYES NEGATIVE: 1
GASTROINTESTINAL NEGATIVE: 1
CONSTITUTIONAL NEGATIVE: 1
SPUTUM PRODUCTION: 1

## 2019-01-19 ASSESSMENT — PULMONARY FUNCTION TESTS
EPAP_CMH2O: 5
EPAP_CMH2O: 5

## 2019-01-19 NOTE — CARE PLAN
Problem: Communication  Goal: The ability to communicate needs accurately and effectively will improve  Outcome: PROGRESSING AS EXPECTED  Patient demonstrated how to use the call light appropriately. Patient is A&O 3-4 and communicates her needs appropriately.     Problem: Safety  Goal: Will remain free from injury  Outcome: PROGRESSING AS EXPECTED  Bed is low, side rails up x2, call light within reach, and bed alarm activated.       Problem: Respiratory:  Goal: Respiratory status will improve  Outcome: PROGRESSING SLOWER THAN EXPECTED  Patient demonstrated how to remove bipap for emergencies.

## 2019-01-19 NOTE — PROGRESS NOTES
Received bedside report from Penny RN. Patient is resting in bed with bipap in place, SPO2 95%. Family is at bedside. Patient demonstrated how to remove the bipap. Per report - Okay to have water but no food per day shift MD. Plan of care for this shift was discussed, patient denies needs at this time. Bed is low, side rails up x2, call light within reach, and bed alarm activated.

## 2019-01-19 NOTE — PROGRESS NOTES
Transported pt from [facility] to [facility].    Pt resting comfortably in bed with call light and belongings within reach. Bed locked and in low position. Bed alarm on. VSS.

## 2019-01-19 NOTE — PROGRESS NOTES
· 2 RN skin check complete with LIBORIO Greenberg.  · Devices in place 2 PIVs, BP cuff, biPAP, pulse ox, Conklin Catheter, EKG monitoring.  · Skin assessed under devices completed.  · Confirmed pressure ulcers found on N/A.  · New potential pressure ulcers noted on N/A.   · The following interventions in place pillows for turns and repositioning Q 2 hours; Mepilex in place; Heel float boots.      Sacrum - erythema; skin intact; blanches   Left hip - erythema, rash  Panus and bilateral breast - erythema, skin intact  Bilateral LE - erythema; swollen; skin intact but fragile and flakey; grey dusky on Left LE

## 2019-01-19 NOTE — PROGRESS NOTES
1652 paged [facility].  8623 Clarified if Dr. Feliz would like the patient to receive both lasix 40mg and Diamox 250mg this AM. Patient's SBP has been 80-100s. Urine output for this shift:1700ml. Okay to hold lasix and address during AM rounds per MD.

## 2019-01-19 NOTE — PROGRESS NOTES
Cardiology Follow Up Progress Note    Date of Service  1/19/2019    Attending Physician  Tommy Asher M.D.    Chief Complaint   SOB    HPI  Kishan Mcintyre is a 73 y.o. female admitted 1/18/2019 with aortic stenosis, known CAD, respiratory failure    Interim Events  Continues on Bipap overnight  Urine output adequate  Repeat echo pending  No chest pain    Review of Systems  Review of Systems   Constitutional: Negative.    HENT: Negative.    Eyes: Negative.    Respiratory: Positive for shortness of breath.    Cardiovascular: Negative.  Negative for chest pain, palpitations and leg swelling.   Gastrointestinal: Negative.  Negative for abdominal distention and nausea.   Endocrine: Negative.    Genitourinary: Negative.    Musculoskeletal: Negative for arthralgias.   Skin: Negative.    Allergic/Immunologic: Negative.    Neurological: Negative.  Negative for dizziness, light-headedness and numbness.   Hematological: Negative.    Psychiatric/Behavioral: Negative.        Vital signs in last 24 hours  Temp:  [36.8 °C (98.2 °F)-37.3 °C (99.2 °F)] 37.2 °C (98.9 °F)  Pulse:  [66-91] 78  Resp:  [16-44] 28  BP: (109-125)/(46-52) 109/52  SpO2:  [88 %-99 %] 95 %    Physical Exam  Physical Exam   Constitutional: She is oriented to person, place, and time. She appears well-developed and well-nourished. No distress.   HENT:   Head: Normocephalic.   Mouth/Throat: Oropharynx is clear and moist.   Eyes: Pupils are equal, round, and reactive to light. EOM are normal. Right eye exhibits no discharge. Left eye exhibits no discharge. No scleral icterus.   Neck: Normal range of motion. Neck supple. No JVD present. No tracheal deviation present.   Cardiovascular: Normal rate, regular rhythm, S1 normal, S2 normal, intact distal pulses and normal pulses.  Exam reveals no gallop, no S3, no S4 and no friction rub.    Murmur heard.   Crescendo systolic murmur is present with a grade of 3/6    No diastolic murmur is present   Pulses:        Carotid pulses are 2+ on the right side, and 2+ on the left side.       Radial pulses are 2+ on the right side, and 2+ on the left side.        Dorsalis pedis pulses are 2+ on the right side, and 2+ on the left side.        Posterior tibial pulses are 2+ on the right side, and 2+ on the left side.   3/6 Murmur, preserved A2, decreased and diminished carotid upstrokes   Pulmonary/Chest: Effort normal and breath sounds normal. No respiratory distress. She has no wheezes. She has no rales.   Abdominal: Soft. Bowel sounds are normal. She exhibits no distension and no mass. There is no tenderness. There is no rebound and no guarding.   Musculoskeletal: She exhibits no edema.   Neurological: She is alert and oriented to person, place, and time. No cranial nerve deficit.   Skin: Skin is warm and dry. She is not diaphoretic. No pallor.   Psychiatric: She has a normal mood and affect. Her behavior is normal. Judgment and thought content normal.   Nursing note and vitals reviewed.      Lab Review  Lab Results   Component Value Date/Time    WBC 5.5 01/18/2019 11:12 AM    RBC 4.07 (L) 01/18/2019 11:12 AM    HEMOGLOBIN 11.7 (L) 01/18/2019 11:12 AM    HEMATOCRIT 38.9 01/18/2019 11:12 AM    MCV 95.6 01/18/2019 11:12 AM    MCH 28.7 01/18/2019 11:12 AM    MCHC 30.1 (L) 01/18/2019 11:12 AM    MPV 9.6 01/18/2019 11:12 AM      Lab Results   Component Value Date/Time    SODIUM 141 01/18/2019 11:12 AM    POTASSIUM 5.1 01/18/2019 11:12 AM    CHLORIDE 90 (L) 01/18/2019 11:12 AM    CO2 44 (HH) 01/18/2019 11:12 AM    GLUCOSE 133 (H) 01/18/2019 11:12 AM    BUN 14 01/18/2019 11:12 AM    CREATININE 0.47 (L) 01/18/2019 11:12 AM    CREATININE 0.71 04/19/2011 09:24 AM    BUNCREATRAT 23 04/19/2011 09:24 AM      Lab Results   Component Value Date/Time    ASTSGOT 18 01/18/2019 11:12 AM    ALTSGPT 5 01/18/2019 11:12 AM     Lab Results   Component Value Date/Time    CHOLSTRLTOT 171 07/23/2016 08:52 AM    LDL 91 07/23/2016 08:52 AM    HDL 60 07/23/2016  08:52 AM    TRIGLYCERIDE 101 07/23/2016 08:52 AM    TROPONINI 0.30 (H) 01/18/2019 09:26 PM       Recent Labs      01/18/19   1112   BNPBTYPENAT  372*       Cardiac Imaging and Procedures Review  EKG:  My personal interpretation of the EKG dated 1/19/2019 is NSR, otherwise normal ECG    Echocardiogram:  10/8/2018  CONCLUSIONS  Prior ; compared to the report of the study done - there has   been no significant change.   Normal left ventricular systolic function.  Left ventricular ejection fraction is visually estimated to be 60%.  Moderate aortic stenosis.  Aortic valve area calculated from the continuity equation is 1.1 cm2.   Vmax is  3.03  m/s.  Transvalvular gradients are - Peak: 37 mmHg,   Mean: 23 mmHg.   Mild tricuspid regurgitation.  Estimated right ventricular systolic pressure is 40 mmHg.    Cardiac Catheterization:  N/A    Imaging  Chest X-Ray:  Enlarged cardiac sillouette, infiltrates bilaterally no effusion    Stress Test:  N/A    Assessment/Plan  No new Assessment & Plan notes have been filed under this hospital service since the last note was generated.  Service: Cardiology  74 yo F with some degree of AS, COPD and LE edema, hypoxic resp failure.  We will continue diuresis and repeat her echo re-assess her aortic valve.  Her valve is moderate to severe on exam.  Her diastolic function is grade 1 which is not severe enough to explain her edema.  Her degree of COPD is also not quantified.    1. AS, likely moderate    - repeat echo    2. COPD, end stage?    - continue lasix 40 BID    - cont diamox 250      3. Trop leak    - type 2, no further workup, poor candidate for cath lab    4. Cor Pulmonale     - per above    Addendum:  Bedside echo shows JVD to angle of mandible, lasix held this morning due to hypotension, re-ordered lasix 20 mg IV x 1 with diamox      Thank you for allowing me to participate in the care of this patient.  I will continue to follow this patient    Please contact me with any  questions.    Trip Galvan M.D.   Cardiologist, [facility]  [phone]

## 2019-01-19 NOTE — ASSESSMENT & PLAN NOTE
Moderate AS confirmed with Echo on 1/19/2019  Repeat echo shows Transvalvular gradients are - Peak: 48 mmHg, Mean: 33 mmHg. (increased from prior)  Followed by Dr Rodrigez as outpatient, monitoring with serial echos.  -Cont BP control   -Follow-up with outpatient cardiologist

## 2019-01-19 NOTE — ASSESSMENT & PLAN NOTE
Likely type II NSTEMI due to heart failure  Trend troponin  Echocardiogram noted  Aspirin  Placed on heparin for any worsening

## 2019-01-19 NOTE — PROGRESS NOTES
[facility] Progress Note      Admit Date: 2019    Resident(s): Ginger rPide M.D.  Attending: ARAMIS IRIZARRY/      Date & Time:   2019   8:28 AM       Patient ID:    Name:             Kishan Mcintyre   YOB: 1945  Age:                 73 y.o.  female   MRN:               [id number]    Diagnosis:  Acute hypoxic hypercapneic respiratory failure    ID:  74 yo F with COPD on 2L, diastolic heart failure, moderate AS came in with worsening SOB, tachypneic at ER and required BIPAP. On presentation, HCO 44, trop 0.3, , PH 7.26, CO2 104, CXR pulmonary edema with effusions, EKG no ischemic changes. She was admitted for exacerbation of COPD and diastolic heart failure requiring BiPAP.       Interval Events:  - SOB improving, pH, CO improving on repeat blood gas last night   - SBP 90s-100s, SR 70s-80s  - on BIPAP all night, SaO2 >95%, reduced BiPAP pressure, plan to switch BiPAP to hiFlow target SaO2 88-92%, continue duonebs, steroids, diuresis.  - regular diet if she's doing well on HiFlow  - Cr and lytes stable on diuresis, HCO improving  - afebrile, no leucocytosis, azithro for COPD exacerbation.    ROS:  Neg except as stated above      VITALS:  Pulse: 86, Heart Rate (Monitored): 84  Blood Pressure : 109/52, NIBP: (!) 94/59       Temp  Av.1 °C (98.7 °F)  Min: 36.8 °C (98.2 °F)  Max: 37.3 °C (99.2 °F)         Physical Exam:  GEN: comfortable on BiPAP  HEENT: NC/AT.  CV:  RRR, no m/r/g, normal s1/s2,  RESP: reduced air entry, No wheezes, rhonchi, or rales.  ABD: +bs, NT/ND  EXT: 2+ edema, slightly improving  NEURO: A & O x 4, moving all extremities    Consultants:  PMA: Dr Asher  Cardiology: Dr Enriquez / Dr Galvan      Procedures:  BiPAP      Fluids:  Intake/Output       19 0700 - 19 0659 (Not Admitted) 19 0700 - 19 0659 19 07 - 19 0659      [id number] 9741-5716 Total 2682-6752 7258-8219 Total [id number] 0730-7498 Total       Intake    P.O.  --  -- --   --  200 200  100  -- 100    P.O. -- -- -- -- 200 200 100 -- 100    Other  --  -- --  30  50 80  --  -- --    Medications (PO/Enteral Liquids) -- -- -- 30 50 80 -- -- --    Total Intake -- -- -- 30 250 280 100 -- 100       Output    Urine  --  -- --  2000 1700 3700  --  -- --    Output (mL) (Urethral Catheter Non-latex 16 Fr.) -- -- -- 2000 1700 3700 -- -- --    Total Output -- -- -- 2000 1700 3700 -- -- --       Net I/O     -- -- -- -1970 -1450 -3420 100 -- 100        Weight: 99.6 kg (219 lb 9.3 oz)  Recent Labs      01/18/19   1112  01/19/19   0533   SODIUM  141  140   POTASSIUM  5.1  4.4   CHLORIDE  90*  93*   CO2  44*  42*   BUN  14  11   CREATININE  0.47*  0.44*   MAGNESIUM  1.7  1.9   PHOSPHORUS  3.2   --    CALCIUM  9.5  9.5     Body mass index is 42.88 kg/m².        Respiratory:     Respiration: (!) 26, Pulse Oximetry: 95 %    Chest Tube Drains:    Recent Labs      01/18/19   1354  01/18/19   1615  01/18/19   2129   ASJLZ22K  7.26*  7.29*   --    ELSHMU828C  104.1*  95.1*   --    GAVJY806C  132.4*  104.9*   --    SHCW4FIZ  98.0  97.0   --    ARTHCO3  45*  45*   --    [id number]  4.0   --    --    ARTBE  14*  14*   --    ISTATAPH   --    --   7.348*   ISTATAPCO2   --    --   77.7*   ISTATAPO2   --    --   76   ISTATATCO2   --    --   45*   QOPEFLS6AAW   --    --   93   ISTATARTHCO3   --    --   42.6*   ISTATARTBE   --    --   13*   ISTATTEMP   --    --   99.0 F   ISTATFIO2   --    --   40   ISTATSPEC   --    --   Arterial   ISTATAPHTC   --    --   7.345*   VTOILVXY5CM   --    --   77       HemoDynamics:  Pulse: 86, Heart Rate (Monitored): 84 Blood Pressure : 109/52, NIBP: (!) 94/59      GI/Nutrition:  Recent Labs      01/18/19 1112 01/19/19   0533   ALTSGPT  5   --    ASTSGOT  18   --    ALKPHOSPHAT  54   --    TBILIRUBIN  0.4   --    GLUCOSE  133*  121*       Heme:  Recent Labs      01/18/19 1112 01/19/19   0533   RBC  4.07*  4.03*   HEMOGLOBIN  11.7*  11.4*   HEMATOCRIT  38.9  38.5   PLATELETCT  200   191       Infectious Disease:  Temp  Av.1 °C (98.7 °F)  Min: 36.8 °C (98.2 °F)  Max: 37.3 °C (99.2 °F)  Recent Labs      19   1112  19   0533   WBC  5.5  4.0*   NEUTSPOLYS  73.50*  86.80*   LYMPHOCYTES  16.50*  10.80*   MONOCYTES  8.90  1.30   EOSINOPHILS  0.50  0.00   BASOPHILS  0.20  0.30   ASTSGOT  18   --    ALTSGPT  5   --    ALKPHOSPHAT  54   --    TBILIRUBIN  0.4   --        Imaging:  DX-CHEST-PORTABLE (1 VIEW)   Final Result      No significant change      CT-CTA CHEST PULMONARY ARTERY W/ RECONS   Final Result      No evidence of pulmonary embolus.      Collapse of the right middle lobe of unknown etiology could be due to bronchial stricture, mucous plugging or perhaps malignancy.      Bibasilar atelectasis with trace bilateral pleural effusions.      Cardiomegaly.      3D angiographic/MIP images of the vasculature confirm the vascular findings as described above.            DX-CHEST-PORTABLE (1 VIEW)   Final Result      Pulmonary edema, pleural effusions, basilar atelectasis and cardiac silhouette enlargement      EC-ECHOCARDIOGRAM COMPLETE W/O CONT    (Results Pending)         Meds:  Current Facility-Administered Medications   Medication Dose Frequency Provider Last Rate Last Dose   • senna-docusate (PERICOLACE or SENOKOT S) 8.6-50 MG per tablet 2 Tab  2 Tab BID Ginger Pride M.D.        And   • polyethylene glycol/lytes (MIRALAX) PACKET 1 Packet  1 Packet QDAY PRN Ginger Pride M.D.        And   • magnesium hydroxide (MILK OF MAGNESIA) suspension 30 mL  30 mL QDAY PRN Ginger Pride M.D.        And   • bisacodyl (DULCOLAX) suppository 10 mg  10 mg QDAY PRN Ginger Pride M.D.       • Respiratory Care per Protocol   Continuous RT Ginger Pride M.D.       • methylPREDNISolone (SOLU-MEDROL) 40 MG injection 40 mg  40 mg Q6HRS Ginger Pride M.D.   40 mg at 19   • azithromycin (ZITHROMAX) tablet 250 mg  250 mg DAILY Ginger Pride M.D.   250 mg at 19   • ipratropium-albuterol  (DUONEB) nebulizer solution  3 mL Q4HRS (RT) Ginger Pride M.D.   3 mL at 01/19/19 0722   • furosemide (LASIX) injection 40 mg  40 mg BID DIURETIC Ginger Pride M.D.   Stopped at 01/18/19 1615   • albuterol inhaler 2 Puff  2 Puff Q6HRS PRN Ginger Pride M.D.       • aspirin EC (ECOTRIN) tablet 81 mg  81 mg DAILY Ginger Pride M.D.   81 mg at 01/19/19 0523   • busPIRone (BUSPAR) tablet 7.5 mg  7.5 mg HS PRN Ginger Pride M.D.       • clotrimazole-betamethasone (LOTRISONE) 1-0.05 % cream 1 Application  1 Application BID Ginger Pride M.D.   1 Application at 01/19/19 0524   • multivitamin (THERAGRAN) tablet   QAM Ginger Pride M.D.   1 Tab at 01/19/19 0523   • omeprazole (PRILOSEC) capsule 20 mg  20 mg DAILY Ginger Pride M.D.   20 mg at 01/19/19 0522   • umeclidinium-vilanterol (ANORO ELLIPTA) inhaler 1 Puff  1 Puff QDAILY (RT) Ginger Pride M.D.   Stopped at 01/19/19 0800    And   • fluticasone (FLOVENT HFA) 44 MCG/ACT inhaler 88 mcg  2 Puff Q12HRS (RT) Ginger Pride M.D.       • acetaminophen (TYLENOL) tablet 500 mg  500 mg Q6HRS PRN Ginger Pride M.D.       • lidocaine (LIDODERM) 5 % 2 Patch  2 Patch QDAY PRN Ginger Pride M.D.       • enoxaparin (LOVENOX) inj 40 mg  40 mg DAILY Ginger Pride M.D.   40 mg at 01/19/19 0523   • ipratropium-albuterol (DUONEB) nebulizer solution  3 mL Q2HRS PRN (RT) Tommy Asher M.D.       • acetaZOLAMIDE (DIAMOX) 250 mg in NS 50 mL IVPB  250 mg DAILY Ginger Pride M.D.   Stopped at 01/19/19 0616     Last reviewed on 1/18/2019  1:38 PM by Etta Flores T    Assessment and plan    * Acute on chronic respiratory failure (HCC)- (present on admission)   Assessment & Plan    Home oxygen 2-2.5L, recently using 4L  Combined COPD exacerbation and diastolic heart failure exacerbation  - treatment for both, IV steroid, azithromycin, IV diuresis  - trial weaning off BiPAP to HiFlow, O2 sat target 88-92%     Acute on chronic diastolic congestive heart failure (HCC)- (present on admission)   Assessment  & Plan    On lasix 60 mg PO at home, compliant.  - start IV lasix 40 mg BID, monitor lytes, Cr     COPD exacerbation (HCC)- (present on admission)   Assessment & Plan    - baseline O2 2L 24/7  - IV steroid, azithromycin, duonebs, RT protochol.  - continue home trelegy, albuterol prn     Aortic stenosis- (present on admission)   Assessment & Plan    Moderate AS  - followed by Dr Rodrigez as outpatient, monitoring with serial echos.  - ordered echo to reevaluate     Elevated troponin- (present on admission)   Assessment & Plan    0.3, no ischemic EKG changesin the setting of respiratory failure, likely demand ischemia  - cards was consulted at ER  - trop trended and stable.       Primary osteoarthritis of knee- (present on admission)   Assessment & Plan    - on aleve at home  - with aspirin and aleve, higher risk for GI bleed  - agreeable with topical cream / patch, started lidocaine, tylenol. Hold NSAIDs.         DISPO: ICU    CODE STATUS: full     Quality Measures:  Conklin Catheter: yes, strict I & O  DVT Prophylaxis: lovenox  Ulcer Prophylaxis: PPI  Antibiotics: azithromycin  Lines: PIV

## 2019-01-19 NOTE — ASSESSMENT & PLAN NOTE
RT/O2 Protocols  Titrate supplemental FiO2 to maintain SpO2 >88%  Conversion of IV steroids to oral prednisone, taper off over a week or so perhaps with Medrol Dosepak  Azithromycin x5 days, last dose 1/22  Continue fluconazole, DuoNeb, and Anoro Ellipta  Continue noninvasive positive pressure ventilation as needed: Sleep and naps continuously and as needed while awake for rescue  Continue to monitor for the need for intubation mechanical ventilation still at significant risk  If intubated diagnostic and therapeutic bronchoscopy may be prudent

## 2019-01-19 NOTE — PROGRESS NOTES
Dr. Feliz with [facility] reviewed ABGs. Continue to monitor patient and call MD with any changes. No new orders at this time.

## 2019-01-19 NOTE — CARE PLAN
Problem: Safety  Goal: Will remain free from injury    Intervention: Provide assistance with mobility  Completed. Pt assisted up in chair      Problem: Respiratory:  Goal: Respiratory status will improve    Intervention: Collaborate with respiratory therapist and Interdisciplinary Team on treatment measures to improve respiratory function  Completed. Transitioned from BiPAP to High Flow NC

## 2019-01-19 NOTE — PROGRESS NOTES
1943 paged [facility]   2000 Updated Dr. CARLITO Feliz with [facility] that the CT scan has resulted - read results via phone. Informed MD that the next ABG is due at 2100. Informed MD that the SBP has been ranging between 80-90s. Orders for repeat troponin. No additional orders received at this time - MD will come to bedside.

## 2019-01-19 NOTE — PROGRESS NOTES
Critical Care Progress Note    Date of admission  1/18/2019    Chief Complaint  73 y.o. female admitted 1/18/2019 with SOB    Hospital Course    73 y.o. female with past medical history of diastolic heart failure, obesity, moderate aortic stenosis, COPD, pulmonary hypertension, who presented 1/18/2019 with progressive worsening dyspnea for approximately 1 week.  The patient and her daughter who is at bedside provide all history, they state that approximately 2 weeks ago she was on a cruise to [place] and noted a small amount of chest pain which resolved.  When she returned from [place] she noted some nasal stuffiness as well as chest congestion which worsened throughout the week and was accompanied by lower extremity swelling.  She is on 2 L 24-hour oxygen at home which was increased to 3-4 L this week.  She has noted a cough productive of tan sputum however she has been afebrile and without chills, daughter has noted some increased confusion in her mother which has resolved in the ER.  She presented to the emergency department for the above complaints and was found to be in partially compensated respiratory acidosis with a PCO2 of 104.5 and a pH of 7.26.  A chest x-ray demonstrated pulmonary edema with effusions and cardiomegaly.  Her labs show an absence of leukocytosis, chemistry panel shows normal renal function with an elevated CO2 at 44 and glucose at 133.  Her troponin was elevated at 0.31 BNP elevated at 372. EKG was non-ischemic. She was placed on noninvasive positive pressure ventilation in the emergency department and given Lasix for her pulmonary edema with hypoxia.  Following these interventions the patient states she feels much better and her breathing is much more relaxed.  She is to be admitted to the ICU for acute on chronic hypoxic respiratory failure with pulmonary edema requiring rescue BiPAP.        Interval Problem Update  Reviewed last 24 hour events:    A&O x4  SR 70s  SBp 90-100s  Opponent I  trending down, only slightly elevated at 0.30  Bicarb level down to 42, renal function remains normal  Afeb  UO great  I/Os neg 3420cc/24hrs  BiPAP ongoing, adjusting pressure as needed  CXR chest x-ray unchanged today, significant cardiomegaly, bibasilar atelectasis/effusions and pulmonary  Solumedrol  Nebs Q4H  Lasix IV  Diamox IV    Mag  Trial HF NC O2 with BiPAP    Patient assessed and reassessed  Work of breathing increased while on HF NC but acceptable  LOC remains acceptable, do not suspect markedly worsening hypercarbia  Hemodynamics acceptable  Will have patient wear BiPAP with sleep again tonight, may need/benefit from trilogy as an outpatient long-term  Updated family at bedside at length    Review of Systems  Review of Systems   Constitutional: Positive for malaise/fatigue. Negative for chills, diaphoresis, fever and weight loss.   HENT: Negative for congestion and sore throat.    Eyes: Negative.    Respiratory: Positive for cough, sputum production, shortness of breath and wheezing. Negative for hemoptysis.    Cardiovascular: Positive for orthopnea, leg swelling and PND. Negative for chest pain and palpitations.   Gastrointestinal: Negative for abdominal pain, nausea and vomiting.   Genitourinary: Negative for dysuria, flank pain and hematuria.   Musculoskeletal: Negative for back pain.   Neurological: Positive for weakness (Diffuse). Negative for sensory change, focal weakness and headaches.   Psychiatric/Behavioral: Negative for depression. The patient has insomnia. The patient is not nervous/anxious.         Vital Signs for last 24 hours   Temp:  [36.8 °C (98.2 °F)-37.3 °C (99.2 °F)] 37.2 °C (98.9 °F)  Pulse:  [66-91] 78  Resp:  [16-44] 28  BP: (109-125)/(46-52) 109/52  SpO2:  [88 %-99 %] 95 %    Hemodynamic parameters for last 24 hours       Respiratory   BiPAP 12/5, titrating O2, full mask    Physical Exam   Physical Exam   Constitutional: She is oriented to person, place, and time. She appears  well-nourished. She appears lethargic. She has a sickly appearance. No distress. Face mask in place.   HENT:   Head: Normocephalic and atraumatic.   Mouth/Throat: Oropharynx is clear and moist.   Eyes: EOM are normal. No scleral icterus.   Neck: Neck supple. JVD present.   Cardiovascular: Normal rate, regular rhythm and intact distal pulses.   Occasional extrasystoles are present. Exam reveals no gallop and no friction rub.    No murmur heard.  Pulmonary/Chest: No respiratory distress (on bipap). She has wheezes. She has rhonchi. She has rales. She exhibits no tenderness.   Abdominal: Soft. Bowel sounds are normal. She exhibits no distension. There is no tenderness. There is no guarding.   Musculoskeletal: She exhibits no edema or tenderness.   Neurological: She is oriented to person, place, and time. She appears lethargic. No cranial nerve deficit or sensory deficit. GCS eye subscore is 4. GCS verbal subscore is 5. GCS motor subscore is 6.   Skin: Skin is warm and dry. No rash noted.   Vitals reviewed.      Medications  Current Facility-Administered Medications   Medication Dose Route Frequency Provider Last Rate Last Dose   • senna-docusate (PERICOLACE or SENOKOT S) 8.6-50 MG per tablet 2 Tab  2 Tab Oral BID Ginger Pride M.D.        And   • polyethylene glycol/lytes (MIRALAX) PACKET 1 Packet  1 Packet Oral QDAY PRN Ginger Pride M.D.        And   • magnesium hydroxide (MILK OF MAGNESIA) suspension 30 mL  30 mL Oral QDAY PRN Ginger Pride M.D.        And   • bisacodyl (DULCOLAX) suppository 10 mg  10 mg Rectal QDAY PRN Ginger Pride M.D.       • Respiratory Care per Protocol   Nebulization Continuous RT Ginger Pride M.D.       • methylPREDNISolone (SOLU-MEDROL) 40 MG injection 40 mg  40 mg Intravenous Q6HRS Ginger Pride M.D.   40 mg at 01/19/19 0523   • azithromycin (ZITHROMAX) tablet 250 mg  250 mg Oral DAILY Ginger Pride M.D.   250 mg at 01/19/19 0523   • ipratropium-albuterol (DUONEB) nebulizer solution  3 mL  Nebulization Q4HRS (RT) Ginger Pride M.D.   3 mL at 01/19/19 0303   • furosemide (LASIX) injection 40 mg  40 mg Intravenous BID DIURETIC Ginger Pride M.D.   Stopped at 01/18/19 1615   • albuterol inhaler 2 Puff  2 Puff Inhalation Q6HRS PRN Ginger Pride M.D.       • aspirin EC (ECOTRIN) tablet 81 mg  81 mg Oral DAILY Ginger Pride M.D.   81 mg at 01/19/19 0523   • busPIRone (BUSPAR) tablet 7.5 mg  7.5 mg Oral HS PRN Ginger Pride M.D.       • clotrimazole-betamethasone (LOTRISONE) 1-0.05 % cream 1 Application  1 Application Topical BID Ginger Pride M.D.   1 Application at 01/19/19 0524   • multivitamin (THERAGRAN) tablet   Oral QAM Ginger Pride M.D.   1 Tab at 01/19/19 0523   • omeprazole (PRILOSEC) capsule 20 mg  20 mg Oral DAILY Ginger Pride M.D.   20 mg at 01/19/19 0522   • umeclidinium-vilanterol (ANORO ELLIPTA) inhaler 1 Puff  1 Puff Inhalation QDAILY (RT) Ginger Pride M.D.        And   • fluticasone (FLOVENT HFA) 44 MCG/ACT inhaler 88 mcg  2 Puff Inhalation Q12HRS (RT) Ginger Pride M.D.       • acetaminophen (TYLENOL) tablet 500 mg  500 mg Oral Q6HRS PRN Ginger Pride M.D.       • lidocaine (LIDODERM) 5 % 2 Patch  2 Patch Transdermal QDAY PRN Ginger Pride M.D.       • enoxaparin (LOVENOX) inj 40 mg  40 mg Subcutaneous DAILY Ginger Pride M.D.   40 mg at 01/19/19 0523   • ipratropium-albuterol (DUONEB) nebulizer solution  3 mL Nebulization Q2HRS PRN (RT) Tommy Ashre M.D.       • acetaZOLAMIDE (DIAMOX) 250 mg in NS 50 mL IVPB  250 mg Intravenous DAILY Ginger Pride M.D. 200 mL/hr at 01/19/19 0601 250 mg at 01/19/19 0601       Fluids    Intake/Output Summary (Last 24 hours) at 01/19/19 0634  Last data filed at 01/19/19 0600   Gross per 24 hour   Intake              280 ml   Output             3700 ml   Net            -3420 ml       Laboratory  Recent Labs      01/18/19   1354  01/18/19   1615  01/18/19   2129   EELAP19R  7.26*  7.29*   --    DMHPRC549T  104.1*  95.1*   --    VUQMJ510E  132.4*  104.9*   --     EHHV9BYN  98.0  97.0   --    ARTHCO3  45*  45*   --    L4MWOHOSX  4.0   --    --    ARTBE  14*  14*   --    ISTATAPH   --    --   7.348*   ISTATAPCO2   --    --   77.7*   ISTATAPO2   --    --   76   ISTATATCO2   --    --   45*   PAGHPMU9SHR   --    --   93   ISTATARTHCO3   --    --   42.6*   ISTATARTBE   --    --   13*   ISTATTEMP   --    --   99.0 F   ISTATFIO2   --    --   40   ISTATSPEC   --    --   Arterial   ISTATAPHTC   --    --   7.345*   OXMHHHAW3ZA   --    --   77     Recent Labs      01/18/19   0626  01/18/19   1112  01/18/19   2126   TROPONINI  0.28*  0.31*  0.30*   BNPBTYPENAT   --   372*   --      Recent Labs      01/18/19   1112   SODIUM  141   POTASSIUM  5.1   CHLORIDE  90*   CO2  44*   BUN  14   CREATININE  0.47*   MAGNESIUM  1.7   PHOSPHORUS  3.2   CALCIUM  9.5     Recent Labs      01/18/19   1112   ALTSGPT  5   ASTSGOT  18   ALKPHOSPHAT  54   TBILIRUBIN  0.4   GLUCOSE  133*     Recent Labs      01/18/19   1112   WBC  5.5   NEUTSPOLYS  73.50*   LYMPHOCYTES  16.50*   MONOCYTES  8.90   EOSINOPHILS  0.50   BASOPHILS  0.20   ASTSGOT  18   ALTSGPT  5   ALKPHOSPHAT  54   TBILIRUBIN  0.4     Recent Labs      01/18/19   1112   RBC  4.07*   HEMOGLOBIN  11.7*   HEMATOCRIT  38.9   PLATELETCT  200       Imaging  X-Ray:  I have personally reviewed the images and compared with prior images.  EKG:  I have personally reviewed the images and compared with prior images.  CT:    Reviewed  Echo:   Reviewed    Assessment/Plan  * Acute on chronic respiratory failure (HCC)- (present on admission)   Assessment & Plan    Hypoxic and hypercarbic, suspect both chronic with severe acute flare  Likely related to acute exacerbation of HFpEF and AECOPD less likely bronchitis/pneumonia  Will diuresis until the patient reaches her dry weight which is approximately 215 pounds, monitor daily  Continue Lasix, Diamox added last night  RT protocols  DuoNeb every 4 hours and every 2 hours as needed  BiPAP 12/5 with titration of O2 to  88-92% saturation  Mobilize as tolerated  Incentive spirometry as tolerated  Diagnostic and therapeutic bronchoscopy if she gets intubated, fairly significant risk of this problem  Consider trilogy auto BiPAP unit for home, patient does have elevated PCO2, severe PFTs and nocturnal oxygen saturations on nasal cannula which meets all of the criteria for home auto BiPAP for chronic respiratory failure  Patient at fairly high risk to develop progression of respiratory failure and need intubation and mechanical ventilation     Morbid obesity (HCC)- (present on admission)   Assessment & Plan    RD consult  Behavior modification weight loss to be encouraged when clinically appropriate     Acute on chronic diastolic congestive heart failure (HCC)- (present on admission)   Assessment & Plan    Forced diuresis with Lasix 40 mg twice daily  Added Diamox to prevent excessive contraction metabolic alkalosis and worsening  Follow BMP, chest x-ray and fluid balance closely       COPD exacerbation (HCC)- (present on admission)   Assessment & Plan    RT/O2 Protocols  Titrate supplemental FiO2 to maintain SpO2 >88%  Solu-Medrol 40 mg every 6 hours, no change in dose or frequency  Azithromycin x5 days  Continue fluconazole, DuoNeb, and Anoro Ellipta  Continue noninvasive positive pressure ventilation as needed  Highly likely to progress to needing mechanical ventilation, continue monitor for need for intubation  If intubated diagnostic and therapeutic bronchoscopy may be prudent     Aortic stenosis- (present on admission)   Assessment & Plan    Moderate AS  Diuresis  Echo pending     Elevated troponin- (present on admission)   Assessment & Plan    Likely type II NSTEMI due to heart failure  Trend troponin  Echocardiogram  Aspirin  Placed on heparin for any worsening          VTE:  Lovenox  Ulcer: PPI  Lines: Conklin Catheter  Ongoing indication addressed    I have performed a physical exam and reviewed and updated ROS and Plan today  (1/19/2019). In review of yesterday's note (1/18/2019), there are no changes except as documented above.     Patient remains critically ill.  Patient still on BiPAP much of the time.  Alternate with high flow nasal cannula.  Work of breathing still bit high.  High risk for needing intubation mechanical ventilation which she still wants.  Ongoing conversations are a CODE STATUS.  Continue IV steroids and frequent nebulizer treatments.  Continue aggressive diuresis with furosemide and Diamox.  IV antibiotics ongoing as well.  Prognosis very guarded    Discussed patient condition and risk of morbidity and/or mortality with Family, RN, RT, Pharmacy, Charge nurse / hot rounds and Patient  The patient remains critically ill.  Critical care time = 45 minutes in directly providing and coordinating critical care and extensive data review.  No time overlap and excludes procedures.

## 2019-01-19 NOTE — ASSESSMENT & PLAN NOTE
-on aleve at home  -agreeable with topical cream / patch, started lidocaine, tylenol.   -Discourage use of NSAIDs (CVD), will need follow up with PCP

## 2019-01-19 NOTE — ASSESSMENT & PLAN NOTE
Acute component seems to have resolved ; improved leg edema, SOB   BARBARA: BELGICA is 1.2 cm2.  Moderate aortic stenosis. No aortic insufficiency. Rest of findings normal   ECHO: EF: 70% grade II diastolic dysfunction    -Cont lasix 10mg QD at home, add diamox 250mg   -Need repeat CMP in 1 week, follow-up with PCP for titration if needed.

## 2019-01-19 NOTE — PROGRESS NOTES
Lab called with critical result of CO2 at 42. Critical lab result read back.   Dr. Ginger Pride notified of critical lab result at 0653.  Critical lab result read back by Dr. Ginger Pride.

## 2019-01-19 NOTE — ASSESSMENT & PLAN NOTE
Resolved, asymptomatic   -0.3 x3, no ischemic EKG changes in the setting of respiratory failure  -Likely due to demand

## 2019-01-19 NOTE — CARE PLAN
Problem: Safety  Goal: Will remain free from falls    Intervention: Implement fall precautions  Bed locked and in low position. Bed alarm on. Call light and belongings within reach. Treaded socks on pt. Safety and fall education provided to pt and family      Problem: Respiratory:  Goal: Respiratory status will improve    Intervention: Collaborate with respiratory therapist and Interdisciplinary Team on treatment measures to improve respiratory function  Pt on biPAP

## 2019-01-19 NOTE — H&P
Internal Medicine Admitting History and Physical    Note Author: Ginger Pride M.D.       Name Kishan Mcintyre     1945   Age/Sex 73 y.o. female   MRN [id number]   Code Status Full      After 5PM or if no immediate response to page, please call for cross-coverage  Attending/Team: Dr Thomas /EDIE See Patient List for primary contact information  Call [phone] to      Day Sr. Resident (R2/R3): Dr Pride       Chief Complaint:   SOB x 1.5 weeks    HPI:  72 yo F with PMHx of COPD, diastolic heart failure, moderate AS came in due to progressive SOB x 1.5 weeks. SHe was on a cruise from  to [place] 2 weeks ago. Since her return, she developed congestion, cough with light brown sputum, SOB, orthopnea, increased leg swelling. She also noticed slight wheezing over the past days. She denies any fever, chills. No sick contact on the Cruise.   She normally uses 2-2.5L for her COPD at home, has SOB at baseline, but able to ambulate well around the house at baseline. Currently, she cannot ambulate in her house without SOB or having to stop. Her leg swelling has been worse and she gained 12 lbs from baseline over the past month. Her daughter is at bedside who noted intermittent confusion over the past week, more so at night. Her mentation is at baseline, A & O x 3 at ER.    She was tachypneic, other VSS, afebrile at ER. Significant lab Hgb 11.7,  HCO 44, trop 0.3, , PH 7.26, CO2 104, CXR pulmonary edema with effusions, EKG no ischemic changes.    Review of Systems   Constitutional: Negative for chills and fever.   HENT: Positive for congestion. Negative for ear discharge, ear pain and sore throat.    Eyes: Negative for blurred vision, double vision and photophobia.   Respiratory: Positive for cough, sputum production, shortness of breath and wheezing.    Cardiovascular: Positive for leg swelling. Negative for chest pain and palpitations.   Gastrointestinal: Negative for abdominal pain, nausea and  vomiting.   Genitourinary: Negative.    Musculoskeletal: Positive for joint pain.   Skin:        Chronic rash in bilateral LE and breast   Neurological: Negative for sensory change, speech change and focal weakness.   Psychiatric/Behavioral: Negative for depression and suicidal ideas.             Past Medical History (Chronic medical problem, known complications and current treatment)    COPD - on trelegy and albuterol  Diastolic HF - on lasix 60 mg daily, compliant  AS - moderate, followed by outpatient cardiology   GOLDY  aleve      Past Surgical History:  Past Surgical History:   Procedure Laterality Date   • APPENDECTOMY     • GASTRIC BYPASS LAPAROSCOPIC     • PRIMARY C SECTION     • TONSILLECTOMY     • TUBAL COAGULATION LAPAROSCOPIC BILATERAL         Current Outpatient Medications:  Home Medications     Reviewed by Elayne Whittaker (Pharmacy Tech) on 01/18/19 at 1338  Med List Status: Complete   Medication Last Dose Status   albuterol (PROVENTIL) 2.5mg/3ml Nebu Soln solution for nebulization 1/18/2019 Active   albuterol 108 (90 Base) MCG/ACT Aero Soln inhalation aerosol 1/18/2019 Active   aspirin 81 MG tablet 1/18/2019 Active   busPIRone (BUSPAR) 7.5 MG tablet 1/17/2019 Active   CALCIUM PO 1/18/2019 Active   clobetasol (TEMOVATE) 0.05 % external solution 1/16/2019 Active   clotrimazole-betamethasone (LOTRISONE) 1-0.05 % Cream 1/16/2019 Active   Fluticasone-Umeclidin-Vilant (TRELEGY ELLIPTA) 100-62.5-25 MCG/INH AEROSOL POWDER, BREATH ACTIVATED 1/18/2019 Active   furosemide (LASIX) 20 MG Tab 1/18/2019 Active   ibuprofen (ADVIL) 200 MG Tab 1/18/2019 Active   Lactobacillus Rhamnosus, GG, (CULTURELLE PO) 1/18/2019 Active   Multiple Vitamin (MULTI-VITAMIN PO) 1/18/2019 Active   Omega-3 Fatty Acids (FISH OIL) 1000 MG Cap capsule 1/18/2019 Active   omeprazole (PRILOSEC) 20 MG delayed-release capsule 1/15/2019 Active   potassium chloride SA (KDUR) 20 MEQ Tab CR 1/18/2019 Active   triamcinolone acetonide (KENALOG) 0.025  % Cream 1/16/2019 Active   vitamin D (CHOLECALCIFEROL) 1000 UNIT TABS 1/18/2019 Active                Medication Allergy/Sensitivities:  Allergies   Allergen Reactions   • Bee Anaphylaxis   • Fosamax Unspecified     Bone pain   • Iodine Rash     RASH   • Pcn [Penicillins] Rash     Rash years ago   • Sulfa Drugs Rash     Terrible rash   • Other Drug Unspecified     Also allergies to Actenol   • Tape Unspecified     Pulls my skin         Family History (mandatory)   Family History   Problem Relation Age of Onset   • Prostate cancer Father    • Dementia Father    • Alcohol abuse Mother         Cirrosis of liver   • Cancer Maternal Aunt    • Lung Disease Sister         COPD/Smoker   • Asthma Sister    • Hyperlipidemia Sister    • Breast Cancer Sister    • No Known Problems Maternal Grandmother    • No Known Problems Maternal Grandfather    • No Known Problems Paternal Grandmother    • Other Daughter    • Hypertension Daughter    • Thyroid Daughter        Social History (mandatory)   Social History     Social History   • Marital status:      Spouse name: N/A   • Number of children: N/A   • Years of education: N/A     Occupational History   • Not on file.     Social History Main Topics   • Smoking status: Former Smoker     Packs/day: 0.50     Years: 26.00     Types: Cigarettes     Quit date: 1/1/1984   • Smokeless tobacco: Never Used      Comment: Started smoking at age 13   • Alcohol use 4.2 oz/week     7 Shots of liquor per week   • Drug use: No   • Sexual activity: No     Other Topics Concern   • Not on file     Social History Narrative   • No narrative on file     Living situation: lives with  in [place]. Daughter lives 2 blocks away    PCP : Ana Hartley M.D.    Physical Exam     Vitals:    01/18/19 1500 01/18/19 1515 01/18/19 1545 01/18/19 1600   BP:       Pulse: 82 82 83 87   Resp: (!) 24 (!) 28 (!) 25 (!) 28   Temp:       TempSrc:       SpO2: 96% 94% 93% 95%   Weight:       Height:         Body  mass index is 44.35 kg/m².  /52   Pulse 87   Temp 37.2 °C (98.9 °F) (Temporal)   Resp (!) 28   Ht 1.524 m (5')   Wt 103 kg (227 lb 1.2 oz)   SpO2 95%   BMI 44.35 kg/m²   O2 therapy: Pulse Oximetry: 95 %, O2 (LPM): 4, O2 Delivery: Nasal Cannula    Physical Exam   Constitutional: She is oriented to person, place, and time and well-developed, well-nourished, and in no distress.   HENT:   Head: Normocephalic and atraumatic.   Eyes: Pupils are equal, round, and reactive to light. EOM are normal.   Neck: Neck supple.   Cardiovascular: Normal rate and regular rhythm.    Murmur heard.  Pulmonary/Chest: No respiratory distress. She has no wheezes. She has no rales.   Reduced air entry bilaterally.    Abdominal: Soft. Bowel sounds are normal. She exhibits no distension. There is no tenderness. There is no rebound.   Musculoskeletal: She exhibits edema.   2+ edema   Lymphadenopathy:     She has no cervical adenopathy.   Neurological: She is alert and oriented to person, place, and time. No cranial nerve deficit.   Skin: Skin is warm. Rash noted.   Psychiatric: Mood and affect normal.         Data Review       Old Records Request:   Deferred  Current Records review/summary: Completed    Lab Data Review:  Recent Results (from the past 24 hour(s))   CBC w/ Differential    Collection Time: 01/18/19 11:12 AM   Result Value Ref Range    WBC 5.5 4.8 - 10.8 K/uL    RBC 4.07 (L) 4.20 - 5.40 M/uL    Hemoglobin 11.7 (L) 12.0 - 16.0 g/dL    Hematocrit 38.9 37.0 - 47.0 %    MCV 95.6 81.4 - 97.8 fL    MCH 28.7 27.0 - 33.0 pg    MCHC 30.1 (L) 33.6 - 35.0 g/dL    RDW 47.0 35.9 - 50.0 fL    Platelet Count 200 164 - 446 K/uL    MPV 9.6 9.0 - 12.9 fL    Neutrophils-Polys 73.50 (H) 44.00 - 72.00 %    Lymphocytes 16.50 (L) 22.00 - 41.00 %    Monocytes 8.90 0.00 - 13.40 %    Eosinophils 0.50 0.00 - 6.90 %    Basophils 0.20 0.00 - 1.80 %    Immature Granulocytes 0.40 0.00 - 0.90 %    Nucleated RBC 0.00 /100 WBC    Neutrophils (Absolute)  4.06 2.00 - 7.15 K/uL    Lymphs (Absolute) 0.91 (L) 1.00 - 4.80 K/uL    Monos (Absolute) 0.49 0.00 - 0.85 K/uL    Eos (Absolute) 0.03 0.00 - 0.51 K/uL    Baso (Absolute) 0.01 0.00 - 0.12 K/uL    Immature Granulocytes (abs) 0.02 0.00 - 0.11 K/uL    NRBC (Absolute) 0.00 K/uL   Complete Metabolic Panel (CMP)    Collection Time: 01/18/19 11:12 AM   Result Value Ref Range    Sodium 141 135 - 145 mmol/L    Potassium 5.1 3.6 - 5.5 mmol/L    Chloride 90 (L) 96 - 112 mmol/L    Co2 44 (HH) 20 - 33 mmol/L    Anion Gap 7.0 0.0 - 11.9    Glucose 133 (H) 65 - 99 mg/dL    Bun 14 8 - 22 mg/dL    Creatinine 0.47 (L) 0.50 - 1.40 mg/dL    Calcium 9.5 8.5 - 10.5 mg/dL    AST(SGOT) 18 12 - 45 U/L    ALT(SGPT) 5 2 - 50 U/L    Alkaline Phosphatase 54 30 - 99 U/L    Total Bilirubin 0.4 0.1 - 1.5 mg/dL    Albumin 3.6 3.2 - 4.9 g/dL    Total Protein 6.7 6.0 - 8.2 g/dL    Globulin 3.1 1.9 - 3.5 g/dL    A-G Ratio 1.2 g/dL   Btype Natriuretic Peptide    Collection Time: 01/18/19 11:12 AM   Result Value Ref Range    B Natriuretic Peptide 372 (H) 0 - 100 pg/mL   Troponin STAT    Collection Time: 01/18/19 11:12 AM   Result Value Ref Range    Troponin I 0.31 (H) 0.00 - 0.04 ng/mL   Magnesium    Collection Time: 01/18/19 11:12 AM   Result Value Ref Range    Magnesium 1.7 1.5 - 2.5 mg/dL   Phosphorus    Collection Time: 01/18/19 11:12 AM   Result Value Ref Range    Phosphorus 3.2 2.5 - 4.5 mg/dL   Influenza A/B By PCR    Collection Time: 01/18/19 11:12 AM   Result Value Ref Range    Influenza virus A RNA Negative Negative    Influenza virus B, PCR Negative Negative   ESTIMATED GFR    Collection Time: 01/18/19 11:12 AM   Result Value Ref Range    GFR If African American >60 >60 mL/min/1.73 m 2    GFR If Non African American >60 >60 mL/min/1.73 m 2   LACTIC ACID    Collection Time: 01/18/19 12:35 PM   Result Value Ref Range    Lactic Acid 1.5 0.5 - 2.0 mmol/L   EKG - STAT    Collection Time: 01/18/19 12:37 PM   Result Value Ref Range    Report        [facility] Emergency Dept.    Test Date:  2019  Pt Name:    IGOR CISNEROS                 Department: ER  MRN:        [id number]                      Room:        04  Gender:     Female                       Technician: [id number]  :        [id number]                   Requested By:NIC QUIGLEY  Order #:    [id number]                    Reading MD: NIC QUIGLEY MD    Measurements  Intervals                                Axis  Rate:       90                           P:          38  VT:         156                          QRS:        47  QRSD:       92                           T:          32  QT:         380  QTc:        465    Interpretive Statements  SINUS RHYTHM  PROBABLE LEFT ATRIAL ABNORMALITY  BORDERLINE LOW VOLTAGE IN FRONTAL LEADS  No previous ECG available for comparison    Electronically Signed On 2019 13:58:45 PST by NIC QUIGLEY MD     URINALYSIS    Collection Time: 19  1:07 PM   Result Value Ref Range    Color Yellow     Character Clear     Specific Gravity 1.012 <1.035    Ph 8.0 5.0 - 8.0    Glucose Negative Negative mg/dL    Ketones Negative Negative mg/dL    Protein Negative Negative mg/dL    Bilirubin Negative Negative    Urobilinogen, Urine 0.2 Negative    Nitrite Negative Negative    Leukocyte Esterase Trace (A) Negative    Occult Blood Negative Negative    Micro Urine Req Microscopic    URINE MICROSCOPIC (W/UA)    Collection Time: 19  1:07 PM   Result Value Ref Range    WBC 0-2 /hpf    RBC 0-2 /hpf    Bacteria Few (A) None /hpf    Epithelial Cells Negative /hpf    Mucous Threads Rare /hpf    Hyaline Cast 6-10 (A) /lpf   ARTERIAL BLOOD GAS w/ O2 (LAB)    Collection Time: 19  1:54 PM   Result Value Ref Range    Ph 7.26 (LL) 7.40 - 7.50    Pco2 104.1 (HH) 26.0 - 37.0 mmHg    Po2 132.4 (H) 64.0 - 87.0 mmHg    O2 Saturation 98.0 93.0 - 99.0 %    Hco3 45 (H) 17 - 25 mmol/L    Base Excess 14 (H) -4 - 3 mmol/L    Body Temp 37.1 Centigrade    O2  Therapy 4.0 2.0 - 10.0 L/min    Ph -TC 7.26 (LL) 7.40 - 7.50    Pco2 -.6 (HH) 26.0 - 37.0 mmHg    Po2 -.0 (H) 64.0 - 87.0 mmHg       Imaging/Procedures Review:    Independant Imaging Review: Completed  DX-CHEST-PORTABLE (1 VIEW)   Final Result      Pulmonary edema, pleural effusions, basilar atelectasis and cardiac silhouette enlargement      CT-CTA CHEST PULMONARY ARTERY W/ RECONS    (Results Pending)            EKG:   EKG Independant Review: Completed  QTc:445, HR: 90, Normal Sinus Rhythm, no ST/T changes     Records reviewed and summarized in current documentation :  Yes  [employer] teaching service handout given to patient:  No         Assessment/Plan     * Acute on chronic respiratory failure (HCC)- (present on admission)   Assessment & Plan    Home oxygen 2-2.5L, recently using 4L  Combined COPD exacerbation and diastolic heart failure exacerbation  - treatment for both, IV steroid, azithromycin, IV diuresis  - BiPAP as tolerated     Acute on chronic diastolic congestive heart failure (HCC)- (present on admission)   Assessment & Plan    On lasix 60 mg PO at home, compliant.  - start IV lasix 40 mg BID, monitor lytes, Cr     COPD exacerbation (HCC)- (present on admission)   Assessment & Plan    - baseline O2 2L 24/7  - IV steroid, azithromycin, duonebs, RT protochol.  - continue home trelegy, albuterol prn     Aortic stenosis- (present on admission)   Assessment & Plan    Moderate AS  - followed by Dr Rodrigez as outpatient, monitoring with serial echos.  - ordered echo to reevaluate     Elevated troponin- (present on admission)   Assessment & Plan    0.3, no ischemic EKG changesin the setting of respiratory failure, likely demand ischemia  - cards was consulted at ER  - trend trop       Primary osteoarthritis of knee- (present on admission)   Assessment & Plan    - on aleve at home  - with aspirin and aleve, higher risk for GI bleed  - agreeable with topical cream / patch, started lidocaine, tylenol. Hold  NSAIDs.         Anticipated Hospital stay:  >2 midnights        Quality Measures  Quality-Core Measures   Reviewed items::  EKG reviewed, Labs reviewed, Medications reviewed and Radiology images reviewed  Conklin catheter::  Critically Ill - Requiring Accurate Measurement of Urinary Output  DVT prophylaxis pharmacological::  Enoxaparin (Lovenox)  Ulcer Prophylaxis::  Yes  Antibiotics:  Treating active infection/contamination beyond 24 hours perioperative coverage    PCP: Ana Hartley M.D.

## 2019-01-19 NOTE — ASSESSMENT & PLAN NOTE
Improved, however hypercapnia still present.   Possible that her bicarb is now at new baseline   Currently on  2.5L NC (now at baseline home O2). Subjectively feeling much better - SOB and LE edema improved. No longer requiring BIPAP   -Continue I-S, inhalers, Lasix at home

## 2019-01-19 NOTE — ASSESSMENT & PLAN NOTE
Hypoxic and hypercarbic, suspect both chronic with severe acute flare  Likely related to acute exacerbation of HFpEF and AECOPD less likely bronchitis/pneumonia

## 2019-01-19 NOTE — ASSESSMENT & PLAN NOTE
ACOS - strong asthma component according to outpatient PFTs, however classified into GOLD 3 and O2 dependent at 2LPM  Resolved  -Cont O2 therapy   -Continue home inhalers

## 2019-01-20 LAB
ANION GAP SERPL CALC-SCNC: 4 MMOL/L (ref 0–11.9)
BASOPHILS # BLD AUTO: 0.2 % (ref 0–1.8)
BASOPHILS # BLD: 0.01 K/UL (ref 0–0.12)
BUN SERPL-MCNC: 17 MG/DL (ref 8–22)
CALCIUM SERPL-MCNC: 9.7 MG/DL (ref 8.5–10.5)
CHLORIDE SERPL-SCNC: 97 MMOL/L (ref 96–112)
CO2 SERPL-SCNC: 40 MMOL/L (ref 20–33)
CREAT SERPL-MCNC: 0.49 MG/DL (ref 0.5–1.4)
EOSINOPHIL # BLD AUTO: 0 K/UL (ref 0–0.51)
EOSINOPHIL NFR BLD: 0 % (ref 0–6.9)
ERYTHROCYTE [DISTWIDTH] IN BLOOD BY AUTOMATED COUNT: 45.4 FL (ref 35.9–50)
GLUCOSE SERPL-MCNC: 131 MG/DL (ref 65–99)
HCT VFR BLD AUTO: 39 % (ref 37–47)
HGB BLD-MCNC: 11.7 G/DL (ref 12–16)
IMM GRANULOCYTES # BLD AUTO: 0.02 K/UL (ref 0–0.11)
IMM GRANULOCYTES NFR BLD AUTO: 0.3 % (ref 0–0.9)
LYMPHOCYTES # BLD AUTO: 0.43 K/UL (ref 1–4.8)
LYMPHOCYTES NFR BLD: 7.3 % (ref 22–41)
MAGNESIUM SERPL-MCNC: 2.3 MG/DL (ref 1.5–2.5)
MCH RBC QN AUTO: 27.9 PG (ref 27–33)
MCHC RBC AUTO-ENTMCNC: 30 G/DL (ref 33.6–35)
MCV RBC AUTO: 93.1 FL (ref 81.4–97.8)
MONOCYTES # BLD AUTO: 0.16 K/UL (ref 0–0.85)
MONOCYTES NFR BLD AUTO: 2.7 % (ref 0–13.4)
NEUTROPHILS # BLD AUTO: 5.26 K/UL (ref 2–7.15)
NEUTROPHILS NFR BLD: 89.5 % (ref 44–72)
NRBC # BLD AUTO: 0 K/UL
NRBC BLD-RTO: 0 /100 WBC
PLATELET # BLD AUTO: 214 K/UL (ref 164–446)
PMV BLD AUTO: 9.5 FL (ref 9–12.9)
POTASSIUM SERPL-SCNC: 4.1 MMOL/L (ref 3.6–5.5)
RBC # BLD AUTO: 4.19 M/UL (ref 4.2–5.4)
SODIUM SERPL-SCNC: 141 MMOL/L (ref 135–145)
WBC # BLD AUTO: 5.9 K/UL (ref 4.8–10.8)

## 2019-01-20 PROCEDURE — 700105 HCHG RX REV CODE 258: Performed by: INTERNAL MEDICINE

## 2019-01-20 PROCEDURE — 80048 BASIC METABOLIC PNL TOTAL CA: CPT

## 2019-01-20 PROCEDURE — 99233 SBSQ HOSP IP/OBS HIGH 50: CPT | Performed by: INTERNAL MEDICINE

## 2019-01-20 PROCEDURE — 700111 HCHG RX REV CODE 636 W/ 250 OVERRIDE (IP): Performed by: INTERNAL MEDICINE

## 2019-01-20 PROCEDURE — 99291 CRITICAL CARE FIRST HOUR: CPT | Performed by: INTERNAL MEDICINE

## 2019-01-20 PROCEDURE — 700101 HCHG RX REV CODE 250: Performed by: INTERNAL MEDICINE

## 2019-01-20 PROCEDURE — A9270 NON-COVERED ITEM OR SERVICE: HCPCS | Performed by: INTERNAL MEDICINE

## 2019-01-20 PROCEDURE — 770022 HCHG ROOM/CARE - ICU (200)

## 2019-01-20 PROCEDURE — 94640 AIRWAY INHALATION TREATMENT: CPT

## 2019-01-20 PROCEDURE — 83735 ASSAY OF MAGNESIUM: CPT

## 2019-01-20 PROCEDURE — 85025 COMPLETE CBC W/AUTO DIFF WBC: CPT

## 2019-01-20 PROCEDURE — 700102 HCHG RX REV CODE 250 W/ 637 OVERRIDE(OP): Performed by: INTERNAL MEDICINE

## 2019-01-20 RX ORDER — METHYLPREDNISOLONE SODIUM SUCCINATE 40 MG/ML
40 INJECTION, POWDER, LYOPHILIZED, FOR SOLUTION INTRAMUSCULAR; INTRAVENOUS EVERY 8 HOURS
Status: COMPLETED | OUTPATIENT
Start: 2019-01-20 | End: 2019-01-21

## 2019-01-20 RX ADMIN — OMEPRAZOLE 20 MG: 20 CAPSULE, DELAYED RELEASE ORAL at 05:01

## 2019-01-20 RX ADMIN — IPRATROPIUM BROMIDE AND ALBUTEROL SULFATE 3 ML: .5; 3 SOLUTION RESPIRATORY (INHALATION) at 11:30

## 2019-01-20 RX ADMIN — CLOTRIMAZOLE AND BETAMETHASONE DIPROPIONATE 1 APPLICATION: 10; .5 CREAM TOPICAL at 17:21

## 2019-01-20 RX ADMIN — IPRATROPIUM BROMIDE AND ALBUTEROL SULFATE 3 ML: .5; 3 SOLUTION RESPIRATORY (INHALATION) at 14:22

## 2019-01-20 RX ADMIN — FUROSEMIDE 40 MG: 10 INJECTION, SOLUTION INTRAMUSCULAR; INTRAVENOUS at 16:10

## 2019-01-20 RX ADMIN — ACETAMINOPHEN 500 MG: 500 TABLET ORAL at 09:31

## 2019-01-20 RX ADMIN — CLOTRIMAZOLE AND BETAMETHASONE DIPROPIONATE 1 APPLICATION: 10; .5 CREAM TOPICAL at 05:01

## 2019-01-20 RX ADMIN — THERA TABS 1 TABLET: TAB at 05:01

## 2019-01-20 RX ADMIN — METHYLPREDNISOLONE SODIUM SUCCINATE 40 MG: 40 INJECTION, POWDER, FOR SOLUTION INTRAMUSCULAR; INTRAVENOUS at 05:00

## 2019-01-20 RX ADMIN — METHYLPREDNISOLONE SODIUM SUCCINATE 40 MG: 40 INJECTION, POWDER, FOR SOLUTION INTRAMUSCULAR; INTRAVENOUS at 21:07

## 2019-01-20 RX ADMIN — SODIUM CHLORIDE 250 MG: 9 INJECTION, SOLUTION INTRAVENOUS at 05:52

## 2019-01-20 RX ADMIN — IPRATROPIUM BROMIDE AND ALBUTEROL SULFATE 3 ML: .5; 3 SOLUTION RESPIRATORY (INHALATION) at 08:25

## 2019-01-20 RX ADMIN — ASPIRIN 81 MG: 81 TABLET, COATED ORAL at 05:01

## 2019-01-20 RX ADMIN — METHYLPREDNISOLONE SODIUM SUCCINATE 40 MG: 40 INJECTION, POWDER, FOR SOLUTION INTRAMUSCULAR; INTRAVENOUS at 16:10

## 2019-01-20 RX ADMIN — FLUTICASONE PROPIONATE 88 MCG: 44 AEROSOL, METERED RESPIRATORY (INHALATION) at 18:36

## 2019-01-20 RX ADMIN — BUSPIRONE HYDROCHLORIDE 7.5 MG: 30 TABLET ORAL at 21:07

## 2019-01-20 RX ADMIN — IPRATROPIUM BROMIDE AND ALBUTEROL SULFATE 3 ML: .5; 3 SOLUTION RESPIRATORY (INHALATION) at 22:20

## 2019-01-20 RX ADMIN — AZITHROMYCIN 250 MG: 250 TABLET, FILM COATED ORAL at 05:01

## 2019-01-20 RX ADMIN — IPRATROPIUM BROMIDE AND ALBUTEROL SULFATE 3 ML: .5; 3 SOLUTION RESPIRATORY (INHALATION) at 02:35

## 2019-01-20 RX ADMIN — FUROSEMIDE 40 MG: 10 INJECTION, SOLUTION INTRAMUSCULAR; INTRAVENOUS at 05:00

## 2019-01-20 RX ADMIN — FLUTICASONE PROPIONATE 88 MCG: 44 AEROSOL, METERED RESPIRATORY (INHALATION) at 08:29

## 2019-01-20 RX ADMIN — ENOXAPARIN SODIUM 40 MG: 100 INJECTION SUBCUTANEOUS at 05:00

## 2019-01-20 RX ADMIN — IPRATROPIUM BROMIDE AND ALBUTEROL SULFATE 3 ML: .5; 3 SOLUTION RESPIRATORY (INHALATION) at 18:35

## 2019-01-20 RX ADMIN — UMECLIDINIUM BROMIDE AND VILANTEROL TRIFENATATE 1 PUFF: 62.5; 25 POWDER RESPIRATORY (INHALATION) at 08:30

## 2019-01-20 ASSESSMENT — ENCOUNTER SYMPTOMS
MYALGIAS: 0
HEADACHES: 0
FLANK PAIN: 0
NUMBNESS: 0
COUGH: 1
HEMOPTYSIS: 0
PALPITATIONS: 0
GASTROINTESTINAL NEGATIVE: 1
BACK PAIN: 0
WHEEZING: 1
COUGH: 0
HEMATOLOGIC/LYMPHATIC NEGATIVE: 1
NEUROLOGICAL NEGATIVE: 1
DIAPHORESIS: 0
SPEECH CHANGE: 0
SHORTNESS OF BREATH: 0
FOCAL WEAKNESS: 0
NERVOUS/ANXIOUS: 0
CARDIOVASCULAR NEGATIVE: 1
ABDOMINAL DISTENTION: 0
DIZZINESS: 0
VOMITING: 0
HEADACHES: 1
PSYCHIATRIC NEGATIVE: 1
SPUTUM PRODUCTION: 0
DIARRHEA: 0
SENSORY CHANGE: 0
CHILLS: 0
EYES NEGATIVE: 1
INSOMNIA: 1
DEPRESSION: 0
BLURRED VISION: 0
SHORTNESS OF BREATH: 1
WEAKNESS: 1
DOUBLE VISION: 0
ENDOCRINE NEGATIVE: 1
ALLERGIC/IMMUNOLOGIC NEGATIVE: 1
NAUSEA: 0
CONSTITUTIONAL NEGATIVE: 1
RESPIRATORY NEGATIVE: 1
CONSTIPATION: 0
SORE THROAT: 0
WEIGHT LOSS: 0
PND: 1
ABDOMINAL PAIN: 0
ARTHRALGIAS: 0
LIGHT-HEADEDNESS: 0
FEVER: 0
SPUTUM PRODUCTION: 1
ORTHOPNEA: 1
SINUS PAIN: 0

## 2019-01-20 ASSESSMENT — PATIENT HEALTH QUESTIONNAIRE - PHQ9
SUM OF ALL RESPONSES TO PHQ9 QUESTIONS 1 AND 2: 0
SUM OF ALL RESPONSES TO PHQ9 QUESTIONS 1 AND 2: 0
1. LITTLE INTEREST OR PLEASURE IN DOING THINGS: NOT AT ALL
2. FEELING DOWN, DEPRESSED, IRRITABLE, OR HOPELESS: NOT AT ALL
2. FEELING DOWN, DEPRESSED, IRRITABLE, OR HOPELESS: NOT AT ALL
1. LITTLE INTEREST OR PLEASURE IN DOING THINGS: NOT AT ALL

## 2019-01-20 ASSESSMENT — PAIN SCALES - GENERAL
PAINLEVEL_OUTOF10: 0

## 2019-01-20 ASSESSMENT — COGNITIVE AND FUNCTIONAL STATUS - GENERAL
WALKING IN HOSPITAL ROOM: A LITTLE
DRESSING REGULAR UPPER BODY CLOTHING: A LITTLE
TOILETING: A LITTLE
MOBILITY SCORE: 17
MOVING TO AND FROM BED TO CHAIR: A LITTLE
DAILY ACTIVITIY SCORE: 20
TURNING FROM BACK TO SIDE WHILE IN FLAT BAD: A LITTLE
STANDING UP FROM CHAIR USING ARMS: A LITTLE
HELP NEEDED FOR BATHING: A LITTLE
SUGGESTED CMS G CODE MODIFIER MOBILITY: CK
DRESSING REGULAR LOWER BODY CLOTHING: A LITTLE
MOVING FROM LYING ON BACK TO SITTING ON SIDE OF FLAT BED: A LITTLE
SUGGESTED CMS G CODE MODIFIER DAILY ACTIVITY: CJ
CLIMB 3 TO 5 STEPS WITH RAILING: A LOT

## 2019-01-20 ASSESSMENT — LIFESTYLE VARIABLES
SUBSTANCE_ABUSE: 0
ALCOHOL_USE: NO
EVER_SMOKED: YES
EVER_SMOKED: YES

## 2019-01-20 NOTE — PROGRESS NOTES
[facility] Progress Note      Admit Date: 1/18/2019  ICU Day:  2      Resident(s): Emi Mazariegos  Attending: ARAMIS IRIZARRY/ Dr. Asher     Date & Time:   1/20/2019   2:16 PM       Patient ID:    Name:             Kishan Mcintyre   YOB: 1945  Age:                 73 y.o.  female   MRN:               [id number]    HPI:  74 yo F with COPD on 2L, diastolic heart failure, moderate AS came in with worsening SOB, tachypneic at ER and required BIPAP. On presentation, HCO 44, trop 0.3, , PH 7.26, CO2 104, CXR pulmonary edema with effusions, EKG no ischemic changes. She was admitted for exacerbation of COPD and diastolic heart failure requiring BiPAP.     Consultants:  Cardiology    PMA: [place]    Procedures:  NA    Imaging:  EC-ECHOCARDIOGRAM COMPLETE W/O CONT   Final Result      DX-CHEST-PORTABLE (1 VIEW)   Final Result      No significant change      CT-CTA CHEST PULMONARY ARTERY W/ RECONS   Final Result      No evidence of pulmonary embolus.      Collapse of the right middle lobe of unknown etiology could be due to bronchial stricture, mucous plugging or perhaps malignancy.      Bibasilar atelectasis with trace bilateral pleural effusions.      Cardiomegaly.      3D angiographic/MIP images of the vasculature confirm the vascular findings as described above.            DX-CHEST-PORTABLE (1 VIEW)   Final Result      Pulmonary edema, pleural effusions, basilar atelectasis and cardiac silhouette enlargement            Interval Events:  A&Ox 4, neuro intact   States breathing feels improved from prior   Afebrile  HR 70-80's  's  On HFNC 45L, 30%, goal SpO2 88-92%  Co2 40, improving, cont. Daily diamox   ECHO- Mod AS, Transvalvular gradients are - Peak: 48 mmHg, Mean: 33 mmHg  NPO at midnight, cardiac cath tomorrow   Cardiology consulted, appreciate recommendations     Review of Systems   Constitutional: Positive for malaise/fatigue. Negative for chills and fever.   HENT: Negative for  congestion, sinus pain and sore throat.    Eyes: Negative for blurred vision and double vision.   Respiratory: Positive for shortness of breath and wheezing. Negative for cough and sputum production.    Cardiovascular: Positive for orthopnea, leg swelling and PND. Negative for chest pain and palpitations.   Gastrointestinal: Negative for abdominal pain, constipation, diarrhea, nausea and vomiting.   Genitourinary: Negative for dysuria and urgency.   Musculoskeletal: Negative for myalgias.   Neurological: Positive for headaches. Negative for dizziness, speech change and focal weakness.   Psychiatric/Behavioral: Negative for substance abuse. The patient is not nervous/anxious.        PHYSICAL EXAM  Gen: ill appearing elderly female, sitting comfortably, eating breakfast in NAD  HEENT: NC/AT, no scleral icterus, no conjunctival injection, mucous membranes pink and dry.  Neck: Supple, no lymphadenopathy.  Cardiac: S1S2, 3/6 systolic murmur heard best over aortic, no r/g, no JVD.  Respiratory: HFNC, increased respiratory effort, diminished breath sounds bilaterally, minimal diffuse wheezing   Abdomen: BS+, soft, NT/ND, no rebound/guarding, no palpable organomegaly.  Ext: 2+ pitting edema with overlying skin changes consistent with stasis dermatitis, 2+ DP pulses b/l.  Skin: Warm, dry. No rashes or erythema.   Neuro: AAOx4, CN II-XII grossly normal, no focal sensory or motor deficits.   Psych: Affect, mood, judgement normal.    Respiratory:     Respiration: 20, Pulse Oximetry: 92 %, O2 Daily Delivery Respiratory : Highflow Nasal Cannula    Chest Tube Drains:    Recent Labs      01/18/19   1354  01/18/19   1614  01/18/19   1615  01/18/19   2129   [id number]  7.26*   --   7.29*   --    [id number]  104.1*   --   95.1*   --    [id number]  132.4*   --   104.9*   --    QAXJ4YHK  98.0   --   97.0   --    ARTHCO3  45*   --   45*   --    [id number]  4.0   --    --    --    ARTBE  14*   --   14*   --    ISTATAPH   --   7.276*   --    7.348*   ISTATAPCO2   --   >100.0*   --   77.7*   ISTATAPO2   --   93*   --   76   ISTATATCO2   --   see below   --   45*   [id number]   --   see below   --   93   ISTATARTHCO3   --   see below   --   42.6*   ISTATARTBE   --   see below   --   13*   ISTATTEMP   --   see below   --   99.0 F   ISTATFIO2   --   6   --   40   ISTATSPEC   --   Arterial   --   Arterial   ISTATAPHTC   --    --    --   7.345*   [id number]   --    --    --   77       HemoDynamics:  Pulse: 78, Heart Rate (Monitored): 78 NIBP: 114/45      Fluids:    Date 19 0700 - 19 0659   Shift 3034-9676 4453-3729 2026-6107 24 Hour Total   I  N  T  A  K  E   P.O. 450   450      P.O. 450   450    Shift Total 450   450   O  U  T  P  U  T   Urine 1500   1500      Output (mL) (Urethral Catheter Non-latex 16 Fr.) 1500   1500    Shift Total 1500   1500   NET -1050   -1050        Intake/Output Summary (Last 24 hours) at 19 1416  Last data filed at 19 1000   Gross per 24 hour   Intake             1050 ml   Output             4100 ml   Net            -3050 ml         Weight: 98.6 kg (217 lb 6 oz)  Body mass index is 42.45 kg/m².    Recent Labs      19   0533  19   0443   SODIUM  141  140  141   POTASSIUM  5.1  4.4  4.1   CHLORIDE  90*  93*  97   CO2  44*  42*  40*   BUN  14  11  17   CREATININE  0.47*  0.44*  0.49*   MAGNESIUM  1.7  1.9  2.3   PHOSPHORUS  3.2   --    --    CALCIUM  9.5  9.5  9.7       GI/Nutrition:  Recent Labs      19   0533  19   0443   ALTSGPT  5   --    --    ASTSGOT  18   --    --    ALKPHOSPHAT  54   --    --    TBILIRUBIN  0.4   --    --    GLUCOSE  133*  121*  131*       Heme:  Recent Labs      1919/19   0533  19   0443   RBC  4.07*  4.03*  4.19*   HEMOGLOBIN  11.7*  11.4*  11.7*   HEMATOCRIT  38.9  38.5  39.0   PLATELETCT  200  191  214       Infectious Disease:  Temp  Av °C (98.6 °F)  Min: 36.5 °C (97.7 °F)  Max: 37.3 °C (99.2  °F)  Recent Labs      01/18/19   1112  01/19/19   0533  01/20/19   0443   WBC  5.5  4.0*  5.9   NEUTSPOLYS  73.50*  86.80*  89.50*   LYMPHOCYTES  16.50*  10.80*  7.30*   MONOCYTES  8.90  1.30  2.70   EOSINOPHILS  0.50  0.00  0.00   BASOPHILS  0.20  0.30  0.20   ASTSGOT  18   --    --    ALTSGPT  5   --    --    ALKPHOSPHAT  54   --    --    TBILIRUBIN  0.4   --    --        Meds:  • methylPREDNISolone  40 mg     • lidocaine  0.5 mL     • senna-docusate  2 Tab      And   • polyethylene glycol/lytes  1 Packet      And   • magnesium hydroxide  30 mL      And   • bisacodyl  10 mg     • Respiratory Care per Protocol       • azithromycin  250 mg     • ipratropium-albuterol  3 mL     • furosemide  40 mg     • albuterol  2 Puff     • aspirin EC  81 mg     • busPIRone  7.5 mg     • clotrimazole-betamethasone  1 Application     • multivitamin       • omeprazole  20 mg     • umeclidinium-vilanterol  1 Puff      And   • fluticasone  2 Puff     • acetaminophen  500 mg     • lidocaine  2 Patch     • enoxaparin (LOVENOX) injection  40 mg     • ipratropium-albuterol  3 mL     • acetaZOLAMIDE (DIAMOX) IV  250 mg Stopped (01/20/19 0607)          Problem and Plan:    * Acute on chronic respiratory failure (HCC)   Assessment & Plan    Home oxygen 2-2.5L, recently using 4L at home, requiring BIPAP on admission   Likely 2/2 Combined COPD exacerbation and diastolic heart failure exacerbation  - weaned off BiPAP to HiFlow, O2 sat target 88-92%  - treatment for both with IV steroid, azithromycin and IV diuresis  - continue home medications   - saúl RT protocol, encourage IS      Aortic stenosis   Assessment & Plan    Moderate AS  - repeat echo shows Transvalvular gradients are - Peak: 48 mmHg, Mean: 33 mmHg. (increased from prior)  - followed by Dr Rodrigez as outpatient, monitoring with serial echos.  - cardiology following, appreciate recommendations   - plan for cardiac cath to assess coronary vessels as well as valve      Acute on  chronic diastolic congestive heart failure (HCC)   Assessment & Plan    On lasix 60 mg PO at home, compliant.  - holding home lasix, cont IV lasix 40 mg BID  -  monitor lytes, Cr  - cardiology following, plan for cardiac catheterization tomorrow   - NPO at midnight      COPD exacerbation (HCC)   Assessment & Plan    - baseline O2 2L 24/7  - decrease IV steroid from 40 q6 to Q8  - continue azithromycin, plan for 5 day total (end date 1/22)  - cont. duonebs, RT protochol.  - continue home trelegy, albuterol prn     Elevated troponin   Assessment & Plan    0.3, no ischemic EKG changesin the setting of respiratory failure, likely demand ischemia  - cardiology following   - trop trended and stable  - plan for cardiac cath tomorrow AM, NPO at midnight        Primary osteoarthritis of knee   Assessment & Plan    - on aleve at home  - with aspirin and aleve, higher risk for GI bleed  - agreeable with topical cream / patch, started lidocaine, tylenol. Hold NSAIDs.           Quality Measures:  Lines: PIV     Conklin Catheter: yes   DVT Prophylaxis: Lovenox   Ulcer Prophylaxis: NA   Antibiotics: azithromycin       CODE STATUS: FULL  DISPO: ICU for respiratory failure

## 2019-01-20 NOTE — PROGRESS NOTES
Critical Care Progress Note    Date of admission  1/18/2019    Chief Complaint  73 y.o. female admitted 1/18/2019 with SOB    Hospital Course    73 y.o. female with past medical history of diastolic heart failure, obesity, moderate aortic stenosis, COPD, pulmonary hypertension, who presented 1/18/2019 with progressive worsening dyspnea for approximately 1 week.  The patient and her daughter who is at bedside provide all history, they state that approximately 2 weeks ago she was on a cruise to [place] and noted a small amount of chest pain which resolved.  When she returned from [place] she noted some nasal stuffiness as well as chest congestion which worsened throughout the week and was accompanied by lower extremity swelling.  She is on 2 L 24-hour oxygen at home which was increased to 3-4 L this week.  She has noted a cough productive of tan sputum however she has been afebrile and without chills, daughter has noted some increased confusion in her mother which has resolved in the ER.  She presented to the emergency department for the above complaints and was found to be in partially compensated respiratory acidosis with a PCO2 of 104.5 and a pH of 7.26.  A chest x-ray demonstrated pulmonary edema with effusions and cardiomegaly.  Her labs show an absence of leukocytosis, chemistry panel shows normal renal function with an elevated CO2 at 44 and glucose at 133.  Her troponin was elevated at 0.31 BNP elevated at 372. EKG was non-ischemic. She was placed on noninvasive positive pressure ventilation in the emergency department and given Lasix for her pulmonary edema with hypoxia.  Following these interventions the patient states she feels much better and her breathing is much more relaxed.  She is to be admitted to the ICU for acute on chronic hypoxic respiratory failure with pulmonary edema requiring rescue BiPAP.        Interval Problem Update  Reviewed last 24 hour events:    A&O x4  HF NC 45 lpm/30% alt BiPAP  10/5  SR 80s  SBp 110s  I/Os  Neg      Cc/24 hrs  UO great  Bicarb 40  Tm  99.2    WBC normal 5.9  Taking PO  Solumedrol  Lasix/Diamox  Nebs Q4H  Azithromycin 3/5    Drop to Q8 Solumedrol  Trilogy eval as an outpatient  Mobilize  Cont BiPAP with NAPs/Sleep  Another day diamox    Patient assessed and reassessed  Hemodynamics unchanged  Appeared to be struggling a bit one was napping, will use BiPAP with sleep at all times  Good response to Lasix so far today with good urine output  Reviewed option of home auto BiPAP in the form of Trilogy with patient, she appears interested  Echocardiogram reviewed with cardiology, significant pulmonary hypertension with RVSP 55 and significant valvular gradient with the aortic valve, cardiology considering cardiac catheterization for valve study tomorrow     YESTERDAY  A&O x4  SR 70s  SBp 90-100s  Opponent I trending down, only slightly elevated at 0.30  Bicarb level down to 42, renal function remains normal  Afeb  UO great  I/Os neg 3420cc/24hrs  BiPAP ongoing, adjusting pressure as needed  CXR chest x-ray unchanged today, significant cardiomegaly, bibasilar atelectasis/effusions and pulmonary  Solumedrol  Nebs Q4H  Lasix IV  Diamox IV    Mag  Trial HF NC O2 with BiPAP    Patient assessed and reassessed  Work of breathing increased while on HF NC but acceptable  LOC remains acceptable, do not suspect markedly worsening hypercarbia  Hemodynamics acceptable  Will have patient wear BiPAP with sleep again tonight, may need/benefit from trilogy as an outpatient long-term  Updated family at bedside at length    Review of Systems  Review of Systems   Constitutional: Positive for malaise/fatigue (Slight better). Negative for chills, diaphoresis, fever and weight loss.   HENT: Negative for congestion and sore throat.    Eyes: Negative.    Respiratory: Positive for cough (Resolving), sputum production (Minimal), shortness of breath (Better) and wheezing (Much better). Negative for  hemoptysis.    Cardiovascular: Positive for orthopnea (Min delta, much better versus admit), leg swelling (Improving) and PND (Min delta overnight, much better versus admit). Negative for chest pain and palpitations.   Gastrointestinal: Negative for abdominal pain, nausea and vomiting.   Genitourinary: Negative for dysuria, flank pain and hematuria.   Musculoskeletal: Negative for back pain.   Neurological: Positive for weakness (Diffuse, unchanged). Negative for sensory change, focal weakness and headaches.   Psychiatric/Behavioral: Negative for depression. The patient has insomnia (Persisting). The patient is not nervous/anxious.    Follow-up review of systems performed, see above changes    Vital Signs for last 24 hours   Temp:  [36.7 °C (98 °F)-37.3 °C (99.2 °F)] 37.2 °C (98.9 °F)  Pulse:  [] 74  Resp:  [17-37] 23  SpO2:  [86 %-97 %] 92 %    Hemodynamic parameters for last 24 hours       Respiratory   BiPAP 12/5, titrating O2, full mask    Physical Exam   Physical Exam   Constitutional: She is oriented to person, place, and time. She appears well-nourished. She appears lethargic. She is cooperative. She appears ill. No distress. Face mask in place.   HENT:   Head: Normocephalic and atraumatic.   Mouth/Throat: Oropharynx is clear and moist.   Eyes: EOM are normal. No scleral icterus.   Neck: Neck supple. JVD (Better) present.   Cardiovascular: Normal rate, regular rhythm and intact distal pulses.   Occasional extrasystoles are present. Exam reveals no gallop and no friction rub.    No murmur heard.  Pulmonary/Chest: No respiratory distress (on bipap). She has wheezes (Improved). She has rhonchi. She has rales (Persisting). She exhibits no tenderness.   Abdominal: Soft. Bowel sounds are normal. She exhibits no distension. There is no tenderness. There is no guarding.   Musculoskeletal: She exhibits no edema or tenderness.   Neurological: She is oriented to person, place, and time. She appears lethargic. No  cranial nerve deficit or sensory deficit. GCS eye subscore is 4. GCS verbal subscore is 5. GCS motor subscore is 6.   Skin: Skin is warm and dry. No rash noted.   Vitals reviewed.  Repeat physical exam performed, see above changes    Medications  Current Facility-Administered Medications   Medication Dose Route Frequency Provider Last Rate Last Dose   • senna-docusate (PERICOLACE or SENOKOT S) 8.6-50 MG per tablet 2 Tab  2 Tab Oral BID Ginger Pride M.D.   2 Tab at 01/19/19 1706    And   • polyethylene glycol/lytes (MIRALAX) PACKET 1 Packet  1 Packet Oral QDAY PRN Ginger Pride M.D.        And   • magnesium hydroxide (MILK OF MAGNESIA) suspension 30 mL  30 mL Oral QDAY PRN Ginger Pride M.D.        And   • bisacodyl (DULCOLAX) suppository 10 mg  10 mg Rectal QDAY PRN Ginger Pride M.D.       • Respiratory Care per Protocol   Nebulization Continuous RT Ginger Pride M.D.       • methylPREDNISolone (SOLU-MEDROL) 40 MG injection 40 mg  40 mg Intravenous Q6HRS Ginger Pride M.D.   40 mg at 01/20/19 0500   • azithromycin (ZITHROMAX) tablet 250 mg  250 mg Oral DAILY Ginger Pride M.D.   250 mg at 01/20/19 0501   • ipratropium-albuterol (DUONEB) nebulizer solution  3 mL Nebulization Q4HRS (RT) Ginger Pride M.D.   3 mL at 01/20/19 0235   • furosemide (LASIX) injection 40 mg  40 mg Intravenous BID DIURETIC Ginger Pride M.D.   40 mg at 01/20/19 0500   • albuterol inhaler 2 Puff  2 Puff Inhalation Q6HRS PRN Ginger Pride M.D.       • aspirin EC (ECOTRIN) tablet 81 mg  81 mg Oral DAILY Ginger Pride M.D.   81 mg at 01/20/19 0501   • busPIRone (BUSPAR) tablet 7.5 mg  7.5 mg Oral HS PRN Ginger Pride M.D.   7.5 mg at 01/19/19 2309   • clotrimazole-betamethasone (LOTRISONE) 1-0.05 % cream 1 Application  1 Application Topical BID Ginger Pride M.D.   1 Application at 01/20/19 0501   • multivitamin (THERAGRAN) tablet   Oral QAM Ginger Pride M.D.   1 Tab at 01/20/19 0501   • omeprazole (PRILOSEC) capsule 20 mg  20 mg Oral DAILY Ginger Pride,  M.D.   20 mg at 01/20/19 0501   • umeclidinium-vilanterol (ANORO ELLIPTA) inhaler 1 Puff  1 Puff Inhalation QDAILY (RT) Ginger Pride M.D.   Stopped at 01/19/19 0800    And   • fluticasone (FLOVENT HFA) 44 MCG/ACT inhaler 88 mcg  2 Puff Inhalation Q12HRS (RT) Ginger Pride M.D.   88 mcg at 01/19/19 1857   • acetaminophen (TYLENOL) tablet 500 mg  500 mg Oral Q6HRS PRN Ginger Pride M.D.       • lidocaine (LIDODERM) 5 % 2 Patch  2 Patch Transdermal QDAY PRN Ginger Pride M.D.       • enoxaparin (LOVENOX) inj 40 mg  40 mg Subcutaneous DAILY Ginger Prdie M.D.   40 mg at 01/20/19 0500   • ipratropium-albuterol (DUONEB) nebulizer solution  3 mL Nebulization Q2HRS PRN (RT) Tommy Asher M.D.       • acetaZOLAMIDE (DIAMOX) 250 mg in NS 50 mL IVPB  250 mg Intravenous DAILY Ginger Pride M.D.   Stopped at 01/19/19 0616       Fluids    Intake/Output Summary (Last 24 hours) at 01/20/19 0518  Last data filed at 01/20/19 0400   Gross per 24 hour   Intake             1020 ml   Output             4000 ml   Net            -2980 ml       Laboratory  Recent Labs      01/18/19   1354  01/18/19   1614  01/18/19   1615  01/18/19   2129   [id number]  7.26*   --   7.29*   --    [id number]  104.1*   --   95.1*   --    [id number]  132.4*   --   104.9*   --    XIPX0VFV  98.0   --   97.0   --    ARTHCO3  45*   --   45*   --    [id number]  4.0   --    --    --    ARTBE  14*   --   14*   --    ISTATAPH   --   7.276*   --   7.348*   ISTATAPCO2   --   >100.0*   --   77.7*   ISTATAPO2   --   93*   --   76   ISTATATCO2   --   see below   --   45*   [id number]   --   see below   --   93   ISTATARTHCO3   --   see below   --   42.6*   ISTATARTBE   --   see below   --   13*   ISTATTEMP   --   see below   --   99.0 F   ISTATFIO2   --   6   --   40   ISTATSPEC   --   Arterial   --   Arterial   ISTATAPHTC   --    --    --   7.345*   [id number]   --    --    --   77     Recent Labs      01/18/19   0626  01/18/19   1112  01/18/19   2126   TROPONINI  0.28*   0.31*  0.30*   BNPBTYPENAT   --   372*   --      Recent Labs      01/18/19   1112  01/19/19   0533   SODIUM  141  140   POTASSIUM  5.1  4.4   CHLORIDE  90*  93*   CO2  44*  42*   BUN  14  11   CREATININE  0.47*  0.44*   MAGNESIUM  1.7  1.9   PHOSPHORUS  3.2   --    CALCIUM  9.5  9.5     Recent Labs      01/18/19   1112  01/19/19   0533   ALTSGPT  5   --    ASTSGOT  18   --    ALKPHOSPHAT  54   --    TBILIRUBIN  0.4   --    GLUCOSE  133*  121*     Recent Labs      01/18/19   1112  01/19/19   0533  01/20/19   0443   WBC  5.5  4.0*  5.9   NEUTSPOLYS  73.50*  86.80*  89.50*   LYMPHOCYTES  16.50*  10.80*  7.30*   MONOCYTES  8.90  1.30  2.70   EOSINOPHILS  0.50  0.00  0.00   BASOPHILS  0.20  0.30  0.20   ASTSGOT  18   --    --    ALTSGPT  5   --    --    ALKPHOSPHAT  54   --    --    TBILIRUBIN  0.4   --    --      Recent Labs      01/18/19   1112  01/19/19   0533  01/20/19   0443   RBC  4.07*  4.03*  4.19*   HEMOGLOBIN  11.7*  11.4*  11.7*   HEMATOCRIT  38.9  38.5  39.0   PLATELETCT  200  191  214       Imaging  X-Ray:  I have personally reviewed the images and compared with prior images.  EKG:  I have personally reviewed the images and compared with prior images.  CT:    Reviewed  Echo:   Reviewed    Assessment/Plan  * Acute on chronic respiratory failure (HCC)- (present on admission)   Assessment & Plan    Hypoxic and hypercarbic, suspect both chronic with severe acute flare  Likely related to acute exacerbation of HFpEF and AECOPD less likely bronchitis/pneumonia  Will diuresis until the patient reaches her dry weight which is approximately 215 pounds, monitor daily  Continue furosemide and Diamox  RT protocols  DuoNeb every 4 hours and every 2 hours as needed  BiPAP 12/5 with titration of O2 to 88-92% saturation  Mobilize as tolerated  Incentive spirometry as tolerated  Diagnostic and therapeutic bronchoscopy if she gets intubated, fairly significant risk of this problem  Consider [facility] auto BiPAP unit for home,  patient does have elevated PCO2, severe PFTs and nocturnal oxygen saturations on nasal cannula which meets all of the criteria for home auto BiPAP for chronic respiratory failure  Patient at fairly high risk to develop progression of respiratory failure and need intubation and mechanical ventilation  Reviewed trilogy use and evaluation with patient and house staff     Morbid obesity (HCC)- (present on admission)   Assessment & Plan    RD consult  Behavior modification weight loss to be encouraged when clinically appropriate     Acute on chronic diastolic congestive heart failure (HCC)- (present on admission)   Assessment & Plan    Forced diuresis with Lasix 40 mg once per day  Continue Diamox to prevent excessive contraction metabolic alkalosis and worsening  Follow BMP, chest x-ray and fluid balance closely       COPD exacerbation (HCC)- (present on admission)   Assessment & Plan    RT/O2 Protocols  Titrate supplemental FiO2 to maintain SpO2 >88%  Continue IV Solu-Medrol but rate when seated every 8 hours  Azithromycin x5 days, last dose 1/22  Continue fluconazole, DuoNeb, and Anoro Ellipta  Continue noninvasive positive pressure ventilation as needed: Sleep and naps continuously and as needed while awake for rescue  Continue to monitor for the need for intubation mechanical ventilation still at significant risk  If intubated diagnostic and therapeutic bronchoscopy may be prudent     Aortic stenosis- (present on admission)   Assessment & Plan    Moderate AS  Diuresis  Echo reviewed  Possible cardiac valve catheterization study 1/21 and Cath Lab       Elevated troponin- (present on admission)   Assessment & Plan    Likely type II NSTEMI due to heart failure  Trend troponin  Echocardiogram noted  Aspirin  Placed on heparin for any worsening          VTE:  Lovenox  Ulcer: PPI  Lines: Conklin Catheter  Ongoing indication addressed    I have performed a physical exam and reviewed and updated ROS and Plan today  (1/20/2019). In review of yesterday's note (1/19/2019), there are no changes except as documented above.     Patient remains critically ill.  Patient still on BiPAP with all naps and sleep and some of the time while awake.  Alternating with high flow nasal cannula and were weaning oxygen at this time.  Patient remains in IV furosemide and Diamox.  Also on IV Solu-Medrol/antibiotics.  Prognosis remains guarded.  Patient still wants to be a full code upon review.  Patient may be a candidate for trilogy auto BiPAP therapy at home as an outpatient, PCO2 greater than 52, severe obstructive lung disease by PFT and significant desaturations and complications related to only low flow O2.  Echocardiogram with significant valvular disease, cardiology considering cardiac catheterization.    Discussed patient condition and risk of morbidity and/or mortality with Family, RN, RT, Pharmacy, Charge nurse / hot rounds and Patient     The patient remains critically ill.  Critical care time = 45 minutes in directly providing and coordinating critical care and extensive data review.  No time overlap and excludes procedures.

## 2019-01-20 NOTE — PROGRESS NOTES
· 2 RN skin check completed.  · Devices in place PIV, BP cuff, High Flow NC, Pulse Ox, Conklin Catheter, EKG monitoring.  · Skin assessed under devices completed.  · Confirmed pressure ulcers found on N/A.  · New potential pressure ulcers noted on N/A. Wound consult placed and wound reported.  · The following interventions in place pillows in use for turns and repositioning Q 2 hours and as needed; Heel float boots on intermittently, ambulate pt, waffle cushion while sitting in chair.      Sacrum - erythema; skin intact; blanches   Left hip - erythema, rash  Panus and bilateral breast - erythema, skin intact  Bilateral LE - erythema; swollen; skin intact but fragile and flakey; grey dusky on Left LE

## 2019-01-20 NOTE — PROGRESS NOTES
Received bedside report from Penny RN. Patient is on high flow NC 45L/30% - SPO2 91%. RT Elizabeth at bedside, collaborated with RT for an SPO2 goal 88-92%- okay to keep on high flow NC and keep bipap in the room for back up. Patient agreed to inform nurse if she feels SOB. Plan of care for this shift was discussed, patient denies needs at this time. Bed is low, side rails up x2, call light within reach, and bed alarm activated.

## 2019-01-20 NOTE — DIETARY
NUTRITION SERVICES: BMI - Pt with BMI >40 (=42.5). Weight loss counseling not appropriate in acute care setting. RECOMMEND - Referral to outpatient nutrition services for weight management after D/C.

## 2019-01-20 NOTE — CARE PLAN
Problem: Bowel/Gastric:  Goal: Normal bowel function is maintained or improved  Outcome: PROGRESSING AS EXPECTED      Problem: Fluid Volume:  Goal: Will maintain balanced intake and output    Intervention: Monitor, educate, and encourage compliance with therapeutic intake of liquids  Total urine output for this shift is 2100ml.

## 2019-01-20 NOTE — PROGRESS NOTES
· 2 RN skin check completed  · Devices in place 2 PIVs, BP cuff, High Flow NC, Pulse Ox, Quiros Catheter with stat lock, EKG monitoring  · Skin assessed under devices completed.  · Confirmed pressure ulcers found on N/A.  · New potential pressure ulcers noted on N/A.   · The following interventions in place pillows in use for turns and repositioning Q 2 hours and as needed; Heel float boots on intermittently, ambulate pt, waffle cushion while sitting in chair, powder applied under breast and panus, barrier wipes and paste applied to sacrum.    Mepilex applied under panus to prevent pressure sore from stat lock and quiros catheter

## 2019-01-20 NOTE — PROGRESS NOTES
Cardiology Follow Up Progress Note    Date of Service  1/20/2019    Attending Physician  Tommy Asher M.D.    Chief Complaint   A-fib    HPI  Kishan Mcintyre is a 73 y.o. female admitted 1/18/2019 with a-fib    Interim Events  Off Bipap  Continues brisk diuresis      Review of Systems  Review of Systems   Constitutional: Negative.    HENT: Negative.    Eyes: Negative.    Respiratory: Negative.  Negative for shortness of breath.    Cardiovascular: Negative.  Negative for chest pain, palpitations and leg swelling.   Gastrointestinal: Negative.  Negative for abdominal distention and nausea.   Endocrine: Negative.    Genitourinary: Negative.    Musculoskeletal: Negative for arthralgias.   Skin: Negative.    Allergic/Immunologic: Negative.    Neurological: Negative.  Negative for dizziness, light-headedness and numbness.   Hematological: Negative.    Psychiatric/Behavioral: Negative.        Vital signs in last 24 hours  Temp:  [36.5 °C (97.7 °F)-37.3 °C (99.2 °F)] 36.5 °C (97.7 °F)  Pulse:  [] 81  Resp:  [17-37] 30  SpO2:  [86 %-94 %] 89 %    Physical Exam  Physical Exam   Constitutional: She is oriented to person, place, and time. She appears well-developed and well-nourished. No distress.   HENT:   Head: Normocephalic.   Mouth/Throat: Oropharynx is clear and moist.   Eyes: Pupils are equal, round, and reactive to light. EOM are normal. Right eye exhibits no discharge. Left eye exhibits no discharge. No scleral icterus.   Neck: Normal range of motion. Neck supple. No JVD present. No tracheal deviation present.   Cardiovascular: Normal rate, regular rhythm, S1 normal, S2 normal, normal heart sounds, intact distal pulses and normal pulses.  Exam reveals no gallop, no S3, no S4 and no friction rub.    No murmur heard.   No systolic murmur is present    No diastolic murmur is present   Pulses:       Carotid pulses are 2+ on the right side, and 2+ on the left side.       Radial pulses are 2+ on the right  side, and 2+ on the left side.        Dorsalis pedis pulses are 2+ on the right side, and 2+ on the left side.        Posterior tibial pulses are 2+ on the right side, and 2+ on the left side.   Pulmonary/Chest: Effort normal and breath sounds normal. No respiratory distress. She has no wheezes. She has no rales.   Abdominal: Soft. Bowel sounds are normal. She exhibits no distension and no mass. There is no tenderness. There is no rebound and no guarding.   Musculoskeletal: She exhibits no edema.   Neurological: She is alert and oriented to person, place, and time. No cranial nerve deficit.   Skin: Skin is warm and dry. She is not diaphoretic. No pallor.   Psychiatric: She has a normal mood and affect. Her behavior is normal. Judgment and thought content normal.   Nursing note and vitals reviewed.      Lab Review  Lab Results   Component Value Date/Time    WBC 5.9 01/20/2019 04:43 AM    RBC 4.19 (L) 01/20/2019 04:43 AM    HEMOGLOBIN 11.7 (L) 01/20/2019 04:43 AM    HEMATOCRIT 39.0 01/20/2019 04:43 AM    MCV 93.1 01/20/2019 04:43 AM    MCH 27.9 01/20/2019 04:43 AM    MCHC 30.0 (L) 01/20/2019 04:43 AM    MPV 9.5 01/20/2019 04:43 AM      Lab Results   Component Value Date/Time    SODIUM 141 01/20/2019 04:43 AM    POTASSIUM 4.1 01/20/2019 04:43 AM    CHLORIDE 97 01/20/2019 04:43 AM    CO2 40 (H) 01/20/2019 04:43 AM    GLUCOSE 131 (H) 01/20/2019 04:43 AM    BUN 17 01/20/2019 04:43 AM    CREATININE 0.49 (L) 01/20/2019 04:43 AM    CREATININE 0.71 04/19/2011 09:24 AM    BUNCREATRAT 23 04/19/2011 09:24 AM      Lab Results   Component Value Date/Time    ASTSGOT 18 01/18/2019 11:12 AM    ALTSGPT 5 01/18/2019 11:12 AM     Lab Results   Component Value Date/Time    CHOLSTRLTOT 171 07/23/2016 08:52 AM    LDL 91 07/23/2016 08:52 AM    HDL 60 07/23/2016 08:52 AM    TRIGLYCERIDE 101 07/23/2016 08:52 AM    TROPONINI 0.30 (H) 01/18/2019 09:26 PM       Recent Labs      01/18/19   1112   BNPBTYPENAT  372*       Cardiac Imaging and  Procedures Review  EKG:  My personal interpretation of the EKG dated 1/18/2019 is normal sinus rhythm low voltage in the inferior leads otherwise normal EKG    Echocardiogram: 1/18/2019   CONCLUSIONS  Prior Echo - 10/8/18Moderate aortic stenosis.  Transvalvular gradients are - Peak: 48 mmHg, Mean: 33 mmHg.  Left ventricular ejection fraction is visually estimated to be 65%.  Biatrial enlargement.  Estimated right ventricular systolic pressure  is 55 mmHg.    Cardiac Catheterization:  N/A    Imaging  Chest X-Ray:  Enlarged cardiac sillouette, infiltrates bilaterally no effusion    Stress Test:  N/A    Assessment/Plan  No new Assessment & Plan notes have been filed under this hospital service since the last note was generated.  Service: Cardiology  73-year-old female with aortic stenosis with shortness of breath and fluid accumulation in the setting of not a lot of tobacco exposure but a diagnosis of end-stage COPD.  I am concerned that her COPD is being used in excuse for her shortness of breath although she only has approximately maybe 5-10 pack years of total smoking history.  Her aortic valve appears somewhere in the severe to moderate range although is moderate by 2 different criteria.  There are a couple different management options at this point.  Given that she has a normal creatinine and all these issues with hypoxia and as well as shortness of breath I am beginning to think that we need to get a coronary angiogram for a diagnosis of her coronaries as well as a valve study.    1. AS, likely moderate    - repeat echo    - valve study in cath lab     2. COPD, end stage?, no significant smoking history    - continue lasix 40 BID    - cont diamox 250      3. Trop leak    - NPO cath I AM     4. Cor Pulmonale     - per above    Thank you for allowing me to participate in the care of this patient.  I will continue to follow this patient    Please contact me with any questions.    Trip Galvan M.D.    Cardiologist, [facility]  [phone]

## 2019-01-21 LAB
ANION GAP SERPL CALC-SCNC: 5 MMOL/L (ref 0–11.9)
BASOPHILS # BLD AUTO: 0 % (ref 0–1.8)
BASOPHILS # BLD: 0 K/UL (ref 0–0.12)
BUN SERPL-MCNC: 23 MG/DL (ref 8–22)
CALCIUM SERPL-MCNC: 9.6 MG/DL (ref 8.5–10.5)
CHLORIDE SERPL-SCNC: 95 MMOL/L (ref 96–112)
CO2 SERPL-SCNC: 39 MMOL/L (ref 20–33)
CREAT SERPL-MCNC: 0.51 MG/DL (ref 0.5–1.4)
EOSINOPHIL # BLD AUTO: 0 K/UL (ref 0–0.51)
EOSINOPHIL NFR BLD: 0 % (ref 0–6.9)
ERYTHROCYTE [DISTWIDTH] IN BLOOD BY AUTOMATED COUNT: 46.2 FL (ref 35.9–50)
GLUCOSE SERPL-MCNC: 138 MG/DL (ref 65–99)
HCT VFR BLD AUTO: 40.5 % (ref 37–47)
HGB BLD-MCNC: 12.3 G/DL (ref 12–16)
IMM GRANULOCYTES # BLD AUTO: 0.02 K/UL (ref 0–0.11)
IMM GRANULOCYTES NFR BLD AUTO: 0.3 % (ref 0–0.9)
INR PPP: 1.19 (ref 0.87–1.13)
LYMPHOCYTES # BLD AUTO: 0.48 K/UL (ref 1–4.8)
LYMPHOCYTES NFR BLD: 7.1 % (ref 22–41)
MAGNESIUM SERPL-MCNC: 2.2 MG/DL (ref 1.5–2.5)
MCH RBC QN AUTO: 28 PG (ref 27–33)
MCHC RBC AUTO-ENTMCNC: 30.4 G/DL (ref 33.6–35)
MCV RBC AUTO: 92.3 FL (ref 81.4–97.8)
MONOCYTES # BLD AUTO: 0.29 K/UL (ref 0–0.85)
MONOCYTES NFR BLD AUTO: 4.3 % (ref 0–13.4)
NEUTROPHILS # BLD AUTO: 5.98 K/UL (ref 2–7.15)
NEUTROPHILS NFR BLD: 88.3 % (ref 44–72)
NRBC # BLD AUTO: 0 K/UL
NRBC BLD-RTO: 0 /100 WBC
PLATELET # BLD AUTO: 227 K/UL (ref 164–446)
PMV BLD AUTO: 9.3 FL (ref 9–12.9)
POTASSIUM SERPL-SCNC: 3.7 MMOL/L (ref 3.6–5.5)
PROTHROMBIN TIME: 15.3 SEC (ref 12–14.6)
RBC # BLD AUTO: 4.39 M/UL (ref 4.2–5.4)
SODIUM SERPL-SCNC: 139 MMOL/L (ref 135–145)
WBC # BLD AUTO: 6.8 K/UL (ref 4.8–10.8)

## 2019-01-21 PROCEDURE — 94664 DEMO&/EVAL PT USE INHALER: CPT

## 2019-01-21 PROCEDURE — A9270 NON-COVERED ITEM OR SERVICE: HCPCS | Performed by: INTERNAL MEDICINE

## 2019-01-21 PROCEDURE — 80048 BASIC METABOLIC PNL TOTAL CA: CPT

## 2019-01-21 PROCEDURE — 94640 AIRWAY INHALATION TREATMENT: CPT

## 2019-01-21 PROCEDURE — 700111 HCHG RX REV CODE 636 W/ 250 OVERRIDE (IP): Performed by: INTERNAL MEDICINE

## 2019-01-21 PROCEDURE — 85025 COMPLETE CBC W/AUTO DIFF WBC: CPT

## 2019-01-21 PROCEDURE — 99233 SBSQ HOSP IP/OBS HIGH 50: CPT | Performed by: INTERNAL MEDICINE

## 2019-01-21 PROCEDURE — 700101 HCHG RX REV CODE 250: Performed by: INTERNAL MEDICINE

## 2019-01-21 PROCEDURE — 85610 PROTHROMBIN TIME: CPT

## 2019-01-21 PROCEDURE — 700102 HCHG RX REV CODE 250 W/ 637 OVERRIDE(OP): Performed by: INTERNAL MEDICINE

## 2019-01-21 PROCEDURE — 700105 HCHG RX REV CODE 258: Performed by: INTERNAL MEDICINE

## 2019-01-21 PROCEDURE — 83735 ASSAY OF MAGNESIUM: CPT

## 2019-01-21 PROCEDURE — 770022 HCHG ROOM/CARE - ICU (200)

## 2019-01-21 PROCEDURE — 94003 VENT MGMT INPAT SUBQ DAY: CPT

## 2019-01-21 RX ORDER — POTASSIUM CHLORIDE 20 MEQ/1
40 TABLET, EXTENDED RELEASE ORAL ONCE
Status: COMPLETED | OUTPATIENT
Start: 2019-01-21 | End: 2019-01-21

## 2019-01-21 RX ORDER — LIDOCAINE HYDROCHLORIDE 20 MG/ML
INJECTION, SOLUTION INFILTRATION; PERINEURAL
Status: COMPLETED
Start: 2019-01-21 | End: 2019-01-21

## 2019-01-21 RX ORDER — FUROSEMIDE 10 MG/ML
INJECTION INTRAMUSCULAR; INTRAVENOUS
Status: ACTIVE
Start: 2019-01-21 | End: 2019-01-22

## 2019-01-21 RX ORDER — ALPRAZOLAM 0.5 MG/1
0.5 TABLET ORAL ONCE
Status: COMPLETED | OUTPATIENT
Start: 2019-01-21 | End: 2019-01-21

## 2019-01-21 RX ORDER — PREDNISONE 20 MG/1
40 TABLET ORAL 2 TIMES DAILY
Status: DISCONTINUED | OUTPATIENT
Start: 2019-01-21 | End: 2019-01-23

## 2019-01-21 RX ORDER — FUROSEMIDE 10 MG/ML
20 INJECTION INTRAMUSCULAR; INTRAVENOUS
Status: DISCONTINUED | OUTPATIENT
Start: 2019-01-22 | End: 2019-01-24

## 2019-01-21 RX ORDER — VERAPAMIL HYDROCHLORIDE 2.5 MG/ML
INJECTION, SOLUTION INTRAVENOUS
Status: COMPLETED
Start: 2019-01-21 | End: 2019-01-21

## 2019-01-21 RX ORDER — HEPARIN SODIUM,PORCINE 1000/ML
VIAL (ML) INJECTION
Status: COMPLETED
Start: 2019-01-21 | End: 2019-01-21

## 2019-01-21 RX ADMIN — IPRATROPIUM BROMIDE AND ALBUTEROL SULFATE 3 ML: .5; 3 SOLUTION RESPIRATORY (INHALATION) at 14:23

## 2019-01-21 RX ADMIN — IPRATROPIUM BROMIDE AND ALBUTEROL SULFATE 3 ML: .5; 3 SOLUTION RESPIRATORY (INHALATION) at 07:06

## 2019-01-21 RX ADMIN — FUROSEMIDE 40 MG: 10 INJECTION, SOLUTION INTRAMUSCULAR; INTRAVENOUS at 05:37

## 2019-01-21 RX ADMIN — UMECLIDINIUM BROMIDE AND VILANTEROL TRIFENATATE 1 PUFF: 62.5; 25 POWDER RESPIRATORY (INHALATION) at 07:08

## 2019-01-21 RX ADMIN — ACETAMINOPHEN 500 MG: 500 TABLET ORAL at 09:10

## 2019-01-21 RX ADMIN — PREDNISONE 40 MG: 20 TABLET ORAL at 17:54

## 2019-01-21 RX ADMIN — IPRATROPIUM BROMIDE AND ALBUTEROL SULFATE 3 ML: .5; 3 SOLUTION RESPIRATORY (INHALATION) at 22:22

## 2019-01-21 RX ADMIN — ALPRAZOLAM 0.5 MG: 0.5 TABLET ORAL at 07:26

## 2019-01-21 RX ADMIN — FUROSEMIDE 40 MG: 10 INJECTION, SOLUTION INTRAMUSCULAR; INTRAVENOUS at 15:16

## 2019-01-21 RX ADMIN — AZITHROMYCIN 250 MG: 250 TABLET, FILM COATED ORAL at 05:36

## 2019-01-21 RX ADMIN — IPRATROPIUM BROMIDE AND ALBUTEROL SULFATE 3 ML: .5; 3 SOLUTION RESPIRATORY (INHALATION) at 09:13

## 2019-01-21 RX ADMIN — POTASSIUM CHLORIDE 40 MEQ: 1500 TABLET, EXTENDED RELEASE ORAL at 15:16

## 2019-01-21 RX ADMIN — THERA TABS 1 TABLET: TAB at 05:37

## 2019-01-21 RX ADMIN — ACETAMINOPHEN 500 MG: 500 TABLET ORAL at 17:55

## 2019-01-21 RX ADMIN — OMEPRAZOLE 20 MG: 20 CAPSULE, DELAYED RELEASE ORAL at 05:36

## 2019-01-21 RX ADMIN — METHYLPREDNISOLONE SODIUM SUCCINATE 40 MG: 40 INJECTION, POWDER, FOR SOLUTION INTRAMUSCULAR; INTRAVENOUS at 05:36

## 2019-01-21 RX ADMIN — CLOTRIMAZOLE AND BETAMETHASONE DIPROPIONATE 1 APPLICATION: 10; .5 CREAM TOPICAL at 18:00

## 2019-01-21 RX ADMIN — FLUTICASONE PROPIONATE 88 MCG: 44 AEROSOL, METERED RESPIRATORY (INHALATION) at 19:00

## 2019-01-21 RX ADMIN — IPRATROPIUM BROMIDE AND ALBUTEROL SULFATE 3 ML: .5; 3 SOLUTION RESPIRATORY (INHALATION) at 02:36

## 2019-01-21 RX ADMIN — ASPIRIN 81 MG: 81 TABLET, COATED ORAL at 05:37

## 2019-01-21 RX ADMIN — FLUTICASONE PROPIONATE 88 MCG: 44 AEROSOL, METERED RESPIRATORY (INHALATION) at 07:07

## 2019-01-21 RX ADMIN — METHYLPREDNISOLONE SODIUM SUCCINATE 40 MG: 40 INJECTION, POWDER, FOR SOLUTION INTRAMUSCULAR; INTRAVENOUS at 15:16

## 2019-01-21 RX ADMIN — CLOTRIMAZOLE AND BETAMETHASONE DIPROPIONATE 1 APPLICATION: 10; .5 CREAM TOPICAL at 05:36

## 2019-01-21 RX ADMIN — IPRATROPIUM BROMIDE AND ALBUTEROL SULFATE 3 ML: .5; 3 SOLUTION RESPIRATORY (INHALATION) at 19:00

## 2019-01-21 RX ADMIN — SODIUM CHLORIDE 250 MG: 9 INJECTION, SOLUTION INTRAVENOUS at 07:28

## 2019-01-21 ASSESSMENT — ENCOUNTER SYMPTOMS
CONSTIPATION: 0
CONSTITUTIONAL NEGATIVE: 1
MYALGIAS: 0
FOCAL WEAKNESS: 0
WEAKNESS: 0
CARDIOVASCULAR NEGATIVE: 1
SHORTNESS OF BREATH: 0
PND: 1
DOUBLE VISION: 0
CHILLS: 0
BLURRED VISION: 0
SHORTNESS OF BREATH: 1
PALPITATIONS: 0
NAUSEA: 0
GASTROINTESTINAL NEGATIVE: 1
NERVOUS/ANXIOUS: 0
DEPRESSION: 0
WEIGHT LOSS: 0
DIAPHORESIS: 0
FLANK PAIN: 0
NERVOUS/ANXIOUS: 1
SPEECH CHANGE: 0
NUMBNESS: 0
ABDOMINAL DISTENTION: 0
NEUROLOGICAL NEGATIVE: 1
EYES NEGATIVE: 1
FEVER: 0
LIGHT-HEADEDNESS: 0
WHEEZING: 0
ENDOCRINE NEGATIVE: 1
RESPIRATORY NEGATIVE: 1
HEADACHES: 0
SENSORY CHANGE: 0
HEMOPTYSIS: 0
ALLERGIC/IMMUNOLOGIC NEGATIVE: 1
VOMITING: 0
BACK PAIN: 0
ABDOMINAL PAIN: 0
DIZZINESS: 0
WHEEZING: 1
PSYCHIATRIC NEGATIVE: 1
DIARRHEA: 0
HEADACHES: 1
SORE THROAT: 0
HEMATOLOGIC/LYMPHATIC NEGATIVE: 1
INSOMNIA: 1
ARTHRALGIAS: 0
ORTHOPNEA: 1
SINUS PAIN: 0
COUGH: 0
SPUTUM PRODUCTION: 0
COUGH: 1

## 2019-01-21 ASSESSMENT — PAIN SCALES - GENERAL
PAINLEVEL_OUTOF10: 0
PAINLEVEL_OUTOF10: 2
PAINLEVEL_OUTOF10: 0
PAINLEVEL_OUTOF10: 7
PAINLEVEL_OUTOF10: 0
PAINLEVEL_OUTOF10: 4
PAINLEVEL_OUTOF10: 0

## 2019-01-21 ASSESSMENT — PULMONARY FUNCTION TESTS
EPAP_CMH2O: 5

## 2019-01-21 ASSESSMENT — PATIENT HEALTH QUESTIONNAIRE - PHQ9
2. FEELING DOWN, DEPRESSED, IRRITABLE, OR HOPELESS: NOT AT ALL
1. LITTLE INTEREST OR PLEASURE IN DOING THINGS: NOT AT ALL
SUM OF ALL RESPONSES TO PHQ9 QUESTIONS 1 AND 2: 0

## 2019-01-21 ASSESSMENT — LIFESTYLE VARIABLES: SUBSTANCE_ABUSE: 0

## 2019-01-21 NOTE — PROGRESS NOTES
Monitor called to inform me that the patient was maintaining SaO2 of 75%. Patient did not wake to verbal or light physical stimuli, and only woke with a sternal rub. However, once awake she was completely appropriate, and her SaO2 improved to 85%. I suggested she go on her BiPAP, she agrees, and accept my apology for the sternal rub. RT notified, and primary RN notified

## 2019-01-21 NOTE — DISCHARGE PLANNING
Care Transition Team Assessment    Information Source  Orientation : Oriented x 4  Information Given By: Patient  Informant's Name: Kishan (female)  Who is responsible for making decisions for patient? : Patient    Readmission Evaluation  Is this a readmission?: No    Elopement Risk  Legal Hold: No  Ambulatory or Self Mobile in Wheelchair: No-Not an Elopement Risk  Elopement Risk: Not at Risk for Elopement    Interdisciplinary Discharge Planning  Primary Care Physician: DICK WILSON M.D.  Lives with - Patient's Self Care Capacity: Spouse  Patient or legal guardian wants to designate a caregiver (see row info): No  Support Systems: Children, Spouse / Significant Other  Housing / Facility: [address]  Do You Take your Prescribed Medications Regularly: Yes  Mobility Issues:  (w/c, FWW, cane all as needed )  Durable Medical Equipment: Home Oxygen  DME Provider / Phone: Preferred    Discharge Preparedness  Prior Functional Level: Independent with Activities of Daily Living  Difficulity with IADLs: Driving  Difficulity with IADL Comments: Spouse drives    Functional Assesment  Prior Functional Level: Independent with Activities of Daily Living    Finances  Prescription Coverage: Yes ([employer] mail order or [facility])    Vision / Hearing Impairment  Vision Impairment : No  Hearing Impairment : No    Values / Beliefs / Concerns  Values / Beliefs Concerns : No    Advance Directive  Advance Directive?: DPOA for Health Care  Durable Power of  Name and Contact : daughter Jaci Osullivan LSW see face sheet    Domestic Abuse  Have you ever been the victim of abuse or violence?: No  Physical Abuse or Sexual Abuse: No  Verbal Abuse or Emotional Abuse: No  Possible Abuse Reported to:: Not Applicable              Anticipated Discharge Information  Anticipated discharge disposition: Discharge needs currently unknown

## 2019-01-21 NOTE — CARE PLAN
Problem: Safety  Goal: Will remain free from falls    Intervention: Implement fall precautions  Bed locked and in low position; bed alarm on. Call light and belongings within reach. Treaded socks on pt when out of bed.       Problem: Respiratory:  Goal: Respiratory status will improve    Intervention: Collaborate with respiratory therapist and Interdisciplinary Team on treatment measures to improve respiratory function  Completed. Pt on high flow throughout shift

## 2019-01-21 NOTE — PROGRESS NOTES
[facility] ICU Progress Note      Admit Date: 1/18/2019  ICU Day:  3      Resident(s): Baljit Chung  Attending: ARAMIS IRIZARRY/ Dr. Asher     Date & Time:   1/21/2019   11:24 AM       Patient ID:    Name:             Kishan Mcintyre   YOB: 1945  Age:                 73 y.o.  female   MRN:               [id number]    HPI:  72 yo F with COPD on 2L (recently increased to 4L at home), HFpEF & moderate AS (Echo on 1/19/2019 significant for EF 65, diastolic dysfunction grade II), came in with worsening SOB, tachypneic at ER and required BIPAP. On presentation, HCO 44, trop 0.3, , PH 7.26, CO2 104, CXR pulmonary edema with effusions, EKG no ischemic changes. She was admitted for exacerbation of COPD and diastolic heart failure requiring BiPAP.     Consultants:  Cardiology, Trip Galvan M.D.  PMA: Tommy Asher M.D.    Procedures:  Scheduled for cardiac cath today 1/21/2019      Imaging:  EC-ECHOCARDIOGRAM COMPLETE W/O CONT   Final Result      DX-CHEST-PORTABLE (1 VIEW)   Final Result      No significant change      CT-CTA CHEST PULMONARY ARTERY W/ RECONS   Final Result      No evidence of pulmonary embolus.      Collapse of the right middle lobe of unknown etiology could be due to bronchial stricture, mucous plugging or perhaps malignancy.      Bibasilar atelectasis with trace bilateral pleural effusions.      Cardiomegaly.      3D angiographic/MIP images of the vasculature confirm the vascular findings as described above.            DX-CHEST-PORTABLE (1 VIEW)   Final Result      Pulmonary edema, pleural effusions, basilar atelectasis and cardiac silhouette enlargement          Interval Events:  - Overnight, Asymptomatic desaturating over night while sleeping to SaO2 75%, her SaO2 improved to 85% when she wake up, was put on BiPAP then de-escalade to HFNC 40L, currently her SpO2 is 96%, we will continue titrating her  HFNC to a goal of SpO2 88-92%. Will consider transferring to Tele floor  tomorrow (pending cath today 1/21/2019).  - Afebrile, vitals stable, blood cultures are -ve *2, negative for influenza A&B by PCR, will continue on oral Azithromycin 200 mg daily, last dose will be tomorrow 1/22/2019.  - Stable HFpEF, will continue on Lasix 40 mg BID and Daily diamox, will consider weaning off diamox when improving Bicarbonate level (currently tolerating both medications well, Bp stable, I&O -ve 2500, -ve JVD, improving peripheral LE edema).  - Cardiology onboard, appreciate recommendations. Pt scheduled for Cath today    Review of Systems   Constitutional: Positive for malaise/fatigue. Negative for chills and fever.   HENT: Negative for congestion, sinus pain and sore throat.    Eyes: Negative for blurred vision and double vision.   Respiratory: Positive for shortness of breath and wheezing. Negative for cough and sputum production.    Cardiovascular: Positive for orthopnea, leg swelling and PND. Negative for chest pain and palpitations.   Gastrointestinal: Negative for abdominal pain, constipation, diarrhea, nausea and vomiting.   Genitourinary: Negative for dysuria and urgency.   Musculoskeletal: Negative for myalgias.   Neurological: Positive for headaches. Negative for dizziness, speech change and focal weakness.   Psychiatric/Behavioral: Negative for substance abuse. The patient is not nervous/anxious.        PHYSICAL EXAM  Gen: ill appearing elderly female, sitting comfortably, eating breakfast in NAD  HEENT: NC/AT, no scleral icterus, no conjunctival injection, mucous membranes pink and dry.  Neck: Supple, no lymphadenopathy.  Cardiac: S1S2, 3/6 systolic murmur heard best over aortic, no r/g, no JVD.  Respiratory: HFNC, increased respiratory effort, diminished breath sounds bilaterally, minimal diffuse wheezing   Abdomen: BS+, soft, NT/ND, no rebound/guarding, no palpable organomegaly.  Ext: 2+ pitting edema with overlying skin changes consistent with stasis dermatitis, 2+ DP pulses  b/l.  Skin: Warm, dry. No rashes or erythema.   Neuro: AAOx4, CN II-XII grossly normal, no focal sensory or motor deficits.   Psych: Affect, mood, judgement normal.    Respiratory:     Respiration: 17, Pulse Oximetry: 90 %, O2 Daily Delivery Respiratory : Highflow Nasal Cannula    Chest Tube Drains:    Recent Labs      01/18/19   1354  01/18/19   1614  01/18/19   1615  01/18/19   2129   [id number]  7.26*   --   7.29*   --    [id number]  104.1*   --   95.1*   --    [id number]  132.4*   --   104.9*   --    [id number]  98.0   --   97.0   --    ARTHCO3  45*   --   45*   --    M1IHRONNN  4.0   --    --    --    ARTBE  14*   --   14*   --    ISTATAPH   --   7.276*   --   7.348*   ISTATAPCO2   --   >100.0*   --   77.7*   ISTATAPO2   --   93*   --   76   ISTATATCO2   --   see below   --   45*   [id number]   --   see below   --   93   ISTATARTHCO3   --   see below   --   42.6*   ISTATARTBE   --   see below   --   13*   ISTATTEMP   --   see below   --   99.0 F   ISTATFIO2   --   6   --   40   ISTATSPEC   --   Arterial   --   Arterial   ISTATAPHTC   --    --    --   7.345*   [id number]   --    --    --   77       HemoDynamics:  Pulse: 80, Heart Rate (Monitored): 81 NIBP: 108/47      Fluids:    Date 01/21/19 0700 - 01/22/19 0659   Shift 8632-2000 4730-5571 1173-4579 24 Hour Total   I  N  T  A  K  E   Shift Total       O  U  T  P  U  T   Urine 825   825      Output (mL) (Urethral Catheter Non-latex 16 Fr.) 825   825    Shift Total 825   825   NET -825   -825        Intake/Output Summary (Last 24 hours) at 01/21/19 1124  Last data filed at 01/21/19 1000   Gross per 24 hour   Intake              460 ml   Output             2615 ml   Net            -2155 ml         Weight: 91.3 kg (201 lb 4.5 oz)  Body mass index is 39.31 kg/m².    Recent Labs      01/19/19   0533  01/20/19   0443  01/21/19   0328   SODIUM  140  141  139   POTASSIUM  4.4  4.1  3.7   CHLORIDE  93*  97  95*   CO2  42*  40*  39*   BUN  11  17  23*   CREATININE  0.44*   0.49*  0.51   MAGNESIUM  1.9  2.3  2.2   CALCIUM  9.5  9.7  9.6       GI/Nutrition:  Recent Labs      19   0533  19   0443  19   0328   GLUCOSE  121*  131*  138*       Heme:  Recent Labs      19   0533  193  19   0422  19   0800   RBC  4.03*  4.19*  4.39   --    HEMOGLOBIN  11.4*  11.7*  12.3   --    HEMATOCRIT  38.5  39.0  40.5   --    PLATELETCT  191  214  227   --    PROTHROMBTM   --    --    --   15.3*   INR   --    --    --   1.19*       Infectious Disease:  Temp  Av.8 °C (98.3 °F)  Min: 36.6 °C (97.9 °F)  Max: 37.1 °C (98.7 °F)  Recent Labs      19   0422   WBC  4.0*  5.9  6.8   NEUTSPOLYS  86.80*  89.50*  88.30*   LYMPHOCYTES  10.80*  7.30*  7.10*   MONOCYTES  1.30  2.70  4.30   EOSINOPHILS  0.00  0.00  0.00   BASOPHILS  0.30  0.20  0.00       Meds:  • potassium chloride SA  40 mEq     • predniSONE  40 mg     • methylPREDNISolone  40 mg     • senna-docusate  2 Tab      And   • polyethylene glycol/lytes  1 Packet      And   • magnesium hydroxide  30 mL      And   • bisacodyl  10 mg     • Respiratory Care per Protocol       • azithromycin  250 mg     • ipratropium-albuterol  3 mL     • furosemide  40 mg     • albuterol  2 Puff     • aspirin EC  81 mg     • busPIRone  7.5 mg     • clotrimazole-betamethasone  1 Application     • multivitamin       • omeprazole  20 mg     • umeclidinium-vilanterol  1 Puff      And   • fluticasone  2 Puff     • acetaminophen  500 mg     • lidocaine  2 Patch     • enoxaparin (LOVENOX) injection  40 mg     • ipratropium-albuterol  3 mL     • acetaZOLAMIDE (DIAMOX) IV  250 mg Stopped (19 0743)          Problem and Plan:  Acute on chronic respiratory failure  - Gradually improving, currently on HFNC 40%, sat at 96%,  we will continue titrating her HFNC to a goal of SpO2 88-92%. Will consider transferring to Tele floor tomorrow, pending cath today 2019.  -Overnight, Asymptomatic  desaturating over night while sleeping to SaO2 75%, her SaO2 improved to 85% when she wake up, was put on BiPAP then de-escalade to HFNC 40L.  - Home oxygen 2-2.5L, recently using 4L at home, required BIPAP on admission Likely 2/2 Combined COPD exacerbation and diastolic heart failure exacerbation (HFpEF)  Plan  - Continue IV steroid, azithromycin (oral, last dose will be 1/22/2019) and IV diuresis, will gradually de-escalade to oral according to response (currentlty she NPO for cath today).  - Will decrease Lasix to 20 mg BID instead of 40 mg BID  - Will continue on diamox for now, will consider holding when bicarbonate <35.  - Continue other home medications   - Continue RT protocol, and encourage IS, HFNC with goal of SpO2 88-92%.    COPD exacerbation (HCC)  - Gradually improving, still requiring HFNC.  - baseline O2 2L 24/7, increased to 4 L at home few weeks before admission.  - Continue IV steroid from 40 q6 to Q8, will consider weaning off gradually as the patient continue to improve.  - Continue azithromycin for COPD exacebation, plan for 5 day total (end date 1/22/19)  - Cont. duonebs, RT protochol.  - Continue home trelegy, albuterol prn.    Acute on chronic diastolic congestive heart failure (HCC)  - Still requiring HFNC, currently on IV lasix 40 mg BID and Diamox IV (will decrease Lasix to 20 mg BID this evening)  - Was on lasix 60 mg PO at home, compliant, we currently holding her home lasix as we are cont IV lasix 40 mg BID & Diamox.  - Will continue monitoring Ishaan navarro  - Cardiology following, plan for cardiac catheterization today 1/21/2019.    Moderate Aortic stenosis  - Moderate AS confirmed with Echo on 1/19/2019  - repeat echo shows Transvalvular gradients are - Peak: 48 mmHg, Mean: 33 mmHg. (increased from prior)  - followed by Dr Rodrigez as outpatient, monitoring with serial echos.  - cardiology following, appreciate recommendations   - plan for cardiac cath to assess coronary vessels as well  as valve     Elevated troponin  - 0.3, no ischemic EKG changesin the setting of respiratory failure,  - cardiology following   - trop trended and stable  - plan for cardiac cath today 1/21/2019.     Primary osteoarthritis of knee  - on aleve at home  - with aspirin and aleve, higher risk for GI bleed  - agreeable with topical cream / patch, started lidocaine, tylenol. Hold NSAIDs.    Quality Measures:  Lines: PIV     Conklin Catheter: yes   DVT Prophylaxis: Lovenox   Ulcer Prophylaxis: NA   Antibiotics: azithromycin       CODE STATUS: FULL  DISPO: ICU for respiratory failure

## 2019-01-21 NOTE — PROGRESS NOTES
Cardiology Follow Up Progress Note    Date of Service  1/21/2019    Attending Physician  Tommy Asher M.D.    Chief Complaint   SOB    HPI  Kishan Mcintyre is a 73 y.o. female admitted 1/18/2019 with edema and SOB    Interim Events  No issues overnight  NPO for cath  Patient concerned about laying flat  No chest pain  No significant tobacco exposure    Review of Systems  Review of Systems   Constitutional: Negative.    HENT: Negative.    Eyes: Negative.    Respiratory: Negative.  Negative for shortness of breath.    Cardiovascular: Negative.  Negative for chest pain, palpitations and leg swelling.   Gastrointestinal: Negative.  Negative for abdominal distention and nausea.   Endocrine: Negative.    Genitourinary: Negative.    Musculoskeletal: Negative for arthralgias.   Skin: Negative.    Allergic/Immunologic: Negative.    Neurological: Negative.  Negative for dizziness, light-headedness and numbness.   Hematological: Negative.    Psychiatric/Behavioral: Negative.        Vital signs in last 24 hours  Temp:  [36.6 °C (97.9 °F)-37.1 °C (98.7 °F)] 37.1 °C (98.7 °F)  Pulse:  [71-94] 80  Resp:  [9-39] 17  SpO2:  [85 %-94 %] 90 %    Physical Exam  Physical Exam   Constitutional: She is oriented to person, place, and time. She appears well-developed and well-nourished. No distress.   HENT:   Head: Normocephalic.   Mouth/Throat: Oropharynx is clear and moist.   Eyes: Pupils are equal, round, and reactive to light. EOM are normal. Right eye exhibits no discharge. Left eye exhibits no discharge. No scleral icterus.   Neck: Normal range of motion. Neck supple. No JVD present. No tracheal deviation present.   Cardiovascular: Normal rate, regular rhythm, S1 normal, S2 normal, normal heart sounds, intact distal pulses and normal pulses.  Exam reveals no gallop, no S3, no S4 and no friction rub.    No murmur heard.   No systolic murmur is present    No diastolic murmur is present   Pulses:       Carotid pulses are 2+ on  the right side, and 2+ on the left side.       Radial pulses are 2+ on the right side, and 2+ on the left side.        Dorsalis pedis pulses are 2+ on the right side, and 2+ on the left side.        Posterior tibial pulses are 2+ on the right side, and 2+ on the left side.   Pulmonary/Chest: Effort normal and breath sounds normal. No respiratory distress. She has no wheezes. She has no rales.   Abdominal: Soft. Bowel sounds are normal. She exhibits no distension and no mass. There is no tenderness. There is no rebound and no guarding.   Musculoskeletal: She exhibits no edema.   Neurological: She is alert and oriented to person, place, and time. No cranial nerve deficit.   Skin: Skin is warm and dry. She is not diaphoretic. No pallor.   Psychiatric: She has a normal mood and affect. Her behavior is normal. Judgment and thought content normal.   Nursing note and vitals reviewed.      Lab Review  Lab Results   Component Value Date/Time    WBC 6.8 01/21/2019 04:22 AM    RBC 4.39 01/21/2019 04:22 AM    HEMOGLOBIN 12.3 01/21/2019 04:22 AM    HEMATOCRIT 40.5 01/21/2019 04:22 AM    MCV 92.3 01/21/2019 04:22 AM    MCH 28.0 01/21/2019 04:22 AM    MCHC 30.4 (L) 01/21/2019 04:22 AM    MPV 9.3 01/21/2019 04:22 AM      Lab Results   Component Value Date/Time    SODIUM 139 01/21/2019 03:28 AM    POTASSIUM 3.7 01/21/2019 03:28 AM    CHLORIDE 95 (L) 01/21/2019 03:28 AM    CO2 39 (H) 01/21/2019 03:28 AM    GLUCOSE 138 (H) 01/21/2019 03:28 AM    BUN 23 (H) 01/21/2019 03:28 AM    CREATININE 0.51 01/21/2019 03:28 AM    CREATININE 0.71 04/19/2011 09:24 AM    BUNCREATRAT 23 04/19/2011 09:24 AM      Lab Results   Component Value Date/Time    ASTSGOT 18 01/18/2019 11:12 AM    ALTSGPT 5 01/18/2019 11:12 AM     Lab Results   Component Value Date/Time    CHOLSTRLTOT 171 07/23/2016 08:52 AM    LDL 91 07/23/2016 08:52 AM    HDL 60 07/23/2016 08:52 AM    TRIGLYCERIDE 101 07/23/2016 08:52 AM    TROPONINI 0.30 (H) 01/18/2019 09:26 PM       Recent  Labs      01/18/19   1112   BNPBTYPENAT  372*       Cardiac Imaging and Procedures Review  EKG:  My personal interpretation of the EKG dated 1/18/2019 is normal sinus rhythm low voltage in the inferior leads otherwise normal EKG     Echocardiogram: 1/18/2019   CONCLUSIONS  Prior Echo - 10/8/18Moderate aortic stenosis.  Transvalvular gradients are - Peak: 48 mmHg, Mean: 33 mmHg.  Left ventricular ejection fraction is visually estimated to be 65%.  Biatrial enlargement.  Estimated right ventricular systolic pressure  is 55 mmHg.     Cardiac Catheterization:  N/A     Imaging  Chest X-Ray:  Enlarged cardiac sillouette, infiltrates bilaterally no effusion     Stress Test:  N/A    Assessment/Plan  No new Assessment & Plan notes have been filed under this hospital service since the last note was generated.  Service: Cardiology  73-year-old female with no significant tobacco exposure.  She is n.p.o. for cath this morning.  We would like a valve study as well as a coronary angiogram.  We will await the results of the study for further recommendations.  Otherwise we will continue with diuresis.    1. AS, likely moderate    - repeat echo    - valve study in cath lab     2. COPD, end stage?, no significant smoking history    - continue lasix 40 BID    - cont diamox 250     3. Trop leak    - NPO cath today     4. Cor Pulmonale     - per above    Thank you for allowing me to participate in the care of this patient.  I will continue to follow this patient    Please contact me with any questions.    Trip Galvan M.D.   Cardiologist, [facility]  [phone]

## 2019-01-21 NOTE — PROGRESS NOTES
Critical Care Progress Note    Date of admission  1/18/2019    Chief Complaint  73 y.o. female admitted 1/18/2019 with SOB    Hospital Course    73 y.o. female with past medical history of diastolic heart failure, obesity, moderate aortic stenosis, COPD, pulmonary hypertension, who presented 1/18/2019 with progressive worsening dyspnea for approximately 1 week.  The patient and her daughter who is at bedside provide all history, they state that approximately 2 weeks ago she was on a cruise to [place] and noted a small amount of chest pain which resolved.  When she returned from [place] she noted some nasal stuffiness as well as chest congestion which worsened throughout the week and was accompanied by lower extremity swelling.  She is on 2 L 24-hour oxygen at home which was increased to 3-4 L this week.  She has noted a cough productive of tan sputum however she has been afebrile and without chills, daughter has noted some increased confusion in her mother which has resolved in the ER.  She presented to the emergency department for the above complaints and was found to be in partially compensated respiratory acidosis with a PCO2 of 104.5 and a pH of 7.26.  A chest x-ray demonstrated pulmonary edema with effusions and cardiomegaly.  Her labs show an absence of leukocytosis, chemistry panel shows normal renal function with an elevated CO2 at 44 and glucose at 133.  Her troponin was elevated at 0.31 BNP elevated at 372. EKG was non-ischemic. She was placed on noninvasive positive pressure ventilation in the emergency department and given Lasix for her pulmonary edema with hypoxia.  Following these interventions the patient states she feels much better and her breathing is much more relaxed.  She is to be admitted to the ICU for acute on chronic hypoxic respiratory failure with pulmonary edema requiring rescue BiPAP.        Interval Problem Update  Reviewed last 24 hour events:    A&O x4  Desat with sleep  Cath today HFNC  30% 40 lpm  Nebs Q4H  Home PPI  Azith 4/5  Solumedrol 40 Q8  Diamox/Lasix  I/Os  Neg  2500 cc/24 hrs  Anesthesia for cath?  Stop diamox tomorrow?  Check BMP in a.m.  To oral Pred today, taper slowly    Patient assessed and reassessed  Patient back to the Cath Lab  Patient from her perspective and await for more than 30 minutes while laying supine he became dyspneic.  She then was told they were going to do a groin approach as opposed to radial arterial approach.  At that point she requested cancellation the procedure and was returned to her room     YESTERDAY  A&O x4  HF NC 45 lpm/30% alt BiPAP 10/5  SR 80s  SBp 110s UO great  Bicarb 40  Tm  99.2    WBC normal 5.9  Taking PO  Solumedrol  Lasix/Diamox alternating  Nebs Q4H  Azithromycin 3/5    Drop to Q8 Solumedrol  Trilogy eval as an outpatient  Mobilize  Cont BiPAP with NAPs/Sleep  Another day diamox    Patient assessed and reassessed  Hemodynamics unchanged  Appeared to be struggling a bit one was napping, will use BiPAP with sleep at all times  Good response to Lasix so far today with good urine output  Reviewed option of home auto BiPAP in the form of Trilogy with patient, she appears interested  Echocardiogram reviewed with cardiology, significant pulmonary hypertension with RVSP 55 and significant valvular gradient with the aortic valve, cardiology considering cardiac catheterization for valve study tomorrow    Review of Systems  Review of Systems   Constitutional: Negative for chills, diaphoresis, fever, malaise/fatigue (Resolved mostly) and weight loss.   HENT: Negative for congestion and sore throat.    Eyes: Negative.    Respiratory: Positive for cough (Mostly resolved) and shortness of breath (Back to baseline almost). Negative for hemoptysis, sputum production (Nonproductive now/resolved) and wheezing (Resolved).    Cardiovascular: Positive for orthopnea (Chronic, persisting), leg swelling (Improved) and PND (Chronic, persisting). Negative for chest pain  and palpitations.   Gastrointestinal: Negative for abdominal pain, nausea and vomiting.   Genitourinary: Negative for dysuria, flank pain and hematuria.   Musculoskeletal: Negative for back pain.   Neurological: Negative for sensory change, focal weakness, weakness (Back to baseline) and headaches.   Psychiatric/Behavioral: Negative for depression. The patient is nervous/anxious and has insomnia (Unchanged/chronic).    Follow-up review of systems performed, see above changes    Vital Signs for last 24 hours   Temp:  [36.3 °C (97.4 °F)-37.1 °C (98.8 °F)] 36.3 °C (97.4 °F)  Pulse:  [71-93] 80  Resp:  [9-35] 19  BP: (98)/(60) 98/60  SpO2:  [85 %-95 %] 94 %    Hemodynamic parameters for last 24 hours       Respiratory   BiPAP 12/5, titrating O2, full mask    Physical Exam   Physical Exam   Constitutional: She is oriented to person, place, and time. She appears well-nourished. She is cooperative.  Non-toxic appearance. No distress. Nasal cannula and face mask in place.   HENT:   Head: Normocephalic and atraumatic.   Mouth/Throat: Oropharynx is clear and moist.   Eyes: EOM are normal. No scleral icterus.   Neck: Neck supple. No JVD (Resolved) present.   Cardiovascular: Normal rate, regular rhythm and intact distal pulses.   Occasional extrasystoles are present. Exam reveals no gallop and no friction rub.    No murmur heard.  Pulmonary/Chest: No respiratory distress (on bipap or high flow nasal cannula). She has no wheezes (Mostly resolved, breath sounds just diminished primarily). She has rhonchi. She has rales (Mostly resolved). She exhibits no tenderness.   Abdominal: Soft. Bowel sounds are normal. She exhibits no distension. There is no tenderness. There is no guarding.   Musculoskeletal: She exhibits no edema or tenderness.   Neurological: She is alert and oriented to person, place, and time. No cranial nerve deficit or sensory deficit. GCS eye subscore is 4. GCS verbal subscore is 5. GCS motor subscore is 6.   Skin:  Skin is warm and dry. No rash noted.   Vitals reviewed.  Physical exam performed, see above changes    Medications  Current Facility-Administered Medications   Medication Dose Route Frequency Provider Last Rate Last Dose   • predniSONE (DELTASONE) tablet 40 mg  40 mg Oral BID Tommy Asher M.D.   40 mg at 01/21/19 1754   • FUROSEMIDE 10 MG/ML INJ SOLN            • [START ON 1/22/2019] furosemide (LASIX) injection 20 mg  20 mg Intravenous BID DIURETIC Eugenia Moore M.D.       • senna-docusate (PERICOLACE or SENOKOT S) 8.6-50 MG per tablet 2 Tab  2 Tab Oral BID Ginger Pride M.D.   2 Tab at 01/19/19 1706    And   • polyethylene glycol/lytes (MIRALAX) PACKET 1 Packet  1 Packet Oral QDAY PRN Ginger Pride M.D.        And   • magnesium hydroxide (MILK OF MAGNESIA) suspension 30 mL  30 mL Oral QDAY PRN Ginger Pride M.D.        And   • bisacodyl (DULCOLAX) suppository 10 mg  10 mg Rectal QDAY PRN Ginger Pride M.D.       • Respiratory Care per Protocol   Nebulization Continuous RT Ginger Pride M.D.       • azithromycin (ZITHROMAX) tablet 250 mg  250 mg Oral DAILY Ginger Pride M.D.   250 mg at 01/21/19 0536   • ipratropium-albuterol (DUONEB) nebulizer solution  3 mL Nebulization Q4HRS (RT) Ginger Pride M.D.   3 mL at 01/21/19 2222   • albuterol inhaler 2 Puff  2 Puff Inhalation Q6HRS PRN Ginger Pride M.D.       • aspirin EC (ECOTRIN) tablet 81 mg  81 mg Oral DAILY Ginger Pride M.D.   81 mg at 01/21/19 0537   • busPIRone (BUSPAR) tablet 7.5 mg  7.5 mg Oral HS PRN Ginger Pride M.D.   7.5 mg at 01/20/19 2107   • clotrimazole-betamethasone (LOTRISONE) 1-0.05 % cream 1 Application  1 Application Topical BID Ginger Pride M.D.   1 Application at 01/21/19 1800   • multivitamin (THERAGRAN) tablet   Oral QAM Ginger Pride M.D.   1 Tab at 01/21/19 0537   • omeprazole (PRILOSEC) capsule 20 mg  20 mg Oral DAILY Ginger Pride M.D.   20 mg at 01/21/19 0536   • umeclidinium-vilanterol (ANORO ELLIPTA) inhaler 1 Puff  1 Puff  Inhalation QDAILY (RT) Ginger Pride M.D.   1 Puff at 01/21/19 0708    And   • fluticasone (FLOVENT HFA) 44 MCG/ACT inhaler 88 mcg  2 Puff Inhalation Q12HRS (RT) Ginger Pride M.D.   88 mcg at 01/21/19 1900   • acetaminophen (TYLENOL) tablet 500 mg  500 mg Oral Q6HRS PRN Gingre Pride M.D.   500 mg at 01/21/19 1755   • lidocaine (LIDODERM) 5 % 2 Patch  2 Patch Transdermal QDAY PRN Ginger Pride M.D.       • enoxaparin (LOVENOX) inj 40 mg  40 mg Subcutaneous DAILY Ginger Pride M.D.   Stopped at 01/21/19 0600   • ipratropium-albuterol (DUONEB) nebulizer solution  3 mL Nebulization Q2HRS PRN (RT) Tommy Asher M.D.       • acetaZOLAMIDE (DIAMOX) 250 mg in NS 50 mL IVPB  250 mg Intravenous DAILY Ginger Pride M.D.   Stopped at 01/21/19 0743       Fluids    Intake/Output Summary (Last 24 hours) at 01/21/19 2252  Last data filed at 01/21/19 2000   Gross per 24 hour   Intake              315 ml   Output             2295 ml   Net            -1980 ml       Laboratory          Recent Labs      01/19/19 0533  01/20/19 0443 01/21/19   0328   SODIUM  140  141  139   POTASSIUM  4.4  4.1  3.7   CHLORIDE  93*  97  95*   CO2  42*  40*  39*   BUN  11  17  23*   CREATININE  0.44*  0.49*  0.51   MAGNESIUM  1.9  2.3  2.2   CALCIUM  9.5  9.7  9.6     Recent Labs      01/19/19   0533  01/20/19   0443  01/21/19   0328   GLUCOSE  121*  131*  138*     Recent Labs      01/19/19   0533  01/20/19 0443  01/21/19   0422   WBC  4.0*  5.9  6.8   NEUTSPOLYS  86.80*  89.50*  88.30*   LYMPHOCYTES  10.80*  7.30*  7.10*   MONOCYTES  1.30  2.70  4.30   EOSINOPHILS  0.00  0.00  0.00   BASOPHILS  0.30  0.20  0.00     Recent Labs      01/19/19   0533  01/20/19   0443  01/21/19   0422  01/21/19   0800   RBC  4.03*  4.19*  4.39   --    HEMOGLOBIN  11.4*  11.7*  12.3   --    HEMATOCRIT  38.5  39.0  40.5   --    PLATELETCT  191  214  227   --    PROTHROMBTM   --    --    --   15.3*   INR   --    --    --   1.19*       Imaging  X-Ray:  I have personally  reviewed the images and compared with prior images.  EKG:  I have personally reviewed the images and compared with prior images.  CT:    Reviewed  Echo:   Reviewed    Assessment/Plan  * Acute on chronic respiratory failure (HCC)- (present on admission)   Assessment & Plan    Hypoxic and hypercarbic, suspect both chronic with severe acute flare  Likely related to acute exacerbation of HFpEF and AECOPD less likely bronchitis/pneumonia    Continue furosemide and Diamox, serial CMP, trend bicarb in particular  RT protocols  DuoNeb every 4 hours and every 2 hours as needed  BiPAP 12/5 with titration of O2 to 88-92% saturation, alternate with HF NC  Mobilize as tolerated  Incentive spirometry as tolerated, add PEP  Diagnostic and therapeutic bronchoscopy if she gets intubated, fairly significant risk of this problem    Patient at fairly high risk to develop progression of respiratory failure and need intubation and mechanical ventilation    Reviewed trilogy use and evaluation with patient and house staff   Patient assessed significantly on nasal cannula O2   PCO2 greater than 52   Severe obstruction on prior PFTs      This meets all the primary surgeon criteria for home auto BiPAP for chronic respiratory failure without the need for PSG  However patient probably benefit from in lab titration to improve response therapy, eval for Trilogy home auto BiPAP at discharge and her follow-up with Dr. Martinez in the pulmonary clinic, reviewed with patient     Morbid obesity (HCC)- (present on admission)   Assessment & Plan    RD consult  Behavior modification and weight loss is encouraged     Aortic stenosis- (present on admission)   Assessment & Plan    Moderate AS  Diuresis  Echo reviewed  Possible cardiac valve catheterization study 1/21 and Cath Lab       Acute on chronic diastolic congestive heart failure (HCC)- (present on admission)   Assessment & Plan    Forced diuresis with Lasix 40 mg once per day, reduce dose when at  baseline/dry weight  Continue Diamox to prevent excessive contraction metabolic alkalosis and worsening, consider 1-2 more days of Diamox and discontinue  Follow BMP, chest x-ray and fluid balance closely       COPD exacerbation (HCC)- (present on admission)   Assessment & Plan    RT/O2 Protocols  Titrate supplemental FiO2 to maintain SpO2 >88%  Conversion of IV steroids to oral prednisone, taper off over a week or so perhaps with Medrol Dosepak  Azithromycin x5 days, last dose 1/22  Continue fluconazole, DuoNeb, and Anoro Ellipta  Continue noninvasive positive pressure ventilation as needed: Sleep and naps continuously and as needed while awake for rescue  Continue to monitor for the need for intubation mechanical ventilation still at significant risk  If intubated diagnostic and therapeutic bronchoscopy may be prudent     Elevated troponin- (present on admission)   Assessment & Plan    Likely type II NSTEMI due to heart failure  Trend troponin  Echocardiogram noted  Aspirin  Placed on heparin for any worsening          VTE:  Lovenox  Ulcer: PPI  Lines: Conklin Catheter  Ongoing indication addressed    I have performed a physical exam and reviewed and updated ROS and Plan today (1/21/2019). In review of yesterday's note (1/20/2019), there are no changes except as documented above.     Patient remains marginal, keep in ICU another day    Discussed patient condition and risk of morbidity and/or mortality with Family, RN, RT, Pharmacy, Charge nurse / hot rounds and Patient     .

## 2019-01-21 NOTE — RESPIRATORY CARE
Pt rested on HHFO 45L30% all day.   Duo q4  Flovent q12  Anoro Ellipta QD       Wears 2.5L 02 home continuously.    Per Dr. Asher. Pt to rest on BiPAP when napping in day, and NOC.

## 2019-01-21 NOTE — PROGRESS NOTES
Cardiology following is diagnosing Cor Pulmonale. Intensivist and [facility] Resident, Dr. Mazariegos are diagnosing acute on chronic diastolic heart failure.    Respectfully request further clarification so that bedside nursing knows what care to provide and what kind of follow up is needed.    Patient to have angiogram today.    Delphine MOOYD RN, [facility] ext. [phone] M-F

## 2019-01-21 NOTE — CARE PLAN
Problem: Safety  Goal: Will remain free from injury  Outcome: PROGRESSING AS EXPECTED  Safety precautions in place.      Problem: Infection  Goal: Will remain free from infection  Outcome: PROGRESSING AS EXPECTED  Implement standard precautions and perform hand washing before and after patient contact. Standard precautions implemented and hand washing performed before and after patient contact and as needed.    Problem: Skin Integrity  Goal: Risk for impaired skin integrity will decrease  Outcome: PROGRESSING AS EXPECTED  Q2H turns, heels floated, lines checked. Appropriate interventions in place.

## 2019-01-21 NOTE — RESPIRATORY CARE
COPD EDUCATION by COPD CLINICAL EDUCATOR  1/21/2019  at  12:04 PM by Carmen Chaudhry     Patient interviewed by COPD education team.  Patient unable to participate in full program. A short intervention has been conducted.   She is an established patient of [facility].  She has a nebulizer, medications and utilizes the Trelegy DPI/daily to control her COPD. We discussed the optimal time for her to take her once daily medications. We reviewed the importance of a deep breath-breath hold to optimize deposition of these medications. Also, provided a comprehensive packet including information about COPD, other treatments, and early warning signs and symptoms to watch out for. [facility] referral initiated

## 2019-01-21 NOTE — PROGRESS NOTES
2 RN skin check completed with LIBORIO Santo.    Bilateral heel redness, blanching. Pillow placed to elevate heels.     Redness under breast folds and pannus, medicated cream has been applied.     Psoriasis patches on back and buttocks. Preventative mepilex placed on sacrum.     Slight indent on cheeks from HFNC. Straps readjusted.    Pt turned q2h and elbows elevated on pillows

## 2019-01-21 NOTE — PROGRESS NOTES
Received bedside report from Hiram CAPONE. Patient is on high flow NC 40L/30% - SPO2 88-94%. Plan of care for this shift was discussed with patient and daughter at bedside. Pt anxious about heart cath today, given xanax per MAR and asked what further questions pt has about the procedure. Pt denies questions and needs at this time, just anxious about procedure to be over. Bed is low, side rails up x2, call light within reach, and bed alarm activated.

## 2019-01-22 PROBLEM — J96.21 ACUTE ON CHRONIC RESPIRATORY FAILURE WITH HYPOXIA AND HYPERCAPNIA (HCC): Status: ACTIVE | Noted: 2019-01-18

## 2019-01-22 PROBLEM — J96.22 ACUTE ON CHRONIC RESPIRATORY FAILURE WITH HYPOXIA AND HYPERCAPNIA (HCC): Status: ACTIVE | Noted: 2019-01-18

## 2019-01-22 LAB
ANION GAP SERPL CALC-SCNC: 4 MMOL/L (ref 0–11.9)
BASOPHILS # BLD AUTO: 0.2 % (ref 0–1.8)
BASOPHILS # BLD: 0.01 K/UL (ref 0–0.12)
BUN SERPL-MCNC: 26 MG/DL (ref 8–22)
CALCIUM SERPL-MCNC: 9.3 MG/DL (ref 8.5–10.5)
CHLORIDE SERPL-SCNC: 96 MMOL/L (ref 96–112)
CO2 SERPL-SCNC: 39 MMOL/L (ref 20–33)
CREAT SERPL-MCNC: 0.51 MG/DL (ref 0.5–1.4)
EOSINOPHIL # BLD AUTO: 0.01 K/UL (ref 0–0.51)
EOSINOPHIL NFR BLD: 0.2 % (ref 0–6.9)
ERYTHROCYTE [DISTWIDTH] IN BLOOD BY AUTOMATED COUNT: 48.2 FL (ref 35.9–50)
GLUCOSE SERPL-MCNC: 125 MG/DL (ref 65–99)
HCT VFR BLD AUTO: 43.4 % (ref 37–47)
HGB BLD-MCNC: 12.7 G/DL (ref 12–16)
IMM GRANULOCYTES # BLD AUTO: 0.04 K/UL (ref 0–0.11)
IMM GRANULOCYTES NFR BLD AUTO: 0.6 % (ref 0–0.9)
LYMPHOCYTES # BLD AUTO: 0.62 K/UL (ref 1–4.8)
LYMPHOCYTES NFR BLD: 9.5 % (ref 22–41)
MAGNESIUM SERPL-MCNC: 2.2 MG/DL (ref 1.5–2.5)
MCH RBC QN AUTO: 28.3 PG (ref 27–33)
MCHC RBC AUTO-ENTMCNC: 29.3 G/DL (ref 33.6–35)
MCV RBC AUTO: 96.7 FL (ref 81.4–97.8)
MONOCYTES # BLD AUTO: 0.55 K/UL (ref 0–0.85)
MONOCYTES NFR BLD AUTO: 8.4 % (ref 0–13.4)
MORPHOLOGY BLD-IMP: NORMAL
NEUTROPHILS # BLD AUTO: 5.33 K/UL (ref 2–7.15)
NEUTROPHILS NFR BLD: 81.1 % (ref 44–72)
NRBC # BLD AUTO: 0 K/UL
NRBC BLD-RTO: 0 /100 WBC
PLATELET # BLD AUTO: 191 K/UL (ref 164–446)
PMV BLD AUTO: 9.7 FL (ref 9–12.9)
POTASSIUM SERPL-SCNC: 4.3 MMOL/L (ref 3.6–5.5)
RBC # BLD AUTO: 4.49 M/UL (ref 4.2–5.4)
SODIUM SERPL-SCNC: 139 MMOL/L (ref 135–145)
WBC # BLD AUTO: 6.6 K/UL (ref 4.8–10.8)

## 2019-01-22 PROCEDURE — 99233 SBSQ HOSP IP/OBS HIGH 50: CPT | Mod: GC | Performed by: INTERNAL MEDICINE

## 2019-01-22 PROCEDURE — 94640 AIRWAY INHALATION TREATMENT: CPT

## 2019-01-22 PROCEDURE — 80048 BASIC METABOLIC PNL TOTAL CA: CPT

## 2019-01-22 PROCEDURE — 83735 ASSAY OF MAGNESIUM: CPT

## 2019-01-22 PROCEDURE — 700102 HCHG RX REV CODE 250 W/ 637 OVERRIDE(OP): Performed by: INTERNAL MEDICINE

## 2019-01-22 PROCEDURE — A9270 NON-COVERED ITEM OR SERVICE: HCPCS | Performed by: INTERNAL MEDICINE

## 2019-01-22 PROCEDURE — 85025 COMPLETE CBC W/AUTO DIFF WBC: CPT

## 2019-01-22 PROCEDURE — 770022 HCHG ROOM/CARE - ICU (200)

## 2019-01-22 PROCEDURE — 94003 VENT MGMT INPAT SUBQ DAY: CPT

## 2019-01-22 PROCEDURE — 99233 SBSQ HOSP IP/OBS HIGH 50: CPT | Performed by: INTERNAL MEDICINE

## 2019-01-22 PROCEDURE — 700111 HCHG RX REV CODE 636 W/ 250 OVERRIDE (IP): Performed by: INTERNAL MEDICINE

## 2019-01-22 PROCEDURE — 700111 HCHG RX REV CODE 636 W/ 250 OVERRIDE (IP): Performed by: STUDENT IN AN ORGANIZED HEALTH CARE EDUCATION/TRAINING PROGRAM

## 2019-01-22 PROCEDURE — 700101 HCHG RX REV CODE 250: Performed by: INTERNAL MEDICINE

## 2019-01-22 RX ORDER — IPRATROPIUM BROMIDE AND ALBUTEROL SULFATE 2.5; .5 MG/3ML; MG/3ML
3 SOLUTION RESPIRATORY (INHALATION)
Status: DISCONTINUED | OUTPATIENT
Start: 2019-01-22 | End: 2019-01-25 | Stop reason: HOSPADM

## 2019-01-22 RX ORDER — FUROSEMIDE 10 MG/ML
INJECTION INTRAMUSCULAR; INTRAVENOUS
Status: ACTIVE
Start: 2019-01-22 | End: 2019-01-22

## 2019-01-22 RX ORDER — IPRATROPIUM BROMIDE AND ALBUTEROL SULFATE 2.5; .5 MG/3ML; MG/3ML
3 SOLUTION RESPIRATORY (INHALATION) 4 TIMES DAILY
Status: DISCONTINUED | OUTPATIENT
Start: 2019-01-23 | End: 2019-01-24 | Stop reason: ALTCHOICE

## 2019-01-22 RX ADMIN — FUROSEMIDE 20 MG: 10 INJECTION, SOLUTION INTRAMUSCULAR; INTRAVENOUS at 10:43

## 2019-01-22 RX ADMIN — ASPIRIN 81 MG: 81 TABLET, COATED ORAL at 05:11

## 2019-01-22 RX ADMIN — IPRATROPIUM BROMIDE AND ALBUTEROL SULFATE 3 ML: .5; 3 SOLUTION RESPIRATORY (INHALATION) at 11:23

## 2019-01-22 RX ADMIN — CLOTRIMAZOLE AND BETAMETHASONE DIPROPIONATE 1 APPLICATION: 10; .5 CREAM TOPICAL at 05:12

## 2019-01-22 RX ADMIN — IPRATROPIUM BROMIDE AND ALBUTEROL SULFATE 3 ML: .5; 3 SOLUTION RESPIRATORY (INHALATION) at 14:27

## 2019-01-22 RX ADMIN — IPRATROPIUM BROMIDE AND ALBUTEROL SULFATE 3 ML: .5; 3 SOLUTION RESPIRATORY (INHALATION) at 06:42

## 2019-01-22 RX ADMIN — FUROSEMIDE 20 MG: 10 INJECTION, SOLUTION INTRAMUSCULAR; INTRAVENOUS at 16:47

## 2019-01-22 RX ADMIN — UMECLIDINIUM BROMIDE AND VILANTEROL TRIFENATATE 1 PUFF: 62.5; 25 POWDER RESPIRATORY (INHALATION) at 19:02

## 2019-01-22 RX ADMIN — ENOXAPARIN SODIUM 40 MG: 100 INJECTION SUBCUTANEOUS at 05:11

## 2019-01-22 RX ADMIN — AZITHROMYCIN 250 MG: 250 TABLET, FILM COATED ORAL at 05:11

## 2019-01-22 RX ADMIN — THERA TABS 1 TABLET: TAB at 05:11

## 2019-01-22 RX ADMIN — PREDNISONE 40 MG: 20 TABLET ORAL at 16:46

## 2019-01-22 RX ADMIN — FLUTICASONE PROPIONATE 88 MCG: 44 AEROSOL, METERED RESPIRATORY (INHALATION) at 06:45

## 2019-01-22 RX ADMIN — PREDNISONE 40 MG: 20 TABLET ORAL at 05:11

## 2019-01-22 RX ADMIN — CLOTRIMAZOLE AND BETAMETHASONE DIPROPIONATE 1 APPLICATION: 10; .5 CREAM TOPICAL at 18:00

## 2019-01-22 RX ADMIN — IPRATROPIUM BROMIDE AND ALBUTEROL SULFATE 3 ML: .5; 3 SOLUTION RESPIRATORY (INHALATION) at 18:51

## 2019-01-22 RX ADMIN — IPRATROPIUM BROMIDE AND ALBUTEROL SULFATE 3 ML: .5; 3 SOLUTION RESPIRATORY (INHALATION) at 02:22

## 2019-01-22 RX ADMIN — BUSPIRONE HYDROCHLORIDE 7.5 MG: 30 TABLET ORAL at 22:07

## 2019-01-22 RX ADMIN — FLUTICASONE PROPIONATE 88 MCG: 44 AEROSOL, METERED RESPIRATORY (INHALATION) at 19:01

## 2019-01-22 RX ADMIN — UMECLIDINIUM BROMIDE AND VILANTEROL TRIFENATATE 1 PUFF: 62.5; 25 POWDER RESPIRATORY (INHALATION) at 06:44

## 2019-01-22 RX ADMIN — OMEPRAZOLE 20 MG: 20 CAPSULE, DELAYED RELEASE ORAL at 05:10

## 2019-01-22 ASSESSMENT — ENCOUNTER SYMPTOMS
NUMBNESS: 0
EYES NEGATIVE: 1
ABDOMINAL PAIN: 0
FEVER: 0
GASTROINTESTINAL NEGATIVE: 1
HEMATOLOGIC/LYMPHATIC NEGATIVE: 1
SHORTNESS OF BREATH: 0
ORTHOPNEA: 1
SORE THROAT: 0
NERVOUS/ANXIOUS: 0
NAUSEA: 0
PALPITATIONS: 0
DIZZINESS: 0
CONSTIPATION: 0
PND: 1
WEIGHT LOSS: 1
SHORTNESS OF BREATH: 1
NEUROLOGICAL NEGATIVE: 1
CONSTITUTIONAL NEGATIVE: 1
ENDOCRINE NEGATIVE: 1
ARTHRALGIAS: 0
MYALGIAS: 0
CARDIOVASCULAR NEGATIVE: 1
SINUS PAIN: 0
RESPIRATORY NEGATIVE: 1
ALLERGIC/IMMUNOLOGIC NEGATIVE: 1
CHILLS: 0
WHEEZING: 1
ABDOMINAL DISTENTION: 0
PSYCHIATRIC NEGATIVE: 1
SPUTUM PRODUCTION: 0
LIGHT-HEADEDNESS: 0
HEADACHES: 1
BLURRED VISION: 0
COUGH: 0

## 2019-01-22 ASSESSMENT — PAIN SCALES - GENERAL
PAINLEVEL_OUTOF10: 0

## 2019-01-22 ASSESSMENT — PULMONARY FUNCTION TESTS: EPAP_CMH2O: 5

## 2019-01-22 NOTE — CARE PLAN
Problem: Safety  Goal: Will remain free from injury  Outcome: PROGRESSING AS EXPECTED      Problem: Skin Integrity  Goal: Risk for impaired skin integrity will decrease  Outcome: PROGRESSING AS EXPECTED  Preventative mepilex placed on sacrum, heels and elbows placed on pillows, pt turned q2h. Barrier cream applied to moisture fissure on buttocks. Skin cleansed and lotioned during bath.

## 2019-01-22 NOTE — PROGRESS NOTES
Orders followed to notify MD for UOP less than 60 for four consecutive hours. [facility] paged for updates. Ordering MD, Dr. Pride, made aware of last 4 hours UOP. Orders received to reassess at next 4 hours.

## 2019-01-22 NOTE — CARE PLAN
Problem: Oxygenation:  Goal: Maintain adequate oxygenation dependent on patient condition  Outcome: PROGRESSING SLOWER THAN EXPECTED  Pt. On HHFNC 50L/40%    Problem: Bronchoconstriction:  Goal: Improve in air movement and diminished wheezing  Outcome: PROGRESSING AS EXPECTED  Pt. On Q4H Duoneb, BID Flovent, QDay Anoro Ellipta

## 2019-01-22 NOTE — PROGRESS NOTES
12HR CC    Monitor Summary    NSR 65-80bpm    .18/.10/.36    2 RN Skin assessment. No new areas of skin integrity concern observed.

## 2019-01-22 NOTE — CARE PLAN
Problem: Risk for Impaired Mobility--Activity Intolerance  Goal: Mobilize and/or Transfer Safely with Maximum [place]  Outcome: PROGRESSING AS EXPECTED  Patient feels she is nearing baseline activity tolerance and states her SOB is a lot better than when she arrived at hospital.    Problem: Pain Management  Goal: Pain level will decrease to patient's comfort goal  Outcome: PROGRESSING AS EXPECTED

## 2019-01-23 ENCOUNTER — APPOINTMENT (OUTPATIENT)
Dept: CARDIOLOGY | Facility: MEDICAL CENTER | Age: 74
DRG: 291 | End: 2019-01-23
Attending: INTERNAL MEDICINE
Payer: MEDICARE

## 2019-01-23 LAB
ANION GAP SERPL CALC-SCNC: 3 MMOL/L (ref 0–11.9)
BACTERIA BLD CULT: NORMAL
BACTERIA BLD CULT: NORMAL
BASOPHILS # BLD AUTO: 0.2 % (ref 0–1.8)
BASOPHILS # BLD: 0.01 K/UL (ref 0–0.12)
BUN SERPL-MCNC: 25 MG/DL (ref 8–22)
CALCIUM SERPL-MCNC: 9.1 MG/DL (ref 8.5–10.5)
CHLORIDE SERPL-SCNC: 97 MMOL/L (ref 96–112)
CO2 SERPL-SCNC: 41 MMOL/L (ref 20–33)
CREAT SERPL-MCNC: 0.48 MG/DL (ref 0.5–1.4)
EOSINOPHIL # BLD AUTO: 0 K/UL (ref 0–0.51)
EOSINOPHIL NFR BLD: 0 % (ref 0–6.9)
ERYTHROCYTE [DISTWIDTH] IN BLOOD BY AUTOMATED COUNT: 48.2 FL (ref 35.9–50)
GLUCOSE SERPL-MCNC: 126 MG/DL (ref 65–99)
HCT VFR BLD AUTO: 42 % (ref 37–47)
HGB BLD-MCNC: 12.2 G/DL (ref 12–16)
IMM GRANULOCYTES # BLD AUTO: 0.06 K/UL (ref 0–0.11)
IMM GRANULOCYTES NFR BLD AUTO: 0.9 % (ref 0–0.9)
LYMPHOCYTES # BLD AUTO: 0.59 K/UL (ref 1–4.8)
LYMPHOCYTES NFR BLD: 9.2 % (ref 22–41)
MAGNESIUM SERPL-MCNC: 2.2 MG/DL (ref 1.5–2.5)
MCH RBC QN AUTO: 28 PG (ref 27–33)
MCHC RBC AUTO-ENTMCNC: 29 G/DL (ref 33.6–35)
MCV RBC AUTO: 96.6 FL (ref 81.4–97.8)
MONOCYTES # BLD AUTO: 0.35 K/UL (ref 0–0.85)
MONOCYTES NFR BLD AUTO: 5.5 % (ref 0–13.4)
MORPHOLOGY BLD-IMP: NORMAL
NEUTROPHILS # BLD AUTO: 5.39 K/UL (ref 2–7.15)
NEUTROPHILS NFR BLD: 84.2 % (ref 44–72)
NRBC # BLD AUTO: 0 K/UL
NRBC BLD-RTO: 0 /100 WBC
PLATELET # BLD AUTO: 175 K/UL (ref 164–446)
PMV BLD AUTO: 9.7 FL (ref 9–12.9)
POTASSIUM SERPL-SCNC: 4.2 MMOL/L (ref 3.6–5.5)
RBC # BLD AUTO: 4.35 M/UL (ref 4.2–5.4)
SIGNIFICANT IND 70042: NORMAL
SIGNIFICANT IND 70042: NORMAL
SITE SITE: NORMAL
SITE SITE: NORMAL
SODIUM SERPL-SCNC: 141 MMOL/L (ref 135–145)
SOURCE SOURCE: NORMAL
SOURCE SOURCE: NORMAL
WBC # BLD AUTO: 6.4 K/UL (ref 4.8–10.8)

## 2019-01-23 PROCEDURE — 700101 HCHG RX REV CODE 250: Performed by: INTERNAL MEDICINE

## 2019-01-23 PROCEDURE — 80048 BASIC METABOLIC PNL TOTAL CA: CPT

## 2019-01-23 PROCEDURE — 700111 HCHG RX REV CODE 636 W/ 250 OVERRIDE (IP): Performed by: STUDENT IN AN ORGANIZED HEALTH CARE EDUCATION/TRAINING PROGRAM

## 2019-01-23 PROCEDURE — 93312 ECHO TRANSESOPHAGEAL: CPT | Mod: 26 | Performed by: INTERNAL MEDICINE

## 2019-01-23 PROCEDURE — 99157 MOD SED OTHER PHYS/QHP EA: CPT | Performed by: INTERNAL MEDICINE

## 2019-01-23 PROCEDURE — A9270 NON-COVERED ITEM OR SERVICE: HCPCS | Performed by: INTERNAL MEDICINE

## 2019-01-23 PROCEDURE — 93321 DOPPLER ECHO F-UP/LMTD STD: CPT | Mod: 26 | Performed by: INTERNAL MEDICINE

## 2019-01-23 PROCEDURE — 83735 ASSAY OF MAGNESIUM: CPT

## 2019-01-23 PROCEDURE — 99233 SBSQ HOSP IP/OBS HIGH 50: CPT | Performed by: INTERNAL MEDICINE

## 2019-01-23 PROCEDURE — 700111 HCHG RX REV CODE 636 W/ 250 OVERRIDE (IP): Performed by: INTERNAL MEDICINE

## 2019-01-23 PROCEDURE — 99156 MOD SED OTH PHYS/QHP 5/>YRS: CPT | Performed by: INTERNAL MEDICINE

## 2019-01-23 PROCEDURE — 93325 DOPPLER ECHO COLOR FLOW MAPG: CPT

## 2019-01-23 PROCEDURE — 770020 HCHG ROOM/CARE - TELE (206)

## 2019-01-23 PROCEDURE — 94640 AIRWAY INHALATION TREATMENT: CPT

## 2019-01-23 PROCEDURE — 85025 COMPLETE CBC W/AUTO DIFF WBC: CPT

## 2019-01-23 PROCEDURE — 700102 HCHG RX REV CODE 250 W/ 637 OVERRIDE(OP): Performed by: INTERNAL MEDICINE

## 2019-01-23 PROCEDURE — 93325 DOPPLER ECHO COLOR FLOW MAPG: CPT | Mod: 26 | Performed by: INTERNAL MEDICINE

## 2019-01-23 RX ORDER — SODIUM CHLORIDE 9 MG/ML
INJECTION, SOLUTION INTRAVENOUS
Status: ACTIVE
Start: 2019-01-23 | End: 2019-01-24

## 2019-01-23 RX ORDER — PROPOFOL 10 MG/ML
100 INJECTION, EMULSION INTRAVENOUS ONCE
Status: COMPLETED | OUTPATIENT
Start: 2019-01-23 | End: 2019-01-23

## 2019-01-23 RX ADMIN — IPRATROPIUM BROMIDE AND ALBUTEROL SULFATE 3 ML: .5; 3 SOLUTION RESPIRATORY (INHALATION) at 06:43

## 2019-01-23 RX ADMIN — THERA TABS 1 TABLET: TAB at 05:10

## 2019-01-23 RX ADMIN — FUROSEMIDE 20 MG: 10 INJECTION, SOLUTION INTRAMUSCULAR; INTRAVENOUS at 05:10

## 2019-01-23 RX ADMIN — ASPIRIN 81 MG: 81 TABLET, COATED ORAL at 05:10

## 2019-01-23 RX ADMIN — ACETAMINOPHEN 500 MG: 500 TABLET ORAL at 02:47

## 2019-01-23 RX ADMIN — PREDNISONE 40 MG: 20 TABLET ORAL at 05:10

## 2019-01-23 RX ADMIN — BUSPIRONE HYDROCHLORIDE 7.5 MG: 30 TABLET ORAL at 22:17

## 2019-01-23 RX ADMIN — IPRATROPIUM BROMIDE AND ALBUTEROL SULFATE 3 ML: .5; 3 SOLUTION RESPIRATORY (INHALATION) at 09:22

## 2019-01-23 RX ADMIN — CLOTRIMAZOLE AND BETAMETHASONE DIPROPIONATE 1 APPLICATION: 10; .5 CREAM TOPICAL at 05:11

## 2019-01-23 RX ADMIN — PROPOFOL 100 MG: 10 INJECTION, EMULSION INTRAVENOUS at 13:39

## 2019-01-23 RX ADMIN — IPRATROPIUM BROMIDE AND ALBUTEROL SULFATE 3 ML: .5; 3 SOLUTION RESPIRATORY (INHALATION) at 19:04

## 2019-01-23 RX ADMIN — IPRATROPIUM BROMIDE AND ALBUTEROL SULFATE 3 ML: .5; 3 SOLUTION RESPIRATORY (INHALATION) at 13:43

## 2019-01-23 RX ADMIN — FLUTICASONE PROPIONATE 88 MCG: 44 AEROSOL, METERED RESPIRATORY (INHALATION) at 19:07

## 2019-01-23 RX ADMIN — OMEPRAZOLE 20 MG: 20 CAPSULE, DELAYED RELEASE ORAL at 05:10

## 2019-01-23 RX ADMIN — FLUTICASONE PROPIONATE 88 MCG: 44 AEROSOL, METERED RESPIRATORY (INHALATION) at 06:43

## 2019-01-23 RX ADMIN — CLOTRIMAZOLE AND BETAMETHASONE DIPROPIONATE 1 APPLICATION: 10; .5 CREAM TOPICAL at 18:00

## 2019-01-23 RX ADMIN — FUROSEMIDE 20 MG: 10 INJECTION, SOLUTION INTRAMUSCULAR; INTRAVENOUS at 16:38

## 2019-01-23 RX ADMIN — ENOXAPARIN SODIUM 40 MG: 100 INJECTION SUBCUTANEOUS at 05:10

## 2019-01-23 RX ADMIN — IPRATROPIUM BROMIDE AND ALBUTEROL SULFATE 3 ML: .5; 3 SOLUTION RESPIRATORY (INHALATION) at 02:33

## 2019-01-23 RX ADMIN — UMECLIDINIUM BROMIDE AND VILANTEROL TRIFENATATE 1 PUFF: 62.5; 25 POWDER RESPIRATORY (INHALATION) at 06:43

## 2019-01-23 ASSESSMENT — ENCOUNTER SYMPTOMS
HEADACHES: 1
EYES NEGATIVE: 1
SORE THROAT: 0
SHORTNESS OF BREATH: 0
GASTROINTESTINAL NEGATIVE: 1
NUMBNESS: 0
CARDIOVASCULAR NEGATIVE: 1
ARTHRALGIAS: 0
ALLERGIC/IMMUNOLOGIC NEGATIVE: 1
ORTHOPNEA: 1
NEUROLOGICAL NEGATIVE: 1
FEVER: 0
PND: 1
WHEEZING: 1
ABDOMINAL PAIN: 0
CONSTITUTIONAL NEGATIVE: 1
PALPITATIONS: 0
SHORTNESS OF BREATH: 1
COUGH: 0
LIGHT-HEADEDNESS: 0
MYALGIAS: 0
WEIGHT LOSS: 1
CHILLS: 0
SPUTUM PRODUCTION: 0
SINUS PAIN: 0
PSYCHIATRIC NEGATIVE: 1
CONSTIPATION: 0
ABDOMINAL DISTENTION: 0
NERVOUS/ANXIOUS: 0
HEMATOLOGIC/LYMPHATIC NEGATIVE: 1
ENDOCRINE NEGATIVE: 1
NAUSEA: 0
RESPIRATORY NEGATIVE: 1
BLURRED VISION: 0
DIZZINESS: 0

## 2019-01-23 ASSESSMENT — PAIN SCALES - GENERAL
PAINLEVEL_OUTOF10: 7
PAINLEVEL_OUTOF10: 0
PAINLEVEL_OUTOF10: 4
PAINLEVEL_OUTOF10: 3
PAINLEVEL_OUTOF10: 0

## 2019-01-23 ASSESSMENT — PATIENT HEALTH QUESTIONNAIRE - PHQ9
SUM OF ALL RESPONSES TO PHQ9 QUESTIONS 1 AND 2: 0
1. LITTLE INTEREST OR PLEASURE IN DOING THINGS: NOT AT ALL
2. FEELING DOWN, DEPRESSED, IRRITABLE, OR HOPELESS: NOT AT ALL

## 2019-01-23 NOTE — PROGRESS NOTES
12 CC/Monitor Summary    Afebrile  NSR 70s with no ectopy noted  SBP 90-110s    3L NC    .16/.10/.42    2 RN skin check complete

## 2019-01-23 NOTE — RESPIRATORY CARE
Conscious Sedation Respiratory Update    )  O2 (LPM): 6  O2 Daily Delivery Respiratory : Silicone Nasal Cannula     Events/Summary/Plan: Conscious sedation for BARBARA at this time.  ETCO2 monitor on and all vitals stable during procedure. (01/23/19 1330)

## 2019-01-23 NOTE — PROGRESS NOTES
Cardiology Follow Up Progress Note    Date of Service  1/23/2019    Attending Physician  Tommy Asher M.D.    Chief Complaint   SOB    HPI  Kishan Mcintyre is a 73 y.o. female admitted 1/18/2019 with SOB    Interim Events  NPO for BARBARA  No events  Continues to diurese    Review of Systems  Review of Systems   Constitutional: Negative.    HENT: Negative.    Eyes: Negative.    Respiratory: Negative.  Negative for shortness of breath.    Cardiovascular: Negative.  Negative for chest pain, palpitations and leg swelling.   Gastrointestinal: Negative.  Negative for abdominal distention and nausea.   Endocrine: Negative.    Genitourinary: Negative.    Musculoskeletal: Negative for arthralgias.   Skin: Negative.    Allergic/Immunologic: Negative.    Neurological: Negative.  Negative for dizziness, light-headedness and numbness.   Hematological: Negative.    Psychiatric/Behavioral: Negative.        Vital signs in last 24 hours  Temp:  [36.8 °C (98.2 °F)-37.2 °C (99 °F)] 37.2 °C (99 °F)  Pulse:  [71-86] 78  Resp:  [15-34] 28  SpO2:  [89 %-100 %] 99 %    Physical Exam  Physical Exam   Constitutional: She is oriented to person, place, and time. She appears well-developed and well-nourished. No distress.   HENT:   Head: Normocephalic.   Mouth/Throat: Oropharynx is clear and moist.   Eyes: Pupils are equal, round, and reactive to light. EOM are normal. Right eye exhibits no discharge. Left eye exhibits no discharge. No scleral icterus.   Neck: Normal range of motion. Neck supple. No JVD present. No tracheal deviation present.   Cardiovascular: Normal rate, regular rhythm, S1 normal, S2 normal, normal heart sounds, intact distal pulses and normal pulses.  Exam reveals no gallop, no S3, no S4 and no friction rub.    No murmur heard.   No systolic murmur is present    No diastolic murmur is present   Pulses:       Carotid pulses are 2+ on the right side, and 2+ on the left side.       Radial pulses are 2+ on the right side,  and 2+ on the left side.        Dorsalis pedis pulses are 2+ on the right side, and 2+ on the left side.        Posterior tibial pulses are 2+ on the right side, and 2+ on the left side.   Pulmonary/Chest: Effort normal and breath sounds normal. No respiratory distress. She has no wheezes. She has no rales.   Abdominal: Soft. Bowel sounds are normal. She exhibits no distension and no mass. There is no tenderness. There is no rebound and no guarding.   Musculoskeletal: She exhibits no edema.   Neurological: She is alert and oriented to person, place, and time. No cranial nerve deficit.   Skin: Skin is warm and dry. She is not diaphoretic. No pallor.   Psychiatric: She has a normal mood and affect. Her behavior is normal. Judgment and thought content normal.   Nursing note and vitals reviewed.      Lab Review  Lab Results   Component Value Date/Time    WBC 6.4 01/23/2019 04:44 AM    RBC 4.35 01/23/2019 04:44 AM    HEMOGLOBIN 12.2 01/23/2019 04:44 AM    HEMATOCRIT 42.0 01/23/2019 04:44 AM    MCV 96.6 01/23/2019 04:44 AM    MCH 28.0 01/23/2019 04:44 AM    MCHC 29.0 (L) 01/23/2019 04:44 AM    MPV 9.7 01/23/2019 04:44 AM      Lab Results   Component Value Date/Time    SODIUM 141 01/23/2019 04:44 AM    POTASSIUM 4.2 01/23/2019 04:44 AM    CHLORIDE 97 01/23/2019 04:44 AM    CO2 41 (HH) 01/23/2019 04:44 AM    GLUCOSE 126 (H) 01/23/2019 04:44 AM    BUN 25 (H) 01/23/2019 04:44 AM    CREATININE 0.48 (L) 01/23/2019 04:44 AM    CREATININE 0.71 04/19/2011 09:24 AM    BUNCREATRAT 23 04/19/2011 09:24 AM      Lab Results   Component Value Date/Time    ASTSGOT 18 01/18/2019 11:12 AM    ALTSGPT 5 01/18/2019 11:12 AM     Lab Results   Component Value Date/Time    CHOLSTRLTOT 171 07/23/2016 08:52 AM    LDL 91 07/23/2016 08:52 AM    HDL 60 07/23/2016 08:52 AM    TRIGLYCERIDE 101 07/23/2016 08:52 AM    TROPONINI 0.30 (H) 01/18/2019 09:26 PM             Cardiac Imaging and Procedures Review  EKG:  My personal interpretation of the EKG dated  1/18/2019 is normal sinus rhythm low voltage in the inferior leads otherwise normal EKG     Echocardiogram: 1/18/2019   CONCLUSIONS  Prior Echo - 10/8/18Moderate aortic stenosis.  Transvalvular gradients are - Peak: 48 mmHg, Mean: 33 mmHg.  Left ventricular ejection fraction is visually estimated to be 65%.  Biatrial enlargement.  Estimated right ventricular systolic pressure  is 55 mmHg.     Cardiac Catheterization:  N/A     Imaging  Chest X-Ray:  Enlarged cardiac sillouette, infiltrates bilaterally no effusion     Stress Test:  N/A    Assessment/Plan  No new Assessment & Plan notes have been filed under this hospital service since the last note was generated.  Service: Cardiology  73-year-old female with no significant tobacco exposure and PFTs are certainly out of proportion to each other.  BARBARA today.  Otherwise no changes until after her BARBARA.     1. AS, likely moderate    - BARBARA today     2. COPD, end stage?, no significant smoking history    - continue lasix 40 BID    - cont diamox 250     3. Trop leak    - Per above     4. Cor Pulmonale     - per above    Thank you for allowing me to participate in the care of this patient.  I will continue to follow this patient    Please contact me with any questions.    Trip Galvan M.D.   Cardiologist, [facility]  [phone]

## 2019-01-23 NOTE — CARE PLAN
Problem: Bronchoconstriction:  Goal: Improve in air movement and diminished wheezing  Outcome: PROGRESSING AS EXPECTED  Pt. On QID Duoneb, BID Flovent, and QDay Anoro Ellipta.  3L via NC

## 2019-01-23 NOTE — CARE PLAN
Problem: Oxygenation:  Goal: Maintain adequate oxygenation dependent on patient condition  Outcome: PROGRESSING AS EXPECTED  Pt. At this time titrated to 5L Oxymask    Problem: Bronchoconstriction:  Goal: Improve in air movement and diminished wheezing  Outcome: PROGRESSING AS EXPECTED  Pt. Currently on Q4H Duoneb

## 2019-01-23 NOTE — CARE PLAN
Problem: Skin Integrity  Goal: Risk for impaired skin integrity will decrease  Outcome: PROGRESSING AS EXPECTED  Refused mepilex, states that it makes her itchy. Patient turns herself and walks occasionally. Pt had full bath today and lotion applied to skin. Medicated lotion applied per MAR.     Problem: Mobility  Goal: Risk for activity intolerance will decrease  Outcome: PROGRESSING AS EXPECTED  Pt walked two half laps around the unit today with wheeled walker and O2 first on non-breather, second time on 8L oxymask. Patient tolerated well and feels she is getting closer to her baseline. No complaints of SOB when ambulating.

## 2019-01-23 NOTE — PROGRESS NOTES
12 hour chart check and monitor summary    Sinus Rhythm    HR; 70s    .16/.10/.40    2 RN skin check

## 2019-01-23 NOTE — PROGRESS NOTES
Cardiology Follow Up Progress Note    Date of Service  1/22/2019    Attending Physician  Tommy Asher M.D.    Chief Complaint   SOB    HPI  Kishan Mcintyre is a 73 y.o. female admitted 1/18/2019 with SOB    Interim Events  Cath yesterday canceled  Continued SOB  PFTs from 2 years ago showed severe diseased  11L negative    Review of Systems  Review of Systems   Constitutional: Negative.    HENT: Negative.    Eyes: Negative.    Respiratory: Negative.  Negative for shortness of breath.    Cardiovascular: Negative.  Negative for chest pain, palpitations and leg swelling.   Gastrointestinal: Negative.  Negative for abdominal distention and nausea.   Endocrine: Negative.    Genitourinary: Negative.    Musculoskeletal: Negative for arthralgias.   Skin: Negative.    Allergic/Immunologic: Negative.    Neurological: Negative.  Negative for dizziness, light-headedness and numbness.   Hematological: Negative.    Psychiatric/Behavioral: Negative.        Vital signs in last 24 hours  Temp:  [36.3 °C (97.4 °F)-36.9 °C (98.5 °F)] 36.8 °C (98.2 °F)  Pulse:  [68-88] 75  Resp:  [10-34] 16  BP: ()/(40-62) 94/48  SpO2:  [89 %-98 %] 93 %    Physical Exam  Physical Exam   Constitutional: She is oriented to person, place, and time. She appears well-developed and well-nourished. No distress.   HENT:   Head: Normocephalic.   Mouth/Throat: Oropharynx is clear and moist.   Eyes: Pupils are equal, round, and reactive to light. EOM are normal. Right eye exhibits no discharge. Left eye exhibits no discharge. No scleral icterus.   Neck: Normal range of motion. Neck supple. No JVD present. No tracheal deviation present.   Cardiovascular: Normal rate, regular rhythm, S1 normal, S2 normal, normal heart sounds, intact distal pulses and normal pulses.  Exam reveals no gallop, no S3, no S4 and no friction rub.    No murmur heard.   No systolic murmur is present    No diastolic murmur is present   Pulses:       Carotid pulses are 2+ on  the right side, and 2+ on the left side.       Radial pulses are 2+ on the right side, and 2+ on the left side.        Dorsalis pedis pulses are 2+ on the right side, and 2+ on the left side.        Posterior tibial pulses are 2+ on the right side, and 2+ on the left side.   Pulmonary/Chest: Effort normal and breath sounds normal. No respiratory distress. She has no wheezes. She has no rales.   Abdominal: Soft. Bowel sounds are normal. She exhibits no distension and no mass. There is no tenderness. There is no rebound and no guarding.   Musculoskeletal: She exhibits no edema.   Neurological: She is alert and oriented to person, place, and time. No cranial nerve deficit.   Skin: Skin is warm and dry. She is not diaphoretic. No pallor.   Psychiatric: She has a normal mood and affect. Her behavior is normal. Judgment and thought content normal.   Nursing note and vitals reviewed.      Lab Review  Lab Results   Component Value Date/Time    WBC 6.6 01/22/2019 07:23 AM    RBC 4.49 01/22/2019 07:23 AM    HEMOGLOBIN 12.7 01/22/2019 07:23 AM    HEMATOCRIT 43.4 01/22/2019 07:23 AM    MCV 96.7 01/22/2019 07:23 AM    MCH 28.3 01/22/2019 07:23 AM    MCHC 29.3 (L) 01/22/2019 07:23 AM    MPV 9.7 01/22/2019 07:23 AM      Lab Results   Component Value Date/Time    SODIUM 139 01/22/2019 05:25 AM    POTASSIUM 4.3 01/22/2019 05:25 AM    CHLORIDE 96 01/22/2019 05:25 AM    CO2 39 (H) 01/22/2019 05:25 AM    GLUCOSE 125 (H) 01/22/2019 05:25 AM    BUN 26 (H) 01/22/2019 05:25 AM    CREATININE 0.51 01/22/2019 05:25 AM    CREATININE 0.71 04/19/2011 09:24 AM    BUNCREATRAT 23 04/19/2011 09:24 AM      Lab Results   Component Value Date/Time    ASTSGOT 18 01/18/2019 11:12 AM    ALTSGPT 5 01/18/2019 11:12 AM     Lab Results   Component Value Date/Time    CHOLSTRLTOT 171 07/23/2016 08:52 AM    LDL 91 07/23/2016 08:52 AM    HDL 60 07/23/2016 08:52 AM    TRIGLYCERIDE 101 07/23/2016 08:52 AM    TROPONINI 0.30 (H) 01/18/2019 09:26 PM              Cardiac Imaging and Procedures Review  EKG:  My personal interpretation of the EKG dated 1/18/2019 is normal sinus rhythm low voltage in the inferior leads otherwise normal EKG     Echocardiogram: 1/18/2019   CONCLUSIONS  Prior Echo - 10/8/18Moderate aortic stenosis.  Transvalvular gradients are - Peak: 48 mmHg, Mean: 33 mmHg.  Left ventricular ejection fraction is visually estimated to be 65%.  Biatrial enlargement.  Estimated right ventricular systolic pressure  is 55 mmHg.     Cardiac Catheterization:  N/A     Imaging  Chest X-Ray:  Enlarged cardiac sillouette, infiltrates bilaterally no effusion     Stress Test:  N/A  Assessment/Plan  No new Assessment & Plan notes have been filed under this hospital service since the last note was generated.  Service: Cardiology  73-year-old female with no significant tobacco exposure and PFTs are certainly out of proportion to each other.  Her cath has been canceled.  We will do BARBARA tomorrow morning to further assess her valves and if it is normal or moderate we will simply stop the workup and continue her on Lasix as an outpatient.     1. AS, likely moderate    - BARBARA in AM     2. COPD, end stage?, no significant smoking history    - continue lasix 40 BID    - cont diamox 250     3. Trop leak    - Per above     4. Cor Pulmonale     - per above    Thank you for allowing me to participate in the care of this patient.  I will continue to follow this patient    Please contact me with any questions.    Trip Galvan M.D.   Cardiologist, [facility]  [phone]

## 2019-01-23 NOTE — PROGRESS NOTES
[facility] ICU Progress Note      Admit Date: 1/18/2019  ICU Day:  4      Resident(s): Baljit Chung/Tray  Attending: ARAMIS IRIZARRY/ Dr. Apple    Date & Time:   1/22/2019   4:03 PM       Patient ID:    Name:             Kishan Mcintyre   YOB: 1945  Age:                 73 y.o.  female   MRN:               [id number]    HPI:  74 yo F with COPD on 2L (recently increased to 4L at home), HFpEF & moderate AS (Echo on 1/19/2019 significant for EF 65, diastolic dysfunction grade II), came in with worsening SOB, tachypneic at ER and required BIPAP. On presentation, HCO 44, trop 0.3, , PH 7.26, CO2 104, CXR pulmonary edema with effusions, EKG no ischemic changes. She was admitted for exacerbation of COPD and diastolic heart failure requiring BiPAP.     Consultants:  Cardiology, Trip Galvan M.D.  PMA: Tommy Asher M.D.    Procedures:  Scheduled for cardiac cath today 1/21/2019      Imaging:  EC-ECHOCARDIOGRAM COMPLETE W/O CONT   Final Result      DX-CHEST-PORTABLE (1 VIEW)   Final Result      No significant change      CT-CTA CHEST PULMONARY ARTERY W/ RECONS   Final Result      No evidence of pulmonary embolus.      Collapse of the right middle lobe of unknown etiology could be due to bronchial stricture, mucous plugging or perhaps malignancy.      Bibasilar atelectasis with trace bilateral pleural effusions.      Cardiomegaly.      3D angiographic/MIP images of the vasculature confirm the vascular findings as described above.            DX-CHEST-PORTABLE (1 VIEW)   Final Result      Pulmonary edema, pleural effusions, basilar atelectasis and cardiac silhouette enlargement          Interval Events:  -No longer requiring HFNC  -Now on 5L oxymask - feeling much better - hemodynamically stable  -Wt dropped from 227 to 195 lbs  -stopped diamox as no improvement seen in Co2 after several days.   -Continue lasix  -No cath per patient  -BARBARA planned for tomorrow  -last dose azithromycin  today      Review of Systems   Constitutional: Positive for malaise/fatigue and weight loss. Negative for chills and fever.   HENT: Negative for congestion, sinus pain and sore throat.    Eyes: Negative for blurred vision.   Respiratory: Positive for shortness of breath and wheezing. Negative for cough and sputum production.    Cardiovascular: Positive for orthopnea, leg swelling and PND. Negative for chest pain and palpitations.        SOB doing better   Gastrointestinal: Negative for abdominal pain, constipation and nausea.   Genitourinary: Negative for dysuria and urgency.   Musculoskeletal: Negative for myalgias.   Neurological: Positive for headaches. Negative for dizziness.   Psychiatric/Behavioral: The patient is not nervous/anxious.        PHYSICAL EXAM  Gen: Obese, elderly female, sitting comfortably  HEENT: NC/AT, no scleral icterus, no conjunctival injection, mucous membranes pink and dry.  Neck: Supple.  Cardiac: S1S2, 3/6 systolic murmur heard best over aortic, no r/g, no JVD.  Respiratory: increased respiratory effort, diminished breath sounds bilaterally, minimal diffuse wheezing   Abdomen: BS+, soft, NT/ND, no rebound/guarding, no palpable organomegaly.  Ext: 1+ pitting edema with venostasis changes, 2+ DP pulses b/l.  Skin: Warm, dry. No rashes or erythema.   Neuro: AAOx4, CN II-XII grossly normal, no focal sensory or motor deficits.   Psych: Affect, mood, judgement normal.    Respiratory:     Respiration: 19, Pulse Oximetry: 95 %, O2 Daily Delivery Respiratory : OxyMask    HemoDynamics:  Pulse: 77, Heart Rate (Monitored): 77 Blood Pressure : (!) 94/48, NIBP: 117/41      Fluids:    Date 01/22/19 0700 - 01/23/19 0659   Shift 4434-8133 5769-1172 7934-8231 24 Hour Total   I  N  T  A  K  E   P.O. 478   478      P.O. 478   478    Shift Total 478   478   O  U  T  P  U  T   Urine 650   650      Output (mL) (Urethral Catheter Non-latex 16 Fr.) 650   650    Shift Total 650   650   NET -172   -172         Intake/Output Summary (Last 24 hours) at 19 1603  Last data filed at 19 1200   Gross per 24 hour   Intake              653 ml   Output             1130 ml   Net             -477 ml       Weight: 88.8 kg (195 lb 12.3 oz)  Body mass index is 38.23 kg/m².    Recent Labs      19   0525   SODIUM  141  139  139   POTASSIUM  4.1  3.7  4.3   CHLORIDE  97  95*  96   CO2  40*  39*  39*   BUN  17  23*  26*   CREATININE  0.49*  0.51  0.51   MAGNESIUM  2.3  2.2  2.2   CALCIUM  9.7  9.6  9.3       GI/Nutrition:  Recent Labs      19   0525   GLUCOSE  131*  138*  125*       Heme:  Recent Labs      19   0800  19   0723   RBC  4.19*  4.39   --   4.49   HEMOGLOBIN  11.7*  12.3   --   12.7   HEMATOCRIT  39.0  40.5   --   43.4   PLATELETCT  214  227   --   191   PROTHROMBTM   --    --   15.3*   --    INR   --    --   1.19*   --        Infectious Disease:  Temp  Av.6 °C (97.9 °F)  Min: 36.3 °C (97.4 °F)  Max: 36.9 °C (98.5 °F)  Recent Labs      19   0723   WBC  5.9  6.8  6.6   NEUTSPOLYS  89.50*  88.30*  81.10*   LYMPHOCYTES  7.30*  7.10*  9.50*   MONOCYTES  2.70  4.30  8.40   EOSINOPHILS  0.00  0.00  0.20   BASOPHILS  0.20  0.00  0.20       Meds:  • furosemide       • predniSONE  40 mg     • furosemide  20 mg     • senna-docusate  2 Tab      And   • polyethylene glycol/lytes  1 Packet      And   • magnesium hydroxide  30 mL      And   • bisacodyl  10 mg     • Respiratory Care per Protocol       • ipratropium-albuterol  3 mL     • albuterol  2 Puff     • aspirin EC  81 mg     • busPIRone  7.5 mg     • clotrimazole-betamethasone  1 Application     • multivitamin       • omeprazole  20 mg     • umeclidinium-vilanterol  1 Puff      And   • fluticasone  2 Puff     • acetaminophen  500 mg     • lidocaine  2 Patch     • enoxaparin (LOVENOX) injection  40 mg      • ipratropium-albuterol  3 mL            Problem and Plan:    * Acute on chronic respiratory failure with hypoxia and hypercapnia (HCC)- (present on admission)   Assessment & Plan    - Gradually improving, currently on  5L oxymask. Subjectively feeling much better - SOB and LE edema improved.    - Home oxygen 2-2.5L, recently using 4L at home  - No longer requiring BIPAP   - IV steroid, azithromycin (oral, last dose 1/22/19) and lasix - stop diamox  - RT protocol, and encourage IS/PEP     Aortic stenosis- (present on admission)   Assessment & Plan    - Moderate AS confirmed with Echo on 1/19/2019  - repeat echo shows Transvalvular gradients are - Peak: 48 mmHg, Mean: 33 mmHg. (increased from prior)  - followed by Dr Rodrigez as outpatient, monitoring with serial echos.  - BARBARA planned for tomorrow     Acute on chronic diastolic congestive heart failure (HCC)- (present on admission)   Assessment & Plan    ECHO: grade II diastolic dysfunction  Severe edema on admit - ?related to worsening AS vs diastolic failure?  - Decreasing oxygen demand. Now on 5L oxymask  - Cont lasix and DC Diamox IV (no Co2 improvement)  - Monitoring lytes, Cr  - Cardiology following, plan for BARBARA tomorrow     COPD exacerbation (HCC)- (present on admission)   Assessment & Plan    - Improving  - baseline O2 2L 24/7  - Prednisone 40mg  - Azithromycin (end date 1/22/19)  - Duonebs, albuterol and RT protocol.  - Continue home trelegy, albuterol prn     Elevated troponin- (present on admission)   Assessment & Plan    - 0.3 x3, no ischemic EKG changesin the setting of respiratory failure  - Likely due to demand  - No need to further trend      Morbid obesity (HCC)- (present on admission)   Assessment & Plan    Dietary consult     Primary osteoarthritis of knee- (present on admission)   Assessment & Plan    - on aleve at home  - with aspirin and aleve, higher risk for GI bleed  - agreeable with topical cream / patch, started lidocaine, tylenol. Hold  NSAIDs         Quality Measures:  Lines: PIV     Conklin Catheter: yes   DVT Prophylaxis: Lovenox   Ulcer Prophylaxis: NA   Antibiotics: azithromycin       CODE STATUS: FULL  DISPO: ICU for respiratory failure

## 2019-01-23 NOTE — HEART FAILURE PROGRAM
BARBARA to evaluate AS. If normal or moderate continue lasix outpatient. Pt remains in [facility].    HF f/u still not scheduled. Order is placed.    Delphine MOODY RN, [employer] ext. [phone] M-F

## 2019-01-23 NOTE — PROGRESS NOTES
[facility] Progress Note      Admit Date: 1/18/2019  ICU Day:  5      Resident(s): Baljit Chung/Tray  Attending: ARAMIS IRIZARRY/ Dr. Apple    Date & Time:   1/23/2019   2:00 PM       Patient ID:    Name:             Kishan Mcintyre   YOB: 1945  Age:                 73 y.o.  female   MRN:               [id number]    HPI:  72 yo F with COPD on 2L (recently increased to 4L at home), HFpEF & moderate AS (Echo on 1/19/2019 significant for EF 65, diastolic dysfunction grade II), came in with worsening SOB, tachypneic at ER and required BIPAP. On presentation, HCO 44, trop 0.3, , PH 7.26, CO2 104, CXR pulmonary edema with effusions, EKG no ischemic changes. She was admitted for exacerbation of COPD and diastolic heart failure requiring BiPAP.     Consultants:  Cardiology, Trip Galvan M.D.  PMA: Tommy Asher M.D.    Procedures:  None      Imaging:  EC-ECHOCARDIOGRAM COMPLETE W/O CONT   Final Result      DX-CHEST-PORTABLE (1 VIEW)   Final Result      No significant change      CT-CTA CHEST PULMONARY ARTERY W/ RECONS   Final Result      No evidence of pulmonary embolus.      Collapse of the right middle lobe of unknown etiology could be due to bronchial stricture, mucous plugging or perhaps malignancy.      Bibasilar atelectasis with trace bilateral pleural effusions.      Cardiomegaly.      3D angiographic/MIP images of the vasculature confirm the vascular findings as described above.            DX-CHEST-PORTABLE (1 VIEW)   Final Result      Pulmonary edema, pleural effusions, basilar atelectasis and cardiac silhouette enlargement          Interval Events:  -No longer requiring HFNC, currently on 3-4 L of nasal cannula (patient using oxygen at home 2-4 L at her baseline 24/7)  -Feeling much better - hemodynamically stable  -Wt dropped from 227 to 195 lbs  -stopped diamox as no improvement seen in Co2 after several days, will consider initiating Diamox if her bicarbonate continue  escalating.  -Continue lasix  -No cath per patient  -Per cardiologist Dr. Galvan, BARBARA planned for today 1/23/2019  -Last dose azithromycin was 1/22/2019  -We will hold on prednisone 40 mg today 1/23/2019      Review of Systems   Constitutional: Positive for malaise/fatigue and weight loss. Negative for chills and fever.   HENT: Negative for congestion, sinus pain and sore throat.    Eyes: Negative for blurred vision.   Respiratory: Positive for shortness of breath and wheezing. Negative for cough and sputum production.    Cardiovascular: Positive for orthopnea, leg swelling and PND. Negative for chest pain and palpitations.        SOB doing better   Gastrointestinal: Negative for abdominal pain, constipation and nausea.   Genitourinary: Negative for dysuria and urgency.   Musculoskeletal: Negative for myalgias.   Neurological: Positive for headaches. Negative for dizziness.   Psychiatric/Behavioral: The patient is not nervous/anxious.        PHYSICAL EXAM  Gen: Obese, elderly female, sitting comfortably  HEENT: NC/AT, no scleral icterus, no conjunctival injection, mucous membranes pink and dry.  Neck: Supple.  Cardiac: S1S2, 3/6 systolic murmur heard best over aortic, no r/g, no JVD.  Respiratory: increased respiratory effort, diminished breath sounds bilaterally, minimal diffuse wheezing   Abdomen: BS+, soft, NT/ND, no rebound/guarding, no palpable organomegaly.  Ext: 1+ pitting edema with venostasis changes, 2+ DP pulses b/l.  Skin: Warm, dry. No rashes or erythema.   Neuro: AAOx4, CN II-XII grossly normal, no focal sensory or motor deficits.   Psych: Affect, mood, judgement normal.    Respiratory:     Respiration: 16, Pulse Oximetry: 89 %, O2 Daily Delivery Respiratory : Silicone Nasal Cannula    HemoDynamics:  Pulse: 77, Heart Rate (Monitored): 77 NIBP: 100/42      Fluids:    Date 01/23/19 0700 - 01/24/19 0659   Shift 3083-1745 4139-1424 3780-9265 24 Hour Total   I  N  T  A  K  E   Shift Total        O  U  T  P  U  T   Urine 325   325      Output (mL) (Urethral Catheter Non-latex 16 Fr.) 325   325    Shift Total 325   325   NET -325   -325        Intake/Output Summary (Last 24 hours) at 19 1400  Last data filed at 19 0800   Gross per 24 hour   Intake              220 ml   Output             1500 ml   Net            -1280 ml          Body mass index is 38.23 kg/m².    Recent Labs      19   0525  19   0444   SODIUM  139  139  141   POTASSIUM  3.7  4.3  4.2   CHLORIDE  95*  96  97   CO2  39*  39*  41*   BUN  23*  26*  25*   CREATININE  0.51  0.51  0.48*   MAGNESIUM  2.2  2.2  2.2   CALCIUM  9.6  9.3  9.1       GI/Nutrition:  Recent Labs      19   0525  19   0444   GLUCOSE  138*  125*  126*       Heme:  Recent Labs      19   0422  19   0800  19   0723  19   0444   RBC  4.39   --   4.49  4.35   HEMOGLOBIN  12.3   --   12.7  12.2   HEMATOCRIT  40.5   --   43.4  42.0   PLATELETCT  227   --   191  175   PROTHROMBTM   --   15.3*   --    --    INR   --   1.19*   --    --        Infectious Disease:  Temp  Av.9 °C (98.4 °F)  Min: 36.8 °C (98.2 °F)  Max: 37.2 °C (99 °F)  Recent Labs      19   04219   0719   0444   WBC  6.8  6.6  6.4   NEUTSPOLYS  88.30*  81.10*  84.20*   LYMPHOCYTES  7.10*  9.50*  9.20*   MONOCYTES  4.30  8.40  5.50   EOSINOPHILS  0.00  0.20  0.00   BASOPHILS  0.00  0.20  0.20       Meds:  • NS       • ipratropium-albuterol  3 mL     • ipratropium-albuterol  3 mL     • furosemide  20 mg     • senna-docusate  2 Tab      And   • polyethylene glycol/lytes  1 Packet      And   • magnesium hydroxide  30 mL      And   • bisacodyl  10 mg     • Respiratory Care per Protocol       • albuterol  2 Puff     • aspirin EC  81 mg     • busPIRone  7.5 mg     • clotrimazole-betamethasone  1 Application     • multivitamin       • omeprazole  20 mg     • umeclidinium-vilanterol  1 Puff      And   •  fluticasone  2 Puff     • acetaminophen  500 mg     • lidocaine  2 Patch     • enoxaparin (LOVENOX) injection  40 mg     • ipratropium-albuterol  3 mL            Problem and Plan:    Acute on chronic respiratory failure with hypoxia and hypercapnia  -Gradually improving, currently off HFNC, saturating well on 3-4 L nasal cannula (her baseline is 2-4 L at home 24/7)  -Patient subjectively feeling better  -No longer requiring BIPAP.  -Hold on IV steroid  -Finished azithromycin course (oral, last dose will be 1/22/2019)   -Hold on Diamox, consider reinitiating Diamox if her bicarbonate continue to escalate   -Continue Lasix.  - Continue RT protocol, and encourage IS  -Patient is stable and ready to transfer to telemetry floor, orders are active in epic     Aortic stenosis  - Moderate AS confirmed with Echo on 1/19/2019, patient is a scheduled for BARBARA today 1/23/2019.  - repeat echo shows Transvalvular gradients are - Peak: 48 mmHg, Mean: 33 mmHg. (increased from prior)  - followed by Dr Rodrigez as outpatient, monitoring with serial echos.    Acute on chronic diastolic congestive heart failure (HCC)  - ECHO: grade II diastolic dysfunction  - Severe edema on admit - ?related to worsening AS vs diastolic failure?  - Decreasing oxygen demand. Now on 3-4 L nasal cannula  - Cont lasix and DC Diamox IV (no Co2 improvement)  - Monitoring Ishaan navarro  - Cardiology following, plan for BARBARA 1/23/2019    COPD exacerbation (HCC)  - Gradually improving, does not require HFNC or BiPAP, currently saturating well on 3-4 L nasal cannula  -Patient use 2-4 L of oxygen at home 24/7 as baseline  -Hold on IV steroids and Diamox  -Patient finish azithromycin course for COPD exacebation (end date 1/22/19)  - Cont. duonebs, RT protochol.  - Continue home trelegy, albuterol prn.      Elevated troponin  - 0.3 x3, no ischemic EKG changesin the setting of respiratory failure,  - cardiology following   - trop trended and stable, no further trending  troponin  -Plan for doing BARBARA on 1/23/2019     Primary osteoarthritis of knee  - on aleve at home  - with aspirin and aleve, higher risk for GI bleed  - agreeable with topical cream / patch, started lidocaine, tylenol. Hold NSAIDs      Quality Measures:  Lines: PIV     Conklin Catheter: yes   DVT Prophylaxis: Lovenox   Ulcer Prophylaxis: NA   Antibiotics: No, finished azithromycin course (1/18-1/22)     CODE STATUS: FULL  DISPO: ICU, stable to transfer to telemetry floor, active orders already in epic.

## 2019-01-24 LAB
ANION GAP SERPL CALC-SCNC: 4 MMOL/L (ref 0–11.9)
BASOPHILS # BLD AUTO: 0.2 % (ref 0–1.8)
BASOPHILS # BLD: 0.02 K/UL (ref 0–0.12)
BUN SERPL-MCNC: 26 MG/DL (ref 8–22)
CALCIUM SERPL-MCNC: 9.1 MG/DL (ref 8.5–10.5)
CHLORIDE SERPL-SCNC: 96 MMOL/L (ref 96–112)
CO2 SERPL-SCNC: 41 MMOL/L (ref 20–33)
CREAT SERPL-MCNC: 0.41 MG/DL (ref 0.5–1.4)
EOSINOPHIL # BLD AUTO: 0.05 K/UL (ref 0–0.51)
EOSINOPHIL NFR BLD: 0.6 % (ref 0–6.9)
ERYTHROCYTE [DISTWIDTH] IN BLOOD BY AUTOMATED COUNT: 47.6 FL (ref 35.9–50)
GLUCOSE SERPL-MCNC: 86 MG/DL (ref 65–99)
HCT VFR BLD AUTO: 44.6 % (ref 37–47)
HGB BLD-MCNC: 12.9 G/DL (ref 12–16)
IMM GRANULOCYTES # BLD AUTO: 0.04 K/UL (ref 0–0.11)
IMM GRANULOCYTES NFR BLD AUTO: 0.5 % (ref 0–0.9)
LYMPHOCYTES # BLD AUTO: 1.36 K/UL (ref 1–4.8)
LYMPHOCYTES NFR BLD: 16.9 % (ref 22–41)
MAGNESIUM SERPL-MCNC: 2.2 MG/DL (ref 1.5–2.5)
MCH RBC QN AUTO: 28 PG (ref 27–33)
MCHC RBC AUTO-ENTMCNC: 28.9 G/DL (ref 33.6–35)
MCV RBC AUTO: 96.7 FL (ref 81.4–97.8)
MONOCYTES # BLD AUTO: 0.77 K/UL (ref 0–0.85)
MONOCYTES NFR BLD AUTO: 9.6 % (ref 0–13.4)
NEUTROPHILS # BLD AUTO: 5.79 K/UL (ref 2–7.15)
NEUTROPHILS NFR BLD: 72.2 % (ref 44–72)
NRBC # BLD AUTO: 0 K/UL
NRBC BLD-RTO: 0 /100 WBC
PLATELET # BLD AUTO: 194 K/UL (ref 164–446)
PMV BLD AUTO: 9.9 FL (ref 9–12.9)
POTASSIUM SERPL-SCNC: 3.7 MMOL/L (ref 3.6–5.5)
RBC # BLD AUTO: 4.61 M/UL (ref 4.2–5.4)
SODIUM SERPL-SCNC: 141 MMOL/L (ref 135–145)
WBC # BLD AUTO: 8 K/UL (ref 4.8–10.8)

## 2019-01-24 PROCEDURE — 80048 BASIC METABOLIC PNL TOTAL CA: CPT

## 2019-01-24 PROCEDURE — A9270 NON-COVERED ITEM OR SERVICE: HCPCS | Performed by: STUDENT IN AN ORGANIZED HEALTH CARE EDUCATION/TRAINING PROGRAM

## 2019-01-24 PROCEDURE — 99233 SBSQ HOSP IP/OBS HIGH 50: CPT | Mod: GC | Performed by: INTERNAL MEDICINE

## 2019-01-24 PROCEDURE — 700102 HCHG RX REV CODE 250 W/ 637 OVERRIDE(OP): Performed by: INTERNAL MEDICINE

## 2019-01-24 PROCEDURE — A9270 NON-COVERED ITEM OR SERVICE: HCPCS | Performed by: INTERNAL MEDICINE

## 2019-01-24 PROCEDURE — 83735 ASSAY OF MAGNESIUM: CPT

## 2019-01-24 PROCEDURE — 94640 AIRWAY INHALATION TREATMENT: CPT

## 2019-01-24 PROCEDURE — 700102 HCHG RX REV CODE 250 W/ 637 OVERRIDE(OP): Performed by: STUDENT IN AN ORGANIZED HEALTH CARE EDUCATION/TRAINING PROGRAM

## 2019-01-24 PROCEDURE — 700111 HCHG RX REV CODE 636 W/ 250 OVERRIDE (IP): Performed by: INTERNAL MEDICINE

## 2019-01-24 PROCEDURE — 99233 SBSQ HOSP IP/OBS HIGH 50: CPT | Performed by: INTERNAL MEDICINE

## 2019-01-24 PROCEDURE — 700101 HCHG RX REV CODE 250: Performed by: INTERNAL MEDICINE

## 2019-01-24 PROCEDURE — 770020 HCHG ROOM/CARE - TELE (206)

## 2019-01-24 PROCEDURE — 85025 COMPLETE CBC W/AUTO DIFF WBC: CPT

## 2019-01-24 RX ORDER — TORSEMIDE 5 MG/1
10 TABLET ORAL 2 TIMES DAILY
Status: DISCONTINUED | OUTPATIENT
Start: 2019-01-24 | End: 2019-01-25

## 2019-01-24 RX ADMIN — BUSPIRONE HYDROCHLORIDE 7.5 MG: 30 TABLET ORAL at 21:26

## 2019-01-24 RX ADMIN — FLUTICASONE PROPIONATE 88 MCG: 44 AEROSOL, METERED RESPIRATORY (INHALATION) at 21:13

## 2019-01-24 RX ADMIN — OMEPRAZOLE 20 MG: 20 CAPSULE, DELAYED RELEASE ORAL at 05:39

## 2019-01-24 RX ADMIN — THERA TABS 1 TABLET: TAB at 05:39

## 2019-01-24 RX ADMIN — TORSEMIDE 10 MG: 5 TABLET ORAL at 09:23

## 2019-01-24 RX ADMIN — IPRATROPIUM BROMIDE AND ALBUTEROL SULFATE 3 ML: .5; 3 SOLUTION RESPIRATORY (INHALATION) at 11:53

## 2019-01-24 RX ADMIN — CLOTRIMAZOLE AND BETAMETHASONE DIPROPIONATE 1 APPLICATION: 10; .5 CREAM TOPICAL at 17:48

## 2019-01-24 RX ADMIN — UMECLIDINIUM BROMIDE AND VILANTEROL TRIFENATATE 1 PUFF: 62.5; 25 POWDER RESPIRATORY (INHALATION) at 07:35

## 2019-01-24 RX ADMIN — POTASSIUM BICARBONATE 25 MEQ: 25 TABLET, EFFERVESCENT ORAL at 09:23

## 2019-01-24 RX ADMIN — ASPIRIN 81 MG: 81 TABLET, COATED ORAL at 05:39

## 2019-01-24 RX ADMIN — ENOXAPARIN SODIUM 40 MG: 100 INJECTION SUBCUTANEOUS at 05:38

## 2019-01-24 RX ADMIN — TORSEMIDE 10 MG: 5 TABLET ORAL at 17:48

## 2019-01-24 RX ADMIN — FLUTICASONE PROPIONATE 88 MCG: 44 AEROSOL, METERED RESPIRATORY (INHALATION) at 07:35

## 2019-01-24 RX ADMIN — CLOTRIMAZOLE AND BETAMETHASONE DIPROPIONATE 1 APPLICATION: 10; .5 CREAM TOPICAL at 05:39

## 2019-01-24 RX ADMIN — IPRATROPIUM BROMIDE AND ALBUTEROL SULFATE 3 ML: .5; 3 SOLUTION RESPIRATORY (INHALATION) at 07:35

## 2019-01-24 ASSESSMENT — ENCOUNTER SYMPTOMS
FOCAL WEAKNESS: 0
PND: 1
ORTHOPNEA: 1
SORE THROAT: 0
HEADACHES: 1
SPUTUM PRODUCTION: 0
DIZZINESS: 0
ALLERGIC/IMMUNOLOGIC NEGATIVE: 1
ENDOCRINE NEGATIVE: 1
CONSTITUTIONAL NEGATIVE: 1
PALPITATIONS: 0
MYALGIAS: 0
HEMATOLOGIC/LYMPHATIC NEGATIVE: 1
ABDOMINAL DISTENTION: 0
FEVER: 0
LIGHT-HEADEDNESS: 0
CHILLS: 0
PSYCHIATRIC NEGATIVE: 1
SHORTNESS OF BREATH: 0
SINUS PAIN: 0
NUMBNESS: 0
GASTROINTESTINAL NEGATIVE: 1
BLURRED VISION: 0
EYES NEGATIVE: 1
COUGH: 0
CONSTIPATION: 0
ARTHRALGIAS: 0
SHORTNESS OF BREATH: 1
NAUSEA: 0
SENSORY CHANGE: 0
WEIGHT LOSS: 1
NEUROLOGICAL NEGATIVE: 1
WHEEZING: 1
CARDIOVASCULAR NEGATIVE: 1
POLYDIPSIA: 0
NERVOUS/ANXIOUS: 0
RESPIRATORY NEGATIVE: 1
ABDOMINAL PAIN: 0

## 2019-01-24 NOTE — PROGRESS NOTES
[facility] ICU Transfer Note                                                                             Attending: PALLAVI Underwood  Resident: Dr. Eugenia Moore  Date of admission: 1/18/2019  Date of transferring out from ICU:  01/23/19      Primary Diagnosis:   - Acute on chronic respiratory failure with hypoxia and hypercapnia, probably end-stage?  - COPD exacerbation (completed a course of azithromycin and prednisone)  - Aortic stenosis, probably moderate, cardiologist on board, BARBARA scheduled for today 1/23/2019  - Acute on chronic diastolic congestive heart failure    HPI and ICU Course Summary (Brief Narrative):  Kishan Mcintyre is 73 y.o. F with past medical history of COPD on 2L (recently increased to 4L at home), HFpEF & moderate AS (Echo on 1/19/2019 significant for EF 65, diastolic dysfunction grade II), came in with worsening SOB, tachypneic at ER and required BIPAP. On presentation, HCO 44, trop 0.3, , PH 7.26, CO2 104, CXR pulmonary edema with effusions, EKG no ischemic changes. She was admitted for exacerbation of COPD and diastolic heart failure requiring BiPAP.   In the ICU, patient condition continued to improve gradually.  She was managed for COPD exacerbation (completed a course of azithromycin for 5 days and prednisone).  Blood culture negative x2.  Initially she required BiPAP and high flow nasal cannula, her oxygen requirement continue to improve, currently she is on 3-4 L of nasal cannula (no new requirement for high flow nasal cannula or BiPAP).  Patient subjectively feeling much better and she is hemodynamically stable.  We will initiate Diamox to help in controlling bicarbonate, did not improve her bicarbonate level that much, we decided on stop Diamox for now and continue on Lasix.  Cardiologist is planning to do BARBARA today 1/23/2019 for further evaluation.    Patient currently in stable medical condition and she is good  to transfer to telemetry floor    Important Events in the ICU:   - Central Line: (Dates of insertion and removal, if applicable) no  - Intubation: (Date of intubation and extubation) no  - Pressors: (Type and Duration) no  - Tube feeding (Start and stop dates) no  - Antibiotics (Type, start and stop dates) no (patient finished 5 days azithromycin course on 1/22/2019)  - Other procedures none    Labs and imaging studies to be continued with their indications:  - CMP or BMP: (Indication) bicarbonate level, kidney function test, glucose level    Things to follow:   -Follow cardiologist recommendations  -Follow bicarbonate level and kidney function test  -Follow-up on her oxygen requirement  -PT OT

## 2019-01-24 NOTE — PROGRESS NOTES
BARBARA procedure completed with Dr Becerra and Dr tao at bedside, RT and echo tech. Consent is signed in chart. Time out was performed. Total of 90mg propofol were pushed. Vitals were monitored q2 mins during procedure. Patient tolerated procedure well.

## 2019-01-24 NOTE — CARE PLAN
Problem: Bronchoconstriction:  Goal: Improve in air movement and diminished wheezing    Intervention: Implement inhaled treatments  Duoneb QID and PRN

## 2019-01-24 NOTE — CARE PLAN
Problem: Safety  Goal: Will remain free from falls  Outcome: PROGRESSING AS EXPECTED      Problem: Respiratory:  Goal: Respiratory status will improve  Outcome: PROGRESSING AS EXPECTED  Pt now down to baseline O2 of 2-3L. Patient tolerating well.

## 2019-01-24 NOTE — CARE PLAN
Problem: Safety  Goal: Will remain free from falls  Outcome: PROGRESSING AS EXPECTED  Pt states understanding to call RN before attempting to mobilize. Yellow non slip socks on pt. Call light in reach.    Problem: Respiratory:  Goal: Respiratory status will improve  Outcome: PROGRESSING AS EXPECTED  Pt on home dose of 2.5L.

## 2019-01-24 NOTE — PROGRESS NOTES
[facility] Progress Note      Admit Date: 1/18/2019  ICU Day:  6      Resident(s): Baljit Chung/Tray  Attending: ARAMIS IRIZARRY/ Dr. Apple    Date & Time:   1/24/2019   7:29 AM       Patient ID:    Name:             Kishan Mcintyre   YOB: 1945  Age:                 73 y.o.  female   MRN:               [id number]    HPI:  72 yo F with COPD on 2L (recently increased to 4L at home), HFpEF & moderate AS (Echo on 1/19/2019 significant for EF 65, diastolic dysfunction grade II), came in with worsening SOB, tachypneic at ER and required BIPAP. On presentation, HCO 44, trop 0.3, , PH 7.26, CO2 104, CXR pulmonary edema with effusions, EKG no ischemic changes. She was admitted for exacerbation of COPD and diastolic heart failure requiring BiPAP.     Consultants:  Cardiology, Trip Galvan M.D.  PMA: Tommy Asher M.D.    Procedures:  BARBARA 1/23    Imaging:  EC-BARBARA W/O CONT         EC-ECHOCARDIOGRAM COMPLETE W/O CONT   Final Result      DX-CHEST-PORTABLE (1 VIEW)   Final Result      No significant change      CT-CTA CHEST PULMONARY ARTERY W/ RECONS   Final Result      No evidence of pulmonary embolus.      Collapse of the right middle lobe of unknown etiology could be due to bronchial stricture, mucous plugging or perhaps malignancy.      Bibasilar atelectasis with trace bilateral pleural effusions.      Cardiomegaly.      3D angiographic/MIP images of the vasculature confirm the vascular findings as described above.            DX-CHEST-PORTABLE (1 VIEW)   Final Result      Pulmonary edema, pleural effusions, basilar atelectasis and cardiac silhouette enlargement          Interval Events:  -Hemodynamically stable, on baseline oxygen 3-4 L NC   -Feeling much better   -Wt dropped from 227 to 194 lbs  -Co2 remaining at 41- consider restarting diamox   -Cont torsemide  -No cath per patient  -BARBARA results pending  -Last dose azithromycin and prednisone 1/22 - total 5 days      Review of Systems    Constitutional: Positive for malaise/fatigue and weight loss. Negative for chills and fever.   HENT: Negative for congestion, sinus pain and sore throat.    Eyes: Negative for blurred vision.   Respiratory: Positive for shortness of breath and wheezing. Negative for cough and sputum production.    Cardiovascular: Positive for orthopnea and PND. Negative for chest pain, palpitations and leg swelling.        SOB doing better   Gastrointestinal: Negative for abdominal pain, constipation and nausea.   Genitourinary: Negative for dysuria and urgency.   Musculoskeletal: Negative for myalgias.   Neurological: Positive for headaches. Negative for dizziness, sensory change and focal weakness.   Endo/Heme/Allergies: Negative for polydipsia.   Psychiatric/Behavioral: The patient is not nervous/anxious.        PHYSICAL EXAM  Gen: Obese, elderly female, sitting comfortably in bed  HEENT: NC/AT, no scleral icterus, no conjunctival injection, mucous membranes pink and dry.  Neck: Supple.  Cardiac: S1S2, 3/6 systolic murmur heard best over aortic, no r/g, no JVD.  Respiratory: Normal respiratory effort, CTA, no crackles or wheezes.  Abdomen: BS+, soft, NT/ND, no rebound/guarding, no palpable organomegaly.  Ext: Trace edema with venostasis changes, 2+ DP pulses b/l.  Skin: Warm, dry. No rashes or erythema.   Neuro: AAOx4, grossly no sensory or motor deficits.   Psych: Affect, mood, judgement normal.    Respiratory:     Respiration: 18, Pulse Oximetry: 100 %, O2 Daily Delivery Respiratory : Silicone Nasal Cannula    HemoDynamics:  Pulse: 73, Heart Rate (Monitored): 73 NIBP: 129/54      Fluids:        Intake/Output Summary (Last 24 hours) at 01/24/19 0733  Last data filed at 01/24/19 0400   Gross per 24 hour   Intake              380 ml   Output             1700 ml   Net            -1320 ml       Weight: 88.4 kg (194 lb 14.2 oz)  Body mass index is 38.06 kg/m².    Recent Labs      01/22/19   0542  01/23/19   3714  01/24/19   0021    SODIUM  139  141  141   POTASSIUM  4.3  4.2  3.7   CHLORIDE  96  97  96   CO2  39*  41*  41*   BUN  26*  25*  26*   CREATININE  0.51  0.48*  0.41*   MAGNESIUM  2.2  2.2  2.2   CALCIUM  9.3  9.1  9.1       GI/Nutrition:  Recent Labs      19   0525  19   0444  19   0550   GLUCOSE  125*  126*  86       Heme:  Recent Labs      19   0800  19   0723  19   0550   RBC   --   4.49  4.35  4.61   HEMOGLOBIN   --   12.7  12.2  12.9   HEMATOCRIT   --   43.4  42.0  44.6   PLATELETCT   --   191  175  194   PROTHROMBTM  15.3*   --    --    --    INR  1.19*   --    --    --        Infectious Disease:  Temp  Av.2 °C (98.9 °F)  Min: 37 °C (98.6 °F)  Max: 37.3 °C (99.1 °F)  Recent Labs      19   0719   0550   WBC  6.6  6.4  8.0   NEUTSPOLYS  81.10*  84.20*  72.20*   LYMPHOCYTES  9.50*  9.20*  16.90*   MONOCYTES  8.40  5.50  9.60   EOSINOPHILS  0.20  0.00  0.60   BASOPHILS  0.20  0.20  0.20       Meds:  • potassium bicarbonate  25 mEq     • ipratropium-albuterol  3 mL     • ipratropium-albuterol  3 mL     • furosemide  20 mg     • senna-docusate  2 Tab      And   • polyethylene glycol/lytes  1 Packet      And   • magnesium hydroxide  30 mL      And   • bisacodyl  10 mg     • Respiratory Care per Protocol       • albuterol  2 Puff     • aspirin EC  81 mg     • busPIRone  7.5 mg     • clotrimazole-betamethasone  1 Application     • multivitamin       • omeprazole  20 mg     • umeclidinium-vilanterol  1 Puff      And   • fluticasone  2 Puff     • acetaminophen  500 mg     • lidocaine  2 Patch     • enoxaparin (LOVENOX) injection  40 mg     • ipratropium-albuterol  3 mL            Problem and Plan:    * Acute on chronic respiratory failure with hypoxia and hypercapnia (HCC)- (present on admission)   Assessment & Plan    - Gradually improving, currently on  2.5L NC (now at baseline home O2). Subjectively feeling much better - SOB and LE edema  improved.    - No longer requiring BIPAP   - IV steroid, azithromycin (oral, last dose 1/22/19) and lasix - stopped diamox  - RT protocol, and encourage IS/PEP     Aortic stenosis- (present on admission)   Assessment & Plan    - Moderate AS confirmed with Echo on 1/19/2019  - repeat echo shows Transvalvular gradients are - Peak: 48 mmHg, Mean: 33 mmHg.  (increased from prior)  - followed by Dr Rodrigez as outpatient, monitoring with serial echos.  - BARBARA read pending  - Follow cardiology recs     Acute on chronic diastolic congestive heart failure (HCC)- (present on admission)   Assessment & Plan    ECHO: grade II diastolic dysfunction  Severe edema on admit - ?related to worsening AS vs diastolic failure?  - Decreasing oxygen demand. Now on 5L oxymask  - Cont lasix and DC Diamox IV (no Co2 improvement)  - Monitoring lytes, Cr  - BARBARA read pending     COPD exacerbation (HCC)- (present on admission)   Assessment & Plan    - Improving  - baseline O2 2L 24/7  - Prednisone 40mg x 5days completed  - Azithromycin (end date 1/22/19)  - Duonebs, albuterol and RT protocol.  - Continue home inhalers     Elevated troponin- (present on admission)   Assessment & Plan    - 0.3 x3, no ischemic EKG changesin the setting of respiratory failure  - Likely due to demand  - No need to further trend      Morbid obesity (HCC)- (present on admission)   Assessment & Plan    Dietary consult     Primary osteoarthritis of knee- (present on admission)   Assessment & Plan    - on aleve at home  - with aspirin and aleve, higher risk for GI bleed  - agreeable with topical cream / patch, started lidocaine, tylenol. Hold NSAIDs         Quality Measures:  Lines: PIV     Conklin Catheter: yes   DVT Prophylaxis: Lovenox   Ulcer Prophylaxis: NA   Antibiotics: No, finished azithromycin course (1/18-1/22)     CODE STATUS: FULL  DISPO: ICU, stable to transfer to telemetry floor, active orders already in epic.

## 2019-01-24 NOTE — CARE PLAN
Problem: Safety  Goal: Will remain free from injury  Outcome: PROGRESSING AS EXPECTED  Pt demonstrates proper use of call light; bed rails upx3; non-slip socks on; walker out of sight.     Problem: Fluid Volume:  Goal: Will maintain balanced intake and output  Outcome: PROGRESSING AS EXPECTED

## 2019-01-25 VITALS
OXYGEN SATURATION: 94 % | TEMPERATURE: 99.1 F | DIASTOLIC BLOOD PRESSURE: 69 MMHG | SYSTOLIC BLOOD PRESSURE: 94 MMHG | BODY MASS INDEX: 40.2 KG/M2 | WEIGHT: 204.74 LBS | HEART RATE: 116 BPM | RESPIRATION RATE: 16 BRPM | HEIGHT: 60 IN

## 2019-01-25 PROBLEM — R79.89 ELEVATED TROPONIN: Status: RESOLVED | Noted: 2019-01-18 | Resolved: 2019-01-25

## 2019-01-25 LAB
ANION GAP SERPL CALC-SCNC: 3 MMOL/L (ref 0–11.9)
BUN SERPL-MCNC: 27 MG/DL (ref 8–22)
CALCIUM SERPL-MCNC: 9.1 MG/DL (ref 8.5–10.5)
CHLORIDE SERPL-SCNC: 95 MMOL/L (ref 96–112)
CO2 SERPL-SCNC: 43 MMOL/L (ref 20–33)
CREAT SERPL-MCNC: 0.63 MG/DL (ref 0.5–1.4)
EKG IMPRESSION: NORMAL
GLUCOSE SERPL-MCNC: 95 MG/DL (ref 65–99)
HEMOCCULT SP1 STL QL: NEGATIVE
MAGNESIUM SERPL-MCNC: 2 MG/DL (ref 1.5–2.5)
POTASSIUM SERPL-SCNC: 3.8 MMOL/L (ref 3.6–5.5)
SODIUM SERPL-SCNC: 141 MMOL/L (ref 135–145)

## 2019-01-25 PROCEDURE — 700111 HCHG RX REV CODE 636 W/ 250 OVERRIDE (IP): Performed by: INTERNAL MEDICINE

## 2019-01-25 PROCEDURE — 93010 ELECTROCARDIOGRAM REPORT: CPT | Performed by: INTERNAL MEDICINE

## 2019-01-25 PROCEDURE — 36415 COLL VENOUS BLD VENIPUNCTURE: CPT

## 2019-01-25 PROCEDURE — A9270 NON-COVERED ITEM OR SERVICE: HCPCS | Performed by: STUDENT IN AN ORGANIZED HEALTH CARE EDUCATION/TRAINING PROGRAM

## 2019-01-25 PROCEDURE — 80048 BASIC METABOLIC PNL TOTAL CA: CPT

## 2019-01-25 PROCEDURE — 99239 HOSP IP/OBS DSCHRG MGMT >30: CPT | Mod: GC | Performed by: INTERNAL MEDICINE

## 2019-01-25 PROCEDURE — 82270 OCCULT BLOOD FECES: CPT

## 2019-01-25 PROCEDURE — 700102 HCHG RX REV CODE 250 W/ 637 OVERRIDE(OP): Performed by: STUDENT IN AN ORGANIZED HEALTH CARE EDUCATION/TRAINING PROGRAM

## 2019-01-25 PROCEDURE — 700102 HCHG RX REV CODE 250 W/ 637 OVERRIDE(OP): Performed by: INTERNAL MEDICINE

## 2019-01-25 PROCEDURE — A9270 NON-COVERED ITEM OR SERVICE: HCPCS | Performed by: INTERNAL MEDICINE

## 2019-01-25 PROCEDURE — 93005 ELECTROCARDIOGRAM TRACING: CPT | Performed by: STUDENT IN AN ORGANIZED HEALTH CARE EDUCATION/TRAINING PROGRAM

## 2019-01-25 PROCEDURE — 99232 SBSQ HOSP IP/OBS MODERATE 35: CPT | Performed by: INTERNAL MEDICINE

## 2019-01-25 PROCEDURE — 83735 ASSAY OF MAGNESIUM: CPT

## 2019-01-25 RX ORDER — BISACODYL 10 MG
10 SUPPOSITORY, RECTAL RECTAL
Status: DISCONTINUED | OUTPATIENT
Start: 2019-01-25 | End: 2019-01-25 | Stop reason: HOSPADM

## 2019-01-25 RX ORDER — POTASSIUM CHLORIDE 20 MEQ/1
40 TABLET, EXTENDED RELEASE ORAL ONCE
Status: COMPLETED | OUTPATIENT
Start: 2019-01-25 | End: 2019-01-25

## 2019-01-25 RX ORDER — FUROSEMIDE 20 MG/1
20 TABLET ORAL DAILY
Qty: 30 TAB | Refills: 0 | Status: SHIPPED | OUTPATIENT
Start: 2019-01-25 | End: 2019-01-25

## 2019-01-25 RX ORDER — SODIUM CHLORIDE 9 MG/ML
500 INJECTION, SOLUTION INTRAVENOUS ONCE
Status: CANCELLED | OUTPATIENT
Start: 2019-01-25 | End: 2019-01-25

## 2019-01-25 RX ORDER — AMOXICILLIN 250 MG
2 CAPSULE ORAL 2 TIMES DAILY PRN
Status: DISCONTINUED | OUTPATIENT
Start: 2019-01-25 | End: 2019-01-25 | Stop reason: HOSPADM

## 2019-01-25 RX ORDER — POLYETHYLENE GLYCOL 3350 17 G/17G
1 POWDER, FOR SOLUTION ORAL
Status: DISCONTINUED | OUTPATIENT
Start: 2019-01-25 | End: 2019-01-25 | Stop reason: HOSPADM

## 2019-01-25 RX ORDER — FUROSEMIDE 20 MG/1
60 TABLET ORAL DAILY
Qty: 30 TAB | Refills: 0 | Status: SHIPPED | OUTPATIENT
Start: 2019-01-25 | End: 2019-02-05 | Stop reason: SDUPTHER

## 2019-01-25 RX ORDER — LIDOCAINE 50 MG/G
2 PATCH TOPICAL
Qty: 10 PATCH | Refills: 0 | Status: SHIPPED | OUTPATIENT
Start: 2019-01-25 | End: 2019-03-14

## 2019-01-25 RX ADMIN — TORSEMIDE 10 MG: 5 TABLET ORAL at 05:20

## 2019-01-25 RX ADMIN — FLUTICASONE PROPIONATE 88 MCG: 44 AEROSOL, METERED RESPIRATORY (INHALATION) at 05:21

## 2019-01-25 RX ADMIN — METOPROLOL TARTRATE 12.5 MG: 25 TABLET, FILM COATED ORAL at 06:26

## 2019-01-25 RX ADMIN — OMEPRAZOLE 20 MG: 20 CAPSULE, DELAYED RELEASE ORAL at 05:20

## 2019-01-25 RX ADMIN — THERA TABS 1 TABLET: TAB at 05:20

## 2019-01-25 RX ADMIN — ENOXAPARIN SODIUM 40 MG: 100 INJECTION SUBCUTANEOUS at 05:21

## 2019-01-25 RX ADMIN — CLOTRIMAZOLE AND BETAMETHASONE DIPROPIONATE 1 APPLICATION: 10; .5 CREAM TOPICAL at 05:20

## 2019-01-25 RX ADMIN — ASPIRIN 81 MG: 81 TABLET, COATED ORAL at 05:20

## 2019-01-25 RX ADMIN — UMECLIDINIUM BROMIDE AND VILANTEROL TRIFENATATE 1 PUFF: 62.5; 25 POWDER RESPIRATORY (INHALATION) at 09:41

## 2019-01-25 RX ADMIN — POTASSIUM CHLORIDE 40 MEQ: 1500 TABLET, EXTENDED RELEASE ORAL at 07:23

## 2019-01-25 RX ADMIN — ACETAMINOPHEN 500 MG: 500 TABLET ORAL at 00:55

## 2019-01-25 ASSESSMENT — ENCOUNTER SYMPTOMS
ORTHOPNEA: 1
NERVOUS/ANXIOUS: 0
HEADACHES: 1
PALPITATIONS: 0
MYALGIAS: 0
SHORTNESS OF BREATH: 1
FEVER: 0
BACK PAIN: 0
DEPRESSION: 0
DOUBLE VISION: 0
CHILLS: 0
SPUTUM PRODUCTION: 0
ABDOMINAL PAIN: 0
BLURRED VISION: 0
COUGH: 0
SORE THROAT: 0
FOCAL WEAKNESS: 0
BRUISES/BLEEDS EASILY: 0
POLYDIPSIA: 0
SENSORY CHANGE: 0
WEIGHT LOSS: 1
PND: 1
WHEEZING: 1
BLOOD IN STOOL: 0
CONSTIPATION: 0
SHORTNESS OF BREATH: 0
WHEEZING: 0
SINUS PAIN: 0
ORTHOPNEA: 0
NECK PAIN: 0
HEARTBURN: 0
HEADACHES: 0
NAUSEA: 0
DIZZINESS: 0

## 2019-01-25 NOTE — ASSESSMENT & PLAN NOTE
Non-valvular AF, asymptomatic, hemodynamically stable   History of paroxysmal AF   Patient went into AF last night s/p BARBARA yesterday, states she was very irritated with her neighbor and had increased stress levels. HR: 100-110bmp    -Increase metoprolol to 25mg BID for rate control   -CHADsVASC: 3 - agreeable to anticoagulation, start xarelto 20mg daily with dinner (HASBLED: 1)   -Patient educated on signs/symptoms of bleeding, to seek emergency medical care  -Discontinue ASA   -Will need follow-up with PCP and cardiology

## 2019-01-25 NOTE — DISCHARGE SUMMARY
Internal Medicine Discharge Summary  Note Author: Sahri Edouard M.D.     Name Kishan Mcintyre     1945   Age/Sex 73 y.o. female   MRN [id number]     Admit Date:  2019       Discharge Date:   2019    Service:   [facility] Internal Medicine Gray Team  Attending Physician(s):   Dr. Venkat Sheffield        Senior Resident(s):   Dr. Marco Antonio Rebolledo  Juvencio Resident(s):   Dr. Shari Edouard   PCP: Ana Hartley M.D.    Primary Diagnosis:   Acute on chronic respiratory failure with hypoxia and hypercapnia (HCC)    Secondary Diagnoses:                Principal Problem:    Acute on chronic respiratory failure with hypoxia and hypercapnia (HCC) POA: Yes  Active Problems:    Acute on chronic diastolic congestive heart failure (HCC) POA: Yes    Aortic stenosis POA: Yes    Primary osteoarthritis of knee POA: Yes    Morbid obesity (HCC) POA: Yes    Paroxysmal atrial fibrillation (HCC) POA: Yes  Resolved Problems:    COPD exacerbation (HCC) POA: Yes    Elevated troponin POA: Yes    Hospital Summary (Brief Narrative):       Kishan Mcintyre is 73 y.o. F with past medical history of COPD on 2L (recently increased to 4L at home), HFpEF & moderate AS (Echo on 2019 significant for EF 65, diastolic dysfunction grade II), came in with worsening SOB, tachypneic at ER and required BIPAP. On presentation, HCO 44, trop 0.3, , PH 7.26, CO2 104, CXR pulmonary edema with effusions, EKG no ischemic changes. She was admitted for exacerbation of COPD and diastolic heart failure requiring BiPAP.   In the ICU, patient condition continued to improve gradually. She was managed for COPD exacerbation (completed a course of azithromycin for 5 days and prednisone).Blood culture negative x2. Initially she required BiPAP and high flow nasal cannula, however improved to her baseline O2 requirements of 2LPM continuously. She was also diuresed with lasix as she did have acute decompensated HF, which improved her leg  swelling, SOB and weight. Her bicarbonate level remained elevated, and acetazolamide was initiated with no improvement and was therefore stopped once she became euvolemic. Patient continued to improve. BARBARA was done which showed moderate AS with a valve area of 1.2 cm. She did go from SR to atrial fibrillatiion, however asymptomatic and heodynamically stable so metoprolol was initiated and she was started on anticoagulation on discharge (CHADsVASC: 3, HASBLED: 1).     Patient /Hospital Summary (Details -- Problem Oriented) :          * Acute on chronic respiratory failure with hypoxia and hypercapnia (HCC)   Assessment & Plan    Improved, however hypercapnia still present.   Possible that her bicarb is now at new baseline   Currently on  2.5L NC (now at baseline home O2). Subjectively feeling much better - SOB and LE edema improved. No longer requiring BIPAP   -Continue I-S, inhalers, Lasix at home     Aortic stenosis   Assessment & Plan    Moderate AS confirmed with Echo on 1/19/2019  Repeat echo shows Transvalvular gradients are - Peak: 48 mmHg, Mean: 33 mmHg. (increased from prior)  Followed by Dr Rodrigez as outpatient, monitoring with serial echos.  -Cont BP control   -Follow-up with outpatient cardiologist        Acute on chronic diastolic congestive heart failure (HCC)   Assessment & Plan    Acute component seems to have resolved ; improved leg edema, SOB   BARBARA: BELGICA is 1.2 cm2.  Moderate aortic stenosis. No aortic insufficiency. Rest of findings normal   ECHO: EF: 70% grade II diastolic dysfunction    -Cont lasix 10mg QD at home, add diamox 250mg   -Need repeat CMP in 1 week, follow-up with PCP for titration if needed.        COPD exacerbation (HCC)-resolved as of 1/25/2019   Assessment & Plan    ACOS - strong asthma component according to outpatient PFTs, however classified into GOLD 3 and O2 dependent at 2LPM  Resolved  -Cont O2 therapy   -Continue home inhalers     Paroxysmal atrial fibrillation (HCC)    Assessment & Plan    Non-valvular AF, asymptomatic, hemodynamically stable   History of paroxysmal AF   Patient went into AF last night s/p BARBARA yesterday, states she was very irritated with her neighbor and had increased stress levels. HR: 100-110bmp    -Increase metoprolol to 25mg BID for rate control   -CHADsVASC: 3 - agreeable to anticoagulation, start xarelto 20mg daily with dinner (HASBLED: 1)   -Patient educated on signs/symptoms of bleeding, to seek emergency medical care  -Discontinue ASA   -Will need follow-up with PCP and cardiology         Morbid obesity (HCC)   Assessment & Plan    Dietary consult     Primary osteoarthritis of knee   Assessment & Plan    -on aleve at home  -agreeable with topical cream / patch, started lidocaine, tylenol.   -Discourage use of NSAIDs (CVD), will need follow up with PCP      Elevated troponin-resolved as of 1/25/2019   Assessment & Plan    Resolved, asymptomatic   -0.3 x3, no ischemic EKG changes in the setting of respiratory failure  -Likely due to demand       Consultants:     Cardiology, Trip Galvan M.D.  PMA: Tommy Asher M.D.  Pulmonary: Jose Nichols M.D.    Procedures:        Moderate Sedation for BARBARA on 1/18/2019     Imaging/ Testing:      EC-BARBARA W/O CONT   Final Result      EC-ECHOCARDIOGRAM COMPLETE W/O CONT   Final Result      DX-CHEST-PORTABLE (1 VIEW)   Final Result      No significant change      CT-CTA CHEST PULMONARY ARTERY W/ RECONS   Final Result      No evidence of pulmonary embolus.      Collapse of the right middle lobe of unknown etiology could be due to bronchial stricture, mucous plugging or perhaps malignancy.      Bibasilar atelectasis with trace bilateral pleural effusions.      Cardiomegaly.      3D angiographic/MIP images of the vasculature confirm the vascular findings as described above.            DX-CHEST-PORTABLE (1 VIEW)   Final Result      Pulmonary edema, pleural effusions, basilar atelectasis and cardiac silhouette  enlargement        Discharge Medications:         Medication Reconciliation: Completed       Medication List      START taking these medications      Instructions   lidocaine 5 % Ptch  Commonly known as:  LIDODERM   Apply 2 Patches to skin as directed 1 time daily as needed (knee pain).  Dose:  2 Patch     metoprolol 25 MG Tabs  Commonly known as:  LOPRESSOR   Take 1 Tab by mouth 2 Times a Day.  Dose:  25 mg     rivaroxaban 20 MG Tabs tablet  Commonly known as:  XARELTO   Take 1 Tab by mouth with dinner.  Dose:  20 mg        CONTINUE taking these medications      Instructions   * albuterol 2.5mg/3ml Nebu solution for nebulization  Commonly known as:  PROVENTIL   Doctor's comments:  Advise patient not use use Albuterol inhaler concurrently with this medication.  USE 3 ML BY NEBULIZATION ROUTE EVERY 6 HOURS AS NEEDED FOR SHORTNESS OF BREATH.     * albuterol 108 (90 Base) MCG/ACT Aers inhalation aerosol   Inhale 2 Puffs by mouth every 6 hours as needed for Shortness of Breath.  Dose:  2 Puff     busPIRone 7.5 MG tablet  Commonly known as:  BUSPAR   Take 7.5 mg by mouth at bedtime as needed.  Dose:  7.5 mg     CALCIUM PO   Take 1 Tab by mouth every day.  Dose:  1 Tab     clobetasol 0.05 % external solution  Commonly known as:  TEMOVATE   Apply sparingly bid     clotrimazole-betamethasone 1-0.05 % Crea  Commonly known as:  LOTRISONE   Apply 1 Application to affected area(s) 2 times a day.  Dose:  1 Application     CULTURELLE PO   Take 1 Cap by mouth every morning.  Dose:  1 Cap     fish oil 1000 MG Caps capsule   Take 1,000 mg by mouth every day.  Dose:  1000 mg     Fluticasone-Umeclidin-Vilant 100-62.5-25 MCG/INH Aepb  Commonly known as:  TRELEGY ELLIPTA   Doctor's comments:  Please provide 30 day sample, TY!  Inhale 1 Puff by mouth every day.  Dose:  1 Puff     furosemide 20 MG Tabs  Commonly known as:  LASIX   Take 3 Tabs by mouth every day.  Dose:  60 mg     MULTI-VITAMIN PO   Take 1 Tab by mouth every  morning.  Dose:  1 Tab     omeprazole 20 MG delayed-release capsule  Commonly known as:  PRILOSEC   Take 1 Cap by mouth every day.  Dose:  20 mg     potassium chloride SA 20 MEQ Tbcr  Commonly known as:  Kdur   Take 20 mEq by mouth every morning.  Dose:  20 mEq     triamcinolone acetonide 0.025 % Crea  Commonly known as:  KENALOG   APPLY TO AFFECTED AREA(S) 2 TIMES A DAY.     vitamin D 1000 UNIT Tabs  Commonly known as:  cholecalciferol   Take 5,000 Units by mouth every day.  Dose:  5000 Units        * This list has 2 medication(s) that are the same as other medications prescribed for you. Read the directions carefully, and ask your doctor or other care provider to review them with you.            STOP taking these medications    ADVIL 200 MG Tabs  Generic drug:  ibuprofen     aspirin 81 MG tablet          Disposition:   To home with      Diet:   As tolerated     Activity:   As tolerated     Instructions:      The patient was instructed to return to the ER in the event of worsening symptoms. I have counseled the patient on the importance of compliance and the patient has agreed to proceed with all medical recommendations and follow up plan indicated above.   The patient understands that all medications come with benefits and risks. Risks may include permanent injury or death and these risks can be minimized with close reassessment and monitoring.        Primary Care Provider:    Ana Hartley M.D.  Discharge summary faxed to primary care provider:  Deferred  Copy of discharge summary given to the patient: Completed    Follow up appointment details :          Pending Studies:        None     Time spent on discharge day patient visit, preparing discharge paperwork and arranging for patient follow up.    Summary of follow up issues:   Patient was started on a low dose furosemide and acetazolamide due to leg swelling but with hypercarbia from her pulmonary dysfunction. She will need a repeat CMP with possible  further titration of these meds.   Patient was also started on metoprolol 25mg BID for rate control and xarelto 20mg for anticoagulation. Please follow     Discharge Time (Minutes) :   50 minutes   Hospital Course Type:  Inpatient Stay >2 midnights    Condition on Discharge  Medically Stable for Discharge Home   ______________________________________________________________________    Interval history/exam for day of discharge:    Overnight, patient converted from SR to Atrial fibrillation with rates ~100bpm (from 60-70s baseline) s/p BARBARA yesterday. Asymptomatic, hemodynamically stable. BARBARA did not show a left atrial appendage or atrial thrombus. She was previously on ASA for other indications.     Review of Systems   Constitutional: Negative for chills and fever.   HENT: Negative for hearing loss and tinnitus.    Eyes: Negative for blurred vision and double vision.   Respiratory: Negative for cough, shortness of breath and wheezing.    Cardiovascular: Negative for chest pain, palpitations, orthopnea and leg swelling.   Gastrointestinal: Negative for abdominal pain, blood in stool, constipation, heartburn and nausea.   Genitourinary: Negative for dysuria and hematuria.   Musculoskeletal: Negative for back pain, myalgias and neck pain.   Skin: Negative for itching and rash.   Neurological: Negative for dizziness and headaches.   Endo/Heme/Allergies: Negative for environmental allergies. Does not bruise/bleed easily.   Psychiatric/Behavioral: Negative for depression. The patient is not nervous/anxious.      Physical Exam   Constitutional: She is oriented to person, place, and time and well-developed, well-nourished, and in no distress.   HENT:   Head: Normocephalic and atraumatic.   Mouth/Throat: Oropharynx is clear and moist.   Eyes: EOM are normal.   Neck: Normal range of motion.   Cardiovascular: Normal rate and intact distal pulses.  An irregularly irregular rhythm present.   Murmur heard.   Systolic murmur is  present with a grade of 3/6   Pulmonary/Chest: Effort normal. She has decreased breath sounds in the right middle field, the right lower field, the left middle field and the left lower field. She has no wheezes. She has no rhonchi. She has no rales.   Abdominal: Soft. She exhibits no distension. There is no tenderness.   Musculoskeletal: Normal range of motion.   +1 pitting edema with skin darkening at bilateral lower extremities    Neurological: She is alert and oriented to person, place, and time.   Skin: Skin is warm and dry.   Psychiatric: Affect and judgment normal.   Nursing note and vitals reviewed.    Most Recent Labs:    Lab Results   Component Value Date/Time    WBC 8.0 01/24/2019 05:50 AM    RBC 4.61 01/24/2019 05:50 AM    HEMOGLOBIN 12.9 01/24/2019 05:50 AM    HEMATOCRIT 44.6 01/24/2019 05:50 AM    MCV 96.7 01/24/2019 05:50 AM    MCH 28.0 01/24/2019 05:50 AM    MCHC 28.9 (L) 01/24/2019 05:50 AM    MPV 9.9 01/24/2019 05:50 AM    NEUTSPOLYS 72.20 (H) 01/24/2019 05:50 AM    LYMPHOCYTES 16.90 (L) 01/24/2019 05:50 AM    MONOCYTES 9.60 01/24/2019 05:50 AM    EOSINOPHILS 0.60 01/24/2019 05:50 AM    BASOPHILS 0.20 01/24/2019 05:50 AM    ANISOCYTOSIS 1+ 01/19/2019 05:33 AM      Lab Results   Component Value Date/Time    SODIUM 141 01/25/2019 04:28 AM    POTASSIUM 3.8 01/25/2019 04:28 AM    CHLORIDE 95 (L) 01/25/2019 04:28 AM    CO2 43 (HH) 01/25/2019 04:28 AM    GLUCOSE 95 01/25/2019 04:28 AM    BUN 27 (H) 01/25/2019 04:28 AM    CREATININE 0.63 01/25/2019 04:28 AM    CREATININE 0.71 04/19/2011 09:24 AM    BUNCREATRAT 23 04/19/2011 09:24 AM      Lab Results   Component Value Date/Time    ALTSGPT 5 01/18/2019 11:12 AM    ASTSGOT 18 01/18/2019 11:12 AM    ALKPHOSPHAT 54 01/18/2019 11:12 AM    TBILIRUBIN 0.4 01/18/2019 11:12 AM    ALBUMIN 3.6 01/18/2019 11:12 AM    GLOBULIN 3.1 01/18/2019 11:12 AM    INR 1.19 (H) 01/21/2019 08:00 AM    MACROCYTOSIS 1+ 01/19/2019 05:33 AM     Lab Results   Component Value Date/Time     PROTHROMBTM 15.3 (H) 01/21/2019 08:00 AM    INR 1.19 (H) 01/21/2019 08:00 AM

## 2019-01-25 NOTE — PROGRESS NOTES
Assessed patient at bedside for conversion to A fib on tele, ventricular rate 100s. It was initially thought to be new onset, but patient said she has it for years. Cards documented it since 2011 as a single episode in the past in the setting of respiratory failure, not on anticoagulation since she was maintaining SR.   She is asymptomatic. 'feeling great' and 'ready to go home'.     Lungs are clear, limited air entry. HR irregularly irregular, AS murmur.  EKG pending. BMP, Mg pending.    She is on lovenox since admission. She received diuresis in ICU with IV lasix for diastolic heart failure. cardiology added oral torsemide 10 mg bid yesterday and lasix was discontinued. She had neg 8.6 L out since admission, clinically euvolemic. Back to lower than baseline weight.     - she already got torsemide this morning. dc'ed further dose of torsemide. Consider readjusting diuretic dose.  - started metoprolol po 12.5 mg bid for rate control.  - [id number] at least 3, day team to consider anticoagulation after further discussion with the patient.  - follow EKG and labs.

## 2019-01-25 NOTE — PROGRESS NOTES
· 2 RN skin check complete with LIBORIO Funes.  · Devices in place oxygen tubing.  · Skin assessed under devices red and blanching.  · Confirmed pressure ulcers found on N/A.  · New potential pressure ulcers noted on N/A. Wound consult placed and wound reported.  · The following interventions in place silicone oxygen tubing in place    Bilateral ears red and blanching, psoriasis and flaky skin present on left side of scalp  Bilateral arms are intact with generalized bruising  Sacrum is red and blanching  Small tear present on back of right thigh  Bilateral legs are swollen and dusky  Bilateral feet have dark discoloration around the heels and ankles, per pt that has been there for years.    Bilateral heels boggy, red, and blanching, heels floated on pillows

## 2019-01-25 NOTE — PROGRESS NOTES
Bedside report received with LIBORIO Middleton. Patient A&O x4. 2.5L NC which is pt baseline. Pt placed on the monitor. Monitor room notified. SR on the monitor. No complaints of pain at this time. POC discussed with patient. Patient verbalized understanding. Call light and belongings within reach. Bed locked and in lowest position, alarm and fall precautions in place.

## 2019-01-25 NOTE — DISCHARGE PLANNING
Anticipated Discharge Disposition: D/C to Home    Action: LSW contacted [facility] and confirmed that pt's Xarelto does not require prior auth. The co-pay is $47/mo. RN and MD made aware.    Barriers to Discharge: None.    Plan: No further d/c planning needs known at this time.

## 2019-01-25 NOTE — DISCHARGE PLANNING
Anticipated Discharge Disposition: D/C to Home/Self-Care    Action: LSW faxed pt's script for Xarelto to pt's preferred pharmacy, [facility], with request to run for prior auth/co-pay amount.     Barriers to Discharge: Potential need for prior auth.    Plan: LSW to notify RN once pharmacy provides information regarding prior auth needs/co-pay.

## 2019-01-25 NOTE — PROGRESS NOTES
Cardiology Follow Up Progress Note    Date of Service  1/24/2019    Attending Physician  Tommy Asher M.D.    Chief Complaint   SOB    HPI  Kishan Mcintyre is a 73 y.o. female admitted 1/18/2019 with SOB    Interim Events  TTE showed moderate AS  Continues to diurese  No chest pain  Breathing improved    Review of Systems  Review of Systems   Constitutional: Negative.    HENT: Negative.    Eyes: Negative.    Respiratory: Negative.  Negative for shortness of breath.    Cardiovascular: Negative.  Negative for chest pain, palpitations and leg swelling.   Gastrointestinal: Negative.  Negative for abdominal distention and nausea.   Endocrine: Negative.    Genitourinary: Negative.    Musculoskeletal: Negative for arthralgias.   Skin: Negative.    Allergic/Immunologic: Negative.    Neurological: Negative.  Negative for dizziness, light-headedness and numbness.   Hematological: Negative.    Psychiatric/Behavioral: Negative.        Vital signs in last 24 hours  Temp:  [36.8 °C (98.2 °F)-37.4 °C (99.4 °F)] 37.4 °C (99.4 °F)  Pulse:  [69-91] 74  Resp:  [10-20] 10  SpO2:  [92 %-100 %] 92 %    Physical Exam  Physical Exam   Constitutional: She is oriented to person, place, and time. She appears well-developed and well-nourished. No distress.   HENT:   Head: Normocephalic.   Mouth/Throat: Oropharynx is clear and moist.   Eyes: Pupils are equal, round, and reactive to light. EOM are normal. Right eye exhibits no discharge. Left eye exhibits no discharge. No scleral icterus.   Neck: Normal range of motion. Neck supple. No JVD present. No tracheal deviation present.   Cardiovascular: Normal rate, regular rhythm, S1 normal, S2 normal, normal heart sounds, intact distal pulses and normal pulses.  Exam reveals no gallop, no S3, no S4 and no friction rub.    No murmur heard.   No systolic murmur is present    No diastolic murmur is present   Pulses:       Carotid pulses are 2+ on the right side, and 2+ on the left side.        Radial pulses are 2+ on the right side, and 2+ on the left side.        Dorsalis pedis pulses are 2+ on the right side, and 2+ on the left side.        Posterior tibial pulses are 2+ on the right side, and 2+ on the left side.   Pulmonary/Chest: Effort normal and breath sounds normal. No respiratory distress. She has no wheezes. She has no rales.   Abdominal: Soft. Bowel sounds are normal. She exhibits no distension and no mass. There is no tenderness. There is no rebound and no guarding.   Musculoskeletal: She exhibits no edema.   Neurological: She is alert and oriented to person, place, and time. No cranial nerve deficit.   Skin: Skin is warm and dry. She is not diaphoretic. No pallor.   Psychiatric: She has a normal mood and affect. Her behavior is normal. Judgment and thought content normal.   Nursing note and vitals reviewed.      Lab Review  Lab Results   Component Value Date/Time    WBC 8.0 01/24/2019 05:50 AM    RBC 4.61 01/24/2019 05:50 AM    HEMOGLOBIN 12.9 01/24/2019 05:50 AM    HEMATOCRIT 44.6 01/24/2019 05:50 AM    MCV 96.7 01/24/2019 05:50 AM    MCH 28.0 01/24/2019 05:50 AM    MCHC 28.9 (L) 01/24/2019 05:50 AM    MPV 9.9 01/24/2019 05:50 AM      Lab Results   Component Value Date/Time    SODIUM 141 01/24/2019 05:50 AM    POTASSIUM 3.7 01/24/2019 05:50 AM    CHLORIDE 96 01/24/2019 05:50 AM    CO2 41 (HH) 01/24/2019 05:50 AM    GLUCOSE 86 01/24/2019 05:50 AM    BUN 26 (H) 01/24/2019 05:50 AM    CREATININE 0.41 (L) 01/24/2019 05:50 AM    CREATININE 0.71 04/19/2011 09:24 AM    BUNCREATRAT 23 04/19/2011 09:24 AM      Lab Results   Component Value Date/Time    ASTSGOT 18 01/18/2019 11:12 AM    ALTSGPT 5 01/18/2019 11:12 AM     Lab Results   Component Value Date/Time    CHOLSTRLTOT 171 07/23/2016 08:52 AM    LDL 91 07/23/2016 08:52 AM    HDL 60 07/23/2016 08:52 AM    TRIGLYCERIDE 101 07/23/2016 08:52 AM    TROPONINI 0.30 (H) 01/18/2019 09:26 PM             Cardiac Imaging and Procedures Review  EKG:  My  personal interpretation of the EKG dated 1/18/2019 is normal sinus rhythm low voltage in the inferior leads otherwise normal EKG     Echocardiogram: 1/18/2019   CONCLUSIONS  Prior Echo - 10/8/18Moderate aortic stenosis.  Transvalvular gradients are - Peak: 48 mmHg, Mean: 33 mmHg.  Left ventricular ejection fraction is visually estimated to be 65%.  Biatrial enlargement.  Estimated right ventricular systolic pressure  is 55 mmHg.     Cardiac Catheterization:  N/A    Assessment/Plan  No new Assessment & Plan notes have been filed under this hospital service since the last note was generated.  Service: Cardiology    73-year-old female with no significant tobacco exposure and PFTs are certainly out of proportion to each other.  BARBARA showed mod AS.  Continued diuresis.     1. AS, moderate    - BARBARA mdoerate     2. COPD, end stage?, no significant smoking history    - continue lasix 40 BID    - cont diamox 250     3. Trop leak    - Per above     4. Cor Pulmonale     - per above    Thank you for allowing me to participate in the care of this patient.  Cardiology will sign off on this patient    Please contact me with any questions.    Trip Galvan M.D.   Cardiologist, [facility]  [phone]

## 2019-01-25 NOTE — CARE PLAN
Problem: Safety  Goal: Will remain free from injury  Outcome: PROGRESSING AS EXPECTED      Problem: Infection  Goal: Will remain free from infection  Outcome: PROGRESSING AS EXPECTED      Problem: Skin Integrity  Goal: Risk for impaired skin integrity will decrease  Outcome: PROGRESSING AS EXPECTED

## 2019-01-25 NOTE — CARE PLAN
Problem: Safety  Goal: Will remain free from injury  Outcome: PROGRESSING AS EXPECTED      Problem: Fluid Volume:  Goal: Will maintain balanced intake and output  Outcome: PROGRESSING AS EXPECTED

## 2019-01-25 NOTE — PROGRESS NOTES
Pulmonary Progress Note      Admit Date: 1/18/2019  Hosp  Day:  7        Date & Time:   1/25/2019   2:06 PM       Patient ID:    Name:             Kishan Mcintyre   YOB: 1945  Age:                 73 y.o.  female   MRN:               [id number]    HPI:  74 yo F with COPD on 2L (recently increased to 4L at home), HFpEF & moderate AS (Echo on 1/19/2019 significant for EF 65, diastolic dysfunction grade II), came in with worsening SOB, tachypneic at ER and required BIPAP. On presentation, HCO 44, trop 0.3, , PH 7.26, CO2 104, CXR pulmonary edema with effusions, EKG no ischemic changes. She was admitted for exacerbation of COPD and diastolic heart failure requiring BiPAP.     Consultants:  Cardiology, Trip Galvan M.D.  PMA: Tommy Asher M.D.    Procedures:  BARBARA 1/23    Imaging:  EC-BARBARA W/O CONT   Final Result      EC-ECHOCARDIOGRAM COMPLETE W/O CONT   Final Result      DX-CHEST-PORTABLE (1 VIEW)   Final Result      No significant change      CT-CTA CHEST PULMONARY ARTERY W/ RECONS   Final Result      No evidence of pulmonary embolus.      Collapse of the right middle lobe of unknown etiology could be due to bronchial stricture, mucous plugging or perhaps malignancy.      Bibasilar atelectasis with trace bilateral pleural effusions.      Cardiomegaly.      3D angiographic/MIP images of the vasculature confirm the vascular findings as described above.            DX-CHEST-PORTABLE (1 VIEW)   Final Result      Pulmonary edema, pleural effusions, basilar atelectasis and cardiac silhouette enlargement          Interval Events:  Preparation for home, continue diuretic, supplemental oxygen, follow-up in the office, seen previously by pulmonary service and outpatient follow-up.  Reviewed carefully with  and patient.      Review of Systems   Constitutional: Positive for malaise/fatigue and weight loss. Negative for chills and fever.   HENT: Negative for congestion, sinus pain and  sore throat.    Eyes: Negative for blurred vision.   Respiratory: Positive for shortness of breath and wheezing. Negative for cough and sputum production.    Cardiovascular: Positive for orthopnea and PND. Negative for chest pain, palpitations and leg swelling.        SOB doing better   Gastrointestinal: Negative for abdominal pain, constipation and nausea.   Genitourinary: Negative for dysuria and urgency.   Musculoskeletal: Negative for myalgias.   Neurological: Positive for headaches. Negative for dizziness, sensory change and focal weakness.   Endo/Heme/Allergies: Negative for polydipsia.   Psychiatric/Behavioral: The patient is not nervous/anxious.        PHYSICAL EXAM  Gen: Obese, elderly female, sitting comfortably in bed  HEENT: NC/AT, no scleral icterus, no conjunctival injection, mucous membranes pink and dry.  Neck: Supple.  Cardiac: S1S2, 3/6 systolic murmur heard best over aortic, no r/g, no JVD.  Respiratory: Normal respiratory effort, CTA, no crackles or wheezes.  Abdomen: BS+, soft, NT/ND, no rebound/guarding, no palpable organomegaly.  Ext: Trace edema with venostasis changes, 2+ DP pulses b/l.  Skin: Warm, dry. No rashes or erythema.   Neuro: AAOx4, grossly no sensory or motor deficits.   Psych: Affect, mood, judgement normal.    Respiratory:     Respiration: 16, Pulse Oximetry: 94 %    HemoDynamics:  Pulse: (!) 116 Blood Pressure : (!) 94/69 (RN notified), NIBP: 116/50      Fluids:    Date 01/25/19 0700 - 01/26/19 0659   Shift 1065-3487 7115-0595 0983-7133 24 Hour Total   I  N  T  A  K  E   P.O. 120   120      P.O. 120   120    Shift Total 120   120   O  U  T  P  U  T   Urine 250   250      Number of Times Voided 2 x   2 x      Urine Void (mL) 250   250    Stool          Number of Times Stooled 3 x   3 x    Shift Total 250   250   NET -130   -130        Intake/Output Summary (Last 24 hours) at 01/25/19 1406  Last data filed at 01/25/19 0935   Gross per 24 hour   Intake              120 ml   Output               750 ml   Net             -630 ml       Weight: 92.9 kg (204 lb 11.9 oz)  Body mass index is 39.99 kg/m².    Recent Labs      194  19   0550  19   0428   SODIUM  141  141  141   POTASSIUM  4.2  3.7  3.8   CHLORIDE  97  96  95*   CO2  41*  41*  43*   BUN  25*  26*  27*   CREATININE  0.48*  0.41*  0.63   MAGNESIUM  2.2  2.2  2.0   CALCIUM  9.1  9.1  9.1       GI/Nutrition:  Recent Labs      194  19   0550  19   0428   GLUCOSE  126*  86  95       Heme:  Recent Labs      194  19   0550   RBC  4.35  4.61   HEMOGLOBIN  12.2  12.9   HEMATOCRIT  42.0  44.6   PLATELETCT  175  194       Infectious Disease:  Temp  Av.1 °C (98.7 °F)  Min: 36.4 °C (97.5 °F)  Max: 37.4 °C (99.4 °F)  Recent Labs      194  19   0550   WBC  6.4  8.0   NEUTSPOLYS  84.20*  72.20*   LYMPHOCYTES  9.20*  16.90*   MONOCYTES  5.50  9.60   EOSINOPHILS  0.00  0.60   BASOPHILS  0.20  0.20       Meds:  • senna-docusate  2 Tab      And   • polyethylene glycol/lytes  1 Packet      And   • magnesium hydroxide  30 mL      And   • bisacodyl  10 mg     • metoprolol  25 mg     • ipratropium-albuterol  3 mL     • Respiratory Care per Protocol       • albuterol  2 Puff     • aspirin EC  81 mg     • busPIRone  7.5 mg     • clotrimazole-betamethasone  1 Application     • multivitamin       • omeprazole  20 mg     • umeclidinium-vilanterol  1 Puff      And   • fluticasone  2 Puff     • acetaminophen  500 mg     • lidocaine  2 Patch     • enoxaparin (LOVENOX) injection  40 mg            Problem and Plan:    * Acute on chronic respiratory failure with hypoxia and hypercapnia (HCC)- (present on admission)   Assessment & Plan    Hypoxic and hypercarbic, suspect both chronic with severe acute flare  Likely related to acute exacerbation of HFpEF and AECOPD less likely bronchitis/pneumonia         Aortic stenosis- (present on admission)   Assessment & Plan    Moderate  AS  Diuresis         Acute on chronic diastolic congestive heart failure (HCC)- (present on admission)   Assessment & Plan    Forced diuresis with Lasix        Morbid obesity (HCC)- (present on admission)   Assessment & Plan    RD consult  Behavior modification and weight loss is encouraged         Discharge to home, follow-up in office with Dr. Martinez, consider outpatient trilogy, has made a dramatic recovery, continue diuretics and off steroids    Argenis Miller MD , FCCP, Pulmonary Service

## 2019-01-26 NOTE — PROGRESS NOTES
Patient discharged home. Patient AOX 4.  IV and tele box removed, discharge instructions provided to patient and reviewed with them. All questions answered, patient provided copy of discharge instructions and instructed on when to F/U with MD. Patient wheeled off unit.

## 2019-01-26 NOTE — DISCHARGE INSTRUCTIONS
Discharge Instructions    Discharge Instructions    Discharged to home by car with relative. Discharged via wheelchair, hospital escort: Yes.  Special equipment needed: Oxygen    Be sure to schedule a follow-up appointment with your primary care doctor or any specialists as instructed.     Discharge Plan:   Diet Plan: Discussed  Activity Level: Discussed  Confirmed Follow up Appointment: Appointment Scheduled  Confirmed Symptoms Management: Discussed  Medication Reconciliation Updated: Yes  Influenza Vaccine Indication: Not indicated: Previously immunized this influenza season and > 8 years of age    I understand that a diet low in cholesterol, fat, and sodium is recommended for good health. Unless I have been given specific instructions below for another diet, I accept this instruction as my diet prescription.   Other diet: low sodium    Special Instructions:   HF Patient Discharge Instructions  · Monitor your weight daily, and maintain a weight chart, to track your weight changes.   · Activity as tolerated, unless your Doctor has ordered otherwise. Other activity order: as tolerated.  · Follow a low fat, low cholesterol, low salt diet unless instructed otherwise by your Doctor. Read the labels on the back of food products and track your intake of fat, cholesterol and salt.   · Fluid Restriction No. If a Fluid Restriction has been ordered by your Doctor, measure fluids with a measuring cup to ensure that you are not exceeding the restriction.   · No smoking.  · Oxygen Yes. If your Doctor has ordered that you wear Oxygen at home, it is important to wear it as ordered.  · Did you receive an explanation from staff on the importance of taking each of your medications and why it is necessary to keep taking them unless your doctor says to stop? Yes  · Were all of your questions answered about how to manage your heart failure and what to do if you have increased signs and symptoms after you go home? Yes  · Do you feel  like your heart failure care team involved you in the care treatment plan and allowed you to make decisions regarding your care while in the hospital and addressed any discharge needs you might have? Yes    See the educational handout provided at discharge for more information on monitoring your daily weight, activity and diet. This also explains more about Heart Failure, symptoms of a flare-up and some of the tests that you have undergone.     Warning Signs of a Flare-Up include:  · Swelling in the ankles or lower legs.  · Shortness of breath, while at rest, or while doing normal activities.   · Shortness of breath at night when in bed, or coughing in bed.   · Requiring more pillows to sleep at night, or needing to sit up at night to sleep.  · Feeling weak, dizzy or fatigued.     When to call your Doctor:  · Call [facility] seven days a week from 8:00 a.m. to 8:00 p.m. for medical questions [phone].  · Call your Primary Care Physician or Cardiologist if:   1. You experience any pain radiating to your jaw or neck.  2. You have any difficulty breathing.  3. You experience weight gain of 3 lbs in a day or 5 lbs in a week.   4. You feel any palpitations or irregular heartbeats.  5. You become dizzy or lose consciousness.   If you have had an angiogram or had a pacemaker or AICD placed, and experience:  1. Bleeding, drainage or swelling at the surgical / puncture site.  2. Fever greater than 100.0 F  3. Shock from internal defibrillator.  4. Cool and / or numb extremities.      · Is patient discharged on Warfarin / Coumadin?   No     Depression / Suicide Risk    As you are discharged from this [facility], it is important to learn how to keep safe from harming yourself.    Recognize the warning signs:  · Abrupt changes in personality, positive or negative- including increase in energy   · Giving away possessions  · Change in eating patterns- significant weight changes-  positive or  negative  · Change in sleeping patterns- unable to sleep or sleeping all the time   · Unwillingness or inability to communicate  · Depression  · Unusual sadness, discouragement and loneliness  · Talk of wanting to die  · Neglect of personal appearance   · Rebelliousness- reckless behavior  · Withdrawal from people/activities they love  · Confusion- inability to concentrate     If you or a loved one observes any of these behaviors or has concerns about self-harm, here's what you can do:  · Talk about it- your feelings and reasons for harming yourself  · Remove any means that you might use to hurt yourself (examples: pills, rope, extension cords, firearm)  · Get professional help from the community (Mental Health, Substance Abuse, psychological counseling)  · Do not be alone:Call your Safe Contact- someone whom you trust who will be there for you.  · Call your local CRISIS HOTLINE 738-3373 or 434-781-7786  · Call your local Children's Mobile Crisis Response Team [place] (035) 823-3342 or wwwtritrue  · Call the toll free National Suicide Prevention Hotlines   · National Suicide Prevention Lifeline 734-210-NBEL (4871)  · National Hope Line Network 800-SUICIDE (171-6311)        Rivaroxaban oral tablets  What is this medicine?  RIVAROXABAN (ri va HALEY a ban) is an anticoagulant (blood thinner). It is used to treat blood clots in the lungs or in the veins. It is also used after knee or hip surgeries to prevent blood clots. It is also used to lower the chance of stroke in people with a medical condition called atrial fibrillation.  This medicine may be used for other purposes; ask your health care provider or pharmacist if you have questions.  COMMON BRAND NAME(S): Xarelto, Xarelto Starter Pack  What should I tell my health care provider before I take this medicine?  They need to know if you have any of these conditions:  -bleeding disorders  -bleeding in the brain  -blood in your stools (black or tarry  stools) or if you have blood in your vomit  -history of stomach bleeding  -kidney disease  -liver disease  -low blood counts, like low white cell, platelet, or red cell counts  -recent or planned spinal or epidural procedure  -take medicines that treat or prevent blood clots  -an unusual or allergic reaction to rivaroxaban, other medicines, foods, dyes, or preservatives  -pregnant or trying to get pregnant  -breast-feeding  How should I use this medicine?  Take this medicine by mouth with a glass of water. Follow the directions on the prescription label. Take your medicine at regular intervals. Do not take it more often than directed. Do not stop taking except on your doctor's advice. Stopping this medicine may increase your risk of a blood clot. Be sure to refill your prescription before you run out of medicine.  If you are taking this medicine after hip or knee replacement surgery, take it with or without food. If you are taking this medicine for atrial fibrillation, take it with your evening meal. If you are taking this medicine to treat blood clots, take it with food at the same time each day. If you are unable to swallow your tablet, you may crush the tablet and mix it in applesauce. Then, immediately eat the applesauce. You should eat more food right after you eat the applesauce containing the crushed tablet.  Talk to your pediatrician regarding the use of this medicine in children. Special care may be needed.  Overdosage: If you think you have taken too much of this medicine contact a poison control center or emergency room at once.  NOTE: This medicine is only for you. Do not share this medicine with others.  What if I miss a dose?  If you take your medicine once a day and miss a dose, take the missed dose as soon as you remember. If you take your medicine twice a day and miss a dose, take the missed dose immediately. In this instance, 2 tablets may be taken at the same time. The next day you should take 1  tablet twice a day as directed.  What may interact with this medicine?  Do not take this medicine with any of the following medications:  -defibrotide  This medicine may also interact with the following medications:  -aspirin and aspirin-like medicines  -certain antibiotics like erythromycin, azithromycin, and clarithromycin  -certain medicines for fungal infections like ketoconazole and itraconazole  -certain medicines for irregular heart beat like amiodarone, quinidine, dronedarone  -certain medicines for seizures like carbamazepine, phenytoin  -certain medicines that treat or prevent blood clots like warfarin, enoxaparin, and dalteparin  -conivaptan  -diltiazem  -felodipine  -indinavir  -lopinavir; ritonavir  -NSAIDS, medicines for pain and inflammation, like ibuprofen or naproxen  -ranolazine  -rifampin  -ritonavir  -SNRIs, medicines for depression, like desvenlafaxine, duloxetine, levomilnacipran, venlafaxine  -SSRIs, medicines for depression, like citalopram, escitalopram, fluoxetine, fluvoxamine, paroxetine, sertraline  -Blackfoot's wort  -verapamil  This list may not describe all possible interactions. Give your health care provider a list of all the medicines, herbs, non-prescription drugs, or dietary supplements you use. Also tell them if you smoke, drink alcohol, or use illegal drugs. Some items may interact with your medicine.  What should I watch for while using this medicine?  Visit your doctor or health care professional for regular checks on your progress.  Notify your doctor or health care professional and seek emergency treatment if you develop breathing problems; changes in vision; chest pain; severe, sudden headache; pain, swelling, warmth in the leg; trouble speaking; sudden numbness or weakness of the face, arm or leg. These can be signs that your condition has gotten worse.  If you are going to have surgery or other procedure, tell your doctor that you are taking this medicine.  What side  effects may I notice from receiving this medicine?  Side effects that you should report to your doctor or health care professional as soon as possible:  -allergic reactions like skin rash, itching or hives, swelling of the face, lips, or tongue  -back pain  -redness, blistering, peeling or loosening of the skin, including inside the mouth  -signs and symptoms of bleeding such as bloody or black, tarry stools; red or dark-brown urine; spitting up blood or brown material that looks like coffee grounds; red spots on the skin; unusual bruising or bleeding from the eye, gums, or nose  Side effects that usually do not require medical attention (report to your doctor or health care professional if they continue or are bothersome):  -dizziness  -muscle pain  This list may not describe all possible side effects. Call your doctor for medical advice about side effects. You may report side effects to FDA at 4-952-FDA-5650.  Where should I keep my medicine?  Keep out of the reach of children.  Store at room temperature between 15 and 30 degrees C (59 and 86 degrees F). Throw away any unused medicine after the expiration date.  NOTE: This sheet is a summary. It may not cover all possible information. If you have questions about this medicine, talk to your doctor, pharmacist, or health care provider.  © 2018 Elsevier/Gold Standard (2017-09-06 16:29:33)      Metoprolol tablets  What is this medicine?  METOPROLOL (me TOE proe lole) is a beta-blocker. Beta-blockers reduce the workload on the heart and help it to beat more regularly. This medicine is used to treat high blood pressure and to prevent chest pain. It is also used to after a heart attack and to prevent an additional heart attack from occurring.  This medicine may be used for other purposes; ask your health care provider or pharmacist if you have questions.  COMMON BRAND NAME(S): Lopressor  What should I tell my health care provider before I take this medicine?  They need  to know if you have any of these conditions:  -diabetes  -heart or vessel disease like slow heart rate, worsening heart failure, heart block, sick sinus syndrome or Raynaud's disease  -kidney disease  -liver disease  -lung or breathing disease, like asthma or emphysema  -pheochromocytoma  -thyroid disease  -an unusual or allergic reaction to metoprolol, other beta-blockers, medicines, foods, dyes, or preservatives  -pregnant or trying to get pregnant  -breast-feeding  How should I use this medicine?  Take this medicine by mouth with a drink of water. Follow the directions on the prescription label. Take this medicine immediately after meals. Take your doses at regular intervals. Do not take more medicine than directed. Do not stop taking this medicine suddenly. This could lead to serious heart-related effects.  Talk to your pediatrician regarding the use of this medicine in children. Special care may be needed.  Overdosage: If you think you have taken too much of this medicine contact a poison control center or emergency room at once.  NOTE: This medicine is only for you. Do not share this medicine with others.  What if I miss a dose?  If you miss a dose, take it as soon as you can. If it is almost time for your next dose, take only that dose. Do not take double or extra doses.  What may interact with this medicine?  This medicine may interact with the following medications:  -certain medicines for blood pressure, heart disease, irregular heart beat  -certain medicines for depression like monoamine oxidase (MAO) inhibitors, fluoxetine, or paroxetine  -clonidine  -dobutamine  -epinephrine  -isoproterenol  -reserpine  This list may not describe all possible interactions. Give your health care provider a list of all the medicines, herbs, non-prescription drugs, or dietary supplements you use. Also tell them if you smoke, drink alcohol, or use illegal drugs. Some items may interact with your medicine.  What should I  watch for while using this medicine?  Visit your doctor or health care professional for regular check ups. Contact your doctor right away if your symptoms worsen. Check your blood pressure and pulse rate regularly. Ask your health care professional what your blood pressure and pulse rate should be, and when you should contact them.  You may get drowsy or dizzy. Do not drive, use machinery, or do anything that needs mental alertness until you know how this medicine affects you. Do not sit or stand up quickly, especially if you are an older patient. This reduces the risk of dizzy or fainting spells. Contact your doctor if these symptoms continue. Alcohol may interfere with the effect of this medicine. Avoid alcoholic drinks.  What side effects may I notice from receiving this medicine?  Side effects that you should report to your doctor or health care professional as soon as possible:  -allergic reactions like skin rash, itching or hives  -cold or numb hands or feet  -depression  -difficulty breathing  -faint  -fever with sore throat  -irregular heartbeat, chest pain  -rapid weight gain  -swollen legs or ankles  Side effects that usually do not require medical attention (report to your doctor or health care professional if they continue or are bothersome):  -anxiety or nervousness  -change in sex drive or performance  -dry skin  -headache  -nightmares or trouble sleeping  -short term memory loss  -stomach upset or diarrhea  -unusually tired  This list may not describe all possible side effects. Call your doctor for medical advice about side effects. You may report side effects to FDA at 6-069-FDA-2150.  Where should I keep my medicine?  Keep out of the reach of children.  Store at room temperature between 15 and 30 degrees C (59 and 86 degrees F). Throw away any unused medicine after the expiration date.  NOTE: This sheet is a summary. It may not cover all possible information. If you have questions about this  medicine, talk to your doctor, pharmacist, or health care provider.  © 2018 Elsevier/Gold Standard (2014-08-22 14:40:36)    Lidocaine dermal patch  What is this medicine?  LIDOCAINE (LYE meraz estevez) causes loss of feeling in the skin and surrounding area. The medicine helps treat pain, including nerve pain.  This medicine may be used for other purposes; ask your health care provider or pharmacist if you have questions.  COMMON BRAND NAME(S): Lidocare, Lidoderm  What should I tell my health care provider before I take this medicine?  They need to know if you have any of these conditions:  -heart disease  -history of irregular heart beat  -liver disease  -skin conditions or sensitivity  -skin infection  -an unusual or allergic reaction to lidocaine, parabens, other medicines, foods, dyes, or preservatives  -pregnant or trying to get pregnant  -breast-feeding  How should I use this medicine?  This medicine is for external use only. Follow the directions on the prescription label or package.  Talk to your pediatrician regarding the use of this medicine in children. While this drug may be prescribed for children as young as 12 years for selected conditions, precautions do apply.  Overdosage: If you think you have taken too much of this medicine contact a poison control center or emergency room at once.  NOTE: This medicine is only for you. Do not share this medicine with others.  What if I miss a dose?  Apply the patches as needed for pain.  What may interact with this medicine?  Do not take this medicine with any of the following medications:  -certain medicines for irregular heart beat  -MAOIs like Carbex, Eldepryl, Marplan, Nardil, and Parnate  This medicine may also interact with the following medications:  -other local anesthetics like pramoxine, tetracaine  Do not use any other skin products on the affected area without asking your doctor or health care professional.  This list may not describe all possible  interactions. Give your health care provider a list of all the medicines, herbs, non-prescription drugs, or dietary supplements you use. Also tell them if you smoke, drink alcohol, or use illegal drugs. Some items may interact with your medicine.  What should I watch for while using this medicine?  Tell your doctor or healthcare professional if your symptoms do not start to get better or if they get worse.  Be careful to avoid injury while the area is numb from the medicine, and you are not aware of pain.  If you are going to need surgery, a MRI, CT scan, or other procedure, tell your doctor that you are using this medicine. You may need to remove this patch before the procedure.  Do not get this medicine in your eyes. If you do, rinse out with plenty of cool tap water.  This medicine can make certain skin conditions worse. Only use it for conditions for which your doctor or health care professional has prescribed.  What side effects may I notice from receiving this medicine?  Side effects that you should report to your doctor or health care professional as soon as possible:  -allergic reactions like skin rash, itching or hives, swelling of the face, lips, or tongue  -breathing problems  -chest pain or chest tightness  -dizzines  Side effects that usually do not require medical attention (report to your doctor or health care professional if they continue or are bothersome):  -tingling, numbness at site where applied  This list may not describe all possible side effects. Call your doctor for medical advice about side effects. You may report side effects to FDA at 7-408-FDA-6023.  Where should I keep my medicine?  Keep out of the reach of children.  See product for storage instructions. Each product may have different instructions.  NOTE: This sheet is a summary. It may not cover all possible information. If you have questions about this medicine, talk to your doctor, pharmacist, or health care provider.  © 2018  Elsevier/Gold Standard (2016-10-28 11:05:19)

## 2019-01-28 ENCOUNTER — PATIENT OUTREACH (OUTPATIENT)
Dept: HEALTH INFORMATION MANAGEMENT | Facility: OTHER | Age: 74
End: 2019-01-28

## 2019-01-28 ENCOUNTER — TELEPHONE (OUTPATIENT)
Dept: HEALTH INFORMATION MANAGEMENT | Facility: OTHER | Age: 74
End: 2019-01-28

## 2019-01-28 DIAGNOSIS — Z91.89 POTENTIAL FOR DEFICIENT KNOWLEDGE OF CONGESTIVE HEART FAILURE: ICD-10-CM

## 2019-01-28 DIAGNOSIS — Z79.899 MEDICATION MANAGEMENT: ICD-10-CM

## 2019-01-28 NOTE — PROGRESS NOTES
Requested pharmacy consultation through [facility] to answer patient's medication questions at discharge.  left for Zeina Dominguez MA.

## 2019-01-28 NOTE — PROGRESS NOTES
"CM received message from [facility] that patient wanted to reschedule post discharge follow-up appt.  CM contacted patient and reviewed post discharge questions. Pt mentioned to CM that she was having some back pain and urinary frequency. Pt also mentioned she had \"some burning earlier with urination\". Reports she took an OTC medication. Patient states it helped and she isn't sure of the name of medication.  CM recommended patient get seen for symptoms. Pt declining, stating she doesn't think it is necessary.  Pt reports she is feeling fine.  Patient doesn't want to come for a follow-up appointment  this week at PCP office. CM encouraged patient to keep appt.  CM encouraged pt to get seen at urgent care if any signs or symptoms of UTI. CM assisted patient with rescheduling her hospital follow-up.    Pt recently hospitalized for COPD and CHF. Pt agreeable to referral to [facility].  CM will send referral.   "

## 2019-01-29 NOTE — PROGRESS NOTES
[facility] post discharge med rec completed. No clinically significant medication issues noted. Patient denies any side effects, barriers to accessing medications, or trouble with adherence. Patient was started on metoprolol 25mg BID for rate control. Patient received and has been taking metoprolol, Xarelto, and lidocaine patches as prescribed. Patient has not been able to monitor BP d/t having moved recently and home BP monitor is still packed. Last recorded BP was 94/69 on 1/25/19. Patient denies any symptoms of hypotension. States she has always been slightly lightheaded and is currently no change from baseline. Discussed ER precautions. Patient has f/u with cardiology and discharge clinic provider on 2/5/19.

## 2019-01-29 NOTE — TELEPHONE ENCOUNTER
If the patient feels ok with the dose then continue. If not have her hold the med and follow up. Thanks!

## 2019-01-29 NOTE — TELEPHONE ENCOUNTER
Ramiro Helm,    Post discharge med review completed. Patient was started on metoprolol 25mg BID at discharge. Last BP before discharge was 94/69 on 1/25. Patient doesn't have home cuff to monitor BP. She has an appointment with you on 2/5. Did you want to make any changes before then?    Thanks for your time,  Robert Gardner, PharmD [phone]

## 2019-01-30 ENCOUNTER — PATIENT OUTREACH (OUTPATIENT)
Dept: HEALTH INFORMATION MANAGEMENT | Facility: OTHER | Age: 74
End: 2019-01-30

## 2019-01-30 ASSESSMENT — ENCOUNTER SYMPTOMS
DEPRESSION: 0
OCCASIONAL FEELINGS OF UNSTEADINESS: 1
LOSS OF SENSATION IN FEET: 1

## 2019-01-30 ASSESSMENT — PATIENT HEALTH QUESTIONNAIRE - PHQ9: CLINICAL INTERPRETATION OF PHQ2 SCORE: 0

## 2019-01-30 NOTE — PROGRESS NOTES
"SBAR Documentation: Situation, Background, Assessment, Recommendation     Situation    Received referral for COPD and CHF management   Background       Acute on chronic respiratory failure with hypoxia and hypercapnia, acute on chronic diastolic congestive heart failure, aortic stenosis, aortic regurgitation, PAF, primary osteoarthritis of knee, morbid obesity   Assessment    Telephone contact w/Kishan; provided an introduction of myself; my role, the Pharmacy role, the  role, and the reason for my call.  Kishan states she was recently hospitalized at [facility] for \"too much fluid and too much oxygen\".  Kishan states she was recently on a cruise and stopped taking her \"water pill\".  Kishan states she lost 25 pounds while in the hospital.  Kishan states she lives at home with her  who is able to assist her when needed.  She states he provides transportation to all her MD appointments and does most of the shopping.  Kishan states she uses home oxygen at 2.5L continuously.  She uses a FWW to assist with ambulation.  Kishan states her daughter that is a LSW and is also able to assist when needed.         Provided Kishan education on the CHF program.       Education/Intervention:     --Education provided on RN Care Coordinator is available to the patient Monday through Friday (except Holidays) from 8:00 AM to 5:00 PM.        --Completed the intake 9-Point Screening & CHF (paitent/encounter) assessments with Kishan at this time.       --Educational materials on Heart Failure per the CHF Packet and Congestive Heart Failure LPOC developed with them during this time, copy sent to Kishan by mail.                Recommendation CC RN instructed to weight herself daily and to keep a record to take to her next MD appointment.  CC RN also instructed her to take her medications as prescribed by her PCP.  Plan:  CC RN to schedule for telephone call in 2 weeks to discuss management of her CHF.         "

## 2019-01-30 NOTE — LETTER
January 30, 2019        Kishan Mcintyre  [address] [place] [address]        Dear Kishan:    Welcome to [facility]! Your team of Registered Nurses, Social Workers and Pharmacists are partnered with your [facility] Providers to assist you with accessing resources and education to support your individual needs. As you work with your Care Management Team, you will be empowered to be successful with learning how to self-manage your health with the Patient Centered goals of helping you to feel better and staying out of the hospital. This program is at no cost to you and is a part of [facility]’s ongoing commitment to serve the people of our community.  Benefits of working with your Care Management Team includes:  - Comprehensive assessment by a Registered Nurse on the telephone to identify your medical and health needs.   - Telephonic review of your medications by a Care Management Pharmacist to answer any questions you may have about your medications.  - Evaluation of social needs, such as, transportation; financial; food; housing; etc. by a Care Management  to connect you with eligible resources.  - For those eligible, we will connect you with the [facility], offering access to services to assist you to manage your Congestive Heart Failure or Chronic Obstructive Pulmonary Disease in your own home.    Please contact us at [phone] to schedule an introductory call with your Registered Nurse. We are available 5 days a week, Monday through Friday from 8:00 a.m. - 5:00 p.m.    We look forward to speaking with you soon.    If you have any questions or concerns, please don't hesitate to call.      Sincerely,      Lanette Jean R.N.    Electronically Signed

## 2019-01-30 NOTE — PATIENT INSTRUCTIONS
Goal: Kishan will be able to provide teach back on her CHF disease process and home management; CHF protocol interventions; low sodium diet and CHF Action Plan (green, yellow and red lights) with within the next 90 days.    Barriers: Knowledge deficit regarding in home management of Congestive heart failure    Interventions:   ? Heart Failure Education  ? Caring for Your Heart Packet (Living Well with Heart Failure)  ? What is Your HEART HEALTH LEVEL today? Magnet    ? Referral to Nutrition Class      MY CHF ACTION PLAN    GREEN ZONE--(I am doing well today)    • Symptoms are under control/no change is symptoms:  a) No new shortness of breath  b) No new weight gain of more than 2 pounds  c) No new swelling of the feet, ankles, legs, or stomach  d) No new chest pain  • Continue on the same medication/treatment regimen    YELLOW ZONE--(I am having a bad day with my CHF)    • Identify the root causes that triggered new symptoms:   • Weight gain = 3 pounds or more in 1 day or 5 pounds in the past 7 days.  • Symptoms:  a) Shortness of breath  b) Swelling of feet, ankles, legs, or stomach  c) No energy, fatigue  d) Dry, hacking cough  e) Dizziness  f) An uneasy feeling that something is not right  g) Difficulty breathing when lying down, needs to sit in a chair in order to sleep or utilizing more than one pillow at night    • Continue on the same medication/treatment regimen    Weight gain without Symptoms:  1. Assess the previous day's fluid intake.  2. Obtain a 24 hour food diary to calculate the sodium intake.  3. Educate and  the patient on root cause identified to make                    adjustments.  4. Re-check patient's weight and symptoms the following morning via remote health monitoring     Weight gain with Symptoms:   1. Assess the previous day's fluid intake.  2. Obtain a 24 hour food diary to calculate the sodium intake.  3. Educate and  the patient on root cause identified.  4. Without Escalation  Protocol--follow up with provider.             OR Escalation Protocol--follow up as ordered by provider.         RED ZONE (URGENT) (I need urgent medical care)   **IF PATIENT IN SEVERE DISTRESS OR HAS A LIFE THREATENING EMERGENCY CALL 911**    • Weight gain greater than 5 pounds AND/OR  • Significant symptomology with or without weight gain greater than 5 pounds    Red Zone: Weight gain of greater than 5 pounds or significant symptomology with or without weight gain greater than 5 pounds    Start Date: 1/30/19  End Date: 3/30/19      CARING FOR YOUR HEART PACKET  Living Well With Heart Failure    • Anatomy of the Heart    • What is Heart Failure  ? Systolic   ? Diastolic  ? Ejection Fraction  ? Normal Ejection Fraction: 60-70%  ? My EF: _____    • What is Heart Failure?  ? Water in Lungs  ? Water in Legs  ? Water in Belly    • Things You Can Do to Live Well with Heart Failure   ? Take your medication correctly everyday  ? Eat less salt  ? Exercise regularly  ? Do your daily checkup and check your weight each day    • How to Take Your Medication  ? Take your pills  ? Do not stop your medications for any reason, and call the office at [phone] if you are feeling unwell  ? If you cannot afford your medications for any reason, talk to your doctor, nurse, or call [facility] Care Coordination at [phone].    ? Don’t run out of pills  ? Have a system that helps you remember how and when to take your pills  ? Remember to take your pills everyday as instructed at the correct times. If you forgot a dose, take it as soon as you remember, unless it is almost time for the next does, then skip that missed dose. Do not double up on the dose.  ? Bring your medication in the pill bottles to every appointment    • Know Your Water Pill  ? Your water pill is also called a diuretic. Common diuretics are Lasix (furosemide), Demedex (torsemide), and Bumex (bumetanide). There are other types of pills-make sure you know which diuretics  you are on  ? The water pill controls how much salt and water you have in your body  ? You may notice your doctor makes changes to your water pills more often than other medications     • Why it’s Important to Eat Less Salt  ? Salt causes the body to act like a sponge and hold water  ? Eating too much salt can cause weight gain, swelling in your legs, and water in your lungs  ? You can eat less salt if you:  ? Aim for 2,000 milligrams of sodium per day  ? Choose foods that are low in salt  ? Don’t add salt when you cook or eat  ? Take the salt shaker off the table    • How to Eat Less Salt  ? Avoid or limit these high-salt foods:  ? Fast food  ? Hotdogs, Walker, Smoked Meat  ? Canned Beans and Vegetables  ? Canned Soup    ? Choose these low-salt foods:  ? Fresh Fish  ? Yogurt  ? Frozen Vegetables  ? Eggs    • How to Eat Less Salt When Eating Out   ? Ask for food to be cooked with no salt  ? Avoid butter, cheese and sauces  ? Avoid fried foods-choose grilled, baked or steamed foods  ? Choose oil and vinegar salad dressing  ? Limit fast foods, or choose menu items with less sodium  ? Avoid walker, sausage and ham    • How to Eat Less Salt When You Eat at Home  ? Read food labels and choose foods that contain less than 140 mg of sodium per serving    • How to Eat Less Salt   ? Reading a food label  ? Look at the serving size  ? Look at the sodium per serving  ? Choose foods with less than 140 mg of sodium per serving  ? Choose items labelled:  o Low Sodium  o No Salt Added  o Sodium Free  ? Watch out for items labeled:  o Lower Sodium     • Exercising Well with Heart Failure  ? What’s in it for you  ? How to get started:  ? Star with 10 minutes a day  ? Be Safe  ? You can exercise by:  ? Riding a stationary bicycle  ? Walking  ? Exercise class  ? Sweeping, vacuuming or dusting  ? Working in your garden  So turn off the TV, get up and get moving!    • More Exercise Tips    ? Steps for doing more:  ? Begin by doing an  activity for 10 minutes, 3 times per week  ? After a couple of weeks, add 5 minutes so that you are exercising 15 minutes at a time  ? When you feel comfortable doing more, add another 5 minutes  ? You want to work up to 30 minutes of exercise, 3 times a week  ? Exercise with a friend or partner, if possible  ? Wear clothes and shoes that fit well  ? Start slowly. Ease your way into exercise    • Daily Check Up  ? Each day, do a check-up by asking yourself  ? How do I feel?  ? Do I have swelling in my feet, legs or belly?  ? What do I weigh?    • How Do I Feel Today  ? Ask yourself the following 4 questions:  1. Am I short of breath walking?  DOING WELL: Walk easily with no shortness of breath  CALL YOUR DOCTOR: Short of breath after waling a short distance  CALL 911: Short of breath at rest    2. Am I short of breath sleeping?  DOING WELL: Sleeping flat, no shortness of breath  CALL YOUR DOCTOR: Needing 2 pillows or more to avoid shortness of breath  CALL 911: Have to sleep upright to avoid shortness of breath    3. Am I feeling faint or dizzy?  DOING WELL: Not dizzy or sometimes a little dizzy when standing up  CALL YOUR DOCTOR: Dizzy for a long time  CALL 911: Almost passed out (fainted) or fallen    4. Do I have swelling?  To check for swelling each morning:  ? Press firmly into the skin in the front part of your lower leg or around your belly   ? If your finger makes a pit in your skin, you have swelling in that area  ? Be aware of how much swelling is usual for you and look for changes  DOING WELL: No swelling  CALL YOUR DOCTOR: Swelling in ankle or shin. Increased swelling in your belly, feeling more bloated        • Your Daily Checkup and Check Your Weight Each Day  ? Weigh yourself each morning at the same time  ? You should weigh yourself:  ? After you urinate  ? Before you eat breakfast  ? Before you get dressed    ? Know your target weigh by:  Your target weight is where your heart is working best  • My  target weight:  • Visit date:    • Putting it all together  ? Remember to do the following daily:  ? Check how you feel  ? Check the swelling on your feet, legs and belly  ? Check your weight  ? Determine if you need to call your doctor or heart failure team  ? If you’re doing well, keep up the good work!  ? Take your medication  ? Watch your salt  ? Get your exercise today  ? Call us if:  ? You are short of breath while at rest or more short of breath than usual  ? You have to sleep upright or in a chair  ? You have more swelling in your feet. Legs and belly than usual  ? You have a lot of dizziness or light-headedness that is worse than usual  ? Your weight goes up by 3 or more pounds in one day or 5 pounds in a week  ? You have to sleep with more pillows than usual      [facility] Coordination Phone: [phone]  M-F 8am-5pm

## 2019-02-01 ENCOUNTER — HOSPITAL ENCOUNTER (OUTPATIENT)
Dept: LAB | Facility: MEDICAL CENTER | Age: 74
End: 2019-02-01
Attending: STUDENT IN AN ORGANIZED HEALTH CARE EDUCATION/TRAINING PROGRAM
Payer: MEDICARE

## 2019-02-01 ENCOUNTER — PATIENT OUTREACH (OUTPATIENT)
Dept: HEALTH INFORMATION MANAGEMENT | Facility: OTHER | Age: 74
End: 2019-02-01

## 2019-02-01 DIAGNOSIS — R06.02 SOB (SHORTNESS OF BREATH): ICD-10-CM

## 2019-02-01 DIAGNOSIS — J96.21 ACUTE ON CHRONIC RESPIRATORY FAILURE WITH HYPOXIA AND HYPERCAPNIA (HCC): ICD-10-CM

## 2019-02-01 DIAGNOSIS — I50.33 ACUTE ON CHRONIC DIASTOLIC CONGESTIVE HEART FAILURE (HCC): ICD-10-CM

## 2019-02-01 DIAGNOSIS — J96.22 ACUTE ON CHRONIC RESPIRATORY FAILURE WITH HYPOXIA AND HYPERCAPNIA (HCC): ICD-10-CM

## 2019-02-01 LAB
ALBUMIN SERPL BCP-MCNC: 3.8 G/DL (ref 3.2–4.9)
ALBUMIN/GLOB SERPL: 1.2 G/DL
ALP SERPL-CCNC: 53 U/L (ref 30–99)
ALT SERPL-CCNC: 8 U/L (ref 2–50)
ANION GAP SERPL CALC-SCNC: 7 MMOL/L (ref 0–11.9)
AST SERPL-CCNC: 20 U/L (ref 12–45)
BILIRUB SERPL-MCNC: 0.4 MG/DL (ref 0.1–1.5)
BUN SERPL-MCNC: 11 MG/DL (ref 8–22)
CALCIUM SERPL-MCNC: 9.2 MG/DL (ref 8.5–10.5)
CHLORIDE SERPL-SCNC: 95 MMOL/L (ref 96–112)
CO2 SERPL-SCNC: 39 MMOL/L (ref 20–33)
CREAT SERPL-MCNC: 0.47 MG/DL (ref 0.5–1.4)
GLOBULIN SER CALC-MCNC: 3.3 G/DL (ref 1.9–3.5)
GLUCOSE SERPL-MCNC: 92 MG/DL (ref 65–99)
POTASSIUM SERPL-SCNC: 4.4 MMOL/L (ref 3.6–5.5)
PROT SERPL-MCNC: 7.1 G/DL (ref 6–8.2)
SODIUM SERPL-SCNC: 141 MMOL/L (ref 135–145)

## 2019-02-01 PROCEDURE — 36415 COLL VENOUS BLD VENIPUNCTURE: CPT

## 2019-02-01 PROCEDURE — 80053 COMPREHEN METABOLIC PANEL: CPT

## 2019-02-01 NOTE — PROGRESS NOTES
Inbound call from patient returning . Discussed recommendation from Dr. Edouard to avoid Aleve. Patient states she has not been taking. States she is taking Tylenol instead. Patient has been using lidocaine patches with some relief. States she has an itching sensation on her feet before bed time. Recommended applying the lidocaine cream to itchy area for relief. Patient agrees. States she has difficulty falling asleep. Discussed use of melatonin and good sleep hygiene techniques. No further medication related issues noted.

## 2019-02-01 NOTE — PROGRESS NOTES
Referral from [facility]. Patient is questioning whether or not it is safe to use Aleve. Dr. Edouard has discouraged patient from using d/t CVD. Outbound call to patient to reinforce. LVM for patient to return call to [phone].

## 2019-02-04 NOTE — PROGRESS NOTES
Chief Complaint   Patient presents with   • Aortic Stenosis       Subjective:   Kishan Mcintyre is a 73 y.o. female who presents today for hospital follow up.    Hospitalized 1/18-1/25/19 for cough, worsening respiratory status, multifactorial due to exisiting lung disease and presumed exacerbation of heart failure. Required BiPAP and aggressive diuresis. TTE and BARBARA showed normal LV function, AS is moderate (sees Dr Rodrigez in valve clinic annually).      In general Ms. Mcintyre is at baseline, still has productive cough however states her breathing is at baseline.  She is on 2.5L O2 at home, on for couple years.  Walked from car to office today, stopped once for breathing and knee pain. She has lightheadedness first thing in the morning, this is been stable for many years, no history of falls.  Her leg swelling is stable, she has been better about elevating her legs when she is at home since her discharge. Monitors weight daily, 207lbs at home.    No difficulties with taking new meds since discharge.  No signs of bleeding on Xarelto.    Notes occasional palpitations, no dizziness associated with it, 1 dizzy spell when sitting, resolved on its own.     She is part of the [facility] home program, has home visits once a week.    Past Medical History:   Diagnosis Date   • Aortic stenosis    • Arrhythmia    • Arthritis    • CHF (congestive heart failure) (HCC)    • Chickenpox    • COPD    • Daytime sleepiness    • Difficulty breathing    • [place] measles    • Heartburn    • [facility] 11/9/2012   • Mumps    • Painful joint    • Palpitations    • Rash    • Shortness of breath    • Swelling of lower extremity    • Toothache    • Weight loss      Past Surgical History:   Procedure Laterality Date   • APPENDECTOMY     • GASTRIC BYPASS LAPAROSCOPIC     • PRIMARY C SECTION     • TONSILLECTOMY     • TUBAL COAGULATION LAPAROSCOPIC BILATERAL       Family History   Problem Relation Age of Onset   • Prostate cancer Father    •  Dementia Father    • Alcohol abuse Mother         Cirrosis of liver   • Cancer Maternal Aunt    • Lung Disease Sister         COPD/Smoker   • Asthma Sister    • Hyperlipidemia Sister    • Breast Cancer Sister    • No Known Problems Maternal Grandmother    • No Known Problems Maternal Grandfather    • No Known Problems Paternal Grandmother    • Other Daughter    • Hypertension Daughter    • Thyroid Daughter      Social History     Social History   • Marital status:      Spouse name: N/A   • Number of children: N/A   • Years of education: N/A     Occupational History   • Not on file.     Social History Main Topics   • Smoking status: Former Smoker     Packs/day: 0.50     Years: 26.00     Types: Cigarettes     Quit date: 1/1/1984   • Smokeless tobacco: Never Used      Comment: Started smoking at age 13   • Alcohol use 4.2 oz/week     7 Shots of liquor per week   • Drug use: No   • Sexual activity: No     Other Topics Concern   • Not on file     Social History Narrative   • No narrative on file     Allergies   Allergen Reactions   • Bee Anaphylaxis   • Fosamax Unspecified     Bone pain   • Iodine Rash     RASH   • Pcn [Penicillins] Rash     Rash years ago   • Sulfa Drugs Rash     Terrible rash  Tolerates furosemide 1/20  Tolerates acetazolamide 1/20   • Other Drug Unspecified     Also allergies to Actenol   • Tape Unspecified     Pulls my skin     Outpatient Encounter Prescriptions as of 2/5/2019   Medication Sig Dispense Refill   • furosemide (LASIX) 20 MG Tab Take 3 Tabs by mouth every day. 90 Tab 11   • metoprolol (LOPRESSOR) 25 MG Tab Take 1 Tab by mouth 2 Times a Day. 60 Tab 11   • rivaroxaban (XARELTO) 20 MG Tab tablet Take 1 Tab by mouth with dinner. 30 Tab 11   • potassium chloride SA (KDUR) 20 MEQ Tab CR Take 1 Tab by mouth every morning. 30 Tab 11   • lidocaine (LIDODERM) 5 % Patch Apply 2 Patches to skin as directed 1 time daily as needed (knee pain). 10 Patch 0   • busPIRone (BUSPAR) 7.5 MG tablet  Take 7.5 mg by mouth at bedtime as needed.     • Fluticasone-Umeclidin-Vilant (TRELEGY ELLIPTA) 100-62.5-25 MCG/INH AEROSOL POWDER, BREATH ACTIVATED Inhale 1 Puff by mouth every day. 2 Each 0   • clobetasol (TEMOVATE) 0.05 % external solution Apply sparingly bid 50 mL 0   • clotrimazole-betamethasone (LOTRISONE) 1-0.05 % Cream Apply 1 Application to affected area(s) 2 times a day. 1 Tube 1   • albuterol 108 (90 Base) MCG/ACT Aero Soln inhalation aerosol Inhale 2 Puffs by mouth every 6 hours as needed for Shortness of Breath. 8.5 g 3   • CALCIUM PO Take 1 Tab by mouth every day.     • Omega-3 Fatty Acids (FISH OIL) 1000 MG Cap capsule Take 1,000 mg by mouth every day.     • albuterol (PROVENTIL) 2.5mg/3ml Nebu Soln solution for nebulization USE 3 ML BY NEBULIZATION ROUTE EVERY 6 HOURS AS NEEDED FOR SHORTNESS OF BREATH. 375 mL 0   • triamcinolone acetonide (KENALOG) 0.025 % Cream APPLY TO AFFECTED AREA(S) 2 TIMES A DAY. 15 g 1   • omeprazole (PRILOSEC) 20 MG delayed-release capsule Take 1 Cap by mouth every day. 90 Cap 1   • Lactobacillus Rhamnosus, GG, (CULTURELLE PO) Take 1 Cap by mouth every morning.     • vitamin D (CHOLECALCIFEROL) 1000 UNIT TABS Take 5,000 Units by mouth every day.     • Multiple Vitamin (MULTI-VITAMIN PO) Take 1 Tab by mouth every morning.     • [DISCONTINUED] rivaroxaban (XARELTO) 20 MG Tab tablet Take 1 Tab by mouth with dinner. 30 Tab 0   • [DISCONTINUED] metoprolol (LOPRESSOR) 25 MG Tab Take 1 Tab by mouth 2 Times a Day. 60 Tab 0   • [DISCONTINUED] furosemide (LASIX) 20 MG Tab Take 3 Tabs by mouth every day. 30 Tab 0   • [DISCONTINUED] potassium chloride SA (KDUR) 20 MEQ Tab CR Take 20 mEq by mouth every morning.       No facility-administered encounter medications on file as of 2/5/2019.      Review of Systems   Constitutional: Negative for chills and fever.   HENT: Negative for nosebleeds.    Respiratory: Positive for cough (improving) and shortness of breath.    Cardiovascular: Positive  for palpitations (occasional, resolves on sponataneously), orthopnea (sleeps at incline) and leg swelling. Negative for chest pain, claudication and PND.   Gastrointestinal: Negative for abdominal pain, blood in stool, melena and nausea.   Genitourinary: Positive for frequency (with lasix). Negative for hematuria.   Musculoskeletal: Positive for joint pain (bilateral knee pain, R>L).   Neurological: Positive for dizziness (single episode). Negative for loss of consciousness.   Endo/Heme/Allergies: Bruises/bleeds easily.        Objective:   /70 (BP Location: Left arm, Patient Position: Sitting, BP Cuff Size: Adult)   Pulse 66   Ht 1.524 m (5')   Wt 93.4 kg (205 lb 12.8 oz)   SpO2 93%   BMI 40.19 kg/m²     Physical Exam   Constitutional: She is oriented to person, place, and time. She appears well-developed and well-nourished. No distress.   Obese female   HENT:   Head: Normocephalic and atraumatic.   Eyes: Conjunctivae are normal. No scleral icterus.   Neck: Neck supple.   Unable to visual due to adipose tissue   Cardiovascular: Normal rate, regular rhythm and intact distal pulses.    Murmur (2/6 systolic) heard.  Pulmonary/Chest: Effort normal. No respiratory distress. She has decreased breath sounds.   Wet cough throughout exam   Abdominal: Soft. Bowel sounds are normal. She exhibits no distension. There is no tenderness.   Musculoskeletal: She exhibits edema (erythematous, taught skin, 2+ pitting edema to knees bilaterally).   Neurological: She is alert and oriented to person, place, and time. No cranial nerve deficit.   Skin: Skin is warm and dry. She is not diaphoretic. No pallor.   Psychiatric: Judgment normal.   Nursing note and vitals reviewed.      TTE 1/18/19  Prior Echo - 10/8/18 Moderate aortic stenosis.  Transvalvular gradients are - Peak: 48 mmHg, Mean: 33 mmHg.  Left ventricular ejection fraction is visually estimated to be 65%.  Biatrial enlargement.  Estimated right ventricular systolic  pressure  is 55 mmHg.    BARBARA 1/23/19  Normal left ventricular size, thickness, systolic function, and diastolic function.    Normal right ventricular size and systolic function.    Normal right atrial size.    Normal left atrial size.    Normal left atrial appendage. No thrombus detected in the left atrial appendage.    Structurally normal mitral valve without significant stenosis or regurgitation.    By plaimetry, the BELGICA is 1.2 cm2.  Moderate aortic stenosis. No aortic insufficiency.    Structurally normal tricuspid valve without significant stenosis or regurgitation.    Structurally normal pulmonic valve without significant stenosis or regurgitation.    CTA Chest 1/18/19  No evidence of pulmonary embolus.    Collapse of the right middle lobe of unknown etiology could be due to bronchial stricture, mucous plugging or perhaps malignancy.    Bibasilar atelectasis with trace bilateral pleural effusions.    Cardiomegaly.    3D angiographic/MIP images of the vasculature confirm the vascular findings as described above.    PFTs 8/2/17  This study demonstrates presence of severe obstructive lung disease with a definite reversible component. The lack of severe hyperinflation indicates a coexisting restrictive process which may be due to the patient's weight. Her diffusing capacity is normal.      NM Cardiac Stress Test 2/18/16  IMPRESSIONS   1. Small reversible defect seen in apical inferior and lateral wall    consistent with ischemia. Prominent subdiaphragmatic activity seen in stress    than rest images could attenuation defect but cannot r/o ischemia. Consider    clinical correlation.   2. Normal left ventricular wall motion, with EF of  69 %. Normal left    ventricular wall thickening. Calculated TID 1.21.  Lab Results   Component Value Date/Time    SODIUM 141 02/01/2019 11:54 AM    POTASSIUM 4.4 02/01/2019 11:54 AM    CHLORIDE 95 (L) 02/01/2019 11:54 AM    CO2 39 (H) 02/01/2019 11:54 AM    GLUCOSE 92 02/01/2019  11:54 AM    BUN 11 02/01/2019 11:54 AM    CREATININE 0.47 (L) 02/01/2019 11:54 AM       Lab Results   Component Value Date/Time    BNPBTYPENAT 372 (H) 01/18/2019 11:12 AM      Assessment:     1. Heart failure due to valvular disease, unspecified heart failure type ([facility])     2. Heart failure, NYHA class 3 ([facility])     3. Chronic obstructive pulmonary disease, unspecified COPD type ([facility])     4. Nonrheumatic aortic valve stenosis     5. Paroxysmal atrial fibrillation ([facility])  REFERRAL TO ANTICOAGULATION MONITORING   6. Chronic respiratory failure with hypoxia ([facility])         Medical Decision Making:  Today's Assessment / Status / Plan:   Dyspnea, multifactorial, at baseline today  - severe COPD, Moderate AS ([place] in Oct 2019), grade II diastolic dysfunction  - exhibits NYHA class 3-4 symptoms  - no evidence of reduced systolic function on imaging, BNP mildly elevated  - continue surveillance with [facility] valve clinic  - continue diuretics, see below    LE Edema  - likely due to venous insufficiency, BRIDGETT, obesity  - continue lasix 60mg with potassium 20meq  - encourage elevation of legs  - participates in [facility] home program    Atrial Fibrillation, paroxysmal  - seen on monitor in hospital, discharged with new meds:  - metoprolol 25mg BID  - Xarelto 20mg daily    Metabolic alkalosis    Plan: 3 mo follow up to monitor volume status  Collaborating MD: Dr. [email or web address]

## 2019-02-05 ENCOUNTER — OFFICE VISIT (OUTPATIENT)
Dept: CARDIOLOGY | Facility: MEDICAL CENTER | Age: 74
End: 2019-02-05
Payer: MEDICARE

## 2019-02-05 ENCOUNTER — OFFICE VISIT (OUTPATIENT)
Dept: MEDICAL GROUP | Facility: MEDICAL CENTER | Age: 74
End: 2019-02-05
Payer: MEDICARE

## 2019-02-05 ENCOUNTER — TELEPHONE (OUTPATIENT)
Dept: VASCULAR LAB | Facility: MEDICAL CENTER | Age: 74
End: 2019-02-05

## 2019-02-05 VITALS
BODY MASS INDEX: 40.4 KG/M2 | WEIGHT: 205.8 LBS | HEART RATE: 66 BPM | OXYGEN SATURATION: 93 % | HEIGHT: 60 IN | SYSTOLIC BLOOD PRESSURE: 122 MMHG | DIASTOLIC BLOOD PRESSURE: 70 MMHG

## 2019-02-05 VITALS
HEIGHT: 59 IN | BODY MASS INDEX: 42.33 KG/M2 | OXYGEN SATURATION: 90 % | SYSTOLIC BLOOD PRESSURE: 124 MMHG | RESPIRATION RATE: 16 BRPM | TEMPERATURE: 97.8 F | WEIGHT: 210 LBS | DIASTOLIC BLOOD PRESSURE: 72 MMHG | HEART RATE: 74 BPM

## 2019-02-05 DIAGNOSIS — I50.9 HEART FAILURE DUE TO VALVULAR DISEASE, UNSPECIFIED HEART FAILURE TYPE (HCC): ICD-10-CM

## 2019-02-05 DIAGNOSIS — Z09 HOSPITAL DISCHARGE FOLLOW-UP: ICD-10-CM

## 2019-02-05 DIAGNOSIS — I50.9 HEART FAILURE, NYHA CLASS 3 (HCC): ICD-10-CM

## 2019-02-05 DIAGNOSIS — I48.0 PAROXYSMAL ATRIAL FIBRILLATION (HCC): ICD-10-CM

## 2019-02-05 DIAGNOSIS — J44.9 COPD, SEVERE (HCC): ICD-10-CM

## 2019-02-05 DIAGNOSIS — J96.11 CHRONIC RESPIRATORY FAILURE WITH HYPOXIA (HCC): ICD-10-CM

## 2019-02-05 DIAGNOSIS — I38 HEART FAILURE DUE TO VALVULAR DISEASE, UNSPECIFIED HEART FAILURE TYPE (HCC): ICD-10-CM

## 2019-02-05 DIAGNOSIS — I35.0 NONRHEUMATIC AORTIC VALVE STENOSIS: ICD-10-CM

## 2019-02-05 DIAGNOSIS — I35.0 MODERATE AORTIC STENOSIS: ICD-10-CM

## 2019-02-05 DIAGNOSIS — R60.0 BILATERAL LOWER EXTREMITY EDEMA: ICD-10-CM

## 2019-02-05 DIAGNOSIS — J44.9 CHRONIC OBSTRUCTIVE PULMONARY DISEASE, UNSPECIFIED COPD TYPE (HCC): ICD-10-CM

## 2019-02-05 DIAGNOSIS — I51.89 DIASTOLIC DYSFUNCTION: ICD-10-CM

## 2019-02-05 PROCEDURE — 99495 TRANSJ CARE MGMT MOD F2F 14D: CPT | Performed by: FAMILY MEDICINE

## 2019-02-05 PROCEDURE — 99214 OFFICE O/P EST MOD 30 MIN: CPT | Performed by: NURSE PRACTITIONER

## 2019-02-05 RX ORDER — FUROSEMIDE 20 MG/1
60 TABLET ORAL DAILY
Qty: 90 TAB | Refills: 11 | Status: SHIPPED | OUTPATIENT
Start: 2019-02-05 | End: 2019-02-11 | Stop reason: SDUPTHER

## 2019-02-05 RX ORDER — POTASSIUM CHLORIDE 20 MEQ/1
20 TABLET, EXTENDED RELEASE ORAL EVERY MORNING
Qty: 30 TAB | Refills: 11 | Status: SHIPPED | OUTPATIENT
Start: 2019-02-05 | End: 2019-02-11 | Stop reason: SDUPTHER

## 2019-02-05 ASSESSMENT — ENCOUNTER SYMPTOMS
SHORTNESS OF BREATH: 1
NAUSEA: 0
PND: 0
DIZZINESS: 1
LOSS OF CONSCIOUSNESS: 0
BRUISES/BLEEDS EASILY: 1
PALPITATIONS: 1
BLOOD IN STOOL: 0
ORTHOPNEA: 1
FEVER: 0
CHILLS: 0
ABDOMINAL PAIN: 0
COUGH: 1
CLAUDICATION: 0

## 2019-02-05 NOTE — PATIENT INSTRUCTIONS
Continue to weigh yourself daily, call if you have weight gain of 3 lbs over 24 hrs, 5lbs in 1 week

## 2019-02-05 NOTE — PROGRESS NOTES
Subjective:     Kishan Mcintyre is a 73 y.o. female who presents for Hospital Follow-up.  Chart and discharge summary reviewed.   Date of discharge 1/25/19.  48- hour post discharge RN call completed on 1/28/19 and documented in the medical record by Char Romero RN:   POST DISCHARGE CALL:  Discharge Date:1/25/2019   Date of Outreach Call: 1/28/2019  1:22 PM  Now that you're home, how are you doing? Fair  Comment:Pt reports she is doing okay. Reports she was  having some back pain earlier.  Pt also mentioned urine  frequency. See progress note  Do you have questions about your medications? No    Did you fill your medications? Yes    Do you have a follow-up appointment scheduled?Yes    Discharging Department: Telemetry 8    Number of Attempts: 1  Current or previous attempts completed within two business days of discharge? Yes  Provided education regarding treatment plan, medication, self-management, ADLs? Yes  Comment:Discussed importance of daily weights. Patient  reports weight today is 204 lbs.   Has patient completed Advance Directive? If yes, advise them to bring to appointment. Yes  Care Manager phone number provided? Yes  Is there anything else I can help you with? No        HPI: The patient is a 73-year-old white female with multiple comorbidities including chronic diastolic heart failure, moderate aortic stenosis, severe COPD requiring oxygen, paroxysmal atrial fibrillation, morbid obesity.  She became severely short of breath following a [place] cruise.  Chest x-ray showed pulmonary edema with effusions.  She was treated for exacerbation of COPD and diastolic heart failure requiring BiPAP.  For COPD exacerbation she was treated with azithromycin and prednisone.  For her heart failure she was diuresed with Lasix.  BARBARA showed moderate aortic stenosis.  She takes Xarelto for paroxysmal atrial fibrillation.    Since returning home, patient reports feeling back at her baseline.  She had back pain  following her hospitalization but that has resolved.  She was seen in cardiology follow-up today.     The patient has questions regarding hospitalization or discharge: All of her questions were answered.  The patient has chronic weakness; denies difficulty taking care of self at home.  Patient reports taking medications as instructed.    Current Outpatient Prescriptions   Medication Sig Dispense Refill   • furosemide (LASIX) 20 MG Tab Take 3 Tabs by mouth every day. 90 Tab 11   • metoprolol (LOPRESSOR) 25 MG Tab Take 1 Tab by mouth 2 Times a Day. 60 Tab 11   • rivaroxaban (XARELTO) 20 MG Tab tablet Take 1 Tab by mouth with dinner. 30 Tab 11   • potassium chloride SA (KDUR) 20 MEQ Tab CR Take 1 Tab by mouth every morning. 30 Tab 11   • lidocaine (LIDODERM) 5 % Patch Apply 2 Patches to skin as directed 1 time daily as needed (knee pain). 10 Patch 0   • busPIRone (BUSPAR) 7.5 MG tablet Take 7.5 mg by mouth at bedtime as needed.     • Fluticasone-Umeclidin-Vilant (TRELEGY ELLIPTA) 100-62.5-25 MCG/INH AEROSOL POWDER, BREATH ACTIVATED Inhale 1 Puff by mouth every day. 2 Each 0   • clobetasol (TEMOVATE) 0.05 % external solution Apply sparingly bid 50 mL 0   • clotrimazole-betamethasone (LOTRISONE) 1-0.05 % Cream Apply 1 Application to affected area(s) 2 times a day. 1 Tube 1   • albuterol 108 (90 Base) MCG/ACT Aero Soln inhalation aerosol Inhale 2 Puffs by mouth every 6 hours as needed for Shortness of Breath. 8.5 g 3   • CALCIUM PO Take 1 Tab by mouth every day.     • Omega-3 Fatty Acids (FISH OIL) 1000 MG Cap capsule Take 1,000 mg by mouth every day.     • albuterol (PROVENTIL) 2.5mg/3ml Nebu Soln solution for nebulization USE 3 ML BY NEBULIZATION ROUTE EVERY 6 HOURS AS NEEDED FOR SHORTNESS OF BREATH. 375 mL 0   • triamcinolone acetonide (KENALOG) 0.025 % Cream APPLY TO AFFECTED AREA(S) 2 TIMES A DAY. 15 g 1   • omeprazole (PRILOSEC) 20 MG delayed-release capsule Take 1 Cap by mouth every day. 90 Cap 1   • Lactobacillus  Rhamnosus, GG, (CULTURELLE PO) Take 1 Cap by mouth every morning.     • vitamin D (CHOLECALCIFEROL) 1000 UNIT TABS Take 5,000 Units by mouth every day.     • Multiple Vitamin (MULTI-VITAMIN PO) Take 1 Tab by mouth every morning.       No current facility-administered medications for this visit.        Allergies:   Bee; Fosamax; Iodine; Pcn [penicillins]; Sulfa drugs; Other drug; and Tape    Social History:  Social History   Substance Use Topics   • Smoking status: Former Smoker     Packs/day: 0.50     Years: 26.00     Types: Cigarettes     Quit date: 1/1/1984   • Smokeless tobacco: Never Used      Comment: Started smoking at age 13   • Alcohol use 4.2 oz/week     7 Shots of liquor per week        Family History:  Family History   Problem Relation Age of Onset   • Prostate cancer Father    • Dementia Father    • Alcohol abuse Mother         Cirrosis of liver   • Cancer Maternal Aunt    • Lung Disease Sister         COPD/Smoker   • Asthma Sister    • Hyperlipidemia Sister    • Breast Cancer Sister    • No Known Problems Maternal Grandmother    • No Known Problems Maternal Grandfather    • No Known Problems Paternal Grandmother    • Other Daughter    • Hypertension Daughter    • Thyroid Daughter        Past Medical History:   Diagnosis Date   • Aortic stenosis    • Arrhythmia    • Arthritis    • CHF (congestive heart failure) (HCC)    • Chickenpox    • COPD    • Daytime sleepiness    • Difficulty breathing    • [place] measles    • Heartburn    • MEDICAL HOME 11/9/2012   • Mumps    • Painful joint    • Palpitations    • Rash    • Shortness of breath    • Swelling of lower extremity    • Toothache    • Weight loss        Surgical History  Past Surgical History:   Procedure Laterality Date   • APPENDECTOMY     • GASTRIC BYPASS LAPAROSCOPIC     • PRIMARY C SECTION     • TONSILLECTOMY     • TUBAL COAGULATION LAPAROSCOPIC BILATERAL         ROS:    Constitutional:  Denies fever, chills, night sweats, new fatigue or  "malaise  HENT: Denies head congestion, ear pain or drainage, decreased hearing, sore throat  Eyes: Denies visual changes, eye drainage or redness, eye pain  Neck: Denies pain, swollen glands, decreased range of motion  Lungs: Denies new shortness of breath, wheezing.  She has a slight cough.  Cardiovascular: Denies chest pain, palpitations.  She has bilateral lower extremity edema which she says is worse when she does not raise her feet.  She sleeps on an incline.  Abdominal: Denies abdominal pain, change in bowel or bladder habits, nausea or vomiting  Musculoskeletal: Denies back pain but has chronic knee pain.  Endocrine: Denies unexplained weight changes, heat or cold intolerance, hair loss, polyuria or polydipsia  Neurological: Denies current dizziness, headache, confusion, focal weakness or numbness, memory loss  Psychiatric: She sometimes has insomnia.  She takes BuSpar on occasion and has tried melatonin.       Objective:     Blood pressure 124/72, pulse 74, temperature 36.6 °C (97.8 °F), resp. rate 16, height 1.499 m (4' 11\"), weight 95.3 kg (210 lb), SpO2 90 %, not currently breastfeeding.  (2.5 L O2 nasal cannula)    Physical Exam:    GEN:  Alert, oriented, in no distress  HEENT:  PERRLA, EOMI  LUNGS:  Clear to auscultation without rales, rhonci, or wheezes.  CV:  Heart RRR with 2/6 systolic murmur left sternal border, no S3 or S4  EXT:  Without cyanosis, clubbing, 2+ bilateral pedal edema.  She ambulates with a walker.  NEURO:  Cranial nerves II through XII intact.  Motor function and sensation grossly intact.  SKIN: No rashes or suspicious lesions.  PSYCH:  Behavior is appropriate.      Assessment and Plan:     1. Hospital follow-up:   Hospitalization and results reviewed with patient. High risk conditions requiring teaching or care coordination were identified and addressed.The patient demonstrate understanding of admission and underlying conditions. The patient understands discharge instructions and " when to seek medical attention. Medications reviewed including instructions regarding high risk medications, dosing and side effects.    The patient is able to safely adhere to ADL/IADL, treatment and medication regimen, self-manage of high-risk conditions? Yes  The patient requires physical therapy/home health/DME referral?  She participates in the weekly [facility] program  The patient requires referral to care coordination/behavioral health/social work?  No   Patient requires referral for pharmacy consult? No   Advance directive/POLST on file?  Yes  Required counseling on advance directive?  No      2. Heart failure due to valvular disease, unspecified heart failure type (HCC)  -She is continuing Lasix 60 mg daily.  -Close follow-up with cardiology    3. Chronic respiratory failure with hypoxia (HCC)  -She is continuing oh to 2.5 L/min    4. Paroxysmal atrial fibrillation (HCC)  -Continue Xarelto    5. COPD, severe (HCC)  -Continue oxygen, trelegy Ellipta, albuterol inhaler when needed, pulmonary follow-up    6. Moderate aortic stenosis  -Cardiology is monitoring    7. Bilateral lower extremity edema  -Multifactorial, including heart failure, venous insufficiency, morbid obesity.  Reviewed avoiding salt, keeping legs elevated.      Medication Reconciliation  Medication list at end of encounter:   Current Outpatient Prescriptions   Medication Sig Dispense Refill   • furosemide (LASIX) 20 MG Tab Take 3 Tabs by mouth every day. 90 Tab 11   • metoprolol (LOPRESSOR) 25 MG Tab Take 1 Tab by mouth 2 Times a Day. 60 Tab 11   • rivaroxaban (XARELTO) 20 MG Tab tablet Take 1 Tab by mouth with dinner. 30 Tab 11   • potassium chloride SA (KDUR) 20 MEQ Tab CR Take 1 Tab by mouth every morning. 30 Tab 11   • lidocaine (LIDODERM) 5 % Patch Apply 2 Patches to skin as directed 1 time daily as needed (knee pain). 10 Patch 0   • busPIRone (BUSPAR) 7.5 MG tablet Take 7.5 mg by mouth at bedtime as needed.     • Fluticasone-Umeclidin-Vilant  (TRELEGY ELLIPTA) 100-62.5-25 MCG/INH AEROSOL POWDER, BREATH ACTIVATED Inhale 1 Puff by mouth every day. 2 Each 0   • clobetasol (TEMOVATE) 0.05 % external solution Apply sparingly bid 50 mL 0   • clotrimazole-betamethasone (LOTRISONE) 1-0.05 % Cream Apply 1 Application to affected area(s) 2 times a day. 1 Tube 1   • albuterol 108 (90 Base) MCG/ACT Aero Soln inhalation aerosol Inhale 2 Puffs by mouth every 6 hours as needed for Shortness of Breath. 8.5 g 3   • CALCIUM PO Take 1 Tab by mouth every day.     • Omega-3 Fatty Acids (FISH OIL) 1000 MG Cap capsule Take 1,000 mg by mouth every day.     • albuterol (PROVENTIL) 2.5mg/3ml Nebu Soln solution for nebulization USE 3 ML BY NEBULIZATION ROUTE EVERY 6 HOURS AS NEEDED FOR SHORTNESS OF BREATH. 375 mL 0   • triamcinolone acetonide (KENALOG) 0.025 % Cream APPLY TO AFFECTED AREA(S) 2 TIMES A DAY. 15 g 1   • omeprazole (PRILOSEC) 20 MG delayed-release capsule Take 1 Cap by mouth every day. 90 Cap 1   • Lactobacillus Rhamnosus, GG, (CULTURELLE PO) Take 1 Cap by mouth every morning.     • vitamin D (CHOLECALCIFEROL) 1000 UNIT TABS Take 5,000 Units by mouth every day.     • Multiple Vitamin (MULTI-VITAMIN PO) Take 1 Tab by mouth every morning.       No current facility-administered medications for this visit.        Primary care follow-up:  New health conditions identified during hospitalization? No (exacerbations)  Changes to medications during hospitalization or today? Yes    Recommended followup:  with Ana Hartley M.D. On 3/14/19  Future Appointments       Provider Department Center    2/5/2019 3:40 PM Marilee Cardenas M.D. [place] [id number]    5/8/2019 1:30 PM Trip Galvan M.D. [facility]     6/6/2019 1:00 PM Sammy Martinez M.D. [facility]           Patient Instruction  Patient provided with educational material on discharge diagnosis and management of  symptoms/red flags. Patient instructed to keep follow-up appointments and to bring written questions and and actual medications to each office visit. Patient instructed to call PCP/specialist with any problems/questions/concerns. Patient verbalizes understanding and has no further questions at this time.    Face-to-face transitional care management services with moderate medical decision complexity were provided.

## 2019-02-05 NOTE — PROGRESS NOTES
Per PA, HF education not indicated at this time, patient had to leave for another appointment. Patient is weighing themselves and has [facility] come out regularly.

## 2019-02-06 NOTE — TELEPHONE ENCOUNTER
[facility]      for pt to call clinic and establish care.     Jhon Velasquez, PharmD

## 2019-02-07 ENCOUNTER — PATIENT OUTREACH (OUTPATIENT)
Dept: HEALTH INFORMATION MANAGEMENT | Facility: OTHER | Age: 74
End: 2019-02-07

## 2019-02-11 DIAGNOSIS — I48.0 PAF (PAROXYSMAL ATRIAL FIBRILLATION) (HCC): ICD-10-CM

## 2019-02-11 DIAGNOSIS — I50.33 ACUTE ON CHRONIC DIASTOLIC (CONGESTIVE) HEART FAILURE (HCC): ICD-10-CM

## 2019-02-11 RX ORDER — POTASSIUM CHLORIDE 20 MEQ/1
20 TABLET, EXTENDED RELEASE ORAL EVERY MORNING
Qty: 90 TAB | Refills: 3 | Status: SHIPPED | OUTPATIENT
Start: 2019-02-11 | End: 2019-02-19 | Stop reason: SDUPTHER

## 2019-02-11 RX ORDER — FUROSEMIDE 20 MG/1
60 TABLET ORAL DAILY
Qty: 270 TAB | Refills: 3 | OUTPATIENT
Start: 2019-02-11

## 2019-02-11 RX ORDER — FUROSEMIDE 20 MG/1
60 TABLET ORAL DAILY
Qty: 270 TAB | Refills: 3 | Status: SHIPPED | OUTPATIENT
Start: 2019-02-11

## 2019-02-11 RX ORDER — POTASSIUM CHLORIDE 20 MEQ/1
20 TABLET, EXTENDED RELEASE ORAL EVERY MORNING
Qty: 90 TAB | Refills: 3 | OUTPATIENT
Start: 2019-02-11

## 2019-02-12 DIAGNOSIS — J44.9 CHRONIC OBSTRUCTIVE PULMONARY DISEASE, UNSPECIFIED COPD TYPE (HCC): ICD-10-CM

## 2019-02-12 NOTE — TELEPHONE ENCOUNTER
Have we ever prescribed this med? Yes.  If yes, what date? 12/04/2018    Last OV: 12/06/2018    Next OV: 06/06/2019    DX: : Chronic obstructive pulmonary disease, unspecified COPD type (HCC) (J44.9)    Medications: Fluticasone-Umeclidin-Vilant (TRELEGY ELLIPTA) 100-62.5-25 MCG/INH AEROSOL POWDER, BREATH ACTIVATED [[id number]]

## 2019-02-13 ENCOUNTER — TELEPHONE (OUTPATIENT)
Dept: VASCULAR LAB | Facility: MEDICAL CENTER | Age: 74
End: 2019-02-13

## 2019-02-14 ENCOUNTER — PATIENT OUTREACH (OUTPATIENT)
Dept: HEALTH INFORMATION MANAGEMENT | Facility: OTHER | Age: 74
End: 2019-02-14

## 2019-02-14 NOTE — PROGRESS NOTES
SBAR Documentation: Situation, Background, Assessment, Recommendation     Situation    CHF management   Background       Acute on chronic respiratory failure with hypoxia and hypercapnia, acute on chronic diastolic congestive heart failure, aortic stenosis, aortic regurgitation, PAF, primary osteoarthritis of knee, morbid obesity   Assessment    Outbound call to Kishan to discuss her CHF management.  Kishan states she goes up and down with her weight, from 207 to 210.  She states she knows to call her cardiologist for a 3 pound weight gain in 1 day.  Kishan states she has bilateral lower leg edema, with her right leg being more swollen than her left leg.  She states she tries to keep them elevated. Kishan states she is a little more   SOB with the colder weather.  She states she is using her home oxygen at 2.5L continuously.  Kishan states she is not having any signs of CHF or COPD at this time.  Kishan states she did not receive the educational material that was previoulsy mailed to her.     Recommendation CC RN reinforced the need for daily weights, and report a 3 pound weight gain in 1 day or 5 pound weight gain in 1 week to her cardiologist, do determine if she needs to be seen in the clinic or take an extra dose of Lasix.  CC RN instructed Kishan to keep her legs elevated as much as possible to reduce her bilateral leg edema.  CC RN will resend educational material to Kishan.  Plan:  CC RN to schedule for telephone call in 2 week to discuss management of her CHF.

## 2019-02-14 NOTE — PATIENT INSTRUCTIONS
Goal: Kishan will be able to provide teach back on her CHF disease process and home management; CHF protocol interventions; low sodium diet and CHF Action Plan (green, yellow and red lights) with within the next 90 days.     Barriers: Knowledge deficit regarding in home management of Congestive heart failure     Interventions:   · Heart Failure Education  · Caring for Your Heart Packet (Living Well with Heart Failure)  · What is Your HEART HEALTH LEVEL today? Magnet     · Referral to Nutrition Class        MY CHF ACTION PLAN     GREEN ZONE--(I am doing well today)     · Symptoms are under control/no change is symptoms:  a. No new shortness of breath  b. No new weight gain of more than 2 pounds  c. No new swelling of the feet, ankles, legs, or stomach  d. No new chest pain  · Continue on the same medication/treatment regimen     YELLOW ZONE--(I am having a bad day with my CHF)     · Identify the root causes that triggered new symptoms:              · Weight gain = 3 pounds or more in 1 day or 5 pounds in the past 7 days.  · Symptoms:  a. Shortness of breath  b. Swelling of feet, ankles, legs, or stomach  c. No energy, fatigue  d. Dry, hacking cough  e. Dizziness  f. An uneasy feeling that something is not right  g. Difficulty breathing when lying down, needs to sit in a chair in order to sleep or utilizing more than one pillow at night     · Continue on the same medication/treatment regimen     Weight gain without Symptoms:  1. Assess the previous day's fluid intake.  2. Obtain a 24 hour food diary to calculate the sodium intake.  3. Educate and  the patient on root cause identified to make                    adjustments.  4. Re-check patient's weight and symptoms the following morning via remote health monitoring      Weight gain with Symptoms:   1. Assess the previous day's fluid intake.  2. Obtain a 24 hour food diary to calculate the sodium intake.  3. Educate and  the patient on root cause  identified.  4. Without Escalation Protocol--follow up with provider.             OR Escalation Protocol--follow up as ordered by provider.                               RED ZONE (URGENT) (I need urgent medical care)   **IF PATIENT IN SEVERE DISTRESS OR HAS A LIFE THREATENING EMERGENCY CALL 911**     · Weight gain greater than 5 pounds AND/OR  · Significant symptomology with or without weight gain greater than 5 pounds     Red Zone: Weight gain of greater than 5 pounds or significant symptomology with or without weight gain greater than 5 pounds     Start Date: 1/30/19  End Date: 3/30/19        CARING FOR YOUR HEART PACKET  Living Well With Heart Failure     · Anatomy of the Heart     · What is Heart Failure  · Systolic   · Diastolic  · Ejection Fraction  · Normal Ejection Fraction: 60-70%  · My EF: _____     · What is Heart Failure?  · Water in Lungs  · Water in Legs  · Water in Belly     · Things You Can Do to Live Well with Heart Failure   · Take your medication correctly everyday  · Eat less salt  · Exercise regularly  · Do your daily checkup and check your weight each day     · How to Take Your Medication  · Take your pills  · Do not stop your medications for any reason, and call the office at [phone] if you are feeling unwell  · If you cannot afford your medications for any reason, talk to your doctor, nurse, or call [facility] Care Coordination at [phone].     · Don’t run out of pills  · Have a system that helps you remember how and when to take your pills  · Remember to take your pills everyday as instructed at the correct times. If you forgot a dose, take it as soon as you remember, unless it is almost time for the next does, then skip that missed dose. Do not double up on the dose.  · Bring your medication in the pill bottles to every appointment     · Know Your Water Pill  · Your water pill is also called a diuretic. Common diuretics are Lasix (furosemide), Demedex (torsemide), and Bumex  (bumetanide). There are other types of pills-make sure you know which diuretics you are on  · The water pill controls how much salt and water you have in your body  · You may notice your doctor makes changes to your water pills more often than other medications      · Why it’s Important to Eat Less Salt  · Salt causes the body to act like a sponge and hold water  · Eating too much salt can cause weight gain, swelling in your legs, and water in your lungs  · You can eat less salt if you:  · Aim for 2,000 milligrams of sodium per day  · Choose foods that are low in salt  · Don’t add salt when you cook or eat  · Take the salt shaker off the table     · How to Eat Less Salt  · Avoid or limit these high-salt foods:  · Fast food  · Hotdogs, Walker, Smoked Meat  · Canned Beans and Vegetables  · Canned Soup     · Choose these low-salt foods:  · Fresh Fish  · Yogurt  · Frozen Vegetables  · Eggs     · How to Eat Less Salt When Eating Out   · Ask for food to be cooked with no salt  · Avoid butter, cheese and sauces  · Avoid fried foods-choose grilled, baked or steamed foods  · Choose oil and vinegar salad dressing  · Limit fast foods, or choose menu items with less sodium  · Avoid walker, sausage and ham     · How to Eat Less Salt When You Eat at Home  · Read food labels and choose foods that contain less than 140 mg of sodium per serving     · How to Eat Less Salt   · Reading a food label  · Look at the serving size  · Look at the sodium per serving  · Choose foods with less than 140 mg of sodium per serving  · Choose items labelled:  · Low Sodium  · No Salt Added  · Sodium Free  · Watch out for items labeled:  · Lower Sodium     · Exercising Well with Heart Failure  · What’s in it for you  · How to get started:  · Star with 10 minutes a day  · Be Safe  · You can exercise by:  · Riding a stationary bicycle  · Walking  · Exercise class  · Sweeping, vacuuming or dusting  · Working in your garden  So turn off the TV, get up and  get moving!     · More Exercise Tips     · Steps for doing more:  · Begin by doing an activity for 10 minutes, 3 times per week  · After a couple of weeks, add 5 minutes so that you are exercising 15 minutes at a time  · When you feel comfortable doing more, add another 5 minutes  · You want to work up to 30 minutes of exercise, 3 times a week  · Exercise with a friend or partner, if possible  · Wear clothes and shoes that fit well  · Start slowly. Ease your way into exercise     · Daily Check Up  · Each day, do a check-up by asking yourself  · How do I feel?  · Do I have swelling in my feet, legs or belly?  · What do I weigh?     · How Do I Feel Today  · Ask yourself the following 4 questions:  1. Am I short of breath walking?  DOING WELL: Walk easily with no shortness of breath  CALL YOUR DOCTOR: Short of breath after waling a short distance  CALL 911: Short of breath at rest     2. Am I short of breath sleeping?  DOING WELL: Sleeping flat, no shortness of breath  CALL YOUR DOCTOR: Needing 2 pillows or more to avoid shortness of breath  CALL 911: Have to sleep upright to avoid shortness of breath     3. Am I feeling faint or dizzy?  DOING WELL: Not dizzy or sometimes a little dizzy when standing up  CALL YOUR DOCTOR: Dizzy for a long time  CALL 911: Almost passed out (fainted) or fallen     4. Do I have swelling?  To check for swelling each morning:  · Press firmly into the skin in the front part of your lower leg or around your belly   · If your finger makes a pit in your skin, you have swelling in that area  · Be aware of how much swelling is usual for you and look for changes  DOING WELL: No swelling  CALL YOUR DOCTOR: Swelling in ankle or shin. Increased swelling in your belly, feeling more bloated           · Your Daily Checkup and Check Your Weight Each Day  · Weigh yourself each morning at the same time  · You should weigh yourself:  · After you urinate  · Before you eat breakfast  · Before you get  dressed     · Know your target weigh by:  Your target weight is where your heart is working best  · My target weight:  · Visit date:     · Putting it all together  · Remember to do the following daily:  · Check how you feel  · Check the swelling on your feet, legs and belly  · Check your weight  · Determine if you need to call your doctor or heart failure team  · If you’re doing well, keep up the good work!  · Take your medication  · Watch your salt  · Get your exercise today  · Call us if:  · You are short of breath while at rest or more short of breath than usual  · You have to sleep upright or in a chair  · You have more swelling in your feet. Legs and belly than usual  · You have a lot of dizziness or light-headedness that is worse than usual  · Your weight goes up by 3 or more pounds in one day or 5 pounds in a week  · You have to sleep with more pillows than usual        [facility] Phone: [phone]  M-F 8am-5pm                  97.4

## 2019-02-19 DIAGNOSIS — I50.33 ACUTE ON CHRONIC DIASTOLIC (CONGESTIVE) HEART FAILURE (HCC): ICD-10-CM

## 2019-02-19 RX ORDER — POTASSIUM CHLORIDE 20 MEQ/1
20 TABLET, EXTENDED RELEASE ORAL EVERY MORNING
Qty: 90 TAB | Refills: 3 | Status: SHIPPED | OUTPATIENT
Start: 2019-02-19

## 2019-02-20 ENCOUNTER — PATIENT OUTREACH (OUTPATIENT)
Dept: HEALTH INFORMATION MANAGEMENT | Facility: OTHER | Age: 74
End: 2019-02-20

## 2019-02-20 NOTE — PROGRESS NOTES
Spoke with patient 2/20/19. Patient reports she is doing very well. No symptoms of shortness of breath. Reports BP and swelling are managed. Patient sounded in great spirits and was in the middle of cooking a meal today. No needs at this time. Will reach out once more next week.

## 2019-02-21 ENCOUNTER — TELEPHONE (OUTPATIENT)
Dept: VASCULAR LAB | Facility: MEDICAL CENTER | Age: 74
End: 2019-02-21

## 2019-02-21 NOTE — TELEPHONE ENCOUNTER
Called regarding Anticoagulation referral for Xarelto from ULISSES Collier on 2/05/19.    Pt requests that we call back next week so that her  will be home and then she can schedule to come in.     [facility] at [phone], fax [phone]    Suzette Camarena, ReginaD

## 2019-02-22 DIAGNOSIS — I48.0 PAF (PAROXYSMAL ATRIAL FIBRILLATION) (HCC): ICD-10-CM

## 2019-02-22 DIAGNOSIS — I50.33 ACUTE ON CHRONIC DIASTOLIC (CONGESTIVE) HEART FAILURE (HCC): ICD-10-CM

## 2019-02-25 ENCOUNTER — PATIENT OUTREACH (OUTPATIENT)
Dept: HEALTH INFORMATION MANAGEMENT | Facility: OTHER | Age: 74
End: 2019-02-25

## 2019-02-27 NOTE — TELEPHONE ENCOUNTER
Kishan called to see if we could get proair sent to [facility] while she waits, but they do not carry that. She is aware and has albuterol for nebulizer if she needs it

## 2019-02-28 ENCOUNTER — PATIENT OUTREACH (OUTPATIENT)
Dept: HEALTH INFORMATION MANAGEMENT | Facility: OTHER | Age: 74
End: 2019-02-28

## 2019-02-28 ENCOUNTER — TELEPHONE (OUTPATIENT)
Dept: VASCULAR LAB | Facility: MEDICAL CENTER | Age: 74
End: 2019-02-28

## 2019-02-28 NOTE — PROGRESS NOTES
Patient Kishan Mcintyre discharged on 1/25/19. [facility] Patient Advocate assisted with discharge orders including one laboratory service, one cardiology appointment, and one discharge clinic appointment. Patient is scheduled for a PCP appointment on 3/14/19, one cardiology appointment on 5/08/19, and one pulmonology appointment on 6/06/19. Patient Advocate also assisted in facilitating a pharmacy consultation with [facility] upon discharge. Patient discharged with a high LACE score, therefore, a PPS screening was conducted and the patient score 60%.

## 2019-02-28 NOTE — TELEPHONE ENCOUNTER
Spoke with pt regarding referral to anticoagulation. Pt reports she will call back and schedule after talking with her     Kay Rojas PharmD

## 2019-03-07 ENCOUNTER — TELEPHONE (OUTPATIENT)
Dept: VASCULAR LAB | Facility: MEDICAL CENTER | Age: 74
End: 2019-03-07

## 2019-03-07 NOTE — LETTER
March 7, 2019        Kishan Mcintyre      [address] [place] [address]      Dear Kishan Mcintyre ,    We have been unsuccessful in our attempts to contact you regarding your Anticoagulation Service referral.  Anticoagulants are medications that can cause potential harmful side effects when not monitored properly. Without proper monitoring there is potential for serious, sometimes life-threatening bleeding problems or life-threatening blood clots or stroke could result.    Please contact our clinic so we may assist you.  We are open Monday-Friday 8 am until 5 pm.  You may reach our Service at [phone].    To monitor your anticoagulant effectively, we need to be able to communicate with you.  This is a requirement to be followed by our Service.  Please contact the clinic as soon as possible to establish care and provide your current contact information.  Thank you.        Sincerely,        Lester Schuster, PharmD, [facility]  Pharmacy Clinical Supervisor  [facility]  Outpatient Anticoagulation Service

## 2019-03-12 DIAGNOSIS — J44.9 CHRONIC OBSTRUCTIVE PULMONARY DISEASE, UNSPECIFIED COPD TYPE (HCC): ICD-10-CM

## 2019-03-12 NOTE — TELEPHONE ENCOUNTER
Have we ever prescribed this med? Yes.  If yes, what date? 02/12/2019    Last OV: 12/06/2018-    Next OV: 06/06/2019-    DX: Chronic obstructive pulmonary disease, unspecified COPD type (HCC) (J44.9)    Medications:   Requested Prescriptions     Pending Prescriptions Disp Refills   • TRELEGY ELLIPTA 100-62.5-25 MCG/INH AEROSOL POWDER, BREATH ACTIVATED [Pharmacy Med Name: SAMPLE TRELEGY ELLIPTA  AEPB] 2 Each 0     Sig: INHALE 1 PUFF ORALLY ONCE DAILY

## 2019-03-14 ENCOUNTER — ANTICOAGULATION VISIT (OUTPATIENT)
Dept: VASCULAR LAB | Facility: MEDICAL CENTER | Age: 74
End: 2019-03-14
Attending: INTERNAL MEDICINE
Payer: MEDICARE

## 2019-03-14 ENCOUNTER — OFFICE VISIT (OUTPATIENT)
Dept: MEDICAL GROUP | Facility: MEDICAL CENTER | Age: 74
End: 2019-03-14
Payer: MEDICARE

## 2019-03-14 ENCOUNTER — TELEPHONE (OUTPATIENT)
Dept: VASCULAR LAB | Facility: MEDICAL CENTER | Age: 74
End: 2019-03-14

## 2019-03-14 VITALS — DIASTOLIC BLOOD PRESSURE: 43 MMHG | SYSTOLIC BLOOD PRESSURE: 89 MMHG | HEART RATE: 62 BPM

## 2019-03-14 VITALS
HEIGHT: 59 IN | OXYGEN SATURATION: 91 % | DIASTOLIC BLOOD PRESSURE: 80 MMHG | BODY MASS INDEX: 43.24 KG/M2 | HEART RATE: 72 BPM | SYSTOLIC BLOOD PRESSURE: 100 MMHG | RESPIRATION RATE: 16 BRPM | TEMPERATURE: 98.6 F | WEIGHT: 214.51 LBS

## 2019-03-14 DIAGNOSIS — F33.41 RECURRENT MAJOR DEPRESSIVE DISORDER, IN PARTIAL REMISSION (HCC): ICD-10-CM

## 2019-03-14 DIAGNOSIS — I48.0 PAROXYSMAL ATRIAL FIBRILLATION (HCC): ICD-10-CM

## 2019-03-14 DIAGNOSIS — I50.32 CHRONIC DIASTOLIC HEART FAILURE (HCC): ICD-10-CM

## 2019-03-14 DIAGNOSIS — J44.9 CHRONIC OBSTRUCTIVE PULMONARY DISEASE, UNSPECIFIED COPD TYPE (HCC): ICD-10-CM

## 2019-03-14 DIAGNOSIS — J96.11 CHRONIC RESPIRATORY FAILURE WITH HYPOXIA (HCC): ICD-10-CM

## 2019-03-14 DIAGNOSIS — I27.20 PULMONARY HYPERTENSION (HCC): ICD-10-CM

## 2019-03-14 LAB — INR PPP: 1.1 (ref 2–3.5)

## 2019-03-14 PROCEDURE — 8041 PR SCP AHA: Performed by: FAMILY MEDICINE

## 2019-03-14 PROCEDURE — 99213 OFFICE O/P EST LOW 20 MIN: CPT

## 2019-03-14 PROCEDURE — 99214 OFFICE O/P EST MOD 30 MIN: CPT | Performed by: FAMILY MEDICINE

## 2019-03-14 PROCEDURE — 85610 PROTHROMBIN TIME: CPT

## 2019-03-14 NOTE — PROGRESS NOTES
Anticoagulation Summary  As of 3/14/2019    INR goal:      TTR:   --   INR used for dosin.1 (3/14/2019)   Warfarin maintenance plan:   No maintenance plan   Next INR check:   2019   Target end date:   Indefinite    Indications    Paroxysmal atrial fibrillation (HCC) [I48.0]             Anticoagulation Episode Summary     INR check location:       Preferred lab:       Send INR reminders to:       Comments:         Anticoagulation Care Providers     Provider Role Specialty Phone number    Jasmin Collier P.A.-C. Referring Cardiology [phone]    [facility] Responsible  [phone]        Anticoagulation Patient Findings     Pt is new to Xarelto and new to [facility].  Discussed indication for drug therapy.  Explained our services, hours of operation, Xarelto therapy, potential SE, potential DI. Discussed monitoring parameters, such as blood in urine, blood in stool, discussed what to do if a dose is missed, or suspected as missed.  Pt to continue with current Xarelto dosing regimen. Pt denies any unusual s/s of bleeding, bruising, clotting or any changes to diet or medications.    CHADSVASC of at least 3 (CHF, Age, Gender)  HasBled of at least 1 (age)  Added [facility] to Care Team     Medications reconciled, no NSAID use.  Denies use of any anticonvulsant medications.  CrCl >50 mL/min.  Confirms medication is affordable.   Signed pt contract.    BP is low today, reports severe thirstiness.  Provided water for her to drink.  Requested she follow up with cardiology, Jasmin BENITEZ.  Reports mild dizziness, utilizes walker for stability and accompanied by .     Follow up in 1 month to assess tolerability and affordability.     Jhon Velasquez, PharmD  CC Jasmin BENITEZ, Michael Bloch, M.D.

## 2019-03-14 NOTE — PROGRESS NOTES
CC: COPD, diastolic dysfunction, pulmonary hypertension, depression, A. fib.    HPI:   Kishan presents today to discuss the following medical issues:    Chronic obstructive pulmonary disease, unspecified COPD type (HCC)/chronic respiratory failure with hypoxia  Patient has severe COPD, last pulmonary function test was done in 2017 showed: severe obstructive lung disease with a definite reversible component. The lack of severe   hyperinflation indicates a coexisting restrictive process which may be due to the patient's weight. Her diffusing capacity is normal.  Patient has been symptomatic all the time however stable.  She is currently on Trelegy Ellipta, has been on albuterol as needed, she has been using it 2-3 times a day.  Patient is on home oxygen at 2.5-3 L/min.    Chronic diastolic heart failure (HCC)/ Pulmonary hypertension ([facility])  Last echocardiogram was done in January 2019 showed:   Left ventricular ejection fraction is visually estimated to be 65%. Normal regional wall motion. Grade II diastolic dysfunction.  Right ventricular systolic pressure is 55 mmHg.  Moderate aortic stenosis    Patient has been having shortness of breath with exertion, and bilateral leg swelling which is probably a combination of venous insufficiency and CHF).  She has been doing fine on Lasix 60 mg daily.  She has been following up with cardiology.    Recurrent major depressive disorder, in partial remission ([facility])  Patient's mood has been fluctuating however most of the time is stable.  She is currently on BuSpar 7.5 mg daily, patient has been taking it at night.    Body mass index (BMI) 40.0-44.9, adult ([facility])  BMI is 43.Patient is counseled about the medical rationale for weight loss in obese individuals is that obesity is associated with a significant increase in mortality, and many health risks including type 2 diabetes mellitus, hypertension, dyslipidemia, and coronary heart disease. The benefits of weight loss include a  reduction in the rate of progression from impaired glucose tolerance to diabetes, blood pressure in hypertensive patients, and lipid levels in higher risk patients. Other noncardiac benefits of weight loss include reductions in urinary incontinence, sleep apnea, and depression, and improvements in quality of life, physical functioning, and mobility.Recommend lifestyle modification: exercise 30 minutes per day 5 days per week. Recommend also portion control.    Paroxysmal atrial fibrillation (HCC)  Patient was diagnosed recently after her last hospitalization from severe COPD and CHF.  Currently patient denies any palpitation.  Has been having controlled rate.  Currently on  Xarelto, and metoprolol.    Patient Active Problem List    Diagnosis Date Noted   • Acute on chronic respiratory failure with hypoxia and hypercapnia ([facility]) 01/18/2019     Priority: High   • Aortic stenosis 06/07/2012     Priority: Medium   • Acute on chronic diastolic congestive heart failure ([facility]) 12/05/2011     Priority: Medium   • Aortic regurgitation 12/05/2011     Priority: Medium   • Morbid obesity ([facility]) 06/16/2015     Priority: Low   • Primary osteoarthritis of knee 10/19/2012     Priority: Low   • Chronic respiratory failure with hypoxia ([facility]) 03/12/2018   • Obesity (BMI 35.0-39.9 without comorbidity) ([facility]) 11/20/2017   • Obesity (BMI 30-39.9) 11/20/2017   • Anxiety 04/04/2016   • MEDICAL HOME 11/09/2012   • Paroxysmal atrial fibrillation ([facility]) 04/13/2011   • Obesity 04/13/2011       Current Outpatient Prescriptions   Medication Sig Dispense Refill   • Fluticasone-Umeclidin-Vilant (TRELEGY ELLIPTA) 100-62.5-25 MCG/INH AEROSOL POWDER, BREATH ACTIVATED Inhale 1 Puff by mouth every day. 2 Each 0   • rivaroxaban (XARELTO) 20 MG Tab tablet Take 1 Tab by mouth with dinner. 2 Tab 0   • metoprolol (LOPRESSOR) 25 MG Tab Take 1 Tab by mouth 2 Times a Day. 4 Tab 0   • potassium chloride SA (KDUR) 20 MEQ Tab CR Take 1 Tab by mouth every morning. 90  "Tab 3   • furosemide (LASIX) 20 MG Tab Take 3 Tabs by mouth every day. 270 Tab 3   • busPIRone (BUSPAR) 7.5 MG tablet Take 7.5 mg by mouth at bedtime as needed.     • clobetasol (TEMOVATE) 0.05 % external solution Apply sparingly bid 50 mL 0   • clotrimazole-betamethasone (LOTRISONE) 1-0.05 % Cream Apply 1 Application to affected area(s) 2 times a day. 1 Tube 1   • albuterol 108 (90 Base) MCG/ACT Aero Soln inhalation aerosol Inhale 2 Puffs by mouth every 6 hours as needed for Shortness of Breath. 8.5 g 3   • CALCIUM PO Take 1 Tab by mouth every day.     • Omega-3 Fatty Acids (FISH OIL) 1000 MG Cap capsule Take 1,000 mg by mouth every day.     • albuterol (PROVENTIL) 2.5mg/3ml Nebu Soln solution for nebulization USE 3 ML BY NEBULIZATION ROUTE EVERY 6 HOURS AS NEEDED FOR SHORTNESS OF BREATH. 375 mL 0   • triamcinolone acetonide (KENALOG) 0.025 % Cream APPLY TO AFFECTED AREA(S) 2 TIMES A DAY. 15 g 1   • omeprazole (PRILOSEC) 20 MG delayed-release capsule Take 1 Cap by mouth every day. 90 Cap 1   • Lactobacillus Rhamnosus, GG, (CULTURELLE PO) Take 1 Cap by mouth every morning.     • vitamin D (CHOLECALCIFEROL) 1000 UNIT TABS Take 5,000 Units by mouth every day.     • Multiple Vitamin (MULTI-VITAMIN PO) Take 1 Tab by mouth every morning.       No current facility-administered medications for this visit.          Allergies as of 03/14/2019 - Reviewed 03/14/2019   Allergen Reaction Noted   • Bee Anaphylaxis 03/12/2018   • Fosamax Unspecified 09/15/2011   • Iodine Rash 03/12/2018   • Pcn [penicillins] Rash 04/13/2011   • Sulfa drugs Rash 04/13/2011   • Other drug Unspecified 07/06/2013   • Tape Unspecified 03/12/2018        ROS: Denies any chest pain, Shortness of breath, Changes bowel or bladder, Lower extremity edema.    Physical Exam:  /80 (BP Location: Right arm, Patient Position: Sitting, BP Cuff Size: Adult)   Pulse 72   Temp 37 °C (98.6 °F) (Temporal)   Resp 16   Ht 1.499 m (4' 11\")   Wt 97.3 kg (214 lb " 8.1 oz)   SpO2 91% Comment: with 02  BMI 43.33 kg/m²   Gen.: Obese , no apparent distress,pleasant and cooperative with the examination  Skin:  Warm and dry with good turgor. No rashes or suspicious lesions in visible areas  HEENT:Sinuses nontender with palpation, TMs clear, nares patent with pink mucosa and clear rhinorrhea,no septal deviation ,polyps or lesions. lips without lesions, oropharynx clear.  Neck: Trachea midline,no masses or adenopathy. No JVD.  Cor: Regular rate and rhythm without murmur, gallop or rub.  Lungs: Respirations unlabored.decreased breath sounds bilaterally. No wheezes, rhonchi.  Extremities: No cyanosis, no clubbing, bilateral chronic edema.        Assessment and Plan.   73 y.o. female     1. Chronic obstructive pulmonary disease, unspecified COPD type (HCC)  Severe COPD, however patient has been stable.  Continue on Trelegy Ellipta,  Continue on albuterol as needed(patient has been using it 2-3 times a day)  Continue oxygen at 2.5-3 L/min.    2. Chronic respiratory failure with hypoxia (HCC)  On oxygen 4.5-3 L/min for severe COPD/pulmonary hypertension    3. Chronic diastolic heart failure (HCC)  Last echocardiogram showed diastolic dysfunction grade 2.  Probably due to hypertension.    4. Pulmonary hypertension (HCC)  Probably due to severe COPD.  Last echocardiogram showed right ventricular systolic pressure was 55 mmHg  Continue on Lasix 60 mg daily.  Continue follow-up with cardiology.    5. Recurrent major depressive disorder, in partial remission (HCC)  Stable.  Continue on BuSpar 7.5 mg daily, patient has been taking it at night.    6. Body mass index (BMI) 40.0-44.9, adult ([facility])  BMI is 43.  Patient is counseled about lifestyle modification.    7. Paroxysmal atrial fibrillation (HCC)  Stable, asymptomatic.  No RVR.  Continue on Xarelto, and metoprolol.

## 2019-03-15 ENCOUNTER — PATIENT OUTREACH (OUTPATIENT)
Dept: HEALTH INFORMATION MANAGEMENT | Facility: OTHER | Age: 74
End: 2019-03-15

## 2019-03-15 ENCOUNTER — TELEPHONE (OUTPATIENT)
Dept: CARDIOLOGY | Facility: MEDICAL CENTER | Age: 74
End: 2019-03-15

## 2019-03-15 NOTE — TELEPHONE ENCOUNTER
Message   Received: Today   Message Contents   Jasmin Collier P.A.-C.  Kimberli Perla R.N.             Will you please call Mrs. Mcintyre and see how she is feeling. I got a note from coumadin clinic she was dizzy and hypotensive. She participates in the [facility] program, hopefully they are helping her with this. If she needs to be seen I can see her today.   Thank you,   AYESHA POLO, pt graduated from program 2 weeks ago    Pt called. Explained the reason for my call above. Pt states she is feeling much better this AM and we discuss perhaps she was just a bit dehydrated. Pt welcomed to call back if she needs to be seen. Pt appreciative and hasty to get off the phone.

## 2019-03-15 NOTE — TELEPHONE ENCOUNTER
Initial anticoag note and most recent pcp note reviewed.  Patient with afib and chads vasc = at least 3    Pending further recommendations, we will continue with indefinite anticoagulation with xarelto as directed by PCP    Will defer all management of rhythm, rate, and other cv issues, aside from anticoagulation, to pcp or cards    Michael Bloch, MD  Antico[facility]    Cc:  MICHAEL Hartley

## 2019-03-15 NOTE — PROGRESS NOTES
"SBAR Documentation: Situation, Background, Assessment, Recommendation     Situation    Management of CHF   Background       Acute on chronic respiratory failure with hypoxia and hypercapnia, acute on chronic diastolic congestive heart failure, aortic stenosis, aortic regurgitation, PAF, primary osteoarthritis of knee, morbid obesity   Assessment    Outbound call to Keith to discuss her CHF management.  Kishan states she is  \"hangin in there\",  Kishan states she was seen by her PCP on 3/14/19, and had a low blood pressure, and some dizziness.  She states she felt better after drinking some water.  Kishan states she weighed 214 pounds and denies having any increased edema in her legs and feet.  She sates he right foot is always more swollen than her left.   Kishan states she continues to eat a no added salt diet, and states there is\" salt in everything\" .     Recommendation CCM RN and Kishan reviewed the importance of weighing her self daily as an indicator of possible CHF/COPD exacerbation.  CCM RN instructed Kishan to go to ER for any uncontrolled bleeding while taking Xarelto.  Plan:  CCM RN to schedule for telephone call next week to discuss management of her CHF.         "

## 2019-03-22 LAB — INR BLD: 1.1 (ref 0.9–1.2)

## 2019-04-04 ENCOUNTER — PATIENT OUTREACH (OUTPATIENT)
Dept: HEALTH INFORMATION MANAGEMENT | Facility: OTHER | Age: 74
End: 2019-04-04

## 2019-04-04 NOTE — PROGRESS NOTES
SBAR Documentation: Situation, Background, Assessment, Recommendation     Situation    CHF management   Background       Acute on chronic respiratory failure with hypoxia and hypercapnia, acute on chronic diastolic congestive heart failure, aortic stenosis, aortic regurgitation, PAF, primary osteoarthritis of knee, morbid obesity   Assessment    Outbound call to Kishan to discuss her CHF management.  Kishan states her weight is about the same--like a yo-you, up and down.  Kishan states her ankle edema is still the same.  Kishan states she is SOB and jittery in the morning.  She states she starts to feel better about 1100.  She thinks she feels SOB and jittery while bathing and dressing.  Kishan is using her nebulizer 2 to 3 times per day when needed for SOB.     Recommendation CCM RN and Kishan discussed taking short breaks while bathing and dressing in the morning.  CCM RN and Kishan bleeding precautions while taking Xarelto.  CCM RN and Kishan reviewed signs and symptoms of COPD exacerbation and when to go to ER.  Plan:  CCM RN to schedule for telephone call next week to discuss management of her CHF.

## 2019-04-08 ENCOUNTER — HOSPITAL ENCOUNTER (OUTPATIENT)
Dept: LAB | Facility: MEDICAL CENTER | Age: 74
End: 2019-04-08
Attending: NURSE PRACTITIONER
Payer: MEDICARE

## 2019-04-08 DIAGNOSIS — I48.0 PAROXYSMAL ATRIAL FIBRILLATION (HCC): ICD-10-CM

## 2019-04-08 DIAGNOSIS — J44.9 CHRONIC OBSTRUCTIVE PULMONARY DISEASE, UNSPECIFIED COPD TYPE (HCC): ICD-10-CM

## 2019-04-08 LAB
ALBUMIN SERPL BCP-MCNC: 3.9 G/DL (ref 3.2–4.9)
ALBUMIN/GLOB SERPL: 1.3 G/DL
ALP SERPL-CCNC: 53 U/L (ref 30–99)
ALT SERPL-CCNC: 5 U/L (ref 2–50)
ANION GAP SERPL CALC-SCNC: 5 MMOL/L (ref 0–11.9)
AST SERPL-CCNC: 18 U/L (ref 12–45)
BILIRUB SERPL-MCNC: 0.5 MG/DL (ref 0.1–1.5)
BUN SERPL-MCNC: 12 MG/DL (ref 8–22)
CALCIUM SERPL-MCNC: 9.3 MG/DL (ref 8.5–10.5)
CHLORIDE SERPL-SCNC: 96 MMOL/L (ref 96–112)
CO2 SERPL-SCNC: 41 MMOL/L (ref 20–33)
CREAT SERPL-MCNC: 0.48 MG/DL (ref 0.5–1.4)
ERYTHROCYTE [DISTWIDTH] IN BLOOD BY AUTOMATED COUNT: 49.2 FL (ref 35.9–50)
GLOBULIN SER CALC-MCNC: 3 G/DL (ref 1.9–3.5)
GLUCOSE SERPL-MCNC: 90 MG/DL (ref 65–99)
HCT VFR BLD AUTO: 42.3 % (ref 37–47)
HGB BLD-MCNC: 12.2 G/DL (ref 12–16)
MCH RBC QN AUTO: 28 PG (ref 27–33)
MCHC RBC AUTO-ENTMCNC: 28.8 G/DL (ref 33.6–35)
MCV RBC AUTO: 97.2 FL (ref 81.4–97.8)
PLATELET # BLD AUTO: 180 K/UL (ref 164–446)
PMV BLD AUTO: 10 FL (ref 9–12.9)
POTASSIUM SERPL-SCNC: 4.8 MMOL/L (ref 3.6–5.5)
PROT SERPL-MCNC: 6.9 G/DL (ref 6–8.2)
RBC # BLD AUTO: 4.35 M/UL (ref 4.2–5.4)
SODIUM SERPL-SCNC: 142 MMOL/L (ref 135–145)
WBC # BLD AUTO: 7.8 K/UL (ref 4.8–10.8)

## 2019-04-08 PROCEDURE — 80053 COMPREHEN METABOLIC PANEL: CPT

## 2019-04-08 PROCEDURE — 36415 COLL VENOUS BLD VENIPUNCTURE: CPT

## 2019-04-08 PROCEDURE — 85027 COMPLETE CBC AUTOMATED: CPT

## 2019-04-08 NOTE — TELEPHONE ENCOUNTER
Have we ever prescribed this med? Yes.  If yes, what date? 03/12/2019    Last OV: 12/06/2018    Next OV: 06/06/2019    DX: Chronic obstructive pulmonary disease, unspecified COPD type (HCC) (J44.9)    Medications: TRELEGY ELLIPTA 100-62.5-25 MCG/INH AEROSOL POWDER, BREATH ACTIVATED [[id number]]

## 2019-04-09 DIAGNOSIS — J44.9 CHRONIC OBSTRUCTIVE PULMONARY DISEASE, UNSPECIFIED COPD TYPE (HCC): ICD-10-CM

## 2019-04-11 ENCOUNTER — ANTICOAGULATION VISIT (OUTPATIENT)
Dept: VASCULAR LAB | Facility: MEDICAL CENTER | Age: 74
End: 2019-04-11
Attending: INTERNAL MEDICINE
Payer: MEDICARE

## 2019-04-11 VITALS — DIASTOLIC BLOOD PRESSURE: 46 MMHG | SYSTOLIC BLOOD PRESSURE: 101 MMHG | HEART RATE: 73 BPM

## 2019-04-11 DIAGNOSIS — I48.0 PAROXYSMAL ATRIAL FIBRILLATION (HCC): ICD-10-CM

## 2019-04-11 LAB
INR BLD: 1.1 (ref 0.9–1.2)
INR PPP: 1.1 (ref 2–3.5)

## 2019-04-11 PROCEDURE — 99212 OFFICE O/P EST SF 10 MIN: CPT | Performed by: NURSE PRACTITIONER

## 2019-04-11 PROCEDURE — 85610 PROTHROMBIN TIME: CPT

## 2019-04-11 NOTE — PROGRESS NOTES
Diagnosis: AF  Drug: Xarelto 20 mg daily  LOT: indefinite  CHADSvasc = 3  HAS-BLED = 1    Health Status Since Last Assessment  On Xarelto for 1 month now. Tolerating without problems.     She can afford her co-pay but it is expensive and cost is a concern. She will let us know if it becomes too much.    Adherence with DOAC Therapy  None  BLEEDING RISK ASSESSMENT NB:    Bleeding Risk Assessment  None of the following:  Severe epistaxis   Hemoptysis    Excessive or unusual bruising / hematomas   GIB / melena / BRBPR / hematemesis    Hematuria Abnormal vaginal bleeding    Concerning daily headache or subdural hematoma symptoms    Decreasing hemoglobin or new anemia    Latest hemoglobin:     Lab Results   Component Value Date/Time    WBC 7.8 04/08/2019 12:33 PM    RBC 4.35 04/08/2019 12:33 PM    HEMOGLOBIN 12.2 04/08/2019 12:33 PM    HEMATOCRIT 42.3 04/08/2019 12:33 PM    MCV 97.2 04/08/2019 12:33 PM    MCH 28.0 04/08/2019 12:33 PM    MCHC 28.8 (L) 04/08/2019 12:33 PM    MPV 10.0 04/08/2019 12:33 PM    NEUTSPOLYS 72.20 (H) 01/24/2019 05:50 AM    LYMPHOCYTES 16.90 (L) 01/24/2019 05:50 AM    MONOCYTES 9.60 01/24/2019 05:50 AM    EOSINOPHILS 0.60 01/24/2019 05:50 AM    BASOPHILS 0.20 01/24/2019 05:50 AM    ANISOCYTOSIS 1+ 01/19/2019 05:33 AM        EtOH overuse - 1-2 cocktails per day. Knows alcohol can increase her risk of bleeding  Falls, presyncope, syncope, or seizures  - no  Uncontrolled hypertension - no  CREATININE CLEARANCE /    Creatinine Clearance/Renal Function  Latest eGFR / creatinine:  Lab Results   Component Value Date/Time    SODIUM 142 04/08/2019 12:33 PM    POTASSIUM 4.8 04/08/2019 12:33 PM    CHLORIDE 96 04/08/2019 12:33 PM    CO2 41 (HH) 04/08/2019 12:33 PM    GLUCOSE 90 04/08/2019 12:33 PM    BUN 12 04/08/2019 12:33 PM    CREATININE 0.48 (L) 04/08/2019 12:33 PM    CREATININE 0.71 04/19/2011 09:24 AM    BUNCREATRAT 23 04/19/2011 09:24 AM      • Is eGFR less than 50ml/min  - no  Actual cr cl 128  ml/min  If YES, calculate CrCl (see back)  Any recent dehydrating illness or medications added/changed? i.e. diuretics    Drug Interactions  ASA / other antiplatelets - avoids  NSAID - avoids  Other drug interactions   (Review med list / OTCs;)  Current Outpatient Prescriptions on File Prior to Visit   Medication Sig Dispense Refill   • Fluticasone-Umeclidin-Vilant (TRELEGY ELLIPTA) 100-62.5-25 MCG/INH AEROSOL POWDER, BREATH ACTIVATED Inhale 1 Puff by mouth every day. Rinse mouth after use 2 Each 0   • rivaroxaban (XARELTO) 20 MG Tab tablet Take 1 Tab by mouth with dinner. 2 Tab 0   • metoprolol (LOPRESSOR) 25 MG Tab Take 1 Tab by mouth 2 Times a Day. 4 Tab 0   • potassium chloride SA (KDUR) 20 MEQ Tab CR Take 1 Tab by mouth every morning. 90 Tab 3   • furosemide (LASIX) 20 MG Tab Take 3 Tabs by mouth every day. 270 Tab 3   • busPIRone (BUSPAR) 7.5 MG tablet Take 7.5 mg by mouth at bedtime as needed.     • clobetasol (TEMOVATE) 0.05 % external solution Apply sparingly bid 50 mL 0   • clotrimazole-betamethasone (LOTRISONE) 1-0.05 % Cream Apply 1 Application to affected area(s) 2 times a day. 1 Tube 1   • albuterol 108 (90 Base) MCG/ACT Aero Soln inhalation aerosol Inhale 2 Puffs by mouth every 6 hours as needed for Shortness of Breath. 8.5 g 3   • CALCIUM PO Take 1 Tab by mouth every day.     • Omega-3 Fatty Acids (FISH OIL) 1000 MG Cap capsule Take 1,000 mg by mouth every day.     • albuterol (PROVENTIL) 2.5mg/3ml Nebu Soln solution for nebulization USE 3 ML BY NEBULIZATION ROUTE EVERY 6 HOURS AS NEEDED FOR SHORTNESS OF BREATH. 375 mL 0   • triamcinolone acetonide (KENALOG) 0.025 % Cream APPLY TO AFFECTED AREA(S) 2 TIMES A DAY. 15 g 1   • omeprazole (PRILOSEC) 20 MG delayed-release capsule Take 1 Cap by mouth every day. 90 Cap 1   • Lactobacillus Rhamnosus, GG, (CULTURELLE PO) Take 1 Cap by mouth every morning.     • vitamin D (CHOLECALCIFEROL) 1000 UNIT TABS Take 5,000 Units by mouth every day.     • Multiple  Vitamin (MULTI-VITAMIN PO) Take 1 Tab by mouth every morning.       No current facility-administered medications on file prior to visit.        Examination  Blood pressure:  101/46  • Elevated BP  - no (sBP greater than 160 mmHg)  • Symptomatic hypotension  - no  Significant gait impairment / imbalance / high risk for falls - she is at risk due to age, uses a walker    Final Assessment and Recommendations:  Patient appears stable from the anticoagulation standpoint  Benefits of continued DOAC therapy outweigh risks for this patient  Recommend continue current DOAC at same dose    Pt given one month supply of samples as a courtesy per her request.    Continue Xarelto 20 mg daily  Return to the lab in 3 months    Other Actions:   The rationale for continued DOAC therapy  The potential for minor, major or life-threatening bleeding  Dosing instructions, adherence, risks of non-adherence, handling missed doses  Avoiding OTC ASA & NSAIDs & minimizing EtOH to reduce bleeding risks  Dosing around upcoming procedure / surgery if applicable (see back)    Follow up:  Will follow up with patient in 3 months

## 2019-04-12 ENCOUNTER — PATIENT OUTREACH (OUTPATIENT)
Dept: HEALTH INFORMATION MANAGEMENT | Facility: OTHER | Age: 74
End: 2019-04-12

## 2019-04-12 NOTE — PROGRESS NOTES
SBAR Documentation: Situation, Background, Assessment, Recommendation     Situation    CHF management   Background       Acute on chronic respiratory failure with hypoxia and hypercapnia, acute on chronic diastolic congestive heart failure, aortic stenosis, aortic regurgitation, PAF, primary osteoarthritis of knee, morbid obesity   Assessment    Outbound call to Kishan to discuss her CHF management.  Kishan states her weigh today is 216 pounds.  She states she will go up a pound then down a pound then back up.  Kishan states she limits the amount of sodium in her diet.  Kishan states she is SOB only with exertion.  She uses her home oxygen continuously.     Recommendation CCM RN and Kishan discussed hidden salt in foods.  CCM RN and Kishan discussed limiting her time outside due to spring flowers, bushes, and trees blooming to avoid a exacerbation of her chronic respiratory failure.  CCM RN reviewed with Kishan the need to keep her feet elevated when resting.  Plan:  CCM RN to schedule for telephone call next week to discuss management of her CHF.

## 2019-04-22 ENCOUNTER — PATIENT OUTREACH (OUTPATIENT)
Dept: HEALTH INFORMATION MANAGEMENT | Facility: OTHER | Age: 74
End: 2019-04-22

## 2019-04-22 NOTE — PROGRESS NOTES
"SBAR Documentation: Situation, Background, Assessment, Recommendation     Situation    CHF management   Background       CHF management   Assessment    Outbound call to Kishan to discuss her CHF management.  Kishan states she is doing the same as always.  She states she is still SOB in the morning.  She states getting in and out of the shower, getting dressed \"wear me out\".  She states she feels better after resting for about 5 to 10 minutes.  Kishan denies being SOB in the evening or during the night.  She states she sleeps with the head of her bed up.  Kishan states her weight isstill the same at 215 to 216 pounds.  She states her ankle edema is unchanged.  Kishan states she wears her home oxygen at 2.5L.  Kishan states she was able to cook Easter dinner yesterday, and had 1 small slice of ham.  She states she knows it has hidden salt and limited herself to only 1 piece of ham.  Kishan states she limits her sodium intake.   Recommendation CCM RN and Kishan discussed discharge today from the [facility].  Kishan states she is ready for discharge.  Kishan states she is call her cardiologist for 2 pound weight gain in 1 day or 5 pound weight gain in 1 week.  Kishan instructed to take all her medications as prescribed.  Kishan instructed to wear her home oxygen continuously.  CCM RN will complete referral at this time       "

## 2019-04-26 ENCOUNTER — PATIENT OUTREACH (OUTPATIENT)
Dept: HEALTH INFORMATION MANAGEMENT | Facility: OTHER | Age: 74
End: 2019-04-26

## 2019-04-26 NOTE — PROGRESS NOTES
1. [facility] Verified: yes    2. Verify PCP: yes    3. Review and add  to Care Team: yes    4. WebIZ Checked & Epic Updated: Yes  WebIZ Recommendations: TD and SHINGRIX (Shingles)  Is patient due for Tdap? NO  Is patient due for Shingles? YES. Patient was not notified of copay/out of pocket cost.    5. Reviewed/Updated the following with patient:       •   Communication Preference Obtained? YES  • MyChart Activation: declined       •   E-Mail Address Obtained? NO       •   Appointment Day and Time Preferences? YES       •   Preferred Pharmacy? YES       •   Preferred Lab? YES    6. Care Gap Scheduling (Attempt to Schedule EACH Overdue Care Gap!)          Called pt and introduced myself as her SCP . Pt stated she has no questions or concerns at this time. Call back number was given. Please transfer at ext [phone]    Pt declined to schedule care gaps at this time / stated that will do it later on.

## 2019-05-09 ENCOUNTER — OFFICE VISIT (OUTPATIENT)
Dept: CARDIOLOGY | Facility: MEDICAL CENTER | Age: 74
End: 2019-05-09
Payer: MEDICARE

## 2019-05-09 VITALS
WEIGHT: 220 LBS | BODY MASS INDEX: 44.35 KG/M2 | DIASTOLIC BLOOD PRESSURE: 64 MMHG | SYSTOLIC BLOOD PRESSURE: 120 MMHG | HEART RATE: 64 BPM | HEIGHT: 59 IN | OXYGEN SATURATION: 92 %

## 2019-05-09 DIAGNOSIS — I48.0 PAF (PAROXYSMAL ATRIAL FIBRILLATION) (HCC): ICD-10-CM

## 2019-05-09 DIAGNOSIS — J44.9 CHRONIC OBSTRUCTIVE PULMONARY DISEASE, UNSPECIFIED COPD TYPE (HCC): ICD-10-CM

## 2019-05-09 DIAGNOSIS — I35.0 MODERATE AORTIC STENOSIS: ICD-10-CM

## 2019-05-09 DIAGNOSIS — I35.0 NONRHEUMATIC AORTIC VALVE STENOSIS: ICD-10-CM

## 2019-05-09 DIAGNOSIS — I38 HEART FAILURE DUE TO VALVULAR DISEASE, UNSPECIFIED HEART FAILURE TYPE (HCC): ICD-10-CM

## 2019-05-09 DIAGNOSIS — I50.9 HEART FAILURE DUE TO VALVULAR DISEASE, UNSPECIFIED HEART FAILURE TYPE (HCC): ICD-10-CM

## 2019-05-09 DIAGNOSIS — J96.11 CHRONIC RESPIRATORY FAILURE WITH HYPOXIA (HCC): ICD-10-CM

## 2019-05-09 PROCEDURE — 99214 OFFICE O/P EST MOD 30 MIN: CPT | Performed by: NURSE PRACTITIONER

## 2019-05-09 ASSESSMENT — MINNESOTA LIVING WITH HEART FAILURE QUESTIONNAIRE (MLHF)
EATING LESS FOODS YOU LIKE: 4
DIFFICULTY TO CONCENTRATE OR REMEMBERING THINGS: 3
HAVING TO SIT OR LIE DOWN DURING THE DAY: 3
WALKING ABOUT OR CLIMBING STAIRS DIFFICULT: 5
COSTING YOU MONEY FOR MEDICAL CARE: 0
DIFFICULTY SLEEPING WELL AT NIGHT: 3
TIRED, FATIGUED OR LOW ON ENERGY: 5
DIFFICULTY WITH RECREATIONAL PASTIMES, SPORTS, HOBBIES: 4
LOSS OF SELF CONTROL IN YOUR LIFE: 4
GIVING YOU SIDE EFFECTS FROM TREATMENTS: 3
DIFFICULTY WITH SEXUAL ACTIVITIES: 5
MAKING YOU SHORT OF BREATH: 5
DIFFICULTY GOING AWAY FROM HOME: 4
TOTAL_SCORE: 69
MAKING YOU STAY IN A HOSPITAL: 0
MAKING YOU FEEL DEPRESSED: 3
WORKING AROUND THE HOUSE OR YARD DIFFICULT: 4
SWELLING IN ANKLES OR LEGS: 3
DIFFICULTY WORKING TO EARN A LIVING: 0
MAKING YOU WORRY: 5
FEELING LIKE A BURDEN TO FAMILY AND FRIENDS: 3
DIFFICULTY SOCIALIZING WITH FAMILY OR FRIENDS: 3

## 2019-05-09 ASSESSMENT — ENCOUNTER SYMPTOMS
PND: 0
ORTHOPNEA: 0
SHORTNESS OF BREATH: 1
ABDOMINAL PAIN: 0
PALPITATIONS: 0
DIZZINESS: 1
FEVER: 0
MYALGIAS: 0
COUGH: 0
CLAUDICATION: 0

## 2019-05-09 NOTE — PROGRESS NOTES
Chief Complaint   Patient presents with   • Congestive Heart Failure     F/V: 3 MO       Subjective:   Kishan Mcintyre is a 74 y.o. female who presents today for follow-up on cor pulmonale, LE edema.    Patient of Dr. Rodrigez.  She was last seen in clinic on 2/5/2019 with OSWALDO Castellanos.  During her last visit, no changes were made.    Over the past few months, patient reports her shortness of breath has progressed/worsened.  She continues to report being fatigued, has chronic right lower extremity edema worse than her left and occasional episodes of dizziness.    She denies chest pain, palpitations, orthopnea or PND.    She states her weights have been stable between 215-217 pounds.    She uses continuous oxygen between 2 and 3 L.    Additonally, patient has the following medical problems:    -Aortic stenosis, followed by Dr. Rodrigez    -hospitals, taking Xarelto    -COPD, using continuous oxygen between 2 and 3 L    -Obesity    Past Medical History:   Diagnosis Date   • Aortic stenosis    • Arrhythmia    • Arthritis    • CHF (congestive heart failure) (HCC)    • Chickenpox    • COPD    • Daytime sleepiness    • Difficulty breathing    • Welsh measles    • Heartburn    • MEDICAL HOME 11/9/2012   • Mumps    • Painful joint    • Palpitations    • Rash    • Shortness of breath    • Swelling of lower extremity    • Toothache    • Weight loss      Past Surgical History:   Procedure Laterality Date   • APPENDECTOMY     • GASTRIC BYPASS LAPAROSCOPIC     • PRIMARY C SECTION     • TONSILLECTOMY     • TUBAL COAGULATION LAPAROSCOPIC BILATERAL       Family History   Problem Relation Age of Onset   • Prostate cancer Father    • Dementia Father    • Alcohol abuse Mother         Cirrosis of liver   • Cancer Maternal Aunt    • Lung Disease Sister         COPD/Smoker   • Asthma Sister    • Hyperlipidemia Sister    • Breast Cancer Sister    • No Known Problems Maternal Grandmother    • No Known Problems Maternal Grandfather    • No  Known Problems Paternal Grandmother    • Other Daughter    • Hypertension Daughter    • Thyroid Daughter      Social History     Social History   • Marital status:      Spouse name: N/A   • Number of children: N/A   • Years of education: N/A     Occupational History   • Not on file.     Social History Main Topics   • Smoking status: Former Smoker     Packs/day: 0.50     Years: 26.00     Types: Cigarettes     Quit date: 1/1/1984   • Smokeless tobacco: Never Used      Comment: Started smoking at age 13   • Alcohol use 4.2 oz/week     7 Shots of liquor per week   • Drug use: No   • Sexual activity: No     Other Topics Concern   • Not on file     Social History Narrative   • No narrative on file     Allergies   Allergen Reactions   • Bee Anaphylaxis   • Fosamax Unspecified     Bone pain   • Iodine Rash     RASH   • Pcn [Penicillins] Rash     Rash years ago   • Sulfa Drugs Rash     Terrible rash  Tolerates furosemide 1/20  Tolerates acetazolamide 1/20   • Other Drug Unspecified     Also allergies to Actenol   • Tape Unspecified     Pulls my skin     Outpatient Encounter Prescriptions as of 5/9/2019   Medication Sig Dispense Refill   • Fluticasone-Umeclidin-Vilant (TRELEGY ELLIPTA) 100-62.5-25 MCG/INH AEROSOL POWDER, BREATH ACTIVATED Inhale 1 Puff by mouth every day. Rinse mouth after use 2 Each 0   • rivaroxaban (XARELTO) 20 MG Tab tablet Take 1 Tab by mouth with dinner. 2 Tab 0   • metoprolol (LOPRESSOR) 25 MG Tab Take 1 Tab by mouth 2 Times a Day. 4 Tab 0   • potassium chloride SA (KDUR) 20 MEQ Tab CR Take 1 Tab by mouth every morning. 90 Tab 3   • furosemide (LASIX) 20 MG Tab Take 3 Tabs by mouth every day. 270 Tab 3   • busPIRone (BUSPAR) 7.5 MG tablet Take 7.5 mg by mouth at bedtime as needed.     • clobetasol (TEMOVATE) 0.05 % external solution Apply sparingly bid 50 mL 0   • clotrimazole-betamethasone (LOTRISONE) 1-0.05 % Cream Apply 1 Application to affected area(s) 2 times a day. 1 Tube 1   • CALCIUM PO  "Take 1 Tab by mouth every day.     • Omega-3 Fatty Acids (FISH OIL) 1000 MG Cap capsule Take 1,000 mg by mouth every day.     • triamcinolone acetonide (KENALOG) 0.025 % Cream APPLY TO AFFECTED AREA(S) 2 TIMES A DAY. 15 g 1   • omeprazole (PRILOSEC) 20 MG delayed-release capsule Take 1 Cap by mouth every day. 90 Cap 1   • Lactobacillus Rhamnosus, GG, (CULTURELLE PO) Take 1 Cap by mouth every morning.     • vitamin D (CHOLECALCIFEROL) 1000 UNIT TABS Take 5,000 Units by mouth every day.     • Multiple Vitamin (MULTI-VITAMIN PO) Take 1 Tab by mouth every morning.     • [DISCONTINUED] TRELEGY ELLIPTA 100-62.5-25 MCG/INH AEROSOL POWDER, BREATH ACTIVATED Inhale 1 Puff by mouth every day. Rinse mouth after use (Patient not taking: Reported on 5/9/2019) 2 Each 0   • albuterol 108 (90 Base) MCG/ACT Aero Soln inhalation aerosol Inhale 2 Puffs by mouth every 6 hours as needed for Shortness of Breath. 8.5 g 3   • albuterol (PROVENTIL) 2.5mg/3ml Nebu Soln solution for nebulization USE 3 ML BY NEBULIZATION ROUTE EVERY 6 HOURS AS NEEDED FOR SHORTNESS OF BREATH. 375 mL 0     No facility-administered encounter medications on file as of 5/9/2019.      Review of Systems   Constitutional: Positive for malaise/fatigue. Negative for fever.   Respiratory: Positive for shortness of breath. Negative for cough.    Cardiovascular: Positive for leg swelling. Negative for chest pain, palpitations, orthopnea, claudication and PND.   Gastrointestinal: Negative for abdominal pain.   Musculoskeletal: Negative for myalgias.   Neurological: Positive for dizziness (occ, in am).   All other systems reviewed and are negative.       Objective:   /64 (BP Location: Left arm, Patient Position: Sitting, BP Cuff Size: Adult)   Pulse 64   Ht 1.499 m (4' 11\")   Wt 99.8 kg (220 lb)   SpO2 92%   BMI 44.43 kg/m²     Physical Exam   Constitutional: She is oriented to person, place, and time. She appears well-developed and well-nourished.   Uses walker "   HENT:   Head: Normocephalic and atraumatic.   Eyes: Pupils are equal, round, and reactive to light. EOM are normal.   Neck: Normal range of motion. Neck supple. No JVD present.   Cardiovascular: Normal rate and regular rhythm.    Murmur heard.   Systolic murmur is present with a grade of 3/6   Pulmonary/Chest: Effort normal and breath sounds normal. No respiratory distress. She has no wheezes. She has no rales.   Uses continuous oxygen at 2-3L   Abdominal: Soft. Bowel sounds are normal.   Musculoskeletal: She exhibits edema.   Neurological: She is alert and oriented to person, place, and time.   Skin: Skin is warm and dry.   Psychiatric: She has a normal mood and affect. Her behavior is normal.   Vitals reviewed.    Lab Results   Component Value Date/Time    CHOLSTRLTOT 171 07/23/2016 08:52 AM    LDL 91 07/23/2016 08:52 AM    HDL 60 07/23/2016 08:52 AM    TRIGLYCERIDE 101 07/23/2016 08:52 AM       Lab Results   Component Value Date/Time    SODIUM 142 04/08/2019 12:33 PM    POTASSIUM 4.8 04/08/2019 12:33 PM    CHLORIDE 96 04/08/2019 12:33 PM    CO2 41 (HH) 04/08/2019 12:33 PM    GLUCOSE 90 04/08/2019 12:33 PM    BUN 12 04/08/2019 12:33 PM    CREATININE 0.48 (L) 04/08/2019 12:33 PM    CREATININE 0.71 04/19/2011 09:24 AM    BUNCREATRAT 23 04/19/2011 09:24 AM     Lab Results   Component Value Date/Time    ALKPHOSPHAT 53 04/08/2019 12:33 PM    ASTSGOT 18 04/08/2019 12:33 PM    ALTSGPT 5 04/08/2019 12:33 PM    TBILIRUBIN 0.5 04/08/2019 12:33 PM      Echocardiogram 8/30/2013  CONCLUSIONS  Normal left ventricular systolic function. Left ventricular ejection fraction is 60% to 65%.   Mild to moderate aortic stenosis.  Mild aortic insufficiency.    Transthoracic Echo Report 2/5/2015  Mild  aortic stenosis. Transvalvular gradients are -    Peak: 32 mmHg      Mean: 20 mmHg.  Transvalvular gradients are -    Peak: 32 mmHg      Mean: 20 mmHg.  Calcification of the noncoronary cusp Mild aortic insufficiency.  Mild concentric  left ventricular hypertrophy.  Left ventricular ejection fraction is 60% to 65%.  Grade I diastolic dysfunction - mitral inflow E/A is <1.0.  Moderately dilated right ventricle.  Moderately dilated left atrium.  Right ventricular systolic pressure is estimated to be 46-51 mmHg.  Trace pulmonic insufficiency.    Transthoracic Echo Report 2/18/2016  Normal left ventricular systolic function.  Left ventricular ejection fraction is visually estimated to be 60%.  Grade I diastolic dysfunction.  Mild to moderate aortic stenosis.   Vmax 3.0 m/s. Transvalvular gradients are Peak: 40 mmHg, Mean: 24 mmHg.  Mild aortic insufficiency.  Mild tricuspid regurgitation.  Right ventricular systolic pressure is estimated to be 60 mmHg.    Myocardial Perfusion Report 2/18/2016   IMPRESSIONS   1. Small reversible defect seen in apical inferior and lateral wall consistent with ischemia. Prominent subdiaphragmatic activity seen in stress    than rest images could attenuation defect but cannot r/o ischemia. Consider clinical correlation.   2. Normal left ventricular wall motion, with EF of  69 %. Normal left ventricular wall thickening. Calculated TID 1.21.    Transthoracic Echo Report 3/22/2017  Prior study done on 02/18/16,  Normal left ventricular systolic function.   Moderate aortic stenosis. Vmax is 3.4  m/s. Transvalvular gradients are - Peak: 47 mmHg, Mean: 24  mmHg.  Compared to the images of the prior study done -  there has been no significant change.     Transthoracic Echo Report 9/11/2017  Prior echo 2-23-17; compared to the report of the study done - there has been no significant change.   Normal left ventricular systolic function.  Left ventricular ejection fraction is visually estimated to be 55%.  Mild mitral regurgitation.  Mild to moderate aortic stenosis.  Vmax is 3.03 m/s.   Transvalvular gradients are - Peak: 37 mmHg, Mean: 21 mmHg.  Mild aortic insufficiency.  Mild tricuspid regurgitation.  Estimated right ventricular  systolic pressure is 50 mmHg.    Transthoracic Echo Report 4/5/2018  Prior echo done 09/11/17. Compared to the report of the study done - there has been no significant change.   Normal left ventricular systolic function.  Left ventricular ejection fraction is visually estimated to be 65%.  Grade II diastolic dysfunction.  Moderate aortic stenosis.  Aortic valve area calculated from the continuity equation is 1 sq cm.  Vmax is 3 m/s. Transvalvular gradients are - Peak: 36 mmHg, Mean: 23 mmHg.   Mild aortic insufficiency.  Mild tricuspid regurgitation.  Estimated right ventricular systolic pressure is 35 mmHg.    Transthoracic Echo Report 10/8/2018  Prior ; compared to the report of the study done - there has been no significant change.   Normal left ventricular systolic function.  Left ventricular ejection fraction is visually estimated to be 60%.  Moderate aortic stenosis.  Aortic valve area calculated from the continuity equation is 1.1 cm2.   Vmax is  3.03  m/s.  Transvalvular gradients are - Peak: 37 mmHg,   Mean: 23 mmHg.   Mild tricuspid regurgitation.  Estimated right ventricular systolic pressure is 40 mmHg.    Transthoracic Echo Report 1/23/2019  Prior Echo - 10/8/18 Moderate aortic stenosis.  Transvalvular gradients are - Peak: 48 mmHg, Mean: 33 mmHg.  Left ventricular ejection fraction is visually estimated to be 65%.  Biatrial enlargement.  Estimated right ventricular systolic pressure  is 55 mmHg.    Transesophageal Echo Report 1/23/2019  By plaiimetry, the BELGICA is 1.2 cm2.    Assessment:     1. Heart failure due to valvular disease, unspecified heart failure type ([facility])     2. Chronic obstructive pulmonary disease, unspecified COPD type ([facility])     3. Nonrheumatic aortic valve stenosis     4. Chronic respiratory failure with hypoxia ([facility])     5. Moderate aortic stenosis     6. PAF (paroxysmal atrial fibrillation) ([facility])         Medical Decision Making:  Today's Assessment / Status / Plan:   1.  Cor  pulmonale/pulmonary hypertension related to COPD: Patient continues to have chronic right lower extremity edema greater than left  -Continue furosemide 60 mg daily (patient is not interested in dose increase at this time)  -Continue potassium 20 mEq daily  -Encourage patient to continue to monitor for worsening symptoms  -She has a follow-up with pulmonary next month  -Continue inhalers and oxygen use    2.  Aortic stenosis: Moderate AS per echo in January  -Patient to follow-up with Dr. Rodrigez  -Continue diuretics per above and to monitor for worsening signs and symptoms.    3.  Paroxysmal A. Fib  -Continue Xarelto 20 mg daily, patient discussed with anticoagulation if she cannot afford it in the future  -Continue metoprolol 25 mg twice a day    FU in clinic in 3 months back with Dr. Rodrgiez. Sooner if needed.    Patient verbalizes understanding and agrees with the plan of care.     Collaborating MD: Jorge Rodriguez MD

## 2019-05-10 NOTE — PROGRESS NOTES
[facility]    Patient called the pharmacy asking for samples for Xarelto as her insurance does cover it, but the copay is expensive for her.   After much discussion, a 30 day supply was called to the [facility] to help the patient, but the patient understands this will be the LAST samples provided for the patient.   If the patient continues to have issues filling Xarelto due to cost, we can switch the patient over the warfarin in the future.     Shawn Macdonald  PharmD

## 2019-05-13 ENCOUNTER — PATIENT OUTREACH (OUTPATIENT)
Dept: HEALTH INFORMATION MANAGEMENT | Facility: OTHER | Age: 74
End: 2019-05-13

## 2019-05-13 NOTE — PROGRESS NOTES
1. Attempt #:1    2. [facility] Verified: yes    3. Verify PCP: yes    4. Review Care Team: no      6. Reviewed/Updated the following with patient:       •   Communication Preference Obtained? NO       •   Preferred Pharmacy? NO       •   Preferred Lab? NO       •   Family History (document living status of immediate family members and if + hx of cancer, diabetes, hypertension, hyperlipidemia, heart attack, stroke) NO  Pt previously spoke to Mary     9. [employer] Activation: declined    10. [employer] Shannon: no    11. Virtual Visits: no    12. Opt In to Text Messages: no    13. Patient was advised: “This is a free wellness visit. The provider will screen for medical conditions to help you stay healthy. If you have other concerns to address you may be asked to discuss these at a separate visit or there may be an additional fee.”     14. Patient was informed to arrive 15 min prior to their scheduled appointment and bring in their medication bottles.

## 2019-05-30 ENCOUNTER — OFFICE VISIT (OUTPATIENT)
Dept: MEDICAL GROUP | Facility: MEDICAL CENTER | Age: 74
End: 2019-05-30
Payer: MEDICARE

## 2019-05-30 VITALS
SYSTOLIC BLOOD PRESSURE: 92 MMHG | OXYGEN SATURATION: 91 % | TEMPERATURE: 98.9 F | HEIGHT: 59 IN | HEART RATE: 92 BPM | DIASTOLIC BLOOD PRESSURE: 68 MMHG | WEIGHT: 219.14 LBS | BODY MASS INDEX: 44.18 KG/M2

## 2019-05-30 DIAGNOSIS — G89.29 CHRONIC PAIN OF RIGHT KNEE: ICD-10-CM

## 2019-05-30 DIAGNOSIS — M25.561 CHRONIC PAIN OF RIGHT KNEE: ICD-10-CM

## 2019-05-30 PROCEDURE — 99214 OFFICE O/P EST MOD 30 MIN: CPT | Performed by: FAMILY MEDICINE

## 2019-05-30 RX ORDER — METHYLPREDNISOLONE ACETATE 80 MG/ML
80 INJECTION, SUSPENSION INTRA-ARTICULAR; INTRALESIONAL; INTRAMUSCULAR; SOFT TISSUE ONCE
Status: COMPLETED | OUTPATIENT
Start: 2019-05-30 | End: 2019-05-30

## 2019-05-30 RX ADMIN — METHYLPREDNISOLONE ACETATE 80 MG: 80 INJECTION, SUSPENSION INTRA-ARTICULAR; INTRALESIONAL; INTRAMUSCULAR; SOFT TISSUE at 14:24

## 2019-05-30 NOTE — PROGRESS NOTES
CC: Patient has chronic knee pain, he is here for intra-articular cortisone shot    HPI:   Kishan presents today with    Chronic pain of right knee  Patient has a chronic bilateral knee osteoarthritis.  More in the right side. Patient stated that she was told it in the past that she needs replacement but she refused. Intra-articular knee injection has been helping her, she has been taking it in a regular basis.  However she reported that the fluids in the right knee has increased, however no other symptoms other than the knee pain with movement.     Patient Active Problem List    Diagnosis Date Noted   • Acute on chronic respiratory failure with hypoxia and hypercapnia ([facility]) 01/18/2019     Priority: High   • Aortic stenosis 06/07/2012     Priority: Medium   • Acute on chronic diastolic congestive heart failure ([facility]) 12/05/2011     Priority: Medium   • Aortic regurgitation 12/05/2011     Priority: Medium   • Morbid obesity ([facility]) 06/16/2015     Priority: Low   • Primary osteoarthritis of knee 10/19/2012     Priority: Low   • Chronic respiratory failure with hypoxia ([facility]) 03/12/2018   • Obesity (BMI 35.0-39.9 without comorbidity) ([facility]) 11/20/2017   • Obesity (BMI 30-39.9) 11/20/2017   • Anxiety 04/04/2016   • MEDICAL HOME 11/09/2012   • Paroxysmal atrial fibrillation ([facility]) 04/13/2011   • Obesity 04/13/2011       Current Outpatient Prescriptions   Medication Sig Dispense Refill   • rivaroxaban (XARELTO) 20 MG Tab tablet Take 1 Tab by mouth with dinner. 30 Tab 0   • Fluticasone-Umeclidin-Vilant (TRELEGY ELLIPTA) 100-62.5-25 MCG/INH AEROSOL POWDER, BREATH ACTIVATED Inhale 1 Puff by mouth every day. Rinse mouth after use 2 Each 0   • rivaroxaban (XARELTO) 20 MG Tab tablet Take 1 Tab by mouth with dinner. 2 Tab 0   • metoprolol (LOPRESSOR) 25 MG Tab Take 1 Tab by mouth 2 Times a Day. 4 Tab 0   • potassium chloride SA (KDUR) 20 MEQ Tab CR Take 1 Tab by mouth every morning. 90 Tab 3   • furosemide (LASIX) 20 MG Tab Take 3 Tabs  by mouth every day. 270 Tab 3   • clobetasol (TEMOVATE) 0.05 % external solution Apply sparingly bid 50 mL 0   • clotrimazole-betamethasone (LOTRISONE) 1-0.05 % Cream Apply 1 Application to affected area(s) 2 times a day. 1 Tube 1   • CALCIUM PO Take 1 Tab by mouth every day.     • Omega-3 Fatty Acids (FISH OIL) 1000 MG Cap capsule Take 1,000 mg by mouth every day.     • albuterol (PROVENTIL) 2.5mg/3ml Nebu Soln solution for nebulization USE 3 ML BY NEBULIZATION ROUTE EVERY 6 HOURS AS NEEDED FOR SHORTNESS OF BREATH. 375 mL 0   • triamcinolone acetonide (KENALOG) 0.025 % Cream APPLY TO AFFECTED AREA(S) 2 TIMES A DAY. 15 g 1   • omeprazole (PRILOSEC) 20 MG delayed-release capsule Take 1 Cap by mouth every day. 90 Cap 1   • Lactobacillus Rhamnosus, GG, (CULTURELLE PO) Take 1 Cap by mouth every morning.     • vitamin D (CHOLECALCIFEROL) 1000 UNIT TABS Take 5,000 Units by mouth every day.     • Multiple Vitamin (MULTI-VITAMIN PO) Take 1 Tab by mouth every morning.     • busPIRone (BUSPAR) 7.5 MG tablet Take 7.5 mg by mouth at bedtime as needed.     • albuterol 108 (90 Base) MCG/ACT Aero Soln inhalation aerosol Inhale 2 Puffs by mouth every 6 hours as needed for Shortness of Breath. 8.5 g 3     Current Facility-Administered Medications   Medication Dose Route Frequency Provider Last Rate Last Dose   • methylPREDNISolone acetate (DEPO-MEDROL) injection 80 mg  80 mg Intra-articular Once Ana Hartley M.D.             Allergies as of 05/30/2019 - Reviewed 05/30/2019   Allergen Reaction Noted   • Bee Anaphylaxis 03/12/2018   • Fosamax Unspecified 09/15/2011   • Iodine Rash 03/12/2018   • Pcn [penicillins] Rash 04/13/2011   • Sulfa drugs Rash 04/13/2011   • Other drug Unspecified 07/06/2013   • Tape Unspecified 03/12/2018        ROS: Denies any chest pain, Shortness of breath, Changes bowel or bladder, Lower extremity edema.    Physical Exam:  BP (!) 92/68   Pulse 92   Temp 37.2 °C (98.9 °F)   Ht 1.49 m (4'  "10.66\")   Wt 99.4 kg (219 lb 2.2 oz)   SpO2 91%   BMI 44.77 kg/m²   Gen.: Well-developed, well-nourished, no apparent distress,pleasant and cooperative with the examination  Right knee: swelling(fusion), no tenderness, restricted range of motion    Knee injection.  Patient was consented. Risks and benefits discussed.  Rt knee was exposed. Site of injection was marked ( A point between lateral patellar ligament, lateral tibial plateau, and lateral femoral condyle). The skin was sterilized with (2% chlorhexidine , and 70% Isopropyl alcohol), the area was numbed with ethyl cholride , then 1 ml of DepoMedrol 80 mg + 4 ml of Lidocaine 1% were injected into the joint.      Assessment and Plan.   74 y.o. female     1. Chronic pain of right knee  Intra-articular injection of the left shoulder joint is given.Patient joint movement improved to about 50% immediately after the injection.Precaustion was given to avoid bleeding . Patient advised to to uses worm compresses 2 times daily for 3 days, and RTC if any continuous pain on the joint  Patient has excessive knee effusion, advised next time to get ultrasound guided intra-articular injection, will [facility] and she is due for one.    - methylPREDNISolone acetate (DEPO-MEDROL) injection 80 mg; 1 mL by Intra-articular route Once.  - Large Joint Injection / Arthrocentesis      "

## 2019-06-07 DIAGNOSIS — J44.9 CHRONIC OBSTRUCTIVE PULMONARY DISEASE, UNSPECIFIED COPD TYPE (HCC): ICD-10-CM

## 2019-06-07 NOTE — TELEPHONE ENCOUNTER
Have we ever prescribed this med? Yes.  If yes, what date? 4/9/2019    Last OV: 12/6/2018    Next OV: no pending, due now    DX: COPD    Medications: Trelegy samples

## 2019-07-08 ENCOUNTER — HOSPITAL ENCOUNTER (OUTPATIENT)
Dept: LAB | Facility: MEDICAL CENTER | Age: 74
End: 2019-07-08
Attending: NURSE PRACTITIONER
Payer: MEDICARE

## 2019-07-08 DIAGNOSIS — J44.9 CHRONIC OBSTRUCTIVE PULMONARY DISEASE, UNSPECIFIED COPD TYPE (HCC): ICD-10-CM

## 2019-07-08 DIAGNOSIS — I48.0 PAROXYSMAL ATRIAL FIBRILLATION (HCC): ICD-10-CM

## 2019-07-08 LAB
ANION GAP SERPL CALC-SCNC: 6 MMOL/L (ref 0–11.9)
BUN SERPL-MCNC: 15 MG/DL (ref 8–22)
CALCIUM SERPL-MCNC: 9.9 MG/DL (ref 8.5–10.5)
CHLORIDE SERPL-SCNC: 94 MMOL/L (ref 96–112)
CO2 SERPL-SCNC: 40 MMOL/L (ref 20–33)
CREAT SERPL-MCNC: 0.53 MG/DL (ref 0.5–1.4)
ERYTHROCYTE [DISTWIDTH] IN BLOOD BY AUTOMATED COUNT: 49.6 FL (ref 35.9–50)
GLUCOSE SERPL-MCNC: 94 MG/DL (ref 65–99)
HCT VFR BLD AUTO: 43.8 % (ref 37–47)
HGB BLD-MCNC: 12.2 G/DL (ref 12–16)
MCH RBC QN AUTO: 27.5 PG (ref 27–33)
MCHC RBC AUTO-ENTMCNC: 27.9 G/DL (ref 33.6–35)
MCV RBC AUTO: 98.6 FL (ref 81.4–97.8)
PLATELET # BLD AUTO: 177 K/UL (ref 164–446)
PMV BLD AUTO: 10.3 FL (ref 9–12.9)
POTASSIUM SERPL-SCNC: 4.7 MMOL/L (ref 3.6–5.5)
RBC # BLD AUTO: 4.44 M/UL (ref 4.2–5.4)
SODIUM SERPL-SCNC: 140 MMOL/L (ref 135–145)
WBC # BLD AUTO: 7.5 K/UL (ref 4.8–10.8)

## 2019-07-08 PROCEDURE — 85027 COMPLETE CBC AUTOMATED: CPT

## 2019-07-08 PROCEDURE — 80048 BASIC METABOLIC PNL TOTAL CA: CPT

## 2019-07-08 PROCEDURE — 36415 COLL VENOUS BLD VENIPUNCTURE: CPT

## 2019-07-08 NOTE — TELEPHONE ENCOUNTER
Have we ever prescribed this med? Yes.  If yes, what date? 06/07/2019    Last OV: 06/15/2018 - DR. MCCORMACK     Next OV: 07/11/2019 - DR. MCCORMACK     DX: COPD    Medications: Trelegy sample

## 2019-07-10 ENCOUNTER — TELEPHONE (OUTPATIENT)
Dept: PULMONOLOGY | Facility: HOSPICE | Age: 74
End: 2019-07-10

## 2019-07-10 NOTE — TELEPHONE ENCOUNTER
patient was informed about her Trelegy RX     Patient had left voicemail requesting refill but it was refilled on 07/08/19 by Massiel TATE

## 2019-07-11 ENCOUNTER — OFFICE VISIT (OUTPATIENT)
Dept: PULMONOLOGY | Facility: HOSPICE | Age: 74
End: 2019-07-11
Payer: MEDICARE

## 2019-07-11 VITALS
HEART RATE: 62 BPM | WEIGHT: 213 LBS | OXYGEN SATURATION: 93 % | HEIGHT: 59 IN | TEMPERATURE: 98.6 F | DIASTOLIC BLOOD PRESSURE: 68 MMHG | SYSTOLIC BLOOD PRESSURE: 112 MMHG | RESPIRATION RATE: 18 BRPM | BODY MASS INDEX: 42.94 KG/M2

## 2019-07-11 DIAGNOSIS — R06.02 SOB (SHORTNESS OF BREATH): ICD-10-CM

## 2019-07-11 DIAGNOSIS — I35.0 AORTIC VALVE STENOSIS, UNSPECIFIED ETIOLOGY: ICD-10-CM

## 2019-07-11 DIAGNOSIS — J44.9 CHRONIC OBSTRUCTIVE PULMONARY DISEASE, UNSPECIFIED COPD TYPE (HCC): ICD-10-CM

## 2019-07-11 DIAGNOSIS — R63.8 INCREASED BMI: ICD-10-CM

## 2019-07-11 DIAGNOSIS — I50.32 CHRONIC DIASTOLIC CONGESTIVE HEART FAILURE (HCC): ICD-10-CM

## 2019-07-11 PROCEDURE — 99214 OFFICE O/P EST MOD 30 MIN: CPT | Performed by: INTERNAL MEDICINE

## 2019-07-11 ASSESSMENT — PAIN SCALES - GENERAL: PAINLEVEL: 4=SLIGHT-MODERATE PAIN

## 2019-07-12 NOTE — PROGRESS NOTES
Chief Complaint   Patient presents with   • Follow-Up     Severe COPD       HPI:  The patient is a 73-year-old woman with severe COPD.  Her FEV1 is 0.69 L.  She does have a significant response to an inhaled bronchodilator.  She also has aortic stenosis, diastolic dysfunction, and pulmonary hypertension.  Her current medications include Trelegy and an albuterol nebulizer as well as HFA.    She is now on supplemental oxygen at 3 L/min 24/7.    She is not having any acute respiratory issues today.  She does have severe dyspnea with minimal activity.   After her cruise to [place] she was hospitalized in January 2019 with acute respiratory failure requiring an ICU stay and treatment with noninvasive ventilation for several days.  At that time she had evidence of CO2 retention.  She is now back to baseline.  She had several echocardiograms at that time showing no worsening of her aortic stenosis.    Past Medical History:   Diagnosis Date   • Aortic stenosis    • Arrhythmia    • Arthritis    • CHF (congestive heart failure) ([facility])    • Chickenpox    • COPD    • Daytime sleepiness    • Difficulty breathing    • [place] measles    • Heartburn    • MEDICAL HOME 11/9/2012   • Mumps    • Painful joint    • Palpitations    • Rash    • Shortness of breath    • Swelling of lower extremity    • Toothache    • Weight loss        ROS:   Constitutional: Denies fevers, chills, night sweats, fatigue or weight loss  Eyes: Denies vision loss, pain, drainage, double vision  Ears, Nose, Throat: Denies earache, tinnitus, hoarseness  Cardiovascular: Denies chest pain, tightness, palpitations at this time  Respiratory: See HPI  Sleep: Denies, snoring, apnea  GI: Denies abdominal pain, nausea, vomiting, diarrhea  : Denies frequent urination, hematuria, painful urination  Musculoskeletal: Denies back pain, painful joints, sore muscles  Neurological: Denies headaches, seizures  Skin: Denies rashes, color changes  Psychiatric: Denies depression or  thoughts of suicide  Hematologic: Denies bleeding tendency or clotting tendency  Allergic/Immunologic: Denies rhinitis, skin sensitivity    Social History     Social History   • Marital status:      Spouse name: N/A   • Number of children: N/A   • Years of education: N/A     Occupational History   • Not on file.     Social History Main Topics   • Smoking status: Former Smoker     Packs/day: 0.50     Years: 26.00     Types: Cigarettes     Quit date: 1/1/1984   • Smokeless tobacco: Never Used      Comment: Started smoking at age 13   • Alcohol use 4.2 oz/week     7 Shots of liquor per week   • Drug use: No   • Sexual activity: No     Other Topics Concern   • Not on file     Social History Narrative   • No narrative on file     Bee; Fosamax; Iodine; Pcn [penicillins]; Sulfa drugs; Other drug; and Tape  Current Outpatient Prescriptions on File Prior to Visit   Medication Sig Dispense Refill   • Fluticasone-Umeclidin-Vilant (TRELEGY ELLIPTA) 100-62.5-25 MCG/INH AEROSOL POWDER, BREATH ACTIVATED Inhale 1 Puff by mouth every day. Rinse mouth after use 2 Each 0   • rivaroxaban (XARELTO) 20 MG Tab tablet Take 1 Tab by mouth with dinner. 30 Tab 0   • metoprolol (LOPRESSOR) 25 MG Tab Take 1 Tab by mouth 2 Times a Day. 4 Tab 0   • potassium chloride SA (KDUR) 20 MEQ Tab CR Take 1 Tab by mouth every morning. 90 Tab 3   • furosemide (LASIX) 20 MG Tab Take 3 Tabs by mouth every day. 270 Tab 3   • CALCIUM PO Take 1 Tab by mouth every day.     • Omega-3 Fatty Acids (FISH OIL) 1000 MG Cap capsule Take 1,000 mg by mouth every day.     • omeprazole (PRILOSEC) 20 MG delayed-release capsule Take 1 Cap by mouth every day. 90 Cap 1   • vitamin D (CHOLECALCIFEROL) 1000 UNIT TABS Take 5,000 Units by mouth every day.     • Multiple Vitamin (MULTI-VITAMIN PO) Take 1 Tab by mouth every morning.     • busPIRone (BUSPAR) 7.5 MG tablet Take 7.5 mg by mouth at bedtime as needed.     • clobetasol (TEMOVATE) 0.05 % external solution Apply  "sparingly bid (Patient not taking: Reported on 7/11/2019) 50 mL 0   • clotrimazole-betamethasone (LOTRISONE) 1-0.05 % Cream Apply 1 Application to affected area(s) 2 times a day. 1 Tube 1   • albuterol 108 (90 Base) MCG/ACT Aero Soln inhalation aerosol Inhale 2 Puffs by mouth every 6 hours as needed for Shortness of Breath. 8.5 g 3   • albuterol (PROVENTIL) 2.5mg/3ml Nebu Soln solution for nebulization USE 3 ML BY NEBULIZATION ROUTE EVERY 6 HOURS AS NEEDED FOR SHORTNESS OF BREATH. 375 mL 0   • triamcinolone acetonide (KENALOG) 0.025 % Cream APPLY TO AFFECTED AREA(S) 2 TIMES A DAY. 15 g 1   • Lactobacillus Rhamnosus, GG, (CULTURELLE PO) Take 1 Cap by mouth every morning.       No current facility-administered medications on file prior to visit.      /68   Pulse 62   Temp 37 °C (98.6 °F) (Oral)   Resp 18   Ht 1.49 m (4' 10.66\")   Wt 96.6 kg (213 lb)   SpO2 93%   Family History   Problem Relation Age of Onset   • Prostate cancer Father    • Dementia Father    • Alcohol abuse Mother         Cirrosis of liver   • Cancer Maternal Aunt    • Lung Disease Sister         COPD/Smoker   • Asthma Sister    • Hyperlipidemia Sister    • Breast Cancer Sister    • No Known Problems Maternal Grandmother    • No Known Problems Maternal Grandfather    • No Known Problems Paternal Grandmother    • Other Daughter    • Hypertension Daughter    • Thyroid Daughter        Physical Exam:  No distress at rest on supplemental oxygen.  She uses a walker.  HEENT: PERRLA, EOMI, no scleral icterus, no nasal or oral lesions  Neck: No thyromegaly, no adenopathy, no bruits  Mallampatti: Grade II  Lungs: Very distant breath sounds, no wheezes or crackles  Heart: Regular rate and rhythm, no gallops or murmurs  Abdomen: Soft, benign, no organomegaly  Extremities: No clubbing, cyanosis, or edema  Neurologic: Cranial nerve, motor, and sensory exam are normal    1. Chronic obstructive pulmonary disease, unspecified COPD type (HCC)    2. Chronic " diastolic congestive heart failure (HCC)    3. SOB (shortness of breath)    4. Aortic valve stenosis, unspecified etiology    5. Increased BMI      She has significant chronic obstructive pulmonary disease along with aortic stenosis, pulmonary hypertension and diastolic dysfunction.  We will make no changes in her inhaled medications and supplemental oxygen.  At this time she appears to be back to baseline.  She will continue to follow-up with cardiology.  We will see her back in 4 months or sooner if she has issues.

## 2019-07-16 ENCOUNTER — ANTICOAGULATION VISIT (OUTPATIENT)
Dept: MEDICAL GROUP | Facility: PHYSICIAN GROUP | Age: 74
End: 2019-07-16
Payer: MEDICARE

## 2019-07-16 DIAGNOSIS — I48.0 PAROXYSMAL ATRIAL FIBRILLATION (HCC): ICD-10-CM

## 2019-07-16 DIAGNOSIS — Z79.01 CHRONIC ANTICOAGULATION: Primary | ICD-10-CM

## 2019-07-16 LAB — INR PPP: 1 (ref 2–3.5)

## 2019-07-16 PROCEDURE — 99211 OFF/OP EST MAY X REQ PHY/QHP: CPT | Performed by: FAMILY MEDICINE

## 2019-07-16 PROCEDURE — 85610 PROTHROMBIN TIME: CPT | Performed by: FAMILY MEDICINE

## 2019-07-16 NOTE — PROGRESS NOTES
Anticoagulation Summary  As of 2019    INR goal:      TTR:   --   INR used for dosin.00 (2019)   Warfarin maintenance plan:   No maintenance plan   Next INR check:   2020   Target end date:   Indefinite    Indications    Chronic anticoagulation [Z79.01]  Paroxysmal atrial fibrillation (HCC) [I48.0]             Anticoagulation Episode Summary     INR check location:       Preferred lab:       Send INR reminders to:       Comments:         Anticoagulation Care Providers     Provider Role Specialty Phone number    Jasmin Collier P.A.-C. Referring Cardiology [phone]    [facility]ticoagulation [facility] Responsible  [phone]        Anticoagulation Patient Findings  Patient Findings     Negatives:   Signs/symptoms of thrombosis, Signs/symptoms of bleeding, Laboratory test error suspected, Change in health, Change in alcohol use, Change in activity, Upcoming invasive procedure, Emergency department visit, Upcoming dental procedure, Missed doses, Extra doses, Change in medications, Change in diet/appetite, Hospital admission, Bruising, Other complaints              Target end date:Indefinite     Indication: PAF     Drug: Xarelto     CHADsVASC = at least 3    Health Status Since Last Assessment   Patient denies any new relevant medical problems, ED visits or hospitalizations   Patient denies any embolic events (stroke/tia/systemic embolism)    Adherence with DOAC Therapy   Pt has NO missed any doses in the average week    Bleeding Risk Assessment     Occasionally very mild Epistaxis   Pt denies any excessive or unusual bleeding/hematomas.  Pt denies any GI bleeds or hematemesis.  Pt denies any concerning daily headache or sub dural hematoma symptoms.     Pt denies any hematuria or abnormal vaginal bleeding.   Latest Hemoglobin 12.2   ETOH overuse Negative     Creatinine Clearance/Renal Function     Latest ClCr >50 mL/min     Drug Interactions   Platelets: 177   ASA/other antiplatelets  Negative   NSAID Negative   Other drug interactions Negative   X Verified no anticonvulsant or azole therapy, education provided for future use.     Examination   Blood Pressure WNL   Symptomatic hypotension Negative   Significant gait impairment/imbalance/high risk for falls? Uses walker, hx of severe pulmonary disease makes balance and gait an issue for her.    Final Assessment and Recommendations:   Patient appears stable from the anticoagulation staindpoint.     Benefits of continued DOAC therapy outweigh risks for this patient   Recommend pt continue with current DOAC therapy Xarelto 20mg one time daily (with dose adjustment)     Other Actions: cmp/ cbc hemogram ordered prior to next visit    Follow up:   Will follow up with patient 6 months.      Natan Toscano, PharmD, BCACP

## 2019-07-18 ENCOUNTER — HOSPITAL ENCOUNTER (OUTPATIENT)
Dept: RADIOLOGY | Facility: MEDICAL CENTER | Age: 74
End: 2019-07-18
Attending: FAMILY MEDICINE
Payer: MEDICARE

## 2019-07-18 DIAGNOSIS — Z12.31 SCREENING MAMMOGRAM, ENCOUNTER FOR: ICD-10-CM

## 2019-07-18 PROCEDURE — 77063 BREAST TOMOSYNTHESIS BI: CPT

## 2019-08-05 DIAGNOSIS — J44.9 CHRONIC OBSTRUCTIVE PULMONARY DISEASE, UNSPECIFIED COPD TYPE (HCC): ICD-10-CM

## 2019-08-05 NOTE — TELEPHONE ENCOUNTER
Have we ever prescribed this med? Yes.  If yes, what date? 07/08/2019    Last OV: 07/11/2019    Next OV: 11/20/2019    DX: Chronic obstructive pulmonary disease, unspecified COPD type (HCC) (J44.9    Medications: Fluticasone-Umeclidin-Vilant (TRELEGY ELLIPTA) 100-62.5-25 MCG/INH AEROSOL POWDER, BREATH ACTIVATED [[id number]]

## 2019-09-05 ENCOUNTER — OFFICE VISIT (OUTPATIENT)
Dept: MEDICAL GROUP | Facility: MEDICAL CENTER | Age: 74
End: 2019-09-05
Payer: MEDICARE

## 2019-09-05 VITALS
OXYGEN SATURATION: 91 % | WEIGHT: 223.11 LBS | SYSTOLIC BLOOD PRESSURE: 100 MMHG | BODY MASS INDEX: 45.59 KG/M2 | RESPIRATION RATE: 16 BRPM | TEMPERATURE: 98.3 F | DIASTOLIC BLOOD PRESSURE: 50 MMHG | HEART RATE: 67 BPM

## 2019-09-05 DIAGNOSIS — M17.0 PRIMARY OSTEOARTHRITIS OF BOTH KNEES: ICD-10-CM

## 2019-09-05 DIAGNOSIS — I48.0 PAROXYSMAL ATRIAL FIBRILLATION (HCC): ICD-10-CM

## 2019-09-05 DIAGNOSIS — I27.20 PULMONARY HYPERTENSION (HCC): ICD-10-CM

## 2019-09-05 DIAGNOSIS — J44.9 CHRONIC OBSTRUCTIVE PULMONARY DISEASE, UNSPECIFIED COPD TYPE (HCC): ICD-10-CM

## 2019-09-05 DIAGNOSIS — Z00.00 MEDICARE ANNUAL WELLNESS VISIT, SUBSEQUENT: ICD-10-CM

## 2019-09-05 DIAGNOSIS — E66.01 MORBID OBESITY (HCC): ICD-10-CM

## 2019-09-05 DIAGNOSIS — F33.41 RECURRENT MAJOR DEPRESSIVE DISORDER, IN PARTIAL REMISSION (HCC): ICD-10-CM

## 2019-09-05 DIAGNOSIS — J96.11 CHRONIC RESPIRATORY FAILURE WITH HYPOXIA (HCC): ICD-10-CM

## 2019-09-05 DIAGNOSIS — K21.9 GASTROESOPHAGEAL REFLUX DISEASE WITHOUT ESOPHAGITIS: ICD-10-CM

## 2019-09-05 DIAGNOSIS — Z12.11 COLON CANCER SCREENING: ICD-10-CM

## 2019-09-05 DIAGNOSIS — L40.9 PSORIASIS: ICD-10-CM

## 2019-09-05 PROCEDURE — G0439 PPPS, SUBSEQ VISIT: HCPCS | Performed by: FAMILY MEDICINE

## 2019-09-05 ASSESSMENT — PATIENT HEALTH QUESTIONNAIRE - PHQ9: CLINICAL INTERPRETATION OF PHQ2 SCORE: 0

## 2019-09-05 ASSESSMENT — ACTIVITIES OF DAILY LIVING (ADL): BATHING_REQUIRES_ASSISTANCE: 0

## 2019-09-05 ASSESSMENT — ENCOUNTER SYMPTOMS: GENERAL WELL-BEING: POOR

## 2019-09-05 NOTE — TELEPHONE ENCOUNTER
Patient last seen with Dr Martinez 7/11/19. Last sample request 8/5/19 to the [facility].

## 2019-09-05 NOTE — PROGRESS NOTES
No chief complaint on file.        HPI:  Kishan Mcintyre is a 74 y.o. here for Medicare Annual Wellness Visit     Medicare annual wellness visit, subsequent  Preventive measures and chronic medical issues reviewed.    Paroxysmal atrial fibrillation (HCC)  Denies palpitation, shortness of breath.  Patient has been on metoprolol 25 mg twice a day and Xarelto 20 mg daily.  Side effects.    Chronic obstructive pulmonary disease, unspecified COPD type (HCC)  Patient is usually having shortness of breath with mild exertion.  Her oxygen saturation now is 91% on 2.5 L/min.  Patient has been on Trelegy inhaler daily, and albuterol as needed, and oxygen 2.5 to 3 L/min 24/7.    Recurrent major depressive disorder, in partial remission (HCC)  Her mood has been fluctuating.  However mostly stable.  She has been doing fine on BuSpar 7.5 mg daily.    Gastroesophageal reflux disease without esophagitis  Denies any epigastric pain, heartburn, nausea, or vomiting.  Patient has been doing fine on omeprazole 20 mg daily.    Pulmonary hypertension (HCC)  Patient has been having exertional shortness of breath, sometimes associated with mild leg swelling.  However her oxygen saturation is currently normal, 91% on 2 to 3 L of oxygen, has been on Lasix 20 mg daily.    Primary osteoarthritis of both knees  Patient has history of chronic osteoarthritis of both legs.  In the past he had multiple intra-articular knee injections and has been helping.  Discussed with patient that she has enough intra-articular injection of the knee, patient comes to the clinic every 3 months to get an intra-articular injection.  Patient advised to follow-up with an orthopedist for more evaluation and recommendation.      Morbid obesity (HCC)  Last BMI was 43.Patient is counseled about the medical rationale for weight loss in obese individuals is that obesity is associated with a significant increase in mortality, and many health risks including type 2  diabetes mellitus, hypertension, dyslipidemia, and coronary heart disease. The benefits of weight loss include a reduction in the rate of progression from impaired glucose tolerance to diabetes, blood pressure in hypertensive patients, and lipid levels in higher risk patients. Other noncardiac benefits of weight loss include reductions in urinary incontinence, sleep apnea, and depression, and improvements in quality of life, physical functioning, and mobility.Recommend lifestyle modification: exercise 30 minutes per day 5 days per week. Recommend also portion control.    Psoriasis  It has been a chronic issue, however patient symptoms comes in bouts.  It mainly affecting her scalp.  She has been taking  steroid based cream, but recently has not been helping      Due for colon cancer screening    Patient Active Problem List    Diagnosis Date Noted   • Acute on chronic respiratory failure with hypoxia and hypercapnia ([facility]) 01/18/2019     Priority: High   • Aortic stenosis 06/07/2012     Priority: Medium   • Acute on chronic diastolic congestive heart failure ([facility]) 12/05/2011     Priority: Medium   • Aortic regurgitation 12/05/2011     Priority: Medium   • COPD (chronic obstructive pulmonary disease) ([facility]) 04/13/2011     Priority: Medium   • Morbid obesity ([facility]) 06/16/2015     Priority: Low   • Primary osteoarthritis of knee 10/19/2012     Priority: Low   • Recurrent major depressive disorder, in partial remission ([facility]) 09/05/2019   • Gastroesophageal reflux disease without esophagitis 09/05/2019   • Pulmonary hypertension ([facility]) 09/05/2019   • Primary osteoarthritis of both knees 09/05/2019   • Chronic anticoagulation 07/16/2019   • Chronic respiratory failure with hypoxia ([facility]) 03/12/2018   • Obesity (BMI 35.0-39.9 without comorbidity) ([facility]) 11/20/2017   • Obesity (BMI 30-39.9) 11/20/2017   • Anxiety 04/04/2016   • [facility] 11/09/2012   • Paroxysmal atrial fibrillation ([facility]) 04/13/2011       Current Outpatient  Medications   Medication Sig Dispense Refill   • Fluticasone-Umeclidin-Vilant (TRELEGY ELLIPTA) 100-62.5-25 MCG/INH AEROSOL POWDER, BREATH ACTIVATED Inhale 1 Puff by mouth every day. Rinse mouth after use 2 Each 0   • rivaroxaban (XARELTO) 20 MG Tab tablet Take 1 Tab by mouth with dinner. 30 Tab 0   • metoprolol (LOPRESSOR) 25 MG Tab Take 1 Tab by mouth 2 Times a Day. 4 Tab 0   • potassium chloride SA (KDUR) 20 MEQ Tab CR Take 1 Tab by mouth every morning. 90 Tab 3   • furosemide (LASIX) 20 MG Tab Take 3 Tabs by mouth every day. 270 Tab 3   • busPIRone (BUSPAR) 7.5 MG tablet Take 7.5 mg by mouth at bedtime as needed.     • clobetasol (TEMOVATE) 0.05 % external solution Apply sparingly bid (Patient not taking: Reported on 7/11/2019) 50 mL 0   • clotrimazole-betamethasone (LOTRISONE) 1-0.05 % Cream Apply 1 Application to affected area(s) 2 times a day. 1 Tube 1   • albuterol 108 (90 Base) MCG/ACT Aero Soln inhalation aerosol Inhale 2 Puffs by mouth every 6 hours as needed for Shortness of Breath. 8.5 g 3   • CALCIUM PO Take 1 Tab by mouth every day.     • Omega-3 Fatty Acids (FISH OIL) 1000 MG Cap capsule Take 1,000 mg by mouth every day.     • albuterol (PROVENTIL) 2.5mg/3ml Nebu Soln solution for nebulization USE 3 ML BY NEBULIZATION ROUTE EVERY 6 HOURS AS NEEDED FOR SHORTNESS OF BREATH. 375 mL 0   • triamcinolone acetonide (KENALOG) 0.025 % Cream APPLY TO AFFECTED AREA(S) 2 TIMES A DAY. 15 g 1   • omeprazole (PRILOSEC) 20 MG delayed-release capsule Take 1 Cap by mouth every day. 90 Cap 1   • Lactobacillus Rhamnosus, GG, (CULTURELLE PO) Take 1 Cap by mouth every morning.     • vitamin D (CHOLECALCIFEROL) 1000 UNIT TABS Take 5,000 Units by mouth every day.     • Multiple Vitamin (MULTI-VITAMIN PO) Take 1 Tab by mouth every morning.       No current facility-administered medications for this visit.             Current supplements as per medication list.       Allergies: Bee; Fosamax; Iodine; Pcn [penicillins]; Sulfa  drugs; Other drug; and Tape    Current social contact/activities:      She  reports that she quit smoking about 35 years ago. Her smoking use included cigarettes. She has a 13.00 pack-year smoking history. She has never used smokeless tobacco. She reports that she drinks about 4.2 oz of alcohol per week. She reports that she does not use drugs.  Counseling given: Not Answered  Comment: Started smoking at age 13      DPA/Advanced Directive:      ROS:    Gait: normal, no assisted device  Ostomy: No  Other tubes: No  Amputations: No  Chronic oxygen use: Not on oxygen  Last eye exam: Not sure  Wears hearing aids: No  : No leak    Screening:    Depression Screening    Little interest or pleasure in doing things?  0 - not at all  Feeling down, depressed , or hopeless? 0 - not at all  Patient Health Questionnaire Score: 0     If depressive symptoms identified deferred to follow up visit unless specifically addressed in assessment and plan.    Interpretation of PHQ-9 Total Score   Score Severity   1-4 No Depression   5-9 Mild Depression   10-14 Moderate Depression   15-19 Moderately Severe Depression   20-27 Severe Depression    Screening for Cognitive Impairment    Three Minute Recall (village, kitchen, baby) 3/3    Wilfred clock face with all 12 numbers and set the hands to show 10 past 10.  No    Cognitive concerns identified deferred for follow up unless specifically addressed in assessment and plan.    Fall Risk Assessment    Has the patient had two or more falls in the last year or any fall with injury in the last year?  No    Safety Assessment    Throw rugs on floor.  No  Handrails on all stairs.  No  Good lighting in all hallways.  Yes  Difficulty hearing.  No  Patient counseled about all safety risks that were identified.    Functional Assessment ADLs    Are there any barriers preventing you from cooking for yourself or meeting nutritional needs?  No.    Are there any barriers preventing you from driving safely or  obtaining transportation?  No.    Are there any barriers preventing you from using a telephone or calling for help?  No.    Are there any barriers preventing you from shopping?  No.    Are there any barriers preventing you from taking care of your own finances?  No.    Are there any barriers preventing you from managing your medications?  No.    Are there any barriers preventing you from showering, bathing or dressing yourself?  No.    Are you currently engaging in any exercise or physical activity?  No.     What is your perception of your health?  Poor.      Health Maintenance Summary                HEPATITIS C SCREENING Overdue 1945     BONE DENSITY Overdue 11/5/2017      Done 11/5/2012 DS-BONE DENSITY STUDY (DEXA)    Annual Wellness Visit Overdue 3/13/2019      Done 3/12/2018 Visit Dx: Medicare annual wellness visit, subsequent    COLON CANCER SCREENING ANNUAL FIT Overdue 4/2/2019      Done 4/2/2018 OCCULT BLOOD FECES IMMUNOASSAY     Patient has more history with this topic...    IMM INFLUENZA Overdue 9/1/2019      Done 9/14/2018 Imm Admin: Influenza (IM) Preservative Free     Patient has more history with this topic...    MAMMOGRAM Next Due 7/18/2020      Done 7/18/2019 MA-SCREENING MAMMO BILAT W/TOMOSYNTHESIS W/CAD     Patient has more history with this topic...    IMM DTaP/Tdap/Td Vaccine Next Due 12/10/2025      Done 12/10/2015 Imm Admin: Tdap Vaccine          Patient Care Team:  Ana Hartley M.D. as PCP - General (Geriatrics)  Rufus Rodrigez M.D. as Consulting Physician (Cardiology)  Yasir Love O.D. as Consulting Physician (Optometry)  [facility]  Sammy Martinez M.D. as Attending Team Physician (Pulmonary Medicine)  [facility] as Pharmacist  Mary Cervantes as          Social History     Tobacco Use   • Smoking status: Former Smoker     Packs/day: 0.50     Years: 26.00     Pack years: 13.00     Types: Cigarettes     Last attempt  to quit: 1984     Years since quittin.7   • Smokeless tobacco: Never Used   • Tobacco comment: Started smoking at age 13   Substance Use Topics   • Alcohol use: Yes     Alcohol/week: 4.2 oz     Types: 7 Shots of liquor per week   • Drug use: No     Family History   Problem Relation Age of Onset   • Prostate cancer Father    • Dementia Father    • Alcohol abuse Mother         Cirrosis of liver   • Cancer Maternal Aunt    • Lung Disease Sister         COPD/Smoker   • Asthma Sister    • Hyperlipidemia Sister    • Breast Cancer Sister    • No Known Problems Maternal Grandmother    • No Known Problems Maternal Grandfather    • No Known Problems Paternal Grandmother    • Other Daughter    • Hypertension Daughter    • Thyroid Daughter      She  has a past medical history of Aortic stenosis, Arrhythmia, Arthritis, CHF (congestive heart failure) (HCC), Chickenpox, COPD, Daytime sleepiness, Difficulty breathing, Amharic measles, Heartburn, [facility] (2012), Mumps, Painful joint, Palpitations, Rash, Recurrent major depressive disorder, in partial remission (HCC) (2019), Shortness of breath, Swelling of lower extremity, Toothache, and Weight loss.   Past Surgical History:   Procedure Laterality Date   • APPENDECTOMY     • GASTRIC BYPASS LAPAROSCOPIC     • PRIMARY C SECTION     • TONSILLECTOMY     • TUBAL COAGULATION LAPAROSCOPIC BILATERAL         Exam:   /50 (BP Location: Right arm, Patient Position: Sitting, BP Cuff Size: Adult)   Pulse 67   Temp 36.8 °C (98.3 °F) (Temporal)   Resp 16   Wt 101.2 kg (223 lb 1.7 oz)   SpO2 91%  Body mass index is 45.59 kg/m².    Hearing good    Dentition, good  Alert, oriented in no acute distress.  Eye contact is good, speech goal directed, affect calm    Assessment and Plan. The following treatment and monitoring plan is recommended:      74 y.o. female     1. Medicare annual wellness visit, subsequent  Preventive measures and chronic medical issues  reviewed.    - Subsequent Annual Wellness Visit - Includes PPPS ()    2. Paroxysmal atrial fibrillation (HCC)  Stable.  No RVR.  Continue metoprolol and Xarelto.    - Subsequent Annual Wellness Visit - Includes PPPS ()    3. Chronic obstructive pulmonary disease, unspecified COPD type (HCC)  Symptomatic with exertion.  However has been doing fine on current medication.  Continue on Trelegy inhaler daily, and albuterol as needed.  Continue oxygen 2.5 to 3 L/min 24/7.    - Subsequent Annual Wellness Visit - Includes PPPS ()    4. Recurrent major depressive disorder, in partial remission (HCC)  Patient has been doing fine on BuSpar 7.5 mg daily.    - Subsequent Annual Wellness Visit - Includes PPPS ()    5. Gastroesophageal reflux disease without esophagitis  Patient has been doing fine on omeprazole 20 mg daily.    - Subsequent Annual Wellness Visit - Includes PPPS ()    6. Pulmonary hypertension (HCC)  Probably related to COPD(cor pulmonale).  Last echo cardiogram was done in January 2019 showed:  Estimated right ventricular systolic pressure  is 55 mmHg.  Continue on Lasix 20 mg.    - Subsequent Annual Wellness Visit - Includes PPPS ()    7. Primary osteoarthritis of both knees  Chronic.  Severe.  History of multiple knee injections and has been helping.  Patient advised to follow-up with orthopedist.    - Subsequent Annual Wellness Visit - Includes PPPS ()    8. Chronic respiratory failure with hypoxia ([facility])  Continue oxygen at 2.5 to 3 L/min 24/7 for COPD.    - Subsequent Annual Wellness Visit - Includes PPPS ()    9. Morbid obesity ([facility])  Last BMI was 43.  Patient is counseled about lifestyle modification.  Declined referral to bariatric surgery/medicine.    - Subsequent Annual Wellness Visit - Includes PPPS ()    10. Psoriasis  Chronic, mainly affecting her scalp.  He is to take steroid based cream, but recently has not been helping  Refer to dermatologist.    -  REFERRAL TO DERMATOLOGY    11. Colon cancer screening    - Subsequent Annual Wellness Visit - Includes PPPS ()  - OCCULT BLOOD FECES IMMUNOASSAY (FIT); Future          Services suggested:   Health Care Screening: Age-appropriate preventive services recommended by USPTF and ACIP covered by Medicare were discussed today. Services ordered if indicated and agreed upon by the patient.  Referrals offered: Community-based lifestyle interventions to reduce health risks and promote self-management and wellness, fall prevention, nutrition, physical activity, tobacco-use cessation, weight loss, and mental health services as per orders if indicated.    Discussion today about general wellness and lifestyle habits:    · Prevent falls and reduce trip hazards; Cautioned about securing or removing rugs.  · Have a working fire alarm and carbon monoxide detector;   · Engage in regular physical activity and social activities     Follow-up: No follow-ups on file.

## 2019-09-06 ENCOUNTER — OFFICE VISIT (OUTPATIENT)
Dept: CARDIOLOGY | Facility: MEDICAL CENTER | Age: 74
End: 2019-09-06
Payer: MEDICARE

## 2019-09-06 VITALS
OXYGEN SATURATION: 90 % | SYSTOLIC BLOOD PRESSURE: 118 MMHG | BODY MASS INDEX: 44.35 KG/M2 | HEART RATE: 63 BPM | HEIGHT: 59 IN | WEIGHT: 220 LBS | DIASTOLIC BLOOD PRESSURE: 78 MMHG

## 2019-09-06 DIAGNOSIS — J43.0 UNILATERAL EMPHYSEMA (HCC): ICD-10-CM

## 2019-09-06 DIAGNOSIS — Z79.01 CHRONIC ANTICOAGULATION: ICD-10-CM

## 2019-09-06 DIAGNOSIS — I35.0 NONRHEUMATIC AORTIC VALVE STENOSIS: ICD-10-CM

## 2019-09-06 DIAGNOSIS — I50.32 CHRONIC DIASTOLIC (CONGESTIVE) HEART FAILURE (HCC): ICD-10-CM

## 2019-09-06 DIAGNOSIS — I48.0 PAROXYSMAL ATRIAL FIBRILLATION (HCC): ICD-10-CM

## 2019-09-06 DIAGNOSIS — I27.20 PULMONARY HYPERTENSION (HCC): ICD-10-CM

## 2019-09-06 DIAGNOSIS — I35.1 NONRHEUMATIC AORTIC VALVE INSUFFICIENCY: ICD-10-CM

## 2019-09-06 PROCEDURE — 99214 OFFICE O/P EST MOD 30 MIN: CPT | Performed by: INTERNAL MEDICINE

## 2019-09-06 RX ORDER — LIDOCAINE 50 MG/G
1 PATCH TOPICAL EVERY 24 HOURS
COMMUNITY

## 2019-09-06 ASSESSMENT — ENCOUNTER SYMPTOMS
WEAKNESS: 0
MYALGIAS: 0
DOUBLE VISION: 0
NAUSEA: 0
DIZZINESS: 0
COUGH: 0
BRUISES/BLEEDS EASILY: 0
NEUROLOGICAL NEGATIVE: 1
NERVOUS/ANXIOUS: 0
FOCAL WEAKNESS: 0
FEVER: 0
MUSCULOSKELETAL NEGATIVE: 1
DEPRESSION: 0
CLAUDICATION: 0
CARDIOVASCULAR NEGATIVE: 1
VOMITING: 0
CHILLS: 0
SHORTNESS OF BREATH: 1
WEIGHT LOSS: 0
ABDOMINAL PAIN: 0
HEADACHES: 0
GASTROINTESTINAL NEGATIVE: 1
PSYCHIATRIC NEGATIVE: 1
PALPITATIONS: 0
BLURRED VISION: 0
EYES NEGATIVE: 1

## 2019-09-06 NOTE — PROGRESS NOTES
Chief Complaint   Patient presents with   • Aortic Stenosis       Subjective:   Kishan Mcintyre is a 74 y.o. female who presents today for annual follow up of aortic stenosis.    Since the patient's last visit on 10/18/18, she has been doing well clinically. She denies chest pain, shortness of breath, palpitations, nausea/vomiting or diaphoresis. She was admitted between 01/18/19 to 01/25/19 for congestive heart failure and atrial fibrillation.    Past Medical History:   Diagnosis Date   • Aortic stenosis    • Arrhythmia    • Arthritis    • CHF (congestive heart failure) ([facility])    • Chickenpox    • COPD    • Daytime sleepiness    • Difficulty breathing    • Romanian measles    • Heartburn    • MEDICAL HOME 11/9/2012   • Mumps    • Painful joint    • Palpitations    • Rash    • Recurrent major depressive disorder, in partial remission ([facility]) 9/5/2019   • Shortness of breath    • Swelling of lower extremity    • Toothache    • Weight loss      Past Surgical History:   Procedure Laterality Date   • APPENDECTOMY     • GASTRIC BYPASS LAPAROSCOPIC     • PRIMARY C SECTION     • TONSILLECTOMY     • TUBAL COAGULATION LAPAROSCOPIC BILATERAL       Family History   Problem Relation Age of Onset   • Prostate cancer Father    • Dementia Father    • Alcohol abuse Mother         Cirrosis of liver   • Cancer Maternal Aunt    • Lung Disease Sister         COPD/Smoker   • Asthma Sister    • Hyperlipidemia Sister    • Breast Cancer Sister    • No Known Problems Maternal Grandmother    • No Known Problems Maternal Grandfather    • No Known Problems Paternal Grandmother    • Other Daughter    • Hypertension Daughter    • Thyroid Daughter      Social History     Socioeconomic History   • Marital status:      Spouse name: Not on file   • Number of children: Not on file   • Years of education: Not on file   • Highest education level: Not on file   Occupational History   • Not on file   Social Needs   • Financial resource strain: Not  on file   • Food insecurity:     Worry: Not on file     Inability: Not on file   • Transportation needs:     Medical: Not on file     Non-medical: Not on file   Tobacco Use   • Smoking status: Former Smoker     Packs/day: 0.50     Years: 26.00     Pack years: 13.00     Types: Cigarettes     Last attempt to quit: 1984     Years since quittin.7   • Smokeless tobacco: Never Used   • Tobacco comment: Started smoking at age 13   Substance and Sexual Activity   • Alcohol use: Yes     Alcohol/week: 4.2 oz     Types: 7 Shots of liquor per week   • Drug use: No   • Sexual activity: Never     Partners: Male   Lifestyle   • Physical activity:     Days per week: Not on file     Minutes per session: Not on file   • Stress: Not on file   Relationships   • Social connections:     Talks on phone: Not on file     Gets together: Not on file     Attends Spiritism service: Not on file     Active member of club or organization: Not on file     Attends meetings of clubs or organizations: Not on file     Relationship status: Not on file   • Intimate partner violence:     Fear of current or ex partner: Not on file     Emotionally abused: Not on file     Physically abused: Not on file     Forced sexual activity: Not on file   Other Topics Concern   • Not on file   Social History Narrative   • Not on file     Allergies   Allergen Reactions   • Bee Anaphylaxis   • Fosamax Unspecified     Bone pain   • Iodine Rash     RASH   • Pcn [Penicillins] Rash     Rash years ago   • Sulfa Drugs Rash     Terrible rash  Tolerates furosemide 20  Tolerates acetazolamide    • Other Drug Unspecified     Also allergies to Actenol   • Tape Unspecified     Pulls my skin     Medications reviewed.    Outpatient Encounter Medications as of 2019   Medication Sig Dispense Refill   • lidocaine (LIDODERM) 5 % Patch Apply 1 Patch to skin as directed every 24 hours.     • Fluticasone-Umeclidin-Vilant (TRELEGY ELLIPTA) 100-62.5-25 MCG/INH AEROSOL POWDER,  BREATH ACTIVATED Inhale 1 Puff by mouth every day. Rinse mouth after use 2 Each 0   • rivaroxaban (XARELTO) 20 MG Tab tablet Take 1 Tab by mouth with dinner. 30 Tab 0   • metoprolol (LOPRESSOR) 25 MG Tab Take 1 Tab by mouth 2 Times a Day. 4 Tab 0   • potassium chloride SA (KDUR) 20 MEQ Tab CR Take 1 Tab by mouth every morning. 90 Tab 3   • furosemide (LASIX) 20 MG Tab Take 3 Tabs by mouth every day. 270 Tab 3   • busPIRone (BUSPAR) 7.5 MG tablet Take 7.5 mg by mouth at bedtime as needed.     • clotrimazole-betamethasone (LOTRISONE) 1-0.05 % Cream Apply 1 Application to affected area(s) 2 times a day. 1 Tube 1   • albuterol 108 (90 Base) MCG/ACT Aero Soln inhalation aerosol Inhale 2 Puffs by mouth every 6 hours as needed for Shortness of Breath. 8.5 g 3   • CALCIUM PO Take 1 Tab by mouth every day.     • Omega-3 Fatty Acids (FISH OIL) 1000 MG Cap capsule Take 1,000 mg by mouth every day.     • albuterol (PROVENTIL) 2.5mg/3ml Nebu Soln solution for nebulization USE 3 ML BY NEBULIZATION ROUTE EVERY 6 HOURS AS NEEDED FOR SHORTNESS OF BREATH. 375 mL 0   • triamcinolone acetonide (KENALOG) 0.025 % Cream APPLY TO AFFECTED AREA(S) 2 TIMES A DAY. 15 g 1   • omeprazole (PRILOSEC) 20 MG delayed-release capsule Take 1 Cap by mouth every day. 90 Cap 1   • Lactobacillus Rhamnosus, GG, (CULTURELLE PO) Take 1 Cap by mouth every morning.     • vitamin D (CHOLECALCIFEROL) 1000 UNIT TABS Take 5,000 Units by mouth every day.     • Multiple Vitamin (MULTI-VITAMIN PO) Take 1 Tab by mouth every morning.     • [DISCONTINUED] clobetasol (TEMOVATE) 0.05 % external solution Apply sparingly bid (Patient not taking: Reported on 7/11/2019) 50 mL 0     No facility-administered encounter medications on file as of 9/6/2019.      Review of Systems   Constitutional: Positive for malaise/fatigue. Negative for chills, fever and weight loss.   HENT: Negative.  Negative for hearing loss.    Eyes: Negative.  Negative for blurred vision and double vision.  "  Respiratory: Positive for shortness of breath. Negative for cough.    Cardiovascular: Negative.  Negative for chest pain, palpitations, claudication and leg swelling.   Gastrointestinal: Negative.  Negative for abdominal pain, nausea and vomiting.   Genitourinary: Negative.  Negative for dysuria and urgency.   Musculoskeletal: Negative.  Negative for joint pain and myalgias.   Skin: Negative.  Negative for itching and rash.   Neurological: Negative.  Negative for dizziness, focal weakness, weakness and headaches.   Endo/Heme/Allergies: Negative.  Does not bruise/bleed easily.   Psychiatric/Behavioral: Negative.  Negative for depression. The patient is not nervous/anxious.         Objective:   /78 (BP Location: Right arm, Patient Position: Sitting, BP Cuff Size: Adult)   Pulse 63   Ht 1.49 m (4' 10.66\")   Wt 99.8 kg (220 lb)   SpO2 90%   BMI 44.95 kg/m²     Physical Exam   Constitutional: She is oriented to person, place, and time. She appears well-developed and well-nourished.   Using oxygen.  Using walker.   HENT:   Head: Normocephalic and atraumatic.   Eyes: EOM are normal.   Neck: No JVD present.   Cardiovascular: Normal rate and regular rhythm.   Murmur heard.  Pulmonary/Chest: Effort normal and breath sounds normal.   Abdominal: Soft. Bowel sounds are normal.   No hepatosplenomegaly.   Musculoskeletal: Normal range of motion. She exhibits edema.   Lymphadenopathy:     She has no cervical adenopathy.   Neurological: She is alert and oriented to person, place, and time.   Skin: Skin is warm and dry.   Psychiatric: She has a normal mood and affect.     CARDIAC STUDIES/PROCEDURES:     CAROTID ULTRASOUND (08/30/13)  Tortuous but no significant plaque in carotid arteries bilaterally.  Normal vertebral flow.    ECHOCARDIOGRAM CONCLUSIONS (01/19/19)  Prior Echo - 10/8/18  Moderate aortic stenosis.  Transvalvular gradients are - Peak: 48 mmHg, Mean: 33 mmHg.  Left ventricular ejection fraction is visually " estimated to be 65%.  Biatrial enlargement.  Estimated right ventricular systolic pressure is 55 mmHg.  (study result reviewed)     ECHOCARDIOGRAM CONCLUSIONS (10/08/18)  Prior ; compared to the report of the study done - there has   been no significant change.   Normal left ventricular systolic function.  Left ventricular ejection fraction is visually estimated to be 60%.  Moderate aortic stenosis.  Aortic valve area calculated from the continuity equation is 1.1 cm2.  Vmax is  3.03  m/s. Transvalvular gradients are - Peak: 37 mmHg, Mean: 23 mmHg.   Mild tricuspid regurgitation.  Estimated right ventricular systolic pressure is 40 mmHg.  (study result reviewed)     ECHOCARDIOGRAM CONCLUSIONS (09/11/17)  Prior echo 2-23-17; compared to the report of the study done - there   has been no significant change.   Normal left ventricular systolic function.  Left ventricular ejection fraction is visually estimated to be 55%.  Mild mitral regurgitation.  Mild to moderate aortic stenosis.  Vmax is 3.03 m/s.  Transvalvular gradients are - Peak: 37 mmHg, Mean: 21 mmHg.  Mild aortic insufficiency.  Mild tricuspid regurgitation.  Estimated right ventricular systolic pressure is 50 mmHg.     ECHOCARDIOGRAM CONCLUSIONS (03/22/17)  Prior study done on 02/18/16,  Normal left ventricular systolic function.   Moderate aortic stenosis. Vmax is 3.4  m/s.   Transvalvular gradients are - Peak: 47 mmHg, Mean: 24  mmHg.  Compared to the images of the prior study done -  there has been no significant change.     ECHOCARDIOGRAM CONCLUSIONS (02/18/16)  Normal left ventricular systolic function.  Left ventricular ejection fraction is visually estimated to be 60%.  Grade I diastolic dysfunction.  Mild to moderate aortic stenosis.  Vmax 3.0 m/s. Transvalvular gradients are Peak: 40 mmHg, Mean: 24 mmHg.  Mild aortic insufficiency.  Mild tricuspid regurgitation.  Right ventricular systolic pressure is estimated to be 60 mmHg.      ECHOCARDIOGRAM CONCLUSIONS (02/05/15)  Mild aortic stenosis. Transvalvular gradients are - Peak: 32 mmHg   Mean: 20 mmHg.  Transvalvular gradients are - Peak: 32 mmHg Mean: 20 mmHg.  Calcification of the noncoronary cusp Mild aortic insufficiency.  Mild concentric left ventricular hypertrophy.  Left ventricular ejection fraction is 60% to 65%.  Grade I diastolic dysfunction - mitral inflow E/A is <1.0.  Moderately dilated right ventricle.  Moderately dilated left atrium.  Right ventricular systolic pressure is estimated to be 46-51 mmHg.  Trace pulmonic insufficiency.     ECHOCARDIOGRAM CONCLUSIONS (08/30/13)  Normal left ventricular systolic function.   Left ventricular ejection fraction is 60% to 65%.   Mild to moderate aortic stenosis.  Mild aortic insufficiency.     ECHOCARDIOGRAM (04/23/12)  Echocardiogram showing ejection fraction of 60-65%, mild aortic stenosis and mild mitral regurgitation.     EKG performed on (01/25/19) was reviewed: EKG shows atrial fibrillation.  EKG performed on (08/23/13) EKG shows normal sinus rhythm.     Laboratory results of (07/23/16) Cholesterol profile of 171/101/60/91 noted.     LOWER EXTREMITY VENOUS ULTRASOUND (04/10/10)  Lower extremity venous study showing patent bilateral common femoral veins.     MPI CONCLUSIONS (03/02/17)  1. Small reversible defect seen in apical inferior and lateral wall    consistent with ischemia. Prominent subdiaphragmatic activity seen in stress    than rest images could attenuation defect but cannot r/o ischemia. Consider    clinical correlation.  2. Normal left ventricular wall motion, with EF of  69 %. Normal left    ventricular wall thickening. Calculated TID 1.21.     MPI CONCLUSIONS (02/18/16)  1. Small reversible defect seen in apical inferior and lateral wall   consistent with ischemia. Prominent subdiaphragmatic activity seen in stress   than rest images could attenuation defect but cannot r/o ischemia. Consider   clinical  correlation.  2. Normal left ventricular wall motion, with EF of 69 %. Normal left   ventricular wall thickening. Calculated TID 1.21.     PULMONARY FUNCTION INTERPRETATION (08/02/17)  FEV1 is 0.69 L which is 38% of predicted. FEV1 FVC ratio is reduced at 69%. MVV is severely reduced at 34% of predicted.  After administration of an inhaled bronchodilator there is improvement in FEV1 up to 0.94 L which is 53% of predicted. This is a relative 36% improvement.  Total lung capacity is mildly reduced at 76% of predicted. RV/TLC ratio is increased to 154% of predicted.  DLCO is 112% of predicted.  Airways resistance is increased.   This study demonstrates presence of severe obstructive lung disease with a definite reversible component. The lack of severe hyperinflation indicates a coexisting restrictive process which may be due to the patient's weight. Her diffusing capacity is normal.    TRANSESOPHAGEAL ECHOCARDIOGRAM CONCLUSIONS by Dr. Galvan (01/23/19)  By plaiimetry, the BELGICA is 1.2 cm2.  (study result reviewed)    Assessment:     1. Nonrheumatic aortic valve stenosis     2. Nonrheumatic aortic valve insufficiency     3. Chronic diastolic (congestive) heart failure (HCC)     4. Paroxysmal atrial fibrillation (HCC)     5. Chronic anticoagulation     6. Unilateral emphysema (HCC)     7. Pulmonary hypertension (HCC)       Medical Decision Making:  Today's Assessment / Status / Plan:     1. Aortic stenosis: She remains asymptomatic with moderate aortic stenosis. We will follow her clinically and repeat an echocardiogram in 6 months. She is likely not a candidate for TAVR due to her severe pulmonary status.  2. Chronic diastolic congestive heart failure: The overall volume status is adequate. (Managed by [facility])  3. Atrial fibrillation on anticoagulation therapy (Xarelto): She is doing well on anticoagulation and the ventricular rate is well controlled.  4. Severe chronic obstructive pulmonary  disease on chronic oxygen use 3 L/min with FEV1 is 0.69 L (managed by Dr. Martinez)   5. Additional information: She has a daughter who works as  in [place].      We will follow up in six months.    CC Ana Siddiqui and Sammy Davis

## 2019-10-01 ENCOUNTER — TELEPHONE (OUTPATIENT)
Dept: MEDICAL GROUP | Facility: MEDICAL CENTER | Age: 74
End: 2019-10-01

## 2019-10-01 NOTE — TELEPHONE ENCOUNTER
It is fine to start the patient on warfarin.  However he needs to be seen at the warfarin clinic every 3 weeks to 4 weeks to check the INR.  Tell the patient if he is willing to do that and let me know.

## 2019-10-01 NOTE — TELEPHONE ENCOUNTER
Good morning Dr. Hartley,     Pt  Is taking Xarelto and currently she is in the donut hole. So, she would like to know if you can prescribe something less expensive.  I did call [facility] and Warfarin it's the option that was recommended. Please advise.    Thank you.

## 2019-10-03 NOTE — TELEPHONE ENCOUNTER
Good morning Dr. Hartley,    I did call pt and went over your advise with her. Pt verbalized understanding that after she change Xarelto for Warfarin she needs to go to the Warfarin Clinic to check her INR.  However, she would like to know if it's a possibilty that somebody can go to her house to draw the blood in case that she needs to fast for the test and also, because it's a little bit hard for her to walk. Please advise.    Thank you.

## 2019-10-07 ENCOUNTER — TELEPHONE (OUTPATIENT)
Dept: PULMONOLOGY | Facility: HOSPICE | Age: 74
End: 2019-10-07

## 2019-10-07 ENCOUNTER — TELEPHONE (OUTPATIENT)
Dept: CARDIOLOGY | Facility: MEDICAL CENTER | Age: 74
End: 2019-10-07

## 2019-10-07 DIAGNOSIS — J44.9 CHRONIC OBSTRUCTIVE PULMONARY DISEASE, UNSPECIFIED COPD TYPE (HCC): ICD-10-CM

## 2019-10-07 NOTE — TELEPHONE ENCOUNTER
Patient calling for samples of Trelegy from the [facility] pharmacy. Last visit was with Dr Martinez on 7/11/19. Please authorize samples.

## 2019-10-07 NOTE — TELEPHONE ENCOUNTER
She need to be seen at the Coumadin clinic this first time and then after that she does not need to follow-up regularly with Coumadin clinic for INR, because she is not on Coumadin..

## 2019-10-07 NOTE — TELEPHONE ENCOUNTER
switch RX   Received: 3 days ago      Call patient   Message Contents   Katja MIKY Love, Med Ass't  Karin Crow R.N.             Patient is requesting a new RX to replace the XARELTO   She would like something more cost effective     _______________________________________    This issue has been discussed with pts PCP Dr. Hartley as well. Called pt to see if she has decided to start coumadin as discussed with Dr. Hartley's office and she said that hasn't been decided yet. She worried about not tolerating coumadin as well and what possible side effects there would be. Coumadin education was provided, explaining INR management, diet restrictions and possible side effect of course bleeding risk. She was still unsure what to do.   Pt only temporarily cannot afford the Xarelto. She says its usually $100/ month which she can afford but much higher now. Advised that we supplement with sample from [facility] pharmacy for now since need is only temporary. Also discussed pt assistance programs. She sees the anticoag clinic regularly and recommended that she contact them for additional assistance. Xarelto 20mg, 30 days sent to sample pharmacy.

## 2019-10-10 ENCOUNTER — TELEPHONE (OUTPATIENT)
Dept: CARDIOLOGY | Facility: MEDICAL CENTER | Age: 74
End: 2019-10-10

## 2019-10-10 NOTE — TELEPHONE ENCOUNTER
REHANA Contreras is calling from [facility] requesting the order for the Warfarin for this patient. Please call Diane back at [phone].    Thank you,  Jaci SEE

## 2019-10-10 NOTE — TELEPHONE ENCOUNTER
S/w pt and she confirmed that she is happy with the Xarelto, using assistance from the free sample pharmacy, and does not want to switch to coumadin like we discussed previously.     Called mail order back and left a message on the prescription line and explained that warfarin is not to be filled.

## 2019-10-16 ENCOUNTER — TELEPHONE (OUTPATIENT)
Dept: PULMONOLOGY | Facility: HOSPICE | Age: 74
End: 2019-10-16

## 2019-10-16 NOTE — TELEPHONE ENCOUNTER
Eileen, please let patient know samples are not available. We could send RX to her local pharmacy. Or she can call [employer] assistance program to see if she qualifies for medications through the company.    The majority of samples are gone at [facility]. Equivalent therapy is not necessarily possible.

## 2019-10-16 NOTE — TELEPHONE ENCOUNTER
Patient need sample of Trelegy but we are not yet handing them out to patients. Is there something else we could have her  in the meantime until next week?  She has two days left

## 2019-10-17 ENCOUNTER — TELEPHONE (OUTPATIENT)
Dept: PULMONOLOGY | Facility: HOSPICE | Age: 74
End: 2019-10-17

## 2019-10-17 NOTE — TELEPHONE ENCOUNTER
I called and spoke with patient: [facility] pharmacy DOES NOT have Trelegy, or alternative  Stiolto in stock (actually they are out of ALL inhalers) We do not wish to prescribe anything else other than what she had been taking with good results, which is the Trelegy. Patient asked that we do a 90 day mail order supply to [facility]. This has been done per authorization from Dr Martinez. I spoke with Fannie the pharmacist. THEY HAVE INSTRUCTIONS TO CALL PATIENT WITH A PRICE QUOTE BEFORE THEY SHIP. Patient aware this has been done. We did offer again the [employer] patient assistance program information BUT patient states she tried that and did not qualify. Hopefully the mail order pharmacy can give patient a good price. Patient to let us know if problems arise.

## 2019-10-17 NOTE — TELEPHONE ENCOUNTER
Pt called stating she still has not heard anything in regards to her tregy sample. Advised pt we still did not have samples available. Offered to give assistance form but pt preferred that I ask Dr. Martinez if he will prescribe something else in the meantime first. Please advise and I will notify pt of your recommendations.

## 2019-10-17 NOTE — TELEPHONE ENCOUNTER
Patient has RX on file at local pharmacy but every month calls for samples from [facility] (they even mail them to her) Message relayed to patient.

## 2019-10-18 ENCOUNTER — TELEPHONE (OUTPATIENT)
Dept: PULMONOLOGY | Facility: HOSPICE | Age: 74
End: 2019-10-18

## 2019-10-18 DIAGNOSIS — J44.9 CHRONIC OBSTRUCTIVE PULMONARY DISEASE, UNSPECIFIED COPD TYPE (HCC): ICD-10-CM

## 2019-10-18 NOTE — TELEPHONE ENCOUNTER
Patient wants one month RX Trelegy sent to her local [facility] pharmacy. We did 90 day supply to her mail order pharmacy yesterday but it will not arrive before she runs out. Requested pended to Dr Martinez for approval.

## 2019-11-12 DIAGNOSIS — I48.0 PAROXYSMAL ATRIAL FIBRILLATION (HCC): Primary | ICD-10-CM

## 2019-11-12 NOTE — TELEPHONE ENCOUNTER
"Spoke with patient and advised that request for Xarelto samples will be approved and sent to [facility].  Advised patient that we may not be able to approve future requests as this is her second month receiving samples.  Patient stated that she is in the \"donut hole\" and will not be able to afford the last two weeks of December.      Advised patient that samples are based on availability and suggested paying out of pocket for the remaining two weeks in December until her insurance coverage restarts.  Also recommended she consider switching to Coumadin and patient stated that she does not want to switch.  Patient also stated that she will attempt to prepare financially in the event that she needs to pay out of pocket.  "

## 2019-11-15 ENCOUNTER — HOSPITAL ENCOUNTER (OUTPATIENT)
Facility: MEDICAL CENTER | Age: 74
End: 2019-11-15
Attending: FAMILY MEDICINE
Payer: MEDICARE

## 2019-11-15 DIAGNOSIS — J44.9 CHRONIC OBSTRUCTIVE PULMONARY DISEASE, UNSPECIFIED COPD TYPE (HCC): ICD-10-CM

## 2019-11-15 PROCEDURE — 82274 ASSAY TEST FOR BLOOD FECAL: CPT

## 2019-11-15 NOTE — TELEPHONE ENCOUNTER
Pt called and states she can no longer afford the trelegy. She was getting samples from us until we sent her to the assistance program. She makes to much money to qualify. She was paying $80 monthly and this month it doubled. She had called to tell us she was going to DC inhalers all together.    I have talked her into attempting the [place] pharmacy. I gave her [facility] information. Pt will call after speaking to them

## 2019-11-16 DIAGNOSIS — Z12.11 COLON CANCER SCREENING: ICD-10-CM

## 2019-11-16 LAB — HEMOCCULT STL QL IA: POSITIVE

## 2019-11-18 DIAGNOSIS — R19.5 OCCULT BLOOD POSITIVE STOOL: ICD-10-CM

## 2019-11-18 NOTE — TELEPHONE ENCOUNTER
Samples are fine. Can we try alternatives to Trelegy?  Was she on something before that worked well for her?

## 2019-11-18 NOTE — TELEPHONE ENCOUNTER
Pt called and LVM saying that the [place] pharmacy is about the same price as it is here.   She is requesting samples.  OK to dispense?

## 2019-11-19 NOTE — TELEPHONE ENCOUNTER
Dispensed Trelegy samples x2.    Lot#: [id number]  Exp: 01/2021    Provider: Carmen    Logged distribution of sample on data sheet.     Dispensed by: Suzette Atkinson

## 2019-11-20 ENCOUNTER — APPOINTMENT (OUTPATIENT)
Dept: PULMONOLOGY | Facility: HOSPICE | Age: 74
End: 2019-11-20
Payer: MEDICARE

## 2019-12-16 ENCOUNTER — TELEPHONE (OUTPATIENT)
Dept: CARDIOLOGY | Facility: MEDICAL CENTER | Age: 74
End: 2019-12-16

## 2019-12-16 DIAGNOSIS — I48.0 PAROXYSMAL ATRIAL FIBRILLATION (HCC): ICD-10-CM

## 2019-12-16 NOTE — TELEPHONE ENCOUNTER
JI      Patient is requesting more samples of Xarelto. She can be reached at [phone]. She said she only has 2 pills left.

## 2019-12-16 NOTE — TELEPHONE ENCOUNTER
S/w pt who confirms that she is in the donut hole but is planning on getting future refills starting the 1st of the year from her pharmacy through insurance. Explained to pt that samples are low at the sample pharmacy.     Pt is scheduled to see anticoagulation clinic 1/14. Advised pt to discuss this yearly difficulty affording Xarelto and the gap in coverage so that they can help her prepare for this it the future. Explained that she needs to take initiative at the beginning of the year to come up with a solution. Pt agrees with plan. She was given phone number to [facility] pharmacy to call regarding supply of samples.

## 2020-01-10 DIAGNOSIS — J44.9 CHRONIC OBSTRUCTIVE PULMONARY DISEASE, UNSPECIFIED COPD TYPE (HCC): ICD-10-CM

## 2020-01-11 ENCOUNTER — DOCUMENTATION (OUTPATIENT)
Dept: RESPIRATORY THERAPY | Facility: MEDICAL CENTER | Age: 75
End: 2020-01-11

## 2020-01-11 ENCOUNTER — APPOINTMENT (OUTPATIENT)
Dept: RADIOLOGY | Facility: MEDICAL CENTER | Age: 75
DRG: 189 | End: 2020-01-11
Attending: INTERNAL MEDICINE
Payer: MEDICARE

## 2020-01-11 ENCOUNTER — APPOINTMENT (OUTPATIENT)
Dept: CARDIOLOGY | Facility: MEDICAL CENTER | Age: 75
DRG: 189 | End: 2020-01-11
Attending: INTERNAL MEDICINE
Payer: MEDICARE

## 2020-01-11 ENCOUNTER — HOSPITAL ENCOUNTER (INPATIENT)
Facility: MEDICAL CENTER | Age: 75
LOS: 9 days | DRG: 189 | End: 2020-01-20
Attending: EMERGENCY MEDICINE | Admitting: INTERNAL MEDICINE
Payer: MEDICARE

## 2020-01-11 ENCOUNTER — APPOINTMENT (OUTPATIENT)
Dept: RADIOLOGY | Facility: MEDICAL CENTER | Age: 75
DRG: 189 | End: 2020-01-11
Attending: EMERGENCY MEDICINE
Payer: MEDICARE

## 2020-01-11 DIAGNOSIS — I50.9 ACUTE ON CHRONIC CONGESTIVE HEART FAILURE, UNSPECIFIED HEART FAILURE TYPE (HCC): ICD-10-CM

## 2020-01-11 DIAGNOSIS — R09.02 HYPOXIA: ICD-10-CM

## 2020-01-11 DIAGNOSIS — I27.20 PULMONARY HYPERTENSION (HCC): ICD-10-CM

## 2020-01-11 DIAGNOSIS — R06.03 RESPIRATORY DISTRESS: ICD-10-CM

## 2020-01-11 PROBLEM — D53.9 MACROCYTIC ANEMIA: Status: ACTIVE | Noted: 2020-01-11

## 2020-01-11 PROBLEM — J98.19 RIGHT MIDDLE LOBE SYNDROME: Status: ACTIVE | Noted: 2020-01-11

## 2020-01-11 PROBLEM — I24.89 DEMAND ISCHEMIA: Status: ACTIVE | Noted: 2020-01-11

## 2020-01-11 LAB
ALBUMIN SERPL BCP-MCNC: 3.7 G/DL (ref 3.2–4.9)
ALBUMIN/GLOB SERPL: 1.3 G/DL
ALP SERPL-CCNC: 48 U/L (ref 30–99)
ALT SERPL-CCNC: 6 U/L (ref 2–50)
ANION GAP SERPL CALC-SCNC: 6 MMOL/L (ref 0–11.9)
ANISOCYTOSIS BLD QL SMEAR: ABNORMAL
APTT PPP: 33.6 SEC (ref 24.7–36)
AST SERPL-CCNC: 15 U/L (ref 12–45)
BASE EXCESS BLDA CALC-SCNC: 21 MMOL/L (ref -4–3)
BASOPHILS # BLD AUTO: 0.3 % (ref 0–1.8)
BASOPHILS # BLD: 0.02 K/UL (ref 0–0.12)
BILIRUB SERPL-MCNC: 0.6 MG/DL (ref 0.1–1.5)
BODY TEMPERATURE: ABNORMAL CENTIGRADE
BUN SERPL-MCNC: 16 MG/DL (ref 8–22)
CALCIUM SERPL-MCNC: 9.1 MG/DL (ref 8.5–10.5)
CHLORIDE SERPL-SCNC: 93 MMOL/L (ref 96–112)
CO2 SERPL-SCNC: 45 MMOL/L (ref 20–33)
COMMENT 1642: NORMAL
CREAT SERPL-MCNC: 0.55 MG/DL (ref 0.5–1.4)
EKG IMPRESSION: NORMAL
EOSINOPHIL # BLD AUTO: 0.06 K/UL (ref 0–0.51)
EOSINOPHIL NFR BLD: 0.9 % (ref 0–6.9)
ERYTHROCYTE [DISTWIDTH] IN BLOOD BY AUTOMATED COUNT: 55 FL (ref 35.9–50)
GLOBULIN SER CALC-MCNC: 2.8 G/DL (ref 1.9–3.5)
GLUCOSE SERPL-MCNC: 109 MG/DL (ref 65–99)
HCO3 BLDA-SCNC: 53 MMOL/L (ref 17–25)
HCT VFR BLD AUTO: 42.9 % (ref 37–47)
HGB BLD-MCNC: 11.7 G/DL (ref 12–16)
HYPOCHROMIA BLD QL SMEAR: ABNORMAL
IMM GRANULOCYTES # BLD AUTO: 0.02 K/UL (ref 0–0.11)
IMM GRANULOCYTES NFR BLD AUTO: 0.3 % (ref 0–0.9)
INR PPP: 1.72 (ref 0.87–1.13)
LV EJECT FRACT  99904: 60
LV EJECT FRACT MOD 2C 99903: 48.41
LV EJECT FRACT MOD 4C 99902: 61.5
LV EJECT FRACT MOD BP 99901: 55.03
LYMPHOCYTES # BLD AUTO: 0.94 K/UL (ref 1–4.8)
LYMPHOCYTES NFR BLD: 14 % (ref 22–41)
MACROCYTES BLD QL SMEAR: ABNORMAL
MAGNESIUM SERPL-MCNC: 1.9 MG/DL (ref 1.5–2.5)
MCH RBC QN AUTO: 28.5 PG (ref 27–33)
MCHC RBC AUTO-ENTMCNC: 27.3 G/DL (ref 33.6–35)
MCV RBC AUTO: 104.4 FL (ref 81.4–97.8)
MONOCYTES # BLD AUTO: 0.7 K/UL (ref 0–0.85)
MONOCYTES NFR BLD AUTO: 10.4 % (ref 0–13.4)
MORPHOLOGY BLD-IMP: NORMAL
NEUTROPHILS # BLD AUTO: 4.97 K/UL (ref 2–7.15)
NEUTROPHILS NFR BLD: 74.1 % (ref 44–72)
NRBC # BLD AUTO: 0 K/UL
NRBC BLD-RTO: 0 /100 WBC
NT-PROBNP SERPL IA-MCNC: 2311 PG/ML (ref 0–125)
OVALOCYTES BLD QL SMEAR: NORMAL
PCO2 BLDA: 106.1 MMHG (ref 26–37)
PH BLDA: 7.31 [PH] (ref 7.4–7.5)
PHOSPHATE SERPL-MCNC: 3.5 MG/DL (ref 2.5–4.5)
PLATELET # BLD AUTO: 150 K/UL (ref 164–446)
PLATELET BLD QL SMEAR: NORMAL
PMV BLD AUTO: 10 FL (ref 9–12.9)
PO2 BLDA: 94.4 MMHG (ref 64–87)
POIKILOCYTOSIS BLD QL SMEAR: NORMAL
POLYCHROMASIA BLD QL SMEAR: NORMAL
POTASSIUM SERPL-SCNC: 4.4 MMOL/L (ref 3.6–5.5)
PROCALCITONIN SERPL-MCNC: <0.05 NG/ML
PROT SERPL-MCNC: 6.5 G/DL (ref 6–8.2)
PROTHROMBIN TIME: 20.7 SEC (ref 12–14.6)
RBC # BLD AUTO: 4.11 M/UL (ref 4.2–5.4)
RBC BLD AUTO: PRESENT
SAO2 % BLDA: 96.7 % (ref 93–99)
SODIUM SERPL-SCNC: 144 MMOL/L (ref 135–145)
TROPONIN T SERPL-MCNC: 23 NG/L (ref 6–19)
TROPONIN T SERPL-MCNC: 26 NG/L (ref 6–19)
WBC # BLD AUTO: 6.7 K/UL (ref 4.8–10.8)

## 2020-01-11 PROCEDURE — 770020 HCHG ROOM/CARE - TELE (206)

## 2020-01-11 PROCEDURE — 85730 THROMBOPLASTIN TIME PARTIAL: CPT

## 2020-01-11 PROCEDURE — 94664 DEMO&/EVAL PT USE INHALER: CPT

## 2020-01-11 PROCEDURE — 84484 ASSAY OF TROPONIN QUANT: CPT

## 2020-01-11 PROCEDURE — 36415 COLL VENOUS BLD VENIPUNCTURE: CPT

## 2020-01-11 PROCEDURE — A9270 NON-COVERED ITEM OR SERVICE: HCPCS | Performed by: EMERGENCY MEDICINE

## 2020-01-11 PROCEDURE — 83735 ASSAY OF MAGNESIUM: CPT

## 2020-01-11 PROCEDURE — 94669 MECHANICAL CHEST WALL OSCILL: CPT

## 2020-01-11 PROCEDURE — 700101 HCHG RX REV CODE 250: Performed by: INTERNAL MEDICINE

## 2020-01-11 PROCEDURE — 700102 HCHG RX REV CODE 250 W/ 637 OVERRIDE(OP): Performed by: EMERGENCY MEDICINE

## 2020-01-11 PROCEDURE — 94667 MNPJ CHEST WALL 1ST: CPT

## 2020-01-11 PROCEDURE — 94640 AIRWAY INHALATION TREATMENT: CPT

## 2020-01-11 PROCEDURE — 85025 COMPLETE CBC W/AUTO DIFF WBC: CPT

## 2020-01-11 PROCEDURE — 700111 HCHG RX REV CODE 636 W/ 250 OVERRIDE (IP): Performed by: EMERGENCY MEDICINE

## 2020-01-11 PROCEDURE — 93306 TTE W/DOPPLER COMPLETE: CPT

## 2020-01-11 PROCEDURE — 94668 MNPJ CHEST WALL SBSQ: CPT

## 2020-01-11 PROCEDURE — 93005 ELECTROCARDIOGRAM TRACING: CPT

## 2020-01-11 PROCEDURE — 93010 ELECTROCARDIOGRAM REPORT: CPT | Performed by: INTERNAL MEDICINE

## 2020-01-11 PROCEDURE — 700111 HCHG RX REV CODE 636 W/ 250 OVERRIDE (IP): Performed by: INTERNAL MEDICINE

## 2020-01-11 PROCEDURE — 82803 BLOOD GASES ANY COMBINATION: CPT

## 2020-01-11 PROCEDURE — 93306 TTE W/DOPPLER COMPLETE: CPT | Mod: 26 | Performed by: INTERNAL MEDICINE

## 2020-01-11 PROCEDURE — 700102 HCHG RX REV CODE 250 W/ 637 OVERRIDE(OP): Performed by: INTERNAL MEDICINE

## 2020-01-11 PROCEDURE — 80053 COMPREHEN METABOLIC PANEL: CPT

## 2020-01-11 PROCEDURE — 99223 1ST HOSP IP/OBS HIGH 75: CPT | Performed by: INTERNAL MEDICINE

## 2020-01-11 PROCEDURE — 94760 N-INVAS EAR/PLS OXIMETRY 1: CPT

## 2020-01-11 PROCEDURE — 93005 ELECTROCARDIOGRAM TRACING: CPT | Performed by: INTERNAL MEDICINE

## 2020-01-11 PROCEDURE — 93005 ELECTROCARDIOGRAM TRACING: CPT | Performed by: EMERGENCY MEDICINE

## 2020-01-11 PROCEDURE — 84100 ASSAY OF PHOSPHORUS: CPT

## 2020-01-11 PROCEDURE — 84145 PROCALCITONIN (PCT): CPT

## 2020-01-11 PROCEDURE — 83880 ASSAY OF NATRIURETIC PEPTIDE: CPT

## 2020-01-11 PROCEDURE — 85610 PROTHROMBIN TIME: CPT

## 2020-01-11 PROCEDURE — A9270 NON-COVERED ITEM OR SERVICE: HCPCS | Performed by: INTERNAL MEDICINE

## 2020-01-11 PROCEDURE — 93970 EXTREMITY STUDY: CPT

## 2020-01-11 PROCEDURE — 96374 THER/PROPH/DIAG INJ IV PUSH: CPT

## 2020-01-11 PROCEDURE — 71045 X-RAY EXAM CHEST 1 VIEW: CPT

## 2020-01-11 PROCEDURE — 99285 EMERGENCY DEPT VISIT HI MDM: CPT

## 2020-01-11 RX ORDER — PREDNISONE 50 MG/1
50 TABLET ORAL DAILY
Status: DISCONTINUED | OUTPATIENT
Start: 2020-01-11 | End: 2020-01-13

## 2020-01-11 RX ORDER — IPRATROPIUM BROMIDE AND ALBUTEROL SULFATE 2.5; .5 MG/3ML; MG/3ML
3 SOLUTION RESPIRATORY (INHALATION)
Status: DISCONTINUED | OUTPATIENT
Start: 2020-01-11 | End: 2020-01-12

## 2020-01-11 RX ORDER — ACETAMINOPHEN 325 MG/1
650 TABLET ORAL EVERY 6 HOURS PRN
Status: DISCONTINUED | OUTPATIENT
Start: 2020-01-11 | End: 2020-01-20 | Stop reason: HOSPADM

## 2020-01-11 RX ORDER — FUROSEMIDE 10 MG/ML
40 INJECTION INTRAMUSCULAR; INTRAVENOUS ONCE
Status: COMPLETED | OUTPATIENT
Start: 2020-01-11 | End: 2020-01-11

## 2020-01-11 RX ORDER — DOXYCYCLINE 100 MG/1
100 TABLET ORAL EVERY 12 HOURS
Status: COMPLETED | OUTPATIENT
Start: 2020-01-11 | End: 2020-01-15

## 2020-01-11 RX ORDER — ENALAPRILAT 1.25 MG/ML
1.25 INJECTION INTRAVENOUS EVERY 6 HOURS PRN
Status: DISCONTINUED | OUTPATIENT
Start: 2020-01-11 | End: 2020-01-20 | Stop reason: HOSPADM

## 2020-01-11 RX ORDER — BUSPIRONE HYDROCHLORIDE 5 MG/1
7.5 TABLET ORAL
Status: DISCONTINUED | OUTPATIENT
Start: 2020-01-11 | End: 2020-01-20 | Stop reason: HOSPADM

## 2020-01-11 RX ORDER — ONDANSETRON 4 MG/1
4 TABLET, ORALLY DISINTEGRATING ORAL EVERY 4 HOURS PRN
Status: DISCONTINUED | OUTPATIENT
Start: 2020-01-11 | End: 2020-01-20 | Stop reason: HOSPADM

## 2020-01-11 RX ORDER — TRIAMCINOLONE ACETONIDE 1 MG/G
CREAM TOPICAL PRN
COMMUNITY

## 2020-01-11 RX ORDER — FLUTICASONE PROPIONATE 44 UG/1
2 AEROSOL, METERED RESPIRATORY (INHALATION)
Status: DISCONTINUED | OUTPATIENT
Start: 2020-01-12 | End: 2020-01-20 | Stop reason: HOSPADM

## 2020-01-11 RX ORDER — ONDANSETRON 2 MG/ML
4 INJECTION INTRAMUSCULAR; INTRAVENOUS EVERY 4 HOURS PRN
Status: DISCONTINUED | OUTPATIENT
Start: 2020-01-11 | End: 2020-01-20 | Stop reason: HOSPADM

## 2020-01-11 RX ORDER — FUROSEMIDE 10 MG/ML
40 INJECTION INTRAMUSCULAR; INTRAVENOUS
Status: DISCONTINUED | OUTPATIENT
Start: 2020-01-11 | End: 2020-01-15

## 2020-01-11 RX ORDER — IPRATROPIUM BROMIDE AND ALBUTEROL SULFATE 2.5; .5 MG/3ML; MG/3ML
3 SOLUTION RESPIRATORY (INHALATION)
Status: DISCONTINUED | OUTPATIENT
Start: 2020-01-11 | End: 2020-01-11

## 2020-01-11 RX ORDER — FAMOTIDINE 20 MG/1
20 TABLET, FILM COATED ORAL 2 TIMES DAILY
Status: DISCONTINUED | OUTPATIENT
Start: 2020-01-11 | End: 2020-01-20 | Stop reason: HOSPADM

## 2020-01-11 RX ADMIN — IPRATROPIUM BROMIDE AND ALBUTEROL SULFATE 3 ML: .5; 3 SOLUTION RESPIRATORY (INHALATION) at 15:12

## 2020-01-11 RX ADMIN — IPRATROPIUM BROMIDE AND ALBUTEROL SULFATE 3 ML: .5; 3 SOLUTION RESPIRATORY (INHALATION) at 23:33

## 2020-01-11 RX ADMIN — BUSPIRONE HYDROCHLORIDE 7.5 MG: 5 TABLET ORAL at 21:46

## 2020-01-11 RX ADMIN — FUROSEMIDE 40 MG: 10 INJECTION, SOLUTION INTRAMUSCULAR; INTRAVENOUS at 18:02

## 2020-01-11 RX ADMIN — FAMOTIDINE 20 MG: 20 TABLET ORAL at 17:02

## 2020-01-11 RX ADMIN — DOXYCYCLINE 100 MG: 100 TABLET, FILM COATED ORAL at 17:03

## 2020-01-11 RX ADMIN — PREDNISONE 50 MG: 50 TABLET ORAL at 12:18

## 2020-01-11 RX ADMIN — DOXYCYCLINE 100 MG: 100 TABLET, FILM COATED ORAL at 12:19

## 2020-01-11 RX ADMIN — FUROSEMIDE 40 MG: 10 INJECTION, SOLUTION INTRAMUSCULAR; INTRAVENOUS at 07:50

## 2020-01-11 RX ADMIN — ACETAMINOPHEN 650 MG: 325 TABLET, FILM COATED ORAL at 12:19

## 2020-01-11 RX ADMIN — IPRATROPIUM BROMIDE AND ALBUTEROL SULFATE 3 ML: .5; 3 SOLUTION RESPIRATORY (INHALATION) at 18:52

## 2020-01-11 RX ADMIN — NITROGLYCERIN 0.5 INCH: 20 OINTMENT TOPICAL at 07:47

## 2020-01-11 RX ADMIN — RIVAROXABAN 20 MG: 20 TABLET, FILM COATED ORAL at 17:03

## 2020-01-11 RX ADMIN — IPRATROPIUM BROMIDE AND ALBUTEROL SULFATE 3 ML: .5; 3 SOLUTION RESPIRATORY (INHALATION) at 09:59

## 2020-01-11 ASSESSMENT — LIFESTYLE VARIABLES
HAVE PEOPLE ANNOYED YOU BY CRITICIZING YOUR DRINKING: NO
EVER HAD A DRINK FIRST THING IN THE MORNING TO STEADY YOUR NERVES TO GET RID OF A HANGOVER: NO
TOTAL SCORE: 0
DOES PATIENT WANT TO STOP DRINKING: NO
EVER_SMOKED: YES
EVER FELT BAD OR GUILTY ABOUT YOUR DRINKING: NO
ON A TYPICAL DAY WHEN YOU DRINK ALCOHOL HOW MANY DRINKS DO YOU HAVE: 0
ON A TYPICAL DAY WHEN YOU DRINK ALCOHOL HOW MANY DRINKS DO YOU HAVE: 0
ALCOHOL_USE: NO
HAVE PEOPLE ANNOYED YOU BY CRITICIZING YOUR DRINKING: NO
HOW MANY TIMES IN THE PAST YEAR HAVE YOU HAD 5 OR MORE DRINKS IN A DAY: 0
HAVE YOU EVER FELT YOU SHOULD CUT DOWN ON YOUR DRINKING: NO
HAVE YOU EVER FELT YOU SHOULD CUT DOWN ON YOUR DRINKING: NO
AVERAGE NUMBER OF DAYS PER WEEK YOU HAVE A DRINK CONTAINING ALCOHOL: 0
EVER_SMOKED: YES
TOTAL SCORE: 0
AVERAGE NUMBER OF DAYS PER WEEK YOU HAVE A DRINK CONTAINING ALCOHOL: 0
ALCOHOL_USE: NO
TOTAL SCORE: 0
DOES PATIENT WANT TO STOP DRINKING: NO
HOW MANY TIMES IN THE PAST YEAR HAVE YOU HAD 5 OR MORE DRINKS IN A DAY: 0
CONSUMPTION TOTAL: NEGATIVE
CONSUMPTION TOTAL: INCOMPLETE
EVER HAD A DRINK FIRST THING IN THE MORNING TO STEADY YOUR NERVES TO GET RID OF A HANGOVER: NO

## 2020-01-11 ASSESSMENT — PATIENT HEALTH QUESTIONNAIRE - PHQ9
1. LITTLE INTEREST OR PLEASURE IN DOING THINGS: NOT AT ALL
SUM OF ALL RESPONSES TO PHQ9 QUESTIONS 1 AND 2: 0
2. FEELING DOWN, DEPRESSED, IRRITABLE, OR HOPELESS: NOT AT ALL

## 2020-01-11 ASSESSMENT — ENCOUNTER SYMPTOMS
DEPRESSION: 0
BLURRED VISION: 0
BACK PAIN: 0
PND: 1
COUGH: 0
ORTHOPNEA: 1
DIZZINESS: 0
NAUSEA: 0
PSYCHIATRIC NEGATIVE: 1
CHILLS: 0
FALLS: 0
DOUBLE VISION: 0
FLANK PAIN: 0
SHORTNESS OF BREATH: 1
VOMITING: 0
FEVER: 0
BLOOD IN STOOL: 0
PALPITATIONS: 0
CONSTIPATION: 0
HEADACHES: 0
GASTROINTESTINAL NEGATIVE: 1
EYES NEGATIVE: 1
MUSCULOSKELETAL NEGATIVE: 1
DIZZINESS: 1
NECK PAIN: 0
ABDOMINAL PAIN: 0
DIARRHEA: 0
HEMOPTYSIS: 0
MYALGIAS: 0
HEARTBURN: 0

## 2020-01-11 ASSESSMENT — COGNITIVE AND FUNCTIONAL STATUS - GENERAL
HELP NEEDED FOR BATHING: A LITTLE
TURNING FROM BACK TO SIDE WHILE IN FLAT BAD: A LITTLE
MOVING FROM LYING ON BACK TO SITTING ON SIDE OF FLAT BED: A LOT
EATING MEALS: A LITTLE
DAILY ACTIVITIY SCORE: 16
STANDING UP FROM CHAIR USING ARMS: A LOT
CLIMB 3 TO 5 STEPS WITH RAILING: TOTAL
DRESSING REGULAR UPPER BODY CLOTHING: A LOT
SUGGESTED CMS G CODE MODIFIER MOBILITY: CL
MOVING TO AND FROM BED TO CHAIR: A LOT
MOBILITY SCORE: 12
SUGGESTED CMS G CODE MODIFIER DAILY ACTIVITY: CK
TOILETING: A LITTLE
PERSONAL GROOMING: A LOT
WALKING IN HOSPITAL ROOM: A LOT
DRESSING REGULAR LOWER BODY CLOTHING: A LITTLE

## 2020-01-11 ASSESSMENT — CHA2DS2 SCORE
DIABETES: NO
VASCULAR DISEASE: NO
CHF OR LEFT VENTRICULAR DYSFUNCTION: NO
CHA2DS2 VASC SCORE: 2
AGE 65 TO 74: YES
HYPERTENSION: NO
PRIOR STROKE OR TIA OR THROMBOEMBOLISM: NO
AGE 75 OR GREATER: NO
SEX: FEMALE

## 2020-01-11 ASSESSMENT — COPD QUESTIONNAIRES
HAVE YOU SMOKED AT LEAST 100 CIGARETTES IN YOUR ENTIRE LIFE: YES
DURING THE PAST 4 WEEKS HOW MUCH DID YOU FEEL SHORT OF BREATH: SOME OF THE TIME
COPD SCREENING SCORE: 7
DO YOU EVER COUGH UP ANY MUCUS OR PHLEGM?: YES, A FEW DAYS A WEEK OR MONTH

## 2020-01-11 NOTE — TELEPHONE ENCOUNTER
Pt called and was requesting samples of Trelegy.     Have we ever prescribed this med? Yes.  If yes, what date? 11/18/19    Last OV: 07/11/19 with Dr Martinez    Next OV: 03/19/2020 with Dr Martinez    DX: COPD    Medications:   Requested Prescriptions     Pending Prescriptions Disp Refills   • Fluticasone-Umeclidin-Vilant (TRELEGY ELLIPTA) 100-62.5-25 MCG/INH AEROSOL POWDER, BREATH ACTIVATED 2 Each 0     Sig: Inhale 1 Puff by mouth every day. Rinse mouth after use

## 2020-01-11 NOTE — ED TRIAGE NOTES
Pt c/o SOB and increased ankle swelling over about 3 weeks. Pt denies LOC, CP, ABD pain, N/V.  EMS relates giving Duo Neb en route. Pt on 3L O2 at home

## 2020-01-11 NOTE — DISCHARGE PLANNING
Anticipated Discharge Disposition: Home resuming oxygen with [facility]; may or may not need home health.    Action: Assessment done. Pt verified demographics on face sheet and states her  will be her ride home on day of dc. Pt uses home oxygen at 2.5-3 liters, currently on 4. Pt uses [facility] pharmacy, otherwise [facility] [address]. Pt has a FWW at home, does not want a wheelchair. Pt requesting resources for housekeeping duties.    Barriers to Discharge: Medical clearance.    Plan: Assist with any dc needs that may be identified. Provide pt with  agencies.

## 2020-01-11 NOTE — ED PROVIDER NOTES
ED Provider Note    CHIEF COMPLAINT  Chief Complaint   Patient presents with   • Shortness of Breath       HPI  Kishan Mcintyre is a 74 y.o. female who presents to the emergency department by ambulance with shortness of breath.  Patient describes a week to a month of increased shortness of breath, although significantly worse overnight.  Patient states she could not catch her breath, could not get any extra air.  Patient states she was weak and dizzy as well.  Daughter called EMS, oxygen saturations 80% on her home O2 of 2.5 L.    Per EMS received 1 breathing treatment with significant improvement.    Patient with history of COPD and CHF.  Compliant with home medications.  Although describes significantly increased lower extremity edema over the last week.  Taking extra Lasix without improvement.  Denies cough.  Denies fever.  Denies chest pain or syncope.    Patient has been admitted previously for her CHF and COPD.  Intubated 10 years ago.    REVIEW OF SYSTEMS  See HPI for further details. All other systems are negative.     PAST MEDICAL HISTORY   has a past medical history of Aortic stenosis, Arrhythmia, Arthritis, Atrial fibrillation ([facility]), CHF (congestive heart failure) ([facility]), Chickenpox, COPD, Daytime sleepiness, Difficulty breathing, Thai measles, Heartburn, [facility]ICAL HOME (2012), Mumps, Painful joint, Palpitations, Rash, Recurrent major depressive disorder, in partial remission ([facility]) (2019), Shortness of breath, Swelling of lower extremity, Toothache, and Weight loss.    SOCIAL HISTORY  Social History     Tobacco Use   • Smoking status: Former Smoker     Packs/day: 0.50     Years: 26.00     Pack years: 13.00     Types: Cigarettes     Last attempt to quit: 1984     Years since quittin.0   • Smokeless tobacco: Never Used   • Tobacco comment: Started smoking at age 13   Substance and Sexual Activity   • Alcohol use: Yes     Alcohol/week: 4.2 oz     Types: 7 Shots of liquor per week   •  Drug use: No   • Sexual activity: Never     Partners: Male       SURGICAL HISTORY   has a past surgical history that includes tonsillectomy; appendectomy; primary c section; tubal coagulation laparoscopic bilateral; and gastric bypass laparoscopic.    CURRENT MEDICATIONS  Home Medications    **Home medications have not yet been reviewed for this encounter**         ALLERGIES  Allergies   Allergen Reactions   • Bee Anaphylaxis   • Fosamax Unspecified     Bone pain   • Iodine Rash     RASH   • Pcn [Penicillins] Rash     Rash years ago   • Sulfa Drugs Rash     Terrible rash  Tolerates furosemide 1/20  Tolerates acetazolamide 1/20   • Other Drug Unspecified     Also allergies to Actenol   • Tape Unspecified     Pulls my skin       PHYSICAL EXAM  VITAL SIGNS: /43   Pulse 78   Temp 36.8 °C (98.2 °F) (Temporal)   Resp (!) 24   Ht 1.524 m (5')   Wt 104.3 kg (230 lb)   SpO2 94%   BMI 44.92 kg/m²   Pulse ox interpretation: I interpret this pulse ox as normal.  Constitutional: Alert in no apparent distress.  HENT: Normocephalic, atraumatic. Bilateral external ears normal, Nose normal. Moist mucous membranes.    Eyes: Pupils are equal and reactive, Conjunctiva normal.   Neck: Normal range of motion, Supple  Lymphatic: No lymphadenopathy noted.   Cardiovascular: Regular rate and rhythm.  Systolic ejection murmur. Distal pulses intact.  Significant 3+ bilateral lower extremity edema below the knees.  Some mild overlying erythema/cellulitis.  No weeping.  Thorax & Lungs: Diminished bilaterally.  Diffuse crackles.  No audible wheeze.  Mild tachypnea without clip speech or retractions.  Abdomen: Obese, soft, non-distended, non-tender to palpation. No palpable or pulsatile masses.   Skin: Warm, Dry  Musculoskeletal: Good range of motion in all major joints  Neurologic: Alert and oriented x4.  Speech clear and cohesive.  Moves 4 extremities spontaneously.  Psychiatric: Affect normal, Judgment normal, Mood normal.        DIAGNOSTIC STUDIES / PROCEDURES    LABS  Results for orders placed or performed during the hospital encounter of 01/11/20   CBC w/ Differential   Result Value Ref Range    WBC 6.7 4.8 - 10.8 K/uL    RBC 4.11 (L) 4.20 - 5.40 M/uL    Hemoglobin 11.7 (L) 12.0 - 16.0 g/dL    Hematocrit 42.9 37.0 - 47.0 %    .4 (H) 81.4 - 97.8 fL    MCH 28.5 27.0 - 33.0 pg    MCHC 27.3 (L) 33.6 - 35.0 g/dL    RDW 55.0 (H) 35.9 - 50.0 fL    Platelet Count 150 (L) 164 - 446 K/uL    MPV 10.0 9.0 - 12.9 fL    Neutrophils-Polys 74.10 (H) 44.00 - 72.00 %    Lymphocytes 14.00 (L) 22.00 - 41.00 %    Monocytes 10.40 0.00 - 13.40 %    Eosinophils 0.90 0.00 - 6.90 %    Basophils 0.30 0.00 - 1.80 %    Immature Granulocytes 0.30 0.00 - 0.90 %    Nucleated RBC 0.00 /100 WBC    Neutrophils (Absolute) 4.97 2.00 - 7.15 K/uL    Lymphs (Absolute) 0.94 (L) 1.00 - 4.80 K/uL    Monos (Absolute) 0.70 0.00 - 0.85 K/uL    Eos (Absolute) 0.06 0.00 - 0.51 K/uL    Baso (Absolute) 0.02 0.00 - 0.12 K/uL    Immature Granulocytes (abs) 0.02 0.00 - 0.11 K/uL    NRBC (Absolute) 0.00 K/uL    Hypochromia 1+     Anisocytosis 1+     Macrocytosis 1+    Complete Metabolic Panel (CMP)   Result Value Ref Range    Sodium 144 135 - 145 mmol/L    Potassium 4.4 3.6 - 5.5 mmol/L    Chloride 93 (L) 96 - 112 mmol/L    Co2 45 (HH) 20 - 33 mmol/L    Anion Gap 6.0 0.0 - 11.9    Glucose 109 (H) 65 - 99 mg/dL    Bun 16 8 - 22 mg/dL    Creatinine 0.55 0.50 - 1.40 mg/dL    Calcium 9.1 8.5 - 10.5 mg/dL    AST(SGOT) 15 12 - 45 U/L    ALT(SGPT) 6 2 - 50 U/L    Alkaline Phosphatase 48 30 - 99 U/L    Total Bilirubin 0.6 0.1 - 1.5 mg/dL    Albumin 3.7 3.2 - 4.9 g/dL    Total Protein 6.5 6.0 - 8.2 g/dL    Globulin 2.8 1.9 - 3.5 g/dL    A-G Ratio 1.3 g/dL   proBrain Natriuretic Peptide, NT   Result Value Ref Range    NT-proBNP 2311 (H) 0 - 125 pg/mL   Troponin STAT   Result Value Ref Range    Troponin T 23 (H) 6 - 19 ng/L   APTT   Result Value Ref Range    APTT 33.6 24.7 - 36.0 sec    PT/INR   Result Value Ref Range    PT 20.7 (H) 12.0 - 14.6 sec    INR 1.72 (H) 0.87 - 1.13   ESTIMATED GFR   Result Value Ref Range    GFR If African American >60 >60 mL/min/1.73 m 2    GFR If Non African American >60 >60 mL/min/1.73 m 2   PERIPHERAL SMEAR REVIEW   Result Value Ref Range    Peripheral Smear Review see below    PLATELET ESTIMATE   Result Value Ref Range    Plt Estimation Decreased    MORPHOLOGY   Result Value Ref Range    RBC Morphology Present     Polychromia 1+     Poikilocytosis 1+     Ovalocytes 1+    DIFFERENTIAL COMMENT   Result Value Ref Range    Comments-Diff see below    EKG (NOW)   Result Value Ref Range    Report       [facility] Emergency Dept.    Test Date:  2020  Pt Name:    IGOR CISNEROS                 Department: ER  MRN:        [id number]                      Room:        10  Gender:     Female                       Technician: [id number]  :        [id number]                   Requested By:HUMBERTO LOGAN  Order #:    [id number]                    Reading MD: HUMBERTO LOGAN DO    Measurements  Intervals                                Axis  Rate:       80                           P:          46  TX:         168                          QRS:        78  QRSD:       100                          T:          35  QT:         388  QTc:        448    Interpretive Statements  SINUS RHYTHM  Compared to ECG 2019 05:39:59  Atrial fibrillation no longer present  Poor R-wave progression no longer present  Electronically Signed On 2020 7:20:41 PST by HUMBERTO LOGAN DO           RADIOLOGY  DX-CHEST-PORTABLE (1 VIEW)   Final Result         1.  Pulmonary edema and/or infiltrates.   2.  Stable right infrahilar opacity, likely corresponding with chronic consolidation/atelectasis of the lingula visualized on prior CT 2019          COURSE & MEDICAL DECISION MAKING  Nursing notes and vital signs were reviewed. (See chart for details)  The patients  records were reviewed, history was obtained from the patient ;     ED evaluation for dyspnea, respiratory distress, hypoxia most consistent with CHF exacerbation.  Although patient did have some improvement of symptoms with albuterol neb prior to arrival, she may have concomitant COPD exacerbation as well, however clinical evaluation more consistent with fluid overload.  Pulmonary edema on chest x-ray, significant lower extremity peripheral edema worse than normal.  No leukocytosis or bandemia.  No electrolyte derangement.  BNP is elevated 2311, troponin appropriately so at 23.  EKG without STEMI or other ischemia, unchanged from previous.  Continuous telemetry without ectopy or arrhythmia.  Hemodynamically stable here in the emergency department, no acute respiratory distress or persistent hypoxia.  Nitroglycerin paste to chest wall, low-dose IV Lasix as blood pressure is borderline.    Patient be hospitalized for further evaluation and treatment.  She is aware the findings and agreeable to the disposition and plan.    7:22 AM Dr. Jacobson is aware of the patient agreeable to consultation.    FINAL IMPRESSION  (I50.9) Acute on chronic congestive heart failure, unspecified heart failure type (HCC)  (R06.03) Respiratory distress  (R09.02) Hypoxia      Electronically signed by: Agnieszka Peña, 1/11/2020 5:56 AM      This dictation was created using voice recognition software. The accuracy of the dictation is limited to the abilities of the software. I expect there may be some errors of grammar and possibly content. The nursing notes were reviewed and certain aspects of this information were incorporated into this note.

## 2020-01-11 NOTE — ASSESSMENT & PLAN NOTE
Appears to be progressing  High risk for bronchoscopy but ? If underlying malignancy is worsening the atelectasis?

## 2020-01-11 NOTE — H&P
Hospital Medicine History & Physical Note    Date of Service  1/11/2020    Primary Care Physician  Ana Hartley M.D.    Consultants  Pulmonology - Dr Oneill     Code Status  FULL CODE - discussed with patient / family at bedside     Chief Complaint  Shortness of breath     History of Presenting Illness  74 y.o. female who presented 1/11/2020 with shortness of breath.    Patient with underlying history of COPD, mild to moderate aortic stenosis in the past, paroxysmal atrial fibrillation and anticoagulated with Xarelto, diastolic congestive heart failure, morbid obesity, chronic hypoxemic respiratory failure requiring 2.5 to 4 L of oxygen via nasal cannula at baseline and untreated sleep apnea.    Patient presents to the emergency department today complaining of worsening and progressive shortness of breath.  Patient reports that her shortness of breath has been worsening and progressive over the last several months.  She has shortness of breath at rest, she has worsening dyspnea on exertion and severe limitation of functional capacity because of underlying dyspnea.  Patient reports that overall she does not have good ambulation because of severe degenerative joint disease.  In addition she has noticed worsening lower extremity edema, all the way up to the thighs at this time.  She has accompanying orthopnea, paroxysmal nocturnal dyspnea.  She complains of minimal cough, no production of sputum.  She denies having any fever or chills.  Has not noticed any profound wheezing.  Given the worsening nature of her symptoms with worsening shortness of breath, worsening debility secondary to this.  She presented to the emergency department for further evaluation.  Otherwise with the symptoms, patient denies having any chest pain.  She denies having any palpitations.  No abdominal pain.  Otherwise no GI/genitourinary complaints.    Review of Systems  Review of Systems   Constitutional: Positive for malaise/fatigue.  Negative for chills and fever.   HENT: Negative for hearing loss and tinnitus.    Eyes: Negative for blurred vision and double vision.   Respiratory: Positive for shortness of breath. Negative for cough and hemoptysis.    Cardiovascular: Positive for orthopnea, leg swelling and PND. Negative for chest pain and palpitations.   Gastrointestinal: Negative for abdominal pain, blood in stool, constipation, diarrhea, heartburn, melena, nausea and vomiting.   Genitourinary: Negative for dysuria, flank pain, frequency, hematuria and urgency.   Musculoskeletal: Negative for back pain, falls, joint pain, myalgias and neck pain.   Skin: Negative for itching and rash.   Neurological: Negative for dizziness and headaches.   Psychiatric/Behavioral: Negative for depression and suicidal ideas.       Past Medical History   has a past medical history of Aortic stenosis, Arrhythmia, Arthritis, Atrial fibrillation ([facility]), CHF (congestive heart failure) ([facility]), Chickenpox, COPD, Daytime sleepiness, Difficulty breathing, Cymro measles, Heartburn, [facility]ICAL HOME (11/9/2012), Mumps, Painful joint, Palpitations, Rash, Recurrent major depressive disorder, in partial remission ([facility]) (9/5/2019), Shortness of breath, Swelling of lower extremity, Toothache, and Weight loss.    Surgical History   has a past surgical history that includes tonsillectomy; appendectomy; primary c section; tubal coagulation laparoscopic bilateral; and gastric bypass laparoscopic.     Family History  family history includes Alcohol abuse in her mother; Asthma in her sister; Breast Cancer in her sister; Cancer in her maternal aunt; Dementia in her father; Hyperlipidemia in her sister; Hypertension in her daughter; Lung Disease in her sister; No Known Problems in her maternal grandfather, maternal grandmother, and paternal grandmother; Other in her daughter; Prostate cancer in her father; Thyroid in her daughter.     Social History   reports that she quit smoking about 36  years ago. Her smoking use included cigarettes. She has a 13.00 pack-year smoking history. She has never used smokeless tobacco. She reports current alcohol use of about 4.2 oz of alcohol per week. She reports that she does not use drugs.    Allergies  Allergies   Allergen Reactions   • Bee Anaphylaxis   • Fosamax Unspecified     Bone pain   • Iodine Rash     RASH   • Pcn [Penicillins] Rash     Rash years ago   • Sulfa Drugs Rash     Terrible rash  Tolerates furosemide 1/20  Tolerates acetazolamide 1/20   • Other Drug Unspecified     Also allergies to Actenol   • Tape Unspecified     Pulls my skin       Medications  Prior to Admission Medications   Prescriptions Last Dose Informant Patient Reported? Taking?   CALCIUM PO 1/10/2020 at AM Patient Yes No   Sig: Take 1 Tab by mouth every day.   Fluticasone-Umeclidin-Vilant (TRELEGY ELLIPTA) 100-62.5-25 MCG/INH AEROSOL POWDER, BREATH ACTIVATED 1/10/2020 at AM Patient No No   Sig: Inhale 1 Puff by mouth every day.   Lactobacillus Rhamnosus, GG, (CULTURELLE PO) 1/10/2020 at AM Patient Yes No   Sig: Take 1 Cap by mouth every morning.   Multiple Vitamin (MULTI-VITAMIN PO) 1/10/2020 at AM Patient Yes No   Sig: Take 1 Tab by mouth every morning.   albuterol (PROVENTIL) 2.5mg/3ml Nebu Soln solution for nebulization PRN at PRN Patient No No   Sig: USE 3 ML BY NEBULIZATION ROUTE EVERY 6 HOURS AS NEEDED FOR SHORTNESS OF BREATH.   albuterol 108 (90 Base) MCG/ACT Aero Soln inhalation aerosol PRN at PRN Patient No No   Sig: Inhale 2 Puffs by mouth every 6 hours as needed for Shortness of Breath.   busPIRone (BUSPAR) 7.5 MG tablet 1/10/2020 at HS Patient Yes No   Sig: Take 7.5 mg by mouth every bedtime.   furosemide (LASIX) 20 MG Tab 1/10/2020 at AM Patient No No   Sig: Take 3 Tabs by mouth every day.   lidocaine (LIDODERM) 5 % Patch 1/10/2020 at OFF Patient Yes No   Sig: Apply 1 Patch to skin as directed every 24 hours. Apply to knee   metoprolol (LOPRESSOR) 25 MG Tab 1/10/2020 at PM  Patient No No   Sig: Take 1 Tab by mouth 2 Times a Day.   omeprazole (PRILOSEC) 20 MG delayed-release capsule 1/10/2020 at AM Patient No No   Sig: Take 1 Cap by mouth every day.   potassium chloride SA (KDUR) 20 MEQ Tab CR 1/10/2020 at AM Patient No No   Sig: Take 1 Tab by mouth every morning.   rivaroxaban (XARELTO) 20 MG Tab tablet 1/10/2020 at PM Patient No No   Sig: Take 1 Tab by mouth with dinner.   triamcinolone acetonide (KENALOG) 0.1 % Cream PRN at PRN Patient Yes Yes   Sig: Apply  to affected area(s) as needed (RASHES).   vitamin D (CHOLECALCIFEROL) 1000 UNIT TABS 1/10/2020 at AM Patient Yes No   Sig: Take 5,000 Units by mouth every day.      Facility-Administered Medications: None       Physical Exam  Temp:  [36.8 °C (98.2 °F)] 36.8 °C (98.2 °F)  Pulse:  [78-82] 78  Resp:  [22-29] 22  BP: (101-120)/(40-64) 109/41  SpO2:  [92 %-98 %] 96 %    Physical Exam  HENT:      Head: Normocephalic and atraumatic.      Right Ear: External ear normal.      Left Ear: External ear normal.      Nose: Nose normal.      Mouth/Throat:      Mouth: Mucous membranes are moist.   Eyes:      Extraocular Movements: Extraocular movements intact.      Conjunctiva/sclera: Conjunctivae normal.      Pupils: Pupils are equal, round, and reactive to light.   Neck:      Musculoskeletal: Normal range of motion.   Cardiovascular:      Rate and Rhythm: Regular rhythm.      Pulses: Normal pulses.      Heart sounds: Murmur present. No friction rub. No gallop.       Comments: Patient has an aortic systolic murmur, suspect severe aortic stenosis.  Patient has jugular venous distention.  Patient has bilateral lower extremity pitting edema, +2 up to the thighs.  Lower extremity venous stasis also seen.  Pulmonary:      Effort: Pulmonary effort is normal. No respiratory distress.      Breath sounds: No stridor. Rales present. No wheezing or rhonchi.      Comments: Diminished in bases, rales in bases with minimal expiratory wheezing.  Chest:       Chest wall: No tenderness.   Abdominal:      General: Abdomen is flat. Bowel sounds are normal. There is no distension.      Palpations: Abdomen is soft. There is no mass.      Tenderness: There is no tenderness. There is no right CVA tenderness, left CVA tenderness, guarding or rebound.      Hernia: No hernia is present.   Musculoskeletal: Normal range of motion.      Right lower leg: Edema present.      Left lower leg: Edema present.   Skin:     General: Skin is warm and dry.      Capillary Refill: Capillary refill takes less than 2 seconds.   Neurological:      General: No focal deficit present.      Mental Status: She is alert and oriented to person, place, and time. Mental status is at baseline.      Cranial Nerves: No cranial nerve deficit.      Sensory: No sensory deficit.      Motor: No weakness.      Coordination: Coordination normal.      Gait: Gait normal.      Deep Tendon Reflexes: Reflexes normal.   Psychiatric:         Mood and Affect: Mood normal.         Behavior: Behavior normal.         Thought Content: Thought content normal.         Judgment: Judgment normal.         Laboratory:  Recent Labs     01/11/20  0555   WBC 6.7   RBC 4.11*   HEMOGLOBIN 11.7*   HEMATOCRIT 42.9   .4*   MCH 28.5   MCHC 27.3*   RDW 55.0*   PLATELETCT 150*   MPV 10.0     Recent Labs     01/11/20  0555   SODIUM 144   POTASSIUM 4.4   CHLORIDE 93*   CO2 45*   GLUCOSE 109*   BUN 16   CREATININE 0.55   CALCIUM 9.1     Recent Labs     01/11/20  0555   ALTSGPT 6   ASTSGOT 15   ALKPHOSPHAT 48   TBILIRUBIN 0.6   GLUCOSE 109*     Recent Labs     01/11/20  0555   APTT 33.6   INR 1.72*     Recent Labs     01/11/20  0555   NTPROBNP 2311*         Recent Labs     01/11/20  0555 01/11/20  0749   TROPONINT 23* 26*       Urinalysis:    No results found     Imaging:  DX-CHEST-PORTABLE (1 VIEW)   Final Result         1.  Pulmonary edema and/or infiltrates.   2.  Stable right infrahilar opacity, likely corresponding with chronic  consolidation/atelectasis of the lingula visualized on prior CT January 18, 2019      EC-ECHOCARDIOGRAM COMPLETE W/O CONT    (Results Pending)   US-EXTREMITY VENOUS LOWER BILAT    (Results Pending)         Assessment/Plan:  I anticipate this patient will require at least two midnights for appropriate medical management, necessitating inpatient admission.    Acute on chronic respiratory failure with hypoxia and hypercapnia (HCC)- (present on admission)  Assessment & Plan  I believe this is multifactorial in etiology, including mild COPD exacerbation, worsening pulmonary hypertension, obesity hypoventilation syndrome, untreated obstructive sleep apnea and worsening aortic stenosis with fluid overload including pulmonary edema, lower extremity edema and worsening right ventricular function. Body mass index is 44.92 kg/m².    Patient in the past has been advised regarding noninvasive positive pressure ventilation, she has been noncompliant and has refused this for multiple years.    At this time patient will be admitted to the hospital  We will initiate diuresis with Lasix 40 mg IV twice daily, closely monitor renal function/electrolytes while on diuresis  Initiate management for mild COPD exacerbation  Obtain an echocardiogram, cardiology consultation if worsening aortic stenosis evident  Interval chest x-ray, BNP tomorrow  Obtain an ABG  No concerns for pulmonary embolism, on therapeutic anticoagulation.  Will obtain noninvasive evaluation with DVT duplex.  No infectious concerns, will check a procalcitonin.    In my opinion, patient needs initiation of noninvasive positive pressure ventilation with naps/bedtime.  If not, she will continue to have a worsening disease course, prognosis.  In my opinion she will be a good candidate for trilogy vent.  For further evaluation, expert input regarding this I have requested consultation from pulmonology.  Patient will be evaluated by Dr. Oneill, I have personally discussed the  case with her.    Demand ischemia (HCC)- (present on admission)  Assessment & Plan  With elevated troponin, suspect secondary to chronic fluid overload, aortic stenosis, hypoxemic respiratory failure as reviewed above.    EKG obtained on presentation has been reviewed by me, no signs of acute infarction    Monitor on telemetry, obtain serial EKGs and obtain cycling of troponins  Obtain echocardiogram  If any concerns on above, cardiology consultation    Aortic stenosis- (present on admission)  Assessment & Plan  Suspect severe at this time based on auscultation  Interval echocardiogram requested  If echocardiogram with severe aortic stenosis, please consult cardiology    COPD (chronic obstructive pulmonary disease) (HCC)- (present on admission)  Assessment & Plan  Mild exacerbation  Oral prednisone, bronchodilator therapy, 5 days of doxycycline  RT protocol  Close clinical monitoring  Pulmonology evaluation  Close outpatient pulmonology follow-up    Obesity (BMI 35.0-39.9 without comorbidity) ([facility])  Assessment & Plan  Body mass index is 44.92 kg/m².    Macrocytic anemia- (present on admission)  Assessment & Plan  No evidence of acute bleeding at this time  We will check iron studies, ferritin, B12, folate, reticulocyte count and TSH  Monitor Hb / Restrictive transfusion strategy    Pulmonary hypertension (HCC)- (present on admission)  Assessment & Plan  Suspect severe  Interval echocardiogram requested, follow-up RVSP    Chronic respiratory failure with hypoxia (HCC)- (present on admission)  Assessment & Plan  Chronic baseline 2.5 to 3 L of oxygen    Morbid obesity (HCC)- (present on admission)  Assessment & Plan  Body mass index is 44.92 kg/m².    Paroxysmal atrial fibrillation (HCC)- (present on admission)  Assessment & Plan  Continue metoprolol, Xarelto  Monitor on telemetry      VTE prophylaxis: Xarelto

## 2020-01-11 NOTE — ASSESSMENT & PLAN NOTE
Due to CHF/ COPD exacerbation/ obesity hypoventilation syndrome  Hypoxic as well as hypercapnic aspiratory failure  Required transfer to the intensive care unit

## 2020-01-11 NOTE — FLOWSHEET NOTE
RES     01/11/20 1514   Events/Summary/Plan   Events/Summary/Plan SVN and Flutter done with pt   Interdisciplinary Plan of Care-Goals (Indications)   Bronchodilator Indications Physical Exam / Hyperinflation / Wheezing (bronchospasm)   Interdisciplinary Plan of Care-Outcomes    Bronchodilator Outcome Improved Patient Appearance with Decreased use of Accessory Muscles;Diminished Wheezing and Volume of Air Movement Increased   Education   Education Yes - Pt. / Family has been Instructed in use of Respiratory Medications and Adverse Reactions   RT Assessment of Delivered Medications   Evaluation of Medication Delivery Daily Yes-- Pt /Family has been Instructed in use of Respiratory Medications and Adverse Reactions   SVN Group   #SVN Performed Yes   Given By: Mouthpiece   Date SVN Last Changed 01/11/20   Date SVN Next Change Due (Q 7 Days) 01/18/20   PEP/CPT Group   PEP/CPT/Airway Clearance Therapy Yes   #PEP/CPT (Manual) Initial Initial   #CPT (Mechanical) Yes   PEP/CPT Method Flutter Valve   Date Disposable Equipment Last Changed 01/11/20   Date Disposable Equipment Next Change Due (Q 30 Days) 01/18/20   Chest Exam   Work Of Breathing / Effort Increased Work of Breathing   Respiration (!) 28   Pulse 88   Breath Sounds   Pre/Post Intervention Pre Intervention Assessment   RUL Breath Sounds Expiratory Wheezes   RML Breath Sounds Diminished   RLL Breath Sounds Diminished   JACQUI Breath Sounds Expiratory Wheezes   LLL Breath Sounds Diminished   Secretions   Cough Non Productive   How Sputum Obtained Cough on Request   Oxygen   Home O2 Use Prior To Admission? Yes   Home O2 LPM Flow   (2-3)   Home O2 Delivery Method Nasal Cannula   Home O2 Frequency of Use Continuous   Pulse Oximetry 96 %   O2 (LPM) 3.5   O2 Daily Delivery Respiratory  Silicone Nasal Cannula

## 2020-01-11 NOTE — ASSESSMENT & PLAN NOTE
Echo showed normal EF with mod AS; poor candidate for aggressive intervention; continue supportive care  She is unlikely to be a candidate for a TAVR

## 2020-01-11 NOTE — RESPIRATORY CARE
COPD EDUCATION by COPD CLINICAL EDUCATOR  1/11/2020 at 3:01 PM by [place] Cerna     COPD Education provided?  Yes    Does patient currently use inhalers/nebulizer at home? Yes    Additional medication/equipment recommendations? No    Is all Respiratory DME in place? Yes                   If yes, has patient been educated on use of DME?   Yes    Has the patient been referred to the [facility] COPD Program? Yes

## 2020-01-11 NOTE — ED NOTES
Lab called with critical result of Pco2 106.1 at 0954. Critical lab result read back to .   Dr. Jacobson notified of critical lab result at 0955.  Critical lab result read back by Dr. Jacobson.

## 2020-01-11 NOTE — ASSESSMENT & PLAN NOTE
Oxygen dependent COPD with exacerbation  IV solumedrol will transition to prednisone 40 mg daily respiratory protocol with bronchodilators  Supplemental oxygen  Pulmonology evaluation  Close outpatient pulmonology follow-up

## 2020-01-11 NOTE — ED NOTES
Jorge CAPONE at bedside for transfer of pt to [facility]-.  Report given to LIBORIO Patel.   All belongings with pt on transfer.

## 2020-01-11 NOTE — CONSULTS
Pulmonary Consultation    Date of consult: 1/11/2020    Referring Physician  Agnieszka Peña D.O.    Reason for Consultation  Acute on Chronic respiratory failure hypercarbic    History of Presenting Illness  74 y.o. female who presented 1/11/2020 with  history of COPD (2017 PFTS lFEV1 is 0.69 L which is 38% of predicted. FEV1 FVC ratio is reduced at 69%. MVV is severely reduced at 34% of predicted. After administration of an inhaled bronchodilator there is improvement in FEV1 up to 0.94 L which is 53% of predicted. This is a relative 36% improvement.   Total lung capacity is mildly reduced at 76% of predicted. RV/TLC ratio is increased to 154% of predicted. DLCO is 112% of predicted. Airways resistance is increased.    , mild to moderate aortic stenosis in the past, paroxysmal atrial fibrillation and anticoagulated with Xarelto, diastolic congestive heart failure, morbid obesity, chronic hypoxemic respiratory failure requiring 2.5 to 4 L of oxygen via nasal cannula at baseline and untreated sleep apnea.  She also has persistent LLL atelectasis    Since 2016 her HCO3 has been increasing from 30 to now she is in the 40s  ABG today shows 7.3/106 paCO2 without complete compensation as her HCO should be higher    She presents today with SOB progressive with symptoms and signs of CHF exacerbation        Code Status  Full Code    Review of Systems  Review of Systems   Constitutional: Positive for malaise/fatigue.   HENT: Negative.    Eyes: Negative.    Respiratory: Positive for shortness of breath.    Cardiovascular: Positive for leg swelling.   Gastrointestinal: Negative.    Genitourinary: Negative.    Musculoskeletal: Negative.    Skin: Negative.    Neurological: Positive for dizziness.   Endo/Heme/Allergies: Negative.    Psychiatric/Behavioral: Negative.        Past Medical History   has a past medical history of Aortic stenosis, Arrhythmia, Arthritis, Atrial fibrillation (HCC), CHF (congestive heart failure) ([facility]),  Chickenpox, COPD, Daytime sleepiness, Difficulty breathing, Kiswahili measles, Heartburn, MEDICAL HOME (11/9/2012), Mumps, Painful joint, Palpitations, Rash, Recurrent major depressive disorder, in partial remission (HCC) (9/5/2019), Shortness of breath, Swelling of lower extremity, Toothache, and Weight loss.    Surgical History   has a past surgical history that includes tonsillectomy; appendectomy; primary c section; tubal coagulation laparoscopic bilateral; and gastric bypass laparoscopic.    Family History  family history includes Alcohol abuse in her mother; Asthma in her sister; Breast Cancer in her sister; Cancer in her maternal aunt; Dementia in her father; Hyperlipidemia in her sister; Hypertension in her daughter; Lung Disease in her sister; No Known Problems in her maternal grandfather, maternal grandmother, and paternal grandmother; Other in her daughter; Prostate cancer in her father; Thyroid in her daughter.    Social History   reports that she quit smoking about 36 years ago. Her smoking use included cigarettes. She has a 13.00 pack-year smoking history. She has never used smokeless tobacco. She reports current alcohol use of about 4.2 oz of alcohol per week. She reports that she does not use drugs.    Medications  Home Medications     Reviewed by Nadya Tyson, Pharmacy Intern (Pharmacy Intern) on 01/11/20 at 0855  Med List Status: Complete   Medication Last Dose Status   albuterol (PROVENTIL) 2.5mg/3ml Nebu Soln solution for nebulization PRN Active   albuterol 108 (90 Base) MCG/ACT Aero Soln inhalation aerosol PRN Active   busPIRone (BUSPAR) 7.5 MG tablet 1/10/2020 Active   CALCIUM PO 1/10/2020 Active   Fluticasone-Umeclidin-Vilant (TRELEGY ELLIPTA) 100-62.5-25 MCG/INH AEROSOL POWDER, BREATH ACTIVATED 1/10/2020 Active   furosemide (LASIX) 20 MG Tab 1/10/2020 Active   Lactobacillus Rhamnosus, GG, (CULTURELLE PO) 1/10/2020 Active   lidocaine (LIDODERM) 5 % Patch 1/10/2020 Active   metoprolol  (LOPRESSOR) 25 MG Tab 1/10/2020 Active   Multiple Vitamin (MULTI-VITAMIN PO) 1/10/2020 Active   omeprazole (PRILOSEC) 20 MG delayed-release capsule 1/10/2020 Active   potassium chloride SA (KDUR) 20 MEQ Tab CR 1/10/2020 Active   rivaroxaban (XARELTO) 20 MG Tab tablet 1/10/2020 Active   triamcinolone acetonide (KENALOG) 0.1 % Cream PRN Active   vitamin D (CHOLECALCIFEROL) 1000 UNIT TABS 1/10/2020 Active              Current Facility-Administered Medications   Medication Dose Route Frequency Provider Last Rate Last Dose   • ipratropium-albuterol (DUONEB) nebulizer solution  3 mL Nebulization Q4HRS (RT) Aryan Jacobson M.D.   3 mL at 01/11/20 0959   • doxycycline monohydrate (ADOXA) tablet 100 mg  100 mg Oral Q12HRS Aryan Jacobson M.D.   100 mg at 01/11/20 1219   • predniSONE (DELTASONE) tablet 50 mg  50 mg Oral DAILY Aryan Jacobson M.D.   50 mg at 01/11/20 1218   • famotidine (PEPCID) tablet 20 mg  20 mg Oral BID Aryan Jacobson M.D.       • furosemide (LASIX) injection 40 mg  40 mg Intravenous BID DIURETIC Aryan Jacobson M.D.       • rivaroxaban (XARELTO) tablet 20 mg  20 mg Oral PM MEAL Aryan Jacobson M.D.       • busPIRone (BUSPAR) tablet 7.5 mg  7.5 mg Oral QHS Aryan Jacobson M.D.       • metoprolol (LOPRESSOR) tablet 25 mg  25 mg Oral BID Aryan Jacobson M.D.       • Respiratory Care per Protocol   Nebulization Continuous RT Aryan Jacobson M.D.       • acetaminophen (TYLENOL) tablet 650 mg  650 mg Oral Q6HRS PRN Aryan Jacobson M.D.   650 mg at 01/11/20 1219   • enalaprilat (VASOTEC) injection 1.25 mg  1.25 mg Intravenous Q6HRS PRN Aryan Jacobson M.D.       • ondansetron (ZOFRAN) syringe/vial injection 4 mg  4 mg Intravenous Q4HRS PRN Aryan Jacobson M.D.       • ondansetron (ZOFRAN ODT) dispertab 4 mg  4 mg Oral Q4HRS PRN Aryan Jacobson M.D.       • [START ON 1/12/2020] umeclidinium-vilanterol (ANORO ELLIPTA) inhaler 1 Puff  1 Puff Inhalation QDAILY (RT) Aryan Jacobson M.D.        And   • [START ON 1/12/2020] fluticasone (FLOVENT HFA) 44  MCG/ACT inhaler 88 mcg  2 Puff Inhalation QDAILY (RT) Aryan Jacobson M.D.           Allergies  Allergies   Allergen Reactions   • Bee Anaphylaxis   • Fosamax Unspecified     Bone pain   • Iodine Rash     RASH   • Pcn [Penicillins] Rash     Rash years ago   • Sulfa Drugs Rash     Terrible rash  Tolerates furosemide 1/20  Tolerates acetazolamide 1/20   • Other Drug Unspecified     Also allergies to Actenol   • Tape Unspecified     Pulls my skin       Vital Signs last 24 hours  Temp:  [36.8 °C (98.2 °F)-37 °C (98.6 °F)] 37 °C (98.6 °F)  Pulse:  [78-83] 80  Resp:  [20-32] 32  BP: ()/(40-64) 99/53  SpO2:  [92 %-98 %] 98 %    Physical Exam  Physical Exam  Constitutional:       Appearance: She is ill-appearing.   HENT:      Head: Normocephalic and atraumatic.      Mouth/Throat:      Mouth: Mucous membranes are dry.   Eyes:      Extraocular Movements: Extraocular movements intact.      Pupils: Pupils are equal, round, and reactive to light.   Neck:      Musculoskeletal: No neck rigidity.      Comments: + JVD  Cardiovascular:      Heart sounds: Murmur present.      Comments: Systolic murmur loudest in the RUSB    Pulmonary:      Comments: Kyphotic and tachypneac  Abdominal:      General: Bowel sounds are normal.      Palpations: There is no mass.   Musculoskeletal:      Right lower leg: Edema present.      Left lower leg: Edema present.   Lymphadenopathy:      Cervical: No cervical adenopathy.   Skin:     General: Skin is warm and dry.   Neurological:      General: No focal deficit present.      Mental Status: She is alert.   Psychiatric:         Mood and Affect: Mood normal.         Behavior: Behavior normal.         Thought Content: Thought content normal.         Judgment: Judgment normal.          Laboratory  Labs:  Recent Labs     01/11/20  0555   WBC 6.7   RBC 4.11*   HEMOGLOBIN 11.7*   HEMATOCRIT 42.9   .4*   MCH 28.5   MCHC 27.3*   RDW 55.0*   PLATELETCT 150*   MPV 10.0     Recent Labs     01/11/20  0555    APTT 33.6   INR 1.72*             Recent Labs     01/11/20  0555   SODIUM 144   POTASSIUM 4.4   CHLORIDE 93*   CO2 45*   GLUCOSE 109*   BUN 16     Recent Labs     01/11/20  0555   SODIUM 144   POTASSIUM 4.4   CHLORIDE 93*   CO2 45*   BUN 16   CREATININE 0.55   MAGNESIUM 1.9   PHOSPHORUS 3.5   CALCIUM 9.1     Results for orders placed or performed during the hospital encounter of 04/05/18   ECHOCARDIOGRAM COMP W/O CONT   Result Value Ref Range    Eject.Frac. MOD BP 53.55     Eject.Frac. MOD 4C 56.04     Eject.Frac. MOD 2C 52.15     Left Ventrical Ejection Fraction 65          Imaging:   US-EXTREMITY VENOUS LOWER BILAT   Final Result      DX-CHEST-PORTABLE (1 VIEW)   Final Result         1.  Pulmonary edema and/or infiltrates.   2.  Stable right infrahilar opacity, likely corresponding with chronic consolidation/atelectasis of the lingula visualized on prior CT January 18, 2019      EC-ECHOCARDIOGRAM COMPLETE W/O CONT    (Results Pending)       Imaging  CXR personally viewed, persistent rml infiltrate -- appears larger than the  on the CT  + pulmonary edema    Assessment/Plan  Acute on chronic respiratory failure with hypoxia and hypercapnia (HCC)- (present on admission)  Assessment & Plan  Multifocal  She has very severe obstructive lung disease with FEV1 of 38%, underlying restrictive lung disease due to her kyphosis and obesity, obesity hypoventilation syndrome and  Persistent shunt with the RML atelectasis that appears to be progressing since Jan 2019    She has been compensating suprisingly with HCO3 now in the 40s but also now in acute on chronic resp failure due to CHF secondary to AS (repeat ECHO pending) and diastolic heart failure     Thus agree she needs treatment for BRIDGETT as if she loses her drive when sleeping she will not be able to compensate and will inevitably progress with her respiratory failure    Explained to the patient that she would need a form of BIPAP - she has agreed to try it but also  says if she cannot tolerate it she will not use it (that is why she was not treated for BRIDGETT as she did not tolerate the CPAP mask)    Called RT set AVAPS at 6-8 ml/kg, RR 20, Fio2 to maintain sats 90-92% while sleeping and napping    Her prognosis is very guarded and minimal reserve          Right middle lobe syndrome  Assessment & Plan  Appears to be progressing  High risk for bronchoscopy but ? If underlying malignancy is worsening the atelectasis?      Discussed patient with Dr. Simental

## 2020-01-11 NOTE — ASSESSMENT & PLAN NOTE
Multifactorial: very severe obstructive lung disease with FEV1 of 38%, underlying restrictive lung disease due to her severe kyphosis and obesity, OHS and persistent shunt with the RML atelectasis that appears to be progressing since Jan 2019.    Now Hospice    Needs treatment for OHS/BRIDGETT, on AVAPS at night. ?  Home with trilogy vent    Explained to the patient that she will need a form of BIPAP - she has agreed to be compliant with therapies    RT/O2 Protocols  Titrate supplemental FiO2 to maintain SpO2 >88%  qHS AVAPS, not requiring rescue use during the day  Weaning Solu-Medrol, transition to prednisone soon  Aspiration precautions  Procalcitonin has been WNL  S/P doxycycline x5 days  Patient on Xarelto with low likelihood of PE in setting of AC

## 2020-01-12 ENCOUNTER — APPOINTMENT (OUTPATIENT)
Dept: RADIOLOGY | Facility: MEDICAL CENTER | Age: 75
DRG: 189 | End: 2020-01-12
Attending: INTERNAL MEDICINE
Payer: MEDICARE

## 2020-01-12 PROBLEM — J98.19 RIGHT MIDDLE LOBE SYNDROME: Status: RESOLVED | Noted: 2020-01-11 | Resolved: 2020-01-12

## 2020-01-12 LAB
ALBUMIN SERPL BCP-MCNC: 3.6 G/DL (ref 3.2–4.9)
ALBUMIN/GLOB SERPL: 1.3 G/DL
ALP SERPL-CCNC: 46 U/L (ref 30–99)
ALT SERPL-CCNC: 6 U/L (ref 2–50)
ANION GAP SERPL CALC-SCNC: 5 MMOL/L (ref 0–11.9)
AST SERPL-CCNC: 17 U/L (ref 12–45)
BASOPHILS # BLD AUTO: 0.3 % (ref 0–1.8)
BASOPHILS # BLD: 0.03 K/UL (ref 0–0.12)
BILIRUB SERPL-MCNC: 0.6 MG/DL (ref 0.1–1.5)
BUN SERPL-MCNC: 16 MG/DL (ref 8–22)
CALCIUM SERPL-MCNC: 8.7 MG/DL (ref 8.5–10.5)
CHLORIDE SERPL-SCNC: 90 MMOL/L (ref 96–112)
CO2 SERPL-SCNC: 46 MMOL/L (ref 20–33)
CREAT SERPL-MCNC: 0.53 MG/DL (ref 0.5–1.4)
EOSINOPHIL # BLD AUTO: 0.01 K/UL (ref 0–0.51)
EOSINOPHIL NFR BLD: 0.1 % (ref 0–6.9)
ERYTHROCYTE [DISTWIDTH] IN BLOOD BY AUTOMATED COUNT: 55.6 FL (ref 35.9–50)
FERRITIN SERPL-MCNC: 13.3 NG/ML (ref 10–291)
FOLATE SERPL-MCNC: >24 NG/ML
GLOBULIN SER CALC-MCNC: 2.8 G/DL (ref 1.9–3.5)
GLUCOSE SERPL-MCNC: 135 MG/DL (ref 65–99)
HCT VFR BLD AUTO: 42.1 % (ref 37–47)
HGB BLD-MCNC: 11.4 G/DL (ref 12–16)
HGB RETIC QN AUTO: 25 PG/CELL (ref 29–35)
IMM GRANULOCYTES # BLD AUTO: 0.04 K/UL (ref 0–0.11)
IMM GRANULOCYTES NFR BLD AUTO: 0.4 % (ref 0–0.9)
IMM RETICS NFR: 13.7 % (ref 9.3–17.4)
IRON SATN MFR SERPL: 12 % (ref 15–55)
IRON SERPL-MCNC: 48 UG/DL (ref 40–170)
LYMPHOCYTES # BLD AUTO: 0.48 K/UL (ref 1–4.8)
LYMPHOCYTES NFR BLD: 5.2 % (ref 22–41)
MAGNESIUM SERPL-MCNC: 1.6 MG/DL (ref 1.5–2.5)
MCH RBC QN AUTO: 28.4 PG (ref 27–33)
MCHC RBC AUTO-ENTMCNC: 27.1 G/DL (ref 33.6–35)
MCV RBC AUTO: 104.7 FL (ref 81.4–97.8)
MONOCYTES # BLD AUTO: 0.52 K/UL (ref 0–0.85)
MONOCYTES NFR BLD AUTO: 5.7 % (ref 0–13.4)
NEUTROPHILS # BLD AUTO: 8.1 K/UL (ref 2–7.15)
NEUTROPHILS NFR BLD: 88.3 % (ref 44–72)
NRBC # BLD AUTO: 0 K/UL
NRBC BLD-RTO: 0 /100 WBC
NT-PROBNP SERPL IA-MCNC: 4035 PG/ML (ref 0–125)
PHOSPHATE SERPL-MCNC: 4.1 MG/DL (ref 2.5–4.5)
PLATELET # BLD AUTO: 142 K/UL (ref 164–446)
PMV BLD AUTO: 10.4 FL (ref 9–12.9)
POTASSIUM SERPL-SCNC: 4.3 MMOL/L (ref 3.6–5.5)
PROT SERPL-MCNC: 6.4 G/DL (ref 6–8.2)
RBC # BLD AUTO: 4.02 M/UL (ref 4.2–5.4)
RETICS # AUTO: 0.07 M/UL (ref 0.04–0.06)
RETICS/RBC NFR: 1.8 % (ref 0.8–2.1)
SODIUM SERPL-SCNC: 141 MMOL/L (ref 135–145)
TIBC SERPL-MCNC: 393 UG/DL (ref 250–450)
TSH SERPL DL<=0.005 MIU/L-ACNC: 0.83 UIU/ML (ref 0.38–5.33)
VIT B12 SERPL-MCNC: 289 PG/ML (ref 211–911)
WBC # BLD AUTO: 9.2 K/UL (ref 4.8–10.8)

## 2020-01-12 PROCEDURE — 700101 HCHG RX REV CODE 250: Performed by: INTERNAL MEDICINE

## 2020-01-12 PROCEDURE — 85025 COMPLETE CBC W/AUTO DIFF WBC: CPT

## 2020-01-12 PROCEDURE — 83540 ASSAY OF IRON: CPT

## 2020-01-12 PROCEDURE — 83550 IRON BINDING TEST: CPT

## 2020-01-12 PROCEDURE — 80053 COMPREHEN METABOLIC PANEL: CPT

## 2020-01-12 PROCEDURE — 85046 RETICYTE/HGB CONCENTRATE: CPT

## 2020-01-12 PROCEDURE — 94669 MECHANICAL CHEST WALL OSCILL: CPT

## 2020-01-12 PROCEDURE — 99232 SBSQ HOSP IP/OBS MODERATE 35: CPT | Performed by: INTERNAL MEDICINE

## 2020-01-12 PROCEDURE — 700102 HCHG RX REV CODE 250 W/ 637 OVERRIDE(OP): Performed by: INTERNAL MEDICINE

## 2020-01-12 PROCEDURE — 94640 AIRWAY INHALATION TREATMENT: CPT

## 2020-01-12 PROCEDURE — 83735 ASSAY OF MAGNESIUM: CPT

## 2020-01-12 PROCEDURE — 99233 SBSQ HOSP IP/OBS HIGH 50: CPT | Performed by: INTERNAL MEDICINE

## 2020-01-12 PROCEDURE — 97166 OT EVAL MOD COMPLEX 45 MIN: CPT

## 2020-01-12 PROCEDURE — 84443 ASSAY THYROID STIM HORMONE: CPT

## 2020-01-12 PROCEDURE — 94660 CPAP INITIATION&MGMT: CPT

## 2020-01-12 PROCEDURE — 84100 ASSAY OF PHOSPHORUS: CPT

## 2020-01-12 PROCEDURE — 82607 VITAMIN B-12: CPT

## 2020-01-12 PROCEDURE — 82728 ASSAY OF FERRITIN: CPT

## 2020-01-12 PROCEDURE — 94668 MNPJ CHEST WALL SBSQ: CPT

## 2020-01-12 PROCEDURE — 82746 ASSAY OF FOLIC ACID SERUM: CPT

## 2020-01-12 PROCEDURE — 770020 HCHG ROOM/CARE - TELE (206)

## 2020-01-12 PROCEDURE — A9270 NON-COVERED ITEM OR SERVICE: HCPCS | Performed by: INTERNAL MEDICINE

## 2020-01-12 PROCEDURE — 94667 MNPJ CHEST WALL 1ST: CPT

## 2020-01-12 PROCEDURE — 700111 HCHG RX REV CODE 636 W/ 250 OVERRIDE (IP): Performed by: INTERNAL MEDICINE

## 2020-01-12 PROCEDURE — 94760 N-INVAS EAR/PLS OXIMETRY 1: CPT

## 2020-01-12 PROCEDURE — 71045 X-RAY EXAM CHEST 1 VIEW: CPT

## 2020-01-12 PROCEDURE — 83880 ASSAY OF NATRIURETIC PEPTIDE: CPT

## 2020-01-12 RX ORDER — IPRATROPIUM BROMIDE AND ALBUTEROL SULFATE 2.5; .5 MG/3ML; MG/3ML
3 SOLUTION RESPIRATORY (INHALATION)
Status: DISCONTINUED | OUTPATIENT
Start: 2020-01-12 | End: 2020-01-16

## 2020-01-12 RX ADMIN — IPRATROPIUM BROMIDE AND ALBUTEROL SULFATE 3 ML: .5; 3 SOLUTION RESPIRATORY (INHALATION) at 20:41

## 2020-01-12 RX ADMIN — PREDNISONE 50 MG: 50 TABLET ORAL at 05:29

## 2020-01-12 RX ADMIN — FUROSEMIDE 40 MG: 10 INJECTION, SOLUTION INTRAMUSCULAR; INTRAVENOUS at 15:19

## 2020-01-12 RX ADMIN — ACETAMINOPHEN 650 MG: 325 TABLET, FILM COATED ORAL at 00:45

## 2020-01-12 RX ADMIN — METOPROLOL TARTRATE 25 MG: 25 TABLET, FILM COATED ORAL at 17:09

## 2020-01-12 RX ADMIN — IPRATROPIUM BROMIDE AND ALBUTEROL SULFATE 3 ML: .5; 3 SOLUTION RESPIRATORY (INHALATION) at 02:06

## 2020-01-12 RX ADMIN — FUROSEMIDE 40 MG: 10 INJECTION, SOLUTION INTRAMUSCULAR; INTRAVENOUS at 05:29

## 2020-01-12 RX ADMIN — FAMOTIDINE 20 MG: 20 TABLET ORAL at 17:09

## 2020-01-12 RX ADMIN — ACETAMINOPHEN 650 MG: 325 TABLET, FILM COATED ORAL at 15:19

## 2020-01-12 RX ADMIN — IPRATROPIUM BROMIDE AND ALBUTEROL SULFATE 3 ML: .5; 3 SOLUTION RESPIRATORY (INHALATION) at 07:01

## 2020-01-12 RX ADMIN — IPRATROPIUM BROMIDE AND ALBUTEROL SULFATE 3 ML: .5; 3 SOLUTION RESPIRATORY (INHALATION) at 10:12

## 2020-01-12 RX ADMIN — FAMOTIDINE 20 MG: 20 TABLET ORAL at 05:29

## 2020-01-12 RX ADMIN — UMECLIDINIUM BROMIDE AND VILANTEROL TRIFENATATE 1 PUFF: 62.5; 25 POWDER RESPIRATORY (INHALATION) at 10:09

## 2020-01-12 RX ADMIN — FLUTICASONE PROPIONATE 88 MCG: 44 AEROSOL, METERED RESPIRATORY (INHALATION) at 10:08

## 2020-01-12 RX ADMIN — DOXYCYCLINE 100 MG: 100 TABLET, FILM COATED ORAL at 05:29

## 2020-01-12 RX ADMIN — BUSPIRONE HYDROCHLORIDE 7.5 MG: 5 TABLET ORAL at 20:23

## 2020-01-12 RX ADMIN — RIVAROXABAN 20 MG: 20 TABLET, FILM COATED ORAL at 17:09

## 2020-01-12 RX ADMIN — DOXYCYCLINE 100 MG: 100 TABLET, FILM COATED ORAL at 17:09

## 2020-01-12 RX ADMIN — METOPROLOL TARTRATE 25 MG: 25 TABLET, FILM COATED ORAL at 05:29

## 2020-01-12 ASSESSMENT — COGNITIVE AND FUNCTIONAL STATUS - GENERAL
SUGGESTED CMS G CODE MODIFIER DAILY ACTIVITY: CK
DRESSING REGULAR LOWER BODY CLOTHING: A LOT
HELP NEEDED FOR BATHING: A LITTLE
DRESSING REGULAR UPPER BODY CLOTHING: A LITTLE
DAILY ACTIVITIY SCORE: 18
PERSONAL GROOMING: A LITTLE
TOILETING: A LITTLE

## 2020-01-12 ASSESSMENT — ENCOUNTER SYMPTOMS
COUGH: 0
DIZZINESS: 1
MUSCULOSKELETAL NEGATIVE: 1
ORTHOPNEA: 1
MYALGIAS: 0
BLURRED VISION: 0
SHORTNESS OF BREATH: 1
EYES NEGATIVE: 1
HEARTBURN: 0
GASTROINTESTINAL NEGATIVE: 1
FEVER: 0
PSYCHIATRIC NEGATIVE: 1

## 2020-01-12 ASSESSMENT — ACTIVITIES OF DAILY LIVING (ADL): TOILETING: INDEPENDENT

## 2020-01-12 NOTE — PROGRESS NOTES
[facility] Medicine Daily Progress Note    Date of Service  1/12/2020    Chief Complaint  74 y.o. female admitted 1/11/2020 with progressively worse sob/periph edema for last 2-3 weeks    Hospital Course    started on IV lasix along with COPD RX with some improvement so far, less sob and leg edema; no chest pain/no cough.      Interval Problem Update  As above    Consultants/Specialty  pulm    Code Status  full    Disposition  Home vs snf when stable    Review of Systems  Review of Systems   Constitutional: Negative for fever.   HENT: Negative for hearing loss.    Eyes: Negative for blurred vision.   Respiratory: Positive for shortness of breath. Negative for cough.    Cardiovascular: Positive for orthopnea and leg swelling. Negative for chest pain.   Gastrointestinal: Negative for heartburn.   Genitourinary: Negative for dysuria.   Musculoskeletal: Negative for myalgias.   Skin: Negative for rash.        Physical Exam  Temp:  [36.3 °C (97.4 °F)-37.3 °C (99.2 °F)] 36.5 °C (97.7 °F)  Pulse:  [65-90] 78  Resp:  [18-29] 18  BP: ()/(47-58) 113/47  SpO2:  [92 %-98 %] 93 %    Physical Exam  Constitutional:       Appearance: She is obese.   HENT:      Head: Normocephalic.      Nose: Nose normal.      Mouth/Throat:      Mouth: Mucous membranes are moist.   Eyes:      Extraocular Movements: Extraocular movements intact.   Neck:      Musculoskeletal: Normal range of motion and neck supple.   Cardiovascular:      Rate and Rhythm: Normal rate. Rhythm irregular.      Heart sounds: Murmur present.   Pulmonary:      Breath sounds: No wheezing.      Comments: Mild basilar crackles, no wheezes  Abdominal:      General: Bowel sounds are normal.      Palpations: Abdomen is soft.   Musculoskeletal:      Right lower leg: Edema present.      Left lower leg: Edema present.      Comments: 3+ BLE edema, improving per patient   Skin:     General: Skin is warm.   Neurological:      General: No focal deficit present.      Mental Status:  She is alert.   Psychiatric:         Behavior: Behavior normal.         Fluids    Intake/Output Summary (Last 24 hours) at 1/12/2020 1449  Last data filed at 1/12/2020 0400  Gross per 24 hour   Intake 500 ml   Output 1400 ml   Net -900 ml       Laboratory  Recent Labs     01/11/20  0555 01/12/20  0819   WBC 6.7 9.2   RBC 4.11* 4.02*   HEMOGLOBIN 11.7* 11.4*   HEMATOCRIT 42.9 42.1   .4* 104.7*   MCH 28.5 28.4   MCHC 27.3* 27.1*   RDW 55.0* 55.6*   PLATELETCT 150* 142*   MPV 10.0 10.4     Recent Labs     01/11/20  0555 01/12/20  0819   SODIUM 144 141   POTASSIUM 4.4 4.3   CHLORIDE 93* 90*   CO2 45* 46*   GLUCOSE 109* 135*   BUN 16 16   CREATININE 0.55 0.53   CALCIUM 9.1 8.7     Recent Labs     01/11/20  0555   APTT 33.6   INR 1.72*               Imaging  DX-CHEST-LIMITED (1 VIEW)   Final Result      No significant interval change.      EC-ECHOCARDIOGRAM COMPLETE W/O CONT   Final Result      US-EXTREMITY VENOUS LOWER BILAT   Final Result      DX-CHEST-PORTABLE (1 VIEW)   Final Result         1.  Pulmonary edema and/or infiltrates.   2.  Stable right infrahilar opacity, likely corresponding with chronic consolidation/atelectasis of the lingula visualized on prior CT January 18, 2019           Assessment/Plan  * Acute on chronic respiratory failure with hypoxia and hypercapnia (HCC)- (present on admission)  Assessment & Plan  Due to CHF and COPD exacerbation; echo showed preserved EF 60% with moderate AS;   diuresis with Lasix 40 mg IV twice daily, with some improvement so far.  Initiate management for mild COPD exacerbation.  Pulm consulted, likely undiagnosed BRIDGETT also.  Full code per patient/family                Demand ischemia (HCC)- (present on admission)  Assessment & Plan  With sl.elevated troponin, suspect secondary to chronic fluid overload, aortic stenosis, hypoxemic respiratory failure as reviewed above.    EKG obtained on presentation has been reviewed by me, no signs of acute infarction    Monitor on  telemetry, obtain serial EKGs and troponins,( not much changed)   echocardiogram noted      Aortic stenosis- (present on admission)  Assessment & Plan  Suspect severe at this time based on auscultation  Echo showed normal EF with mod AS; poor candidate for aggressive intervention; continue supportive care    COPD (chronic obstructive pulmonary disease) (HCC)- (present on admission)  Assessment & Plan  Advanced copd at baseline with chronic resp failure on 3L O2 at home, has GILL of only a few yards.  Oral prednisone, bronchodilator therapy, 5 days of doxycycline  RT protocol  Close clinical monitoring  Pulmonology evaluation  Close outpatient pulmonology follow-up    Obesity (BMI 35.0-39.9 without comorbidity) (HCC)  Assessment & Plan  Body mass index is 44.92 kg/m².    Macrocytic anemia- (present on admission)  Assessment & Plan  No evidence of acute bleeding at this time  We will check iron studies, ferritin, reticulocyte count; vitamin B12/folate/TSH ok  Monitor Hb / Restrictive transfusion strategy    Pulmonary hypertension (HCC)- (present on admission)  Assessment & Plan   Severe, due to advanced copd  Echo done here, RVSP 90    Morbid obesity (HCC)- (present on admission)  Assessment & Plan  Body mass index is 44.92 kg/m².   Advised gradual wt loss    Paroxysmal atrial fibrillation (HCC)- (present on admission)  Assessment & Plan  Continue metoprolol, Xarelto; rate controlled  Monitor on telemetry         VTE prophylaxis: xarelto

## 2020-01-12 NOTE — CARE PLAN
Problem: Communication  Goal: The ability to communicate needs accurately and effectively will improve  Outcome: PROGRESSING AS EXPECTED  Intervention: Reading patient and significant other/support system to call light to alert staff of needs  Flowsheets (Taken 1/11/2020 1631)  Oriented to:: All of the Following : Location of Bathroom, Visiting Policy, Unit Routine, Call Light and Bedside Controls, Bedside Rail Policy, Smoking Policy, Rights and Responsibilities, Bedside Report, and Patient Education Notebook  Intervention: Educate patient and significant other/support system about the plan of care, procedures, treatments, medications and allow for questions  Flowsheets (Taken 1/11/2020 1631)  Pt & Family Have Been Educated on Methods Available to Report Concerns Related to Care, Treatment, Services, and Patient Safety Issues: Yes     Problem: Safety  Goal: Will remain free from injury  Outcome: PROGRESSING AS EXPECTED  Goal: Will remain free from falls  Outcome: PROGRESSING AS EXPECTED

## 2020-01-12 NOTE — DIETARY
NUTRITION SERVICES: BMI - Pt with BMI >40 (=Body mass index is 42.45 kg/m².), morbid obesity. Lasix on MAR and 2+ bilateral lower extremity edema per flow sheets. Weight loss counseling not appropriate in acute care setting. RECOMMEND - Referral to outpatient nutrition services for weight management after D/C.

## 2020-01-12 NOTE — CARE PLAN
Problem: Bronchoconstriction:  Goal: Improve in air movement and diminished wheezing  Outcome: PROGRESSING AS EXPECTED     Problem: Bronchopulmonary Hygiene:  Goal: Increase mobilization of retained secretions  Outcome: PROGRESSING AS EXPECTED     Problem: Oxygenation:  Goal: Maintain adequate oxygenation dependent on patient condition  Outcome: PROGRESSING SLOWER THAN EXPECTED

## 2020-01-12 NOTE — PROGRESS NOTES
2 RN Skin Check    2 RN skin check complete with Jorge RN    Devices in place: Nasal Cannula.  Skin assessed under devices: yes.  Confirmed pressure ulcers found on: none.  New potential pressure ulcers noted on none. Wound consult placed N/A.  The following interventions in place pillows    Sacrum red but intact and blanching

## 2020-01-12 NOTE — CARE PLAN
Problem: Communication  Goal: The ability to communicate needs accurately and effectively will improve  Intervention: Educate patient and significant other/support system about the plan of care, procedures, treatments, medications and allow for questions  Note:   Discussed plan of care with patient, all questions and concerns answered. Demonstratied understanding.        Problem: Pain Management  Goal: Pain level will decrease to patient's comfort goal  Note:   Appropriate pain measuring tool used for assessment. Addressing patients pain needs with appropriate interventions. Assessing pain interventions every two hours or/and PRN.

## 2020-01-12 NOTE — THERAPY
"Occupational Therapy Evaluation completed.   Functional Status:  CG for transfers, basic ADLs. Fatigues very quickly, taking frequent rests, min GILL observed  Plan of Care: 3 x weekly to focus on safety, ADLS, functional endurance  Discharge Recommendations:  Equipment: TBD. Post-acute therapy Recommend home health transitional care for continued occupational therapy services. Versus Recommend post-acute placement for additional occupational therapy services prior to discharge home. Patient can tolerate post-acute therapies at a 5x/week frequency.    See \"Rehab Therapy-Acute\" Patient Summary Report for complete documentation.    "

## 2020-01-13 ENCOUNTER — APPOINTMENT (OUTPATIENT)
Dept: RADIOLOGY | Facility: MEDICAL CENTER | Age: 75
DRG: 189 | End: 2020-01-13
Attending: INTERNAL MEDICINE
Payer: MEDICARE

## 2020-01-13 LAB
ACTION RANGE TRIGGERED IACRT: YES
ANION GAP SERPL CALC-SCNC: 7 MMOL/L (ref 0–11.9)
BASE EXCESS BLDA CALC-SCNC: 20 MMOL/L (ref -4–3)
BASE EXCESS BLDA CALC-SCNC: ABNORMAL MMOL/L (ref -4–3)
BODY TEMPERATURE: ABNORMAL CENTIGRADE
BODY TEMPERATURE: ABNORMAL DEGREES
BUN SERPL-MCNC: 19 MG/DL (ref 8–22)
CALCIUM SERPL-MCNC: 8.8 MG/DL (ref 8.5–10.5)
CHLORIDE SERPL-SCNC: 90 MMOL/L (ref 96–112)
CO2 BLDA-SCNC: ABNORMAL MMOL/L (ref 20–33)
CO2 SERPL-SCNC: 44 MMOL/L (ref 20–33)
CREAT SERPL-MCNC: 0.6 MG/DL (ref 0.5–1.4)
GLUCOSE SERPL-MCNC: 108 MG/DL (ref 65–99)
HCO3 BLDA-SCNC: 55 MMOL/L (ref 17–25)
HCO3 BLDA-SCNC: ABNORMAL MMOL/L (ref 17–25)
HOROWITZ INDEX BLDA+IHG-RTO: 147 MM[HG]
HOROWITZ INDEX BLDA+IHG-RTO: 152 MM[HG]
HOROWITZ INDEX BLDA+IHG-RTO: 154 MM[HG]
INST. QUALIFIED PATIENT IIQPT: YES
O2/TOTAL GAS SETTING VFR VENT: 50 %
O2/TOTAL GAS SETTING VFR VENT: 50 %
O2/TOTAL GAS SETTING VFR VENT: 60 %
PCO2 BLDA: >100 MMHG (ref 26–37)
PCO2 BLDA: >150 MMHG (ref 26–37)
PCO2 TEMP ADJ BLDA: ABNORMAL MMHG (ref 26–37)
PH BLDA: 7.17 [PH] (ref 7.4–7.5)
PH BLDA: 7.21 [PH] (ref 7.4–7.5)
PH TEMP ADJ BLDA: 7.18 [PH] (ref 7.4–7.5)
PH TEMP ADJ BLDA: 7.19 [PH] (ref 7.4–7.5)
PH TEMP ADJ BLDA: 7.21 [PH] (ref 7.4–7.5)
PO2 BLDA: 76 MMHG (ref 64–87)
PO2 BLDA: 77 MMHG (ref 64–87)
PO2 BLDA: 77.9 MMHG (ref 64–87)
PO2 BLDA: 88 MMHG (ref 64–87)
PO2 TEMP ADJ BLDA: 71 MMHG (ref 64–87)
PO2 TEMP ADJ BLDA: 76 MMHG (ref 64–87)
PO2 TEMP ADJ BLDA: 82 MMHG (ref 64–87)
POTASSIUM SERPL-SCNC: 4.2 MMOL/L (ref 3.6–5.5)
SAO2 % BLDA: 94.3 % (ref 93–99)
SAO2 % BLDA: ABNORMAL % (ref 93–99)
SODIUM SERPL-SCNC: 141 MMOL/L (ref 135–145)
SPECIMEN DRAWN FROM PATIENT: ABNORMAL

## 2020-01-13 PROCEDURE — 82803 BLOOD GASES ANY COMBINATION: CPT

## 2020-01-13 PROCEDURE — 700111 HCHG RX REV CODE 636 W/ 250 OVERRIDE (IP): Performed by: INTERNAL MEDICINE

## 2020-01-13 PROCEDURE — 770022 HCHG ROOM/CARE - ICU (200)

## 2020-01-13 PROCEDURE — 700101 HCHG RX REV CODE 250: Performed by: INTERNAL MEDICINE

## 2020-01-13 PROCEDURE — 36600 WITHDRAWAL OF ARTERIAL BLOOD: CPT

## 2020-01-13 PROCEDURE — 99233 SBSQ HOSP IP/OBS HIGH 50: CPT | Performed by: INTERNAL MEDICINE

## 2020-01-13 PROCEDURE — A9270 NON-COVERED ITEM OR SERVICE: HCPCS | Performed by: INTERNAL MEDICINE

## 2020-01-13 PROCEDURE — 99291 CRITICAL CARE FIRST HOUR: CPT | Performed by: INTERNAL MEDICINE

## 2020-01-13 PROCEDURE — 5A09357 ASSISTANCE WITH RESPIRATORY VENTILATION, LESS THAN 24 CONSECUTIVE HOURS, CONTINUOUS POSITIVE AIRWAY PRESSURE: ICD-10-PCS | Performed by: INTERNAL MEDICINE

## 2020-01-13 PROCEDURE — 99292 CRITICAL CARE ADDL 30 MIN: CPT | Performed by: INTERNAL MEDICINE

## 2020-01-13 PROCEDURE — 700105 HCHG RX REV CODE 258: Performed by: INTERNAL MEDICINE

## 2020-01-13 PROCEDURE — 94640 AIRWAY INHALATION TREATMENT: CPT

## 2020-01-13 PROCEDURE — 700102 HCHG RX REV CODE 250 W/ 637 OVERRIDE(OP): Performed by: INTERNAL MEDICINE

## 2020-01-13 PROCEDURE — 80048 BASIC METABOLIC PNL TOTAL CA: CPT

## 2020-01-13 PROCEDURE — 71045 X-RAY EXAM CHEST 1 VIEW: CPT

## 2020-01-13 PROCEDURE — 94002 VENT MGMT INPAT INIT DAY: CPT

## 2020-01-13 RX ORDER — METHYLPREDNISOLONE SODIUM SUCCINATE 40 MG/ML
40 INJECTION, POWDER, LYOPHILIZED, FOR SOLUTION INTRAMUSCULAR; INTRAVENOUS EVERY 8 HOURS
Status: DISCONTINUED | OUTPATIENT
Start: 2020-01-13 | End: 2020-01-17

## 2020-01-13 RX ADMIN — BUSPIRONE HYDROCHLORIDE 7.5 MG: 5 TABLET ORAL at 23:35

## 2020-01-13 RX ADMIN — METOPROLOL TARTRATE 25 MG: 25 TABLET, FILM COATED ORAL at 05:51

## 2020-01-13 RX ADMIN — METHYLPREDNISOLONE SODIUM SUCCINATE 40 MG: 40 INJECTION, POWDER, FOR SOLUTION INTRAMUSCULAR; INTRAVENOUS at 14:43

## 2020-01-13 RX ADMIN — ACETAZOLAMIDE 500 MG: 500 INJECTION, POWDER, LYOPHILIZED, FOR SOLUTION INTRAVENOUS at 11:46

## 2020-01-13 RX ADMIN — METHYLPREDNISOLONE SODIUM SUCCINATE 40 MG: 40 INJECTION, POWDER, FOR SOLUTION INTRAMUSCULAR; INTRAVENOUS at 21:54

## 2020-01-13 RX ADMIN — DOXYCYCLINE 100 MG: 100 TABLET, FILM COATED ORAL at 05:51

## 2020-01-13 RX ADMIN — FUROSEMIDE 40 MG: 10 INJECTION, SOLUTION INTRAMUSCULAR; INTRAVENOUS at 16:12

## 2020-01-13 RX ADMIN — FAMOTIDINE 20 MG: 20 TABLET ORAL at 05:51

## 2020-01-13 RX ADMIN — FAMOTIDINE 20 MG: 20 TABLET ORAL at 17:17

## 2020-01-13 RX ADMIN — RIVAROXABAN 20 MG: 20 TABLET, FILM COATED ORAL at 17:17

## 2020-01-13 RX ADMIN — IPRATROPIUM BROMIDE AND ALBUTEROL SULFATE 3 ML: .5; 3 SOLUTION RESPIRATORY (INHALATION) at 19:42

## 2020-01-13 RX ADMIN — DOXYCYCLINE 100 MG: 100 TABLET, FILM COATED ORAL at 17:17

## 2020-01-13 RX ADMIN — METOPROLOL TARTRATE 25 MG: 25 TABLET, FILM COATED ORAL at 17:17

## 2020-01-13 RX ADMIN — PREDNISONE 50 MG: 50 TABLET ORAL at 05:51

## 2020-01-13 RX ADMIN — FUROSEMIDE 40 MG: 10 INJECTION, SOLUTION INTRAMUSCULAR; INTRAVENOUS at 05:51

## 2020-01-13 RX ADMIN — IPRATROPIUM BROMIDE AND ALBUTEROL SULFATE 3 ML: .5; 3 SOLUTION RESPIRATORY (INHALATION) at 14:31

## 2020-01-13 RX ADMIN — IPRATROPIUM BROMIDE AND ALBUTEROL SULFATE 3 ML: .5; 3 SOLUTION RESPIRATORY (INHALATION) at 11:11

## 2020-01-13 ASSESSMENT — PULMONARY FUNCTION TESTS
EPAP_CMH2O: 8

## 2020-01-13 ASSESSMENT — ENCOUNTER SYMPTOMS
HEARTBURN: 0
COUGH: 0
BLURRED VISION: 0
MYALGIAS: 0
ORTHOPNEA: 1
FEVER: 0
SHORTNESS OF BREATH: 1

## 2020-01-13 NOTE — PROGRESS NOTES
09:30 -  Got patient to side of bed to start eating.  Patient seemed sleepy and a little dusky.  Let Dr. Hull know and got order for stat ABG.    09:50 -  Went back into patient room and noticed she was more dusky and legs were turning blue.  patient stated she did not feel right but did not know why.  Let Dr. Hull know who was at nurses' station and got set of vitals.  Oxygen was 69% on 4L.  Put her on mask while waiting for RT whom put her on cpap.  Rapid nurse was called to expedite orders.  Dr. Hull remained at bedside.

## 2020-01-13 NOTE — PROGRESS NOTES
"Patient keeps attempting to pull off BiPAP. Refusing to wear the mask. O2 dropped to 72%. Patient stating \"Just let me die, just let me die.\" Dr Gomes at bedside. Spoke with  at bedside and daughter on the phone. Daughter stated she will be able to come bedside in an hour. Per Dr Gomes keep patient on the BiPAP until able to discuss plan of care with  and daughter (POA) at the bedside.  "

## 2020-01-13 NOTE — ASSESSMENT & PLAN NOTE
Severe RVSP 90  Multifactorial  Maintain O2 saturations greater than 92%  Maintain euvolemic volume balance  Agrees to compliance with nocturnal AVAPS/BiPAP for BRIDGETT/OHS  Recommend weight loss

## 2020-01-13 NOTE — CONSULTS
Pulmonary Consultation    Date of consult: 1/11/2020    Referring Physician  Agnieszka Peña D.O.    Reason for Consultation  Acute on Chronic respiratory failure hypercarbic    History of Presenting Illness  74 y.o. female who presented 1/11/2020 with  history of COPD (2017 PFTS lFEV1 is 0.69 L which is 38% of predicted. FEV1 FVC ratio is reduced at 69%. MVV is severely reduced at 34% of predicted. After administration of an inhaled bronchodilator there is improvement in FEV1 up to 0.94 L which is 53% of predicted. This is a relative 36% improvement.   Total lung capacity is mildly reduced at 76% of predicted. RV/TLC ratio is increased to 154% of predicted. DLCO is 112% of predicted. Airways resistance is increased.    , mild to moderate aortic stenosis in the past, paroxysmal atrial fibrillation and anticoagulated with Xarelto, diastolic congestive heart failure, morbid obesity, chronic hypoxemic respiratory failure requiring 2.5 to 4 L of oxygen via nasal cannula at baseline and untreated sleep apnea.  She also has persistent LLL atelectasis    Since 2016 her HCO3 has been increasing from 30 to now she is in the 40s  ABG today shows 7.3/106 paCO2 without complete compensation as her HCO should be higher    She presents today with SOB progressive with symptoms and signs of CHF exacerbation    1/12/20: patient has refused AVAPS, we are offering her a nasal mask today but completely refused yesterday. Her physical exam is unchanged    Code Status  Full Code    Review of Systems  Review of Systems   Constitutional: Positive for malaise/fatigue.   HENT: Negative.    Eyes: Negative.    Respiratory: Positive for shortness of breath.    Cardiovascular: Positive for leg swelling.   Gastrointestinal: Negative.    Genitourinary: Negative.    Musculoskeletal: Negative.    Skin: Negative.    Neurological: Positive for dizziness.   Endo/Heme/Allergies: Negative.    Psychiatric/Behavioral: Negative.        Past Medical  History   has a past medical history of Aortic stenosis, Arrhythmia, Arthritis, Atrial fibrillation ([facility]), CHF (congestive heart failure) ([facility]), Chickenpox, COPD, Daytime sleepiness, Difficulty breathing, La[place] measles, Heartburn, [facility] (11/9/2012), Mumps, Painful joint, Palpitations, Rash, Recurrent major depressive disorder, in partial remission ([facility]) (9/5/2019), Shortness of breath, Swelling of lower extremity, Toothache, and Weight loss.    Surgical History   has a past surgical history that includes tonsillectomy; appendectomy; primary c section; tubal coagulation laparoscopic bilateral; and gastric bypass laparoscopic.    Family History  family history includes Alcohol abuse in her mother; Asthma in her sister; Breast Cancer in her sister; Cancer in her maternal aunt; Dementia in her father; Hyperlipidemia in her sister; Hypertension in her daughter; Lung Disease in her sister; No Known Problems in her maternal grandfather, maternal grandmother, and paternal grandmother; Other in her daughter; Prostate cancer in her father; Thyroid in her daughter.    Social History   reports that she quit smoking about 36 years ago. Her smoking use included cigarettes. She has a 13.00 pack-year smoking history. She has never used smokeless tobacco. She reports current alcohol use of about 4.2 oz of alcohol per week. She reports that she does not use drugs.    Medications  Home Medications     Reviewed by Nadya Tyson, Pharmacy Intern (Pharmacy Intern) on 01/11/20 at 0855  Med List Status: Complete   Medication Last Dose Status   albuterol (PROVENTIL) 2.5mg/3ml Nebu Soln solution for nebulization PRN Active   albuterol 108 (90 Base) MCG/ACT Aero Soln inhalation aerosol PRN Active   busPIRone (BUSPAR) 7.5 MG tablet 1/10/2020 Active   CALCIUM PO 1/10/2020 Active   Fluticasone-Umeclidin-Vilant (TRELEGY ELLIPTA) 100-62.5-25 MCG/INH AEROSOL POWDER, BREATH ACTIVATED 1/10/2020 Active   furosemide (LASIX) 20 MG Tab  1/10/2020 Active   Lactobacillus Rhamnosus, GG, (CULTURELLE PO) 1/10/2020 Active   lidocaine (LIDODERM) 5 % Patch 1/10/2020 Active   metoprolol (LOPRESSOR) 25 MG Tab 1/10/2020 Active   Multiple Vitamin (MULTI-VITAMIN PO) 1/10/2020 Active   omeprazole (PRILOSEC) 20 MG delayed-release capsule 1/10/2020 Active   potassium chloride SA (KDUR) 20 MEQ Tab CR 1/10/2020 Active   rivaroxaban (XARELTO) 20 MG Tab tablet 1/10/2020 Active   triamcinolone acetonide (KENALOG) 0.1 % Cream PRN Active   vitamin D (CHOLECALCIFEROL) 1000 UNIT TABS 1/10/2020 Active              Current Facility-Administered Medications   Medication Dose Route Frequency Provider Last Rate Last Dose   • ipratropium-albuterol (DUONEB) nebulizer solution  3 mL Nebulization 4X/DAY (RT) Aryan Jacobson M.D.       • doxycycline monohydrate (ADOXA) tablet 100 mg  100 mg Oral Q12HRS Aryan Jacobson M.D.   100 mg at 01/12/20 1709   • predniSONE (DELTASONE) tablet 50 mg  50 mg Oral DAILY Aryan Jacobson M.D.   50 mg at 01/12/20 0529   • famotidine (PEPCID) tablet 20 mg  20 mg Oral BID Aryan Jacobson M.D.   20 mg at 01/12/20 1709   • furosemide (LASIX) injection 40 mg  40 mg Intravenous BID DIURETIC Aryan Jacobson M.D.   40 mg at 01/12/20 1519   • rivaroxaban (XARELTO) tablet 20 mg  20 mg Oral PM MEAL Aryan Jacobson M.D.   20 mg at 01/12/20 1709   • busPIRone (BUSPAR) tablet 7.5 mg  7.5 mg Oral QHS Aryan Jacobson M.D.   7.5 mg at 01/11/20 2146   • metoprolol (LOPRESSOR) tablet 25 mg  25 mg Oral BID Aryan Jacobson M.D.   25 mg at 01/12/20 1709   • Respiratory Care per Protocol   Nebulization Continuous RT Aryan Jacobson M.D.       • acetaminophen (TYLENOL) tablet 650 mg  650 mg Oral Q6HRS PRN Aryan Jacobson M.D.   650 mg at 01/12/20 1519   • enalaprilat (VASOTEC) injection 1.25 mg  1.25 mg Intravenous Q6HRS PRN Aryan Jacobson M.D.       • ondansetron (ZOFRAN) syringe/vial injection 4 mg  4 mg Intravenous Q4HRS PRN Aryan Jacobson M.D.       • ondansetron (ZOFRAN ODT) dispertab 4 mg  4 mg Oral  Q4HRS PRN Aryan Jacobson M.D.       • umeclidinium-vilanterol (ANORO ELLIPTA) inhaler 1 Puff  1 Puff Inhalation QDAILY (RT) Aryan Jacobson M.D.   1 Puff at 01/12/20 1009    And   • fluticasone (FLOVENT HFA) 44 MCG/ACT inhaler 88 mcg  2 Puff Inhalation QDAILY (RT) Aryan Jacobson M.D.   88 mcg at 01/12/20 1008       Allergies  Allergies   Allergen Reactions   • Bee Anaphylaxis   • Fosamax Unspecified     Bone pain   • Iodine Rash     RASH   • Pcn [Penicillins] Rash     Rash years ago   • Sulfa Drugs Rash     Terrible rash  Tolerates furosemide 1/20  Tolerates acetazolamide 1/20   • Other Drug Unspecified     Also allergies to Actenol   • Tape Unspecified     Pulls my skin       Vital Signs last 24 hours  Temp:  [36.3 °C (97.4 °F)-37.3 °C (99.2 °F)] 36.7 °C (98 °F)  Pulse:  [77-90] 82  Resp:  [18-26] 18  BP: ()/(40-58) 126/40  SpO2:  [92 %-98 %] 92 %    Physical Exam  Physical Exam  Constitutional:       Appearance: She is ill-appearing.   HENT:      Head: Normocephalic and atraumatic.      Mouth/Throat:      Mouth: Mucous membranes are dry.   Eyes:      Extraocular Movements: Extraocular movements intact.      Pupils: Pupils are equal, round, and reactive to light.   Neck:      Musculoskeletal: No neck rigidity.      Comments: + JVD  Cardiovascular:      Heart sounds: Murmur present.      Comments: Systolic murmur loudest in the RUSB    Pulmonary:      Comments: Kyphotic and tachypneac  Abdominal:      General: Bowel sounds are normal.      Palpations: There is no mass.   Musculoskeletal:      Right lower leg: Edema present.      Left lower leg: Edema present.   Lymphadenopathy:      Cervical: No cervical adenopathy.   Skin:     General: Skin is warm and dry.   Neurological:      General: No focal deficit present.      Mental Status: She is alert.   Psychiatric:         Mood and Affect: Mood normal.         Behavior: Behavior normal.         Thought Content: Thought content normal.         Judgment: Judgment normal.           Laboratory  Labs:  Recent Labs     01/11/20 0555 01/12/20 0819   WBC 6.7 9.2   RBC 4.11* 4.02*   HEMOGLOBIN 11.7* 11.4*   HEMATOCRIT 42.9 42.1   .4* 104.7*   MCH 28.5 28.4   MCHC 27.3* 27.1*   RDW 55.0* 55.6*   PLATELETCT 150* 142*   MPV 10.0 10.4     Recent Labs     01/11/20 0555   APTT 33.6   INR 1.72*             Recent Labs     01/11/20 0555 01/12/20 0819   SODIUM 144 141   POTASSIUM 4.4 4.3   CHLORIDE 93* 90*   CO2 45* 46*   GLUCOSE 109* 135*   BUN 16 16     Recent Labs     01/11/20 0555 01/12/20 0819   SODIUM 144 141   POTASSIUM 4.4 4.3   CHLORIDE 93* 90*   CO2 45* 46*   BUN 16 16   CREATININE 0.55 0.53   MAGNESIUM 1.9 1.6   PHOSPHORUS 3.5 4.1   CALCIUM 9.1 8.7     Results for orders placed or performed during the hospital encounter of 04/05/18   ECHOCARDIOGRAM COMP W/O CONT   Result Value Ref Range    Eject.Frac. MOD BP 53.55     Eject.Frac. MOD 4C 56.04     Eject.Frac. MOD 2C 52.15     Left Ventrical Ejection Fraction 65          Imaging:   DX-CHEST-LIMITED (1 VIEW)   Final Result      No significant interval change.      EC-ECHOCARDIOGRAM COMPLETE W/O CONT   Final Result      US-EXTREMITY VENOUS LOWER BILAT   Final Result      DX-CHEST-PORTABLE (1 VIEW)   Final Result         1.  Pulmonary edema and/or infiltrates.   2.  Stable right infrahilar opacity, likely corresponding with chronic consolidation/atelectasis of the lingula visualized on prior CT January 18, 2019          Imaging  CXR personally viewed, persistent rml infiltrate -- appears larger than the  on the CT  + pulmonary edema    ECHO shows moderate AS and RVSP 90 mmhg    Assessment/Plan  * Acute on chronic respiratory failure with hypoxia and hypercapnia (HCC)- (present on admission)  Assessment & Plan  Multifocal  She has very severe obstructive lung disease with FEV1 of 38%, underlying restrictive lung disease due to her kyphosis and obesity, obesity hypoventilation syndrome and  Persistent shunt with the RML  atelectasis that appears to be progressing since Jan 2019    She has been compensating suprisingly with HCO3 now in the 40s but also now in acute on chronic resp failure due to CHF secondary to moderate AS, right heart failure and diastolic heart failure     Thus agree she needs treatment for BRIDGETT as if she loses her drive when sleeping she will not be able to compensate and will inevitably progress with her respiratory failure    Patient refused use last night  Reviewed with patient benefits as this is noninvasive and the only other alternative would be tracheostomy and ventilator which she also is not interested in.  Thus, if she is refusing noninvasive ventilation which can be helpful unsure what else can be offered.    Her prognosis is very guarded and minimal reserve.

## 2020-01-13 NOTE — PROGRESS NOTES
[facility] Medicine Daily Progress Note    Date of Service  1/13/2020    Chief Complaint  74 y.o. female admitted 1/11/2020 with progressively worse sob/periph edema for last 2-3 weeks    Hospital Course    refused CPAP last night; worse sob this am with lethargy, desat to 70% around 9:45 am, now placed on CPAP.      Interval Problem Update  As above    Consultants/Specialty  pulm    Code Status  full    Disposition  TBD    Review of Systems  Review of Systems   Constitutional: Negative for fever.   HENT: Negative for hearing loss.    Eyes: Negative for blurred vision.   Respiratory: Positive for shortness of breath. Negative for cough.    Cardiovascular: Positive for orthopnea and leg swelling. Negative for chest pain.   Gastrointestinal: Negative for heartburn.   Genitourinary: Negative for dysuria.   Musculoskeletal: Negative for myalgias.   Skin: Negative for rash.        Physical Exam  Temp:  [36.5 °C (97.7 °F)-37.2 °C (99 °F)] 37.2 °C (99 °F)  Pulse:  [74-87] 77  Resp:  [16-18] 18  BP: (105-144)/(40-98) 136/48  SpO2:  [92 %-96 %] 92 %    Physical Exam  Constitutional:       Appearance: She is obese.   HENT:      Head: Normocephalic.      Nose: Nose normal.      Mouth/Throat:      Mouth: Mucous membranes are moist.   Eyes:      Extraocular Movements: Extraocular movements intact.   Neck:      Musculoskeletal: Normal range of motion and neck supple.   Cardiovascular:      Rate and Rhythm: Normal rate. Rhythm irregular.      Heart sounds: Murmur present.   Pulmonary:      Effort: Respiratory distress present.      Breath sounds: Rales present. No wheezing.      Comments: Mild basilar crackles, no wheezes, weak air movement  Abdominal:      General: Bowel sounds are normal.      Palpations: Abdomen is soft.   Musculoskeletal:      Right lower leg: Edema present.      Left lower leg: Edema present.      Comments: 3+ BLE edema, improving per patient   Skin:     General: Skin is warm.   Neurological:      General: No  focal deficit present.      Mental Status: She is alert.   Psychiatric:         Behavior: Behavior normal.         Fluids    Intake/Output Summary (Last 24 hours) at 1/13/2020 1024  Last data filed at 1/12/2020 2100  Gross per 24 hour   Intake --   Output 900 ml   Net -900 ml       Laboratory  Recent Labs     01/11/20  0555 01/12/20  0819   WBC 6.7 9.2   RBC 4.11* 4.02*   HEMOGLOBIN 11.7* 11.4*   HEMATOCRIT 42.9 42.1   .4* 104.7*   MCH 28.5 28.4   MCHC 27.3* 27.1*   RDW 55.0* 55.6*   PLATELETCT 150* 142*   MPV 10.0 10.4     Recent Labs     01/11/20  0555 01/12/20  0819 01/13/20  0344   SODIUM 144 141 141   POTASSIUM 4.4 4.3 4.2   CHLORIDE 93* 90* 90*   CO2 45* 46* 44*   GLUCOSE 109* 135* 108*   BUN 16 16 19   CREATININE 0.55 0.53 0.60   CALCIUM 9.1 8.7 8.8     Recent Labs     01/11/20  0555   APTT 33.6   INR 1.72*               Imaging  DX-CHEST-LIMITED (1 VIEW)   Final Result      No significant interval change.      EC-ECHOCARDIOGRAM COMPLETE W/O CONT   Final Result      US-EXTREMITY VENOUS LOWER BILAT   Final Result      DX-CHEST-PORTABLE (1 VIEW)   Final Result         1.  Pulmonary edema and/or infiltrates.   2.  Stable right infrahilar opacity, likely corresponding with chronic consolidation/atelectasis of the lingula visualized on prior CT January 18, 2019      DX-CHEST-PORTABLE (1 VIEW)    (Results Pending)        Assessment/Plan  * Acute on chronic respiratory failure with hypoxia and hypercapnia (HCC)- (present on admission)  Assessment & Plan  Due to CHF/ COPD exacerbation/ obesity hypoventilation syndrome; echo showed preserved EF 60% with moderate AS;  Worse resp failure this am with desat to 70%, increased sob/lethargy; await stat ABG/CXR; started on CPAP with RT  Transfer to ICU for impending resp failure; patient is full code                Demand ischemia (HCC)- (present on admission)  Assessment & Plan  With sl.elevated troponin, suspect secondary to chronic fluid overload, aortic stenosis,  hypoxemic respiratory failure as reviewed above.    EKG obtained on presentation has been reviewed by me, no signs of acute infarction    Monitor on telemetry, obtain serial EKGs and troponins,( not much changed)   echocardiogram noted      Aortic stenosis- (present on admission)  Assessment & Plan  Echo showed normal EF with mod AS; poor candidate for aggressive intervention; continue supportive care    COPD (chronic obstructive pulmonary disease) (HCC)- (present on admission)  Assessment & Plan  Advanced copd at baseline with chronic resp failure on 3L O2 at home, has GILL of only a few yards.  IV solumedrol, bronchodilator therapy, abx  RT protocol  Close clinical monitoring  Pulmonology evaluation  Close outpatient pulmonology follow-up    Obesity (BMI 35.0-39.9 without comorbidity) (HCC)  Assessment & Plan  Body mass index is 44.92 kg/m².    Macrocytic anemia- (present on admission)  Assessment & Plan  No evidence of acute bleeding at this time  We will check iron studies, ferritin, reticulocyte count; vitamin B12/folate/TSH ok  Monitor Hb / Restrictive transfusion strategy    Pulmonary hypertension (HCC)- (present on admission)  Assessment & Plan   Severe, due to advanced copd  Echo done here, RVSP 90    Morbid obesity (HCC)- (present on admission)  Assessment & Plan  Body mass index is 44.92 kg/m².   Advised gradual wt loss    Paroxysmal atrial fibrillation (HCC)- (present on admission)  Assessment & Plan  Continue metoprolol, Xarelto; rate controlled  Monitor on telemetry       VTE prophylaxis: xarelto

## 2020-01-13 NOTE — RESPIRATORY CARE
Per NOC RT and pt interview this afternoon, pt repeatedly refuses to use CPAP/AVAPS. Pt states she cannot use any mask and cannot tolerate the equipment. I explained the importance of using the CPAP on AVAPS settings and the pt continues to deny the device. I made additional attempts to convince the pt to use, and offered a smaller nasal mask which was agreed to by pt. However the pt continues to refuse the device at this time, and an appropriate sized nasal mask had been left to use this NOC if tolerated.

## 2020-01-13 NOTE — THERAPY
Physical Therapy Contact Note    PT treatment attempted, patient requested PT return after breakfast. Will re attempt as appropriate and able. Note patient transferred to higher level of care following attempt.    Mayra Billings, PT, DPT  [phone]

## 2020-01-13 NOTE — PROGRESS NOTES
Assumed care of patient at 1915, received report from day RN at that time. Communication board updated and reviewed POC with pt. Pt denies pain at this time. Assessment complete. No other needs at this time. Call light within reach.

## 2020-01-13 NOTE — RESPIRATORY CARE
Pt arrived to unit on 15L nonrebreather. Pt placed on BiPAP per MD order. Pt demonstrated taking mask off if needed.   Repeat ABG pending

## 2020-01-13 NOTE — PROGRESS NOTES
2 RN skin check complete.   Devices in place BiPAP, periwick, O2 probe, ecg leads, and bp cuff.  Skin assessed under devices.  Confirmed pressure ulcers found on none.  New potential pressure ulcers noted on none.  Patient has generalized redness, blanching, and bruising. There is a moisture fissure on the sacrum.  The following interventions in place: BiPAP protective gel in place, sacral and heel mepilex placed, pillows used for positioning, and barrier paste applied.

## 2020-01-13 NOTE — CARE PLAN
Problem: Venous Thromboembolism (VTW)/Deep Vein Thrombosis (DVT) Prevention:  Goal: Patient will participate in Venous Thrombosis (VTE)/Deep Vein Thrombosis (DVT)Prevention Measures  Flowsheets (Taken 1/12/2020 8201)  Pharmacologic Prophylaxis Used: Rivaroxaban (Xarelto) - (ONLY for Total Knee and Total Hip Surgery)     Problem: Respiratory:  Goal: Respiratory status will improve  Note:   3L NC, pt refuses to where her CPAP at night. RT treatments.

## 2020-01-13 NOTE — PROGRESS NOTES
Patient arrived to T630 with rapid team and on a non-rebreather. Connected to unit monitors, VSS. Patient is lethargic but easy to arouse and is AOx4. RT at bedside to place patient on BiPAP. Skin assessment complete, bed alarm on, call light within reach.

## 2020-01-13 NOTE — CARE PLAN
Problem: Oxygenation:  Goal: Maintain adequate oxygenation dependent on patient condition  Intervention: Manage oxygen therapy by monitoring pulse oximetry and/or ABG values  Note:   NOC AWAPS      Problem: Bronchoconstriction:  Goal: Improve in air movement and diminished wheezing  Intervention: Implement inhaled treatments  Note:   Duo QID  Anoro   Flovent

## 2020-01-13 NOTE — CONSULTS
Critical Care Consultation    Date of consult: 1/13/2020    Referring Physician  Van Hull M.D.    Reason for Consultation  Acute on chronic hypoxic respiratory failure    History of Presenting Illness  74 y.o. female who presented 1/11/2020 with  history of COPD (2017 PFTS lFEV1 is 0.69 L which is 38% of predicted. FEV1 FVC ratio is reduced at 69%. MVV is severely reduced at 34% of predicted. After administration of an inhaled bronchodilator there is improvement in FEV1 up to 0.94 L which is 53% of predicted. This is a relative 36% improvement.   Total lung capacity is mildly reduced at 76% of predicted. RV/TLC ratio is increased to 154% of predicted. DLCO is 112% of predicted. Airways resistance is increased.    , mild to moderate aortic stenosis in the past, paroxysmal atrial fibrillation and anticoagulated with Xarelto, diastolic congestive heart failure, morbid obesity, chronic hypoxemic respiratory failure requiring 2.5 to 4 L of oxygen via nasal cannula at baseline and untreated sleep apnea.  She also has persistent LLL atelectasis     Since 2016 her HCO3 has been increasing from 30 to now she is in the 40s  ABG today shows 7.3/106 paCO2 without complete compensation as her HCO should be higher     She presents today with SOB progressive with symptoms and signs of CHF exacerbation    Patient had further decline while on telemetry unit with increasing somnolence and worsening hypercarbic respiratory failure requiring initiation of NIPPV and transferred to ICU    Code Status  Full Code    Review of Systems  Review of Systems   Unable to perform ROS: Acuity of condition       Past Medical History   has a past medical history of Aortic stenosis, Arrhythmia, Arthritis, Atrial fibrillation (HCC), CHF (congestive heart failure) (HCC), Chickenpox, COPD, Daytime sleepiness, Difficulty breathing, [place] measles, Heartburn, [facility] HOME (11/9/2012), Mumps, Painful joint, Palpitations, Rash, Recurrent major depressive  disorder, in partial remission (HCC) (9/5/2019), Shortness of breath, Swelling of lower extremity, Toothache, and Weight loss.    Surgical History   has a past surgical history that includes tonsillectomy; appendectomy; primary c section; tubal coagulation laparoscopic bilateral; and gastric bypass laparoscopic.    Family History  family history includes Alcohol abuse in her mother; Asthma in her sister; Breast Cancer in her sister; Cancer in her maternal aunt; Dementia in her father; Hyperlipidemia in her sister; Hypertension in her daughter; Lung Disease in her sister; No Known Problems in her maternal grandfather, maternal grandmother, and paternal grandmother; Other in her daughter; Prostate cancer in her father; Thyroid in her daughter.    Social History   reports that she quit smoking about 36 years ago. Her smoking use included cigarettes. She has a 13.00 pack-year smoking history. She has never used smokeless tobacco. She reports current alcohol use of about 4.2 oz of alcohol per week. She reports that she does not use drugs.    Medications  Home Medications     Reviewed by Nadya Tyson, Pharmacy Intern (Pharmacy Intern) on 01/11/20 at 0855  Med List Status: Complete   Medication Last Dose Status   albuterol (PROVENTIL) 2.5mg/3ml Nebu Soln solution for nebulization PRN Active   albuterol 108 (90 Base) MCG/ACT Aero Soln inhalation aerosol PRN Active   busPIRone (BUSPAR) 7.5 MG tablet 1/10/2020 Active   CALCIUM PO 1/10/2020 Active   Fluticasone-Umeclidin-Vilant (TRELEGY ELLIPTA) 100-62.5-25 MCG/INH AEROSOL POWDER, BREATH ACTIVATED 1/10/2020 Active   furosemide (LASIX) 20 MG Tab 1/10/2020 Active   Lactobacillus Rhamnosus, GG, (CULTURELLE PO) 1/10/2020 Active   lidocaine (LIDODERM) 5 % Patch 1/10/2020 Active   metoprolol (LOPRESSOR) 25 MG Tab 1/10/2020 Active   Multiple Vitamin (MULTI-VITAMIN PO) 1/10/2020 Active   omeprazole (PRILOSEC) 20 MG delayed-release capsule 1/10/2020 Active   potassium chloride SA  (KDUR) 20 MEQ Tab CR 1/10/2020 Active   rivaroxaban (XARELTO) 20 MG Tab tablet 1/10/2020 Active   triamcinolone acetonide (KENALOG) 0.1 % Cream PRN Active   vitamin D (CHOLECALCIFEROL) 1000 UNIT TABS 1/10/2020 Active              Current Facility-Administered Medications   Medication Dose Route Frequency Provider Last Rate Last Dose   • methylPREDNISolone (SOLU-MEDROL) 40 MG injection 40 mg  40 mg Intravenous Q8HRS Van Hull M.D.       • ipratropium-albuterol (DUONEB) nebulizer solution  3 mL Nebulization 4X/DAY (RT) Aryan Jacobson M.D.   Stopped at 01/13/20 0700   • doxycycline monohydrate (ADOXA) tablet 100 mg  100 mg Oral Q12HRS Aryan Jacobson M.D.   100 mg at 01/13/20 0551   • famotidine (PEPCID) tablet 20 mg  20 mg Oral BID Aryan Jacobson M.D.   20 mg at 01/13/20 0551   • furosemide (LASIX) injection 40 mg  40 mg Intravenous BID DIURETIC Aryan Jacobson M.D.   40 mg at 01/13/20 0551   • rivaroxaban (XARELTO) tablet 20 mg  20 mg Oral PM MEAL Aryan Jacobson M.D.   20 mg at 01/12/20 1709   • busPIRone (BUSPAR) tablet 7.5 mg  7.5 mg Oral QHS Aryan Jacobson M.D.   7.5 mg at 01/12/20 2023   • metoprolol (LOPRESSOR) tablet 25 mg  25 mg Oral BID Aryan Jacobson M.D.   25 mg at 01/13/20 0551   • Respiratory Care per Protocol   Nebulization Continuous RT Aryan Jacobson M.D.       • acetaminophen (TYLENOL) tablet 650 mg  650 mg Oral Q6HRS PRN Aryan Jacobson M.D.   650 mg at 01/12/20 1519   • enalaprilat (VASOTEC) injection 1.25 mg  1.25 mg Intravenous Q6HRS PRN Aryan Jacobson M.D.       • ondansetron (ZOFRAN) syringe/vial injection 4 mg  4 mg Intravenous Q4HRS PRN Adnan Kavon, M.D.       • ondansetron (ZOFRAN ODT) dispertab 4 mg  4 mg Oral Q4HRS PRN Aryan Jacobson M.D.       • umeclidinium-vilanterol (ANORO ELLIPTA) inhaler 1 Puff  1 Puff Inhalation QDAILY (RT) Aryan Jacobson M.D.   1 Puff at 01/12/20 1009    And   • fluticasone (FLOVENT HFA) 44 MCG/ACT inhaler 88 mcg  2 Puff Inhalation QDAILY (RT) Aryan Jacobson M.D.   88 mcg at 01/12/20 1008        Allergies  Allergies   Allergen Reactions   • Bee Anaphylaxis   • Fosamax Unspecified     Bone pain   • Iodine Rash     RASH   • Pcn [Penicillins] Rash     Rash years ago   • Sulfa Drugs Rash     Terrible rash  Tolerates furosemide 1/20  Tolerates acetazolamide 1/20   • Other Drug Unspecified     Also allergies to Actenol   • Tape Unspecified     Pulls my skin       Vital Signs last 24 hours  Temp:  [36.5 °C (97.7 °F)-37.2 °C (99 °F)] 37.2 °C (99 °F)  Pulse:  [74-87] 77  Resp:  [16-18] 18  BP: (105-144)/(40-98) 136/48  SpO2:  [92 %-96 %] 92 %    Physical Exam  Physical Exam  Vitals signs and nursing note reviewed.   Constitutional:       General: She is in acute distress.      Appearance: She is ill-appearing. She is not diaphoretic.   HENT:      Head: Normocephalic and atraumatic.   Eyes:      Conjunctiva/sclera: Conjunctivae normal.      Pupils: Pupils are equal, round, and reactive to light.   Neck:      Musculoskeletal: Neck supple.   Cardiovascular:      Rate and Rhythm: Normal rate.      Pulses: Normal pulses.   Pulmonary:      Effort: Respiratory distress present.      Breath sounds: No rales.      Comments: diminished  Abdominal:      General: There is no distension.      Palpations: Abdomen is soft.   Musculoskeletal:         General: Swelling present.   Skin:     General: Skin is warm and dry.   Neurological:      Cranial Nerves: No cranial nerve deficit.   Psychiatric:      Comments: somnolent         Fluids    Intake/Output Summary (Last 24 hours) at 1/13/2020 1041  Last data filed at 1/12/2020 2100  Gross per 24 hour   Intake --   Output 900 ml   Net -900 ml       Laboratory  Recent Results (from the past 48 hour(s))   EKG    Collection Time: 01/11/20  2:06 PM   Result Value Ref Range    Report       [facility]    Test Date:  2020-01-11  Pt Name:    IGOR CISNEROS                 Department: ER  MRN:        [id number]                      Room:       T736  Gender:     Female                        Technician: DGG  :        1945                   Requested By:HUMBERTO LOGAN  Order #:    [id number]                    Reading MD:    Measurements  Intervals                                Axis  Rate:       78                           P:          40  KS:         151                          QRS:        17  QRSD:       100                          T:          38  QT:         395  QTc:        450    Interpretive Statements  SINUS RHYTHM  CONSIDER LEFT ATRIAL ENLARGEMENT  Compared to ECG 2020 05:51:53  No significant changes     EKG in four (4) hours    Collection Time: 20  2:06 PM   Result Value Ref Range    Report       [facility]    Test Date:  2020  Pt Name:    IGOR CISNEROS                 Department: ER  MRN:        [id number]                      Room:       [facility]  Gender:     Female                       Technician: G  :        1945                   Requested By:FIOR GARCIA  Order #:    [id number]                    Reading MD: Eleazar Miller MD    Measurements  Intervals                                Axis  Rate:       78                           P:          40  KS:         151                          QRS:        17  QRSD:       100                          T:          38  QT:         395  QTc:        450    Interpretive Statements  SINUS RHYTHM  CONSIDER LEFT ATRIAL ENLARGEMENT  Compared to ECG 2020 05:51:53  No significant changes  Electronically Signed On 2020 17:58:45 PST by Eleazar Miller MD     EC-ECHOCARDIOGRAM COMPLETE W/O CONT    Collection Time: 20  3:26 PM   Result Value Ref Range    Eject.Frac. MOD BP 55.03     Eject.Frac. MOD 4C 61.5     Eject.Frac. MOD 2C 48.41     Left Ventrical Ejection Fraction 60    IRON/TOTAL IRON BIND    Collection Time: 20  8:19 AM   Result Value Ref Range    Iron 48 40 - 170 ug/dL    Total Iron Binding 393 250 - 450 ug/dL    % Saturation 12 (L) 15 - 55 %   FERRITIN    Collection Time: 20  8:19 AM    Result Value Ref Range    Ferritin 13.3 10.0 - 291.0 ng/mL   VITAMIN B12    Collection Time: 01/12/20  8:19 AM   Result Value Ref Range    Vitamin B12 -True Cobalamin 289 211 - 911 pg/mL   FOLATE    Collection Time: 01/12/20  8:19 AM   Result Value Ref Range    Folate -Folic Acid >24.0 >4.0 ng/mL   RETICULOCYTES COUNT    Collection Time: 01/12/20  8:19 AM   Result Value Ref Range    Reticulocyte Count 1.8 0.8 - 2.1 %    Retic, Absolute 0.07 (H) 0.04 - 0.06 M/uL    Imm. Reticulocyte Fraction 13.7 9.3 - 17.4 %    Retic Hgb Equivalent 25.0 (L) 29.0 - 35.0 pg/cell   TSH    Collection Time: 01/12/20  8:19 AM   Result Value Ref Range    TSH 0.830 0.380 - 5.330 uIU/mL   CBC WITH DIFFERENTIAL    Collection Time: 01/12/20  8:19 AM   Result Value Ref Range    WBC 9.2 4.8 - 10.8 K/uL    RBC 4.02 (L) 4.20 - 5.40 M/uL    Hemoglobin 11.4 (L) 12.0 - 16.0 g/dL    Hematocrit 42.1 37.0 - 47.0 %    .7 (H) 81.4 - 97.8 fL    MCH 28.4 27.0 - 33.0 pg    MCHC 27.1 (L) 33.6 - 35.0 g/dL    RDW 55.6 (H) 35.9 - 50.0 fL    Platelet Count 142 (L) 164 - 446 K/uL    MPV 10.4 9.0 - 12.9 fL    Neutrophils-Polys 88.30 (H) 44.00 - 72.00 %    Lymphocytes 5.20 (L) 22.00 - 41.00 %    Monocytes 5.70 0.00 - 13.40 %    Eosinophils 0.10 0.00 - 6.90 %    Basophils 0.30 0.00 - 1.80 %    Immature Granulocytes 0.40 0.00 - 0.90 %    Nucleated RBC 0.00 /100 WBC    Neutrophils (Absolute) 8.10 (H) 2.00 - 7.15 K/uL    Lymphs (Absolute) 0.48 (L) 1.00 - 4.80 K/uL    Monos (Absolute) 0.52 0.00 - 0.85 K/uL    Eos (Absolute) 0.01 0.00 - 0.51 K/uL    Baso (Absolute) 0.03 0.00 - 0.12 K/uL    Immature Granulocytes (abs) 0.04 0.00 - 0.11 K/uL    NRBC (Absolute) 0.00 K/uL   Comp Metabolic Panel    Collection Time: 01/12/20  8:19 AM   Result Value Ref Range    Sodium 141 135 - 145 mmol/L    Potassium 4.3 3.6 - 5.5 mmol/L    Chloride 90 (L) 96 - 112 mmol/L    Co2 46 (HH) 20 - 33 mmol/L    Anion Gap 5.0 0.0 - 11.9    Glucose 135 (H) 65 - 99 mg/dL    Bun 16 8 - 22 mg/dL     Creatinine 0.53 0.50 - 1.40 mg/dL    Calcium 8.7 8.5 - 10.5 mg/dL    AST(SGOT) 17 12 - 45 U/L    ALT(SGPT) 6 2 - 50 U/L    Alkaline Phosphatase 46 30 - 99 U/L    Total Bilirubin 0.6 0.1 - 1.5 mg/dL    Albumin 3.6 3.2 - 4.9 g/dL    Total Protein 6.4 6.0 - 8.2 g/dL    Globulin 2.8 1.9 - 3.5 g/dL    A-G Ratio 1.3 g/dL   MAGNESIUM    Collection Time: 01/12/20  8:19 AM   Result Value Ref Range    Magnesium 1.6 1.5 - 2.5 mg/dL   PHOSPHORUS    Collection Time: 01/12/20  8:19 AM   Result Value Ref Range    Phosphorus 4.1 2.5 - 4.5 mg/dL   proBrain Natriuretic Peptide, NT    Collection Time: 01/12/20  8:19 AM   Result Value Ref Range    NT-proBNP 4035 (H) 0 - 125 pg/mL   ESTIMATED GFR    Collection Time: 01/12/20  8:19 AM   Result Value Ref Range    GFR If African American >60 >60 mL/min/1.73 m 2    GFR If Non African American >60 >60 mL/min/1.73 m 2   Basic Metabolic Panel    Collection Time: 01/13/20  3:44 AM   Result Value Ref Range    Sodium 141 135 - 145 mmol/L    Potassium 4.2 3.6 - 5.5 mmol/L    Chloride 90 (L) 96 - 112 mmol/L    Co2 44 (HH) 20 - 33 mmol/L    Glucose 108 (H) 65 - 99 mg/dL    Bun 19 8 - 22 mg/dL    Creatinine 0.60 0.50 - 1.40 mg/dL    Calcium 8.8 8.5 - 10.5 mg/dL    Anion Gap 7.0 0.0 - 11.9   ESTIMATED GFR    Collection Time: 01/13/20  3:44 AM   Result Value Ref Range    GFR If African American >60 >60 mL/min/1.73 m 2    GFR If Non African American >60 >60 mL/min/1.73 m 2       Imaging  DX-CHEST-PORTABLE (1 VIEW)   Final Result      1.  Enlarged cardiac silhouette with changes of edema/congestive failure.      DX-CHEST-LIMITED (1 VIEW)   Final Result      No significant interval change.      EC-ECHOCARDIOGRAM COMPLETE W/O CONT   Final Result      US-EXTREMITY VENOUS LOWER BILAT   Final Result      DX-CHEST-PORTABLE (1 VIEW)   Final Result         1.  Pulmonary edema and/or infiltrates.   2.  Stable right infrahilar opacity, likely corresponding with chronic consolidation/atelectasis of the lingula  visualized on prior CT January 18, 2019          Assessment/Plan  * Acute on chronic respiratory failure with hypoxia and hypercapnia (HCC)- (present on admission)  Assessment & Plan  Multifocal  She has very severe obstructive lung disease with FEV1 of 38%, underlying restrictive lung disease due to her kyphosis and obesity, obesity hypoventilation syndrome and  Persistent shunt with the RML atelectasis that appears to be progressing since Jan 2019    She has been compensating suprisingly with HCO3 now in the 40s but also now in acute on chronic resp failure due to CHF secondary to AS and diastolic heart failure     Thus agree she needs treatment for BRIDGETT as if she loses her drive when sleeping she will not be able to compensate and will inevitably progress with her respiratory failure    Explained to the patient that she would need a form of BIPAP - she has agreed to try it but also says if she cannot tolerate it she will not use it (that is why she was not treated for BRIDGETT as she did not tolerate the CPAP mask)    Transferred to ICU with acute deterioration  RT/O2 protocols  Continuous BiPAP with serial ABGs to identify trend  Low threshold for intubation  Continue Solu-Medrol  Aspiration precautions  Procalcitonin has been WNL  Currently on doxycycline with consideration of COPD exacerbation  Patient on Xarelto with low likelihood of PE in setting of AC    Aortic stenosis- (present on admission)  Assessment & Plan  Noted on echo as moderate  Peak: 55 mmHg, Mean: 34 mmHg    COPD (chronic obstructive pulmonary disease) (HCC)- (present on admission)  Assessment & Plan  With acute exacerbation  RT/O2 protocols  Continue steroids  Complete 5 days of doxycycline  Initiate BiPAP with low threshold to transition to full mechanical ventilatory support    Pulmonary hypertension (HCC)- (present on admission)  Assessment & Plan  Severe RVSP 90  Multifactorial  Maintain O2 saturations greater than 92%  Maintain euvolemic  volume balance  Needs compliance with nocturnal BiPAP for BRIDGETT/OHS  Recommend weight loss    Paroxysmal atrial fibrillation (HCC)- (present on admission)  Assessment & Plan  Keep K greater than 4 and mag are in 2  Continue Xarelto  Maintain rate control    I have assessed and reassessed numerous times, clinically appears to have better mentation now, will repeat abg once again. I had long discussion with family at bedside and they are leaning towards DNR/DNI but have not formally declared this yet.     Discussed patient condition and risk of morbidity and/or mortality with Hospitalist, RN, RT, Pharmacy and Patient.    The patient remains critically ill.  Critical care time = 80 minutes in directly providing and coordinating critical care and extensive data review.  No time overlap and excludes procedures.

## 2020-01-13 NOTE — HEART FAILURE PROGRAM
Presentation to ED on 1/11/20 with c/o SOB and increased LE edema over 3 weeks. EMS reported admin of duo nebs en route.    Patient was initially admitted to telemetry but is currently in T630. It would appear that patient refused her cPAP last night resulting in worsening SOB this morning with lethargy and desaturations.     Patient is followed at the [facility] as well as [facility]. On her last visit with Dr. Rodrigez 9/16/19, he had planned to repeat echo in 6 months but noted that patient would likely not be a TAVR candidate due to her severe pulmonary status.     Patient is being followed by pulmonary who are noting that patient has very severe obstructive lung disease and that if patient continues to refuse non invasive treatment for her BRIDGETT, the only other alternative is tracheostomy and ventilator.     In addition to all of this, patient is diagnosed with acute right heart failure.    Please see below for measures that must be addressed prior to discharge.     Daily Nurse: please begin to fill out the HF checklist (pink sheet in hard chart) and use it to guide your daily care.    Discharge Nurse: please ensure completeness of the HF checklist (pink sheet in hard chart) and have it co-signed by the charge RN before the patient leaves the hospital.    Thank you, Delphine, Cardiovascular Nurse Navigator, RN, [facility]FN [phone]    HF Measures:  1. Documentation of LV systolic function (echo or cath) PTA, during this hospitalization, or plan to assess post discharge or reason for not assessing documented.  2. ACE-I, ARNI or ARB prescribed on discharge for LVEF <40%  3. For HF patients with LVEF less than or equal to 40% evidence based beta blocker must be prescribed upon discharge one of the following: carvedilol, bisoprolol, Toprol XL  4. For EF less than or equal to 35% aldosterone blockade prescribed upon discharge  5. Nutrition consult for diet education  6. HF education documented daily  7. Screening  for and administering immunizations as long as no contraindications: Pneumonia and Influenza  8. Written discharge instructions include:  ? Daily weights  ? Record weight on tracker  ? Bring tracker to appointments  ? Call MD for weight gain of 3lb /day or 5lb/week  ? HF medication teaching  ? Low sodium diet  ? Follow up appointment within seven calendar days of d/c must include: date, time and location  ? Activity  ? Worsening symptoms  What if any of the above HF measures are contraindicated?  ? Request that the discharging provider document the medication/intervention and the contraindication specifically in a progress note  ? For example: “no CHF meds due to hypotension” is not enough. It needs to say: “No ACE-I, ARNI, ARB due to hypotension”; “No Beta Blockade due to bradycardia”…

## 2020-01-13 NOTE — ASSESSMENT & PLAN NOTE
With acute exacerbation  RT/O2 Protocols  Titrate supplemental FiO2 to maintain SpO2 >88%  Weaning steroids  Completed 5 days of doxycycline  qHS AVAPS due to severe CO2 retention  Avoid sedatives  Scheduled nebs  Danielro Adrienne while hospitalized, on Breo Ellipta at home

## 2020-01-14 ENCOUNTER — APPOINTMENT (OUTPATIENT)
Dept: RADIOLOGY | Facility: MEDICAL CENTER | Age: 75
DRG: 189 | End: 2020-01-14
Attending: INTERNAL MEDICINE
Payer: MEDICARE

## 2020-01-14 LAB
ACTION RANGE TRIGGERED IACRT: YES
ALBUMIN SERPL BCP-MCNC: 3 G/DL (ref 3.2–4.9)
ALBUMIN/GLOB SERPL: 1.2 G/DL
ALP SERPL-CCNC: 38 U/L (ref 30–99)
ALT SERPL-CCNC: 8 U/L (ref 2–50)
ANION GAP SERPL CALC-SCNC: 2 MMOL/L (ref 0–11.9)
AST SERPL-CCNC: 18 U/L (ref 12–45)
BASE EXCESS BLDA CALC-SCNC: 20 MMOL/L (ref -4–3)
BASE EXCESS BLDA CALC-SCNC: 22 MMOL/L (ref -4–3)
BASE EXCESS BLDA CALC-SCNC: ABNORMAL MMOL/L (ref -4–3)
BILIRUB SERPL-MCNC: 0.6 MG/DL (ref 0.1–1.5)
BODY TEMPERATURE: ABNORMAL DEGREES
BUN SERPL-MCNC: 23 MG/DL (ref 8–22)
CALCIUM SERPL-MCNC: 8.7 MG/DL (ref 8.5–10.5)
CHLORIDE SERPL-SCNC: 93 MMOL/L (ref 96–112)
CO2 BLDA-SCNC: >50 MMOL/L (ref 20–33)
CO2 BLDA-SCNC: >50 MMOL/L (ref 20–33)
CO2 BLDA-SCNC: ABNORMAL MMOL/L (ref 20–33)
CO2 SERPL-SCNC: 46 MMOL/L (ref 20–33)
CREAT SERPL-MCNC: 0.47 MG/DL (ref 0.5–1.4)
ERYTHROCYTE [DISTWIDTH] IN BLOOD BY AUTOMATED COUNT: 51.7 FL (ref 35.9–50)
GLOBULIN SER CALC-MCNC: 2.5 G/DL (ref 1.9–3.5)
GLUCOSE SERPL-MCNC: 87 MG/DL (ref 65–99)
HCO3 BLDA-SCNC: 51.8 MMOL/L (ref 17–25)
HCO3 BLDA-SCNC: 53.4 MMOL/L (ref 17–25)
HCO3 BLDA-SCNC: ABNORMAL MMOL/L (ref 17–25)
HCT VFR BLD AUTO: 39 % (ref 37–47)
HGB BLD-MCNC: 10.7 G/DL (ref 12–16)
HOROWITZ INDEX BLDA+IHG-RTO: 128 MM[HG]
HOROWITZ INDEX BLDA+IHG-RTO: 164 MM[HG]
HOROWITZ INDEX BLDA+IHG-RTO: 169 MM[HG]
INST. QUALIFIED PATIENT IIQPT: YES
MAGNESIUM SERPL-MCNC: 1.6 MG/DL (ref 1.5–2.5)
MCH RBC QN AUTO: 28.3 PG (ref 27–33)
MCHC RBC AUTO-ENTMCNC: 27.4 G/DL (ref 33.6–35)
MCV RBC AUTO: 103.2 FL (ref 81.4–97.8)
O2/TOTAL GAS SETTING VFR VENT: 45 %
O2/TOTAL GAS SETTING VFR VENT: 50 %
O2/TOTAL GAS SETTING VFR VENT: 60 %
PCO2 BLDA: 97.1 MMHG (ref 26–37)
PCO2 BLDA: 97.1 MMHG (ref 26–37)
PCO2 BLDA: >100 MMHG (ref 26–37)
PCO2 TEMP ADJ BLDA: 95 MMHG (ref 26–37)
PCO2 TEMP ADJ BLDA: 95.4 MMHG (ref 26–37)
PCO2 TEMP ADJ BLDA: ABNORMAL MMHG (ref 26–37)
PH BLDA: 7.25 [PH] (ref 7.4–7.5)
PH BLDA: 7.33 [PH] (ref 7.4–7.5)
PH BLDA: 7.35 [PH] (ref 7.4–7.5)
PH TEMP ADJ BLDA: 7.26 [PH] (ref 7.4–7.5)
PH TEMP ADJ BLDA: 7.34 [PH] (ref 7.4–7.5)
PH TEMP ADJ BLDA: 7.36 [PH] (ref 7.4–7.5)
PHOSPHATE SERPL-MCNC: 2.9 MG/DL (ref 2.5–4.5)
PLATELET # BLD AUTO: 124 K/UL (ref 164–446)
PMV BLD AUTO: 10.2 FL (ref 9–12.9)
PO2 BLDA: 76 MMHG (ref 64–87)
PO2 BLDA: 77 MMHG (ref 64–87)
PO2 BLDA: 82 MMHG (ref 64–87)
PO2 TEMP ADJ BLDA: 73 MMHG (ref 64–87)
PO2 TEMP ADJ BLDA: 75 MMHG (ref 64–87)
PO2 TEMP ADJ BLDA: 77 MMHG (ref 64–87)
POTASSIUM SERPL-SCNC: 4 MMOL/L (ref 3.6–5.5)
PROT SERPL-MCNC: 5.5 G/DL (ref 6–8.2)
RBC # BLD AUTO: 3.78 M/UL (ref 4.2–5.4)
SAO2 % BLDA: 93 % (ref 93–99)
SAO2 % BLDA: 93 % (ref 93–99)
SAO2 % BLDA: ABNORMAL % (ref 93–99)
SODIUM SERPL-SCNC: 141 MMOL/L (ref 135–145)
SPECIMEN DRAWN FROM PATIENT: ABNORMAL
WBC # BLD AUTO: 4.4 K/UL (ref 4.8–10.8)

## 2020-01-14 PROCEDURE — 84100 ASSAY OF PHOSPHORUS: CPT

## 2020-01-14 PROCEDURE — 94640 AIRWAY INHALATION TREATMENT: CPT

## 2020-01-14 PROCEDURE — 700111 HCHG RX REV CODE 636 W/ 250 OVERRIDE (IP): Performed by: INTERNAL MEDICINE

## 2020-01-14 PROCEDURE — 94003 VENT MGMT INPAT SUBQ DAY: CPT

## 2020-01-14 PROCEDURE — 700101 HCHG RX REV CODE 250: Performed by: INTERNAL MEDICINE

## 2020-01-14 PROCEDURE — 99291 CRITICAL CARE FIRST HOUR: CPT | Performed by: INTERNAL MEDICINE

## 2020-01-14 PROCEDURE — 85027 COMPLETE CBC AUTOMATED: CPT

## 2020-01-14 PROCEDURE — A9270 NON-COVERED ITEM OR SERVICE: HCPCS | Performed by: INTERNAL MEDICINE

## 2020-01-14 PROCEDURE — 97162 PT EVAL MOD COMPLEX 30 MIN: CPT

## 2020-01-14 PROCEDURE — 700105 HCHG RX REV CODE 258: Performed by: INTERNAL MEDICINE

## 2020-01-14 PROCEDURE — 83735 ASSAY OF MAGNESIUM: CPT

## 2020-01-14 PROCEDURE — 82803 BLOOD GASES ANY COMBINATION: CPT

## 2020-01-14 PROCEDURE — 770022 HCHG ROOM/CARE - ICU (200)

## 2020-01-14 PROCEDURE — 36600 WITHDRAWAL OF ARTERIAL BLOOD: CPT

## 2020-01-14 PROCEDURE — 80053 COMPREHEN METABOLIC PANEL: CPT

## 2020-01-14 PROCEDURE — 71045 X-RAY EXAM CHEST 1 VIEW: CPT

## 2020-01-14 PROCEDURE — 700102 HCHG RX REV CODE 250 W/ 637 OVERRIDE(OP): Performed by: INTERNAL MEDICINE

## 2020-01-14 RX ORDER — MAGNESIUM SULFATE HEPTAHYDRATE 40 MG/ML
2 INJECTION, SOLUTION INTRAVENOUS ONCE
Status: COMPLETED | OUTPATIENT
Start: 2020-01-14 | End: 2020-01-14

## 2020-01-14 RX ADMIN — FAMOTIDINE 20 MG: 20 TABLET ORAL at 17:15

## 2020-01-14 RX ADMIN — ACETAZOLAMIDE 500 MG: 500 INJECTION, POWDER, LYOPHILIZED, FOR SOLUTION INTRAVENOUS at 09:58

## 2020-01-14 RX ADMIN — IPRATROPIUM BROMIDE AND ALBUTEROL SULFATE 3 ML: .5; 3 SOLUTION RESPIRATORY (INHALATION) at 20:52

## 2020-01-14 RX ADMIN — FAMOTIDINE 20 MG: 20 TABLET ORAL at 05:27

## 2020-01-14 RX ADMIN — MAGNESIUM SULFATE 2 G: 2 INJECTION INTRAVENOUS at 11:21

## 2020-01-14 RX ADMIN — IPRATROPIUM BROMIDE AND ALBUTEROL SULFATE 3 ML: .5; 3 SOLUTION RESPIRATORY (INHALATION) at 10:52

## 2020-01-14 RX ADMIN — UMECLIDINIUM BROMIDE AND VILANTEROL TRIFENATATE 1 PUFF: 62.5; 25 POWDER RESPIRATORY (INHALATION) at 07:14

## 2020-01-14 RX ADMIN — IPRATROPIUM BROMIDE AND ALBUTEROL SULFATE 3 ML: .5; 3 SOLUTION RESPIRATORY (INHALATION) at 07:21

## 2020-01-14 RX ADMIN — METOPROLOL TARTRATE 25 MG: 25 TABLET, FILM COATED ORAL at 05:27

## 2020-01-14 RX ADMIN — FUROSEMIDE 40 MG: 10 INJECTION, SOLUTION INTRAMUSCULAR; INTRAVENOUS at 15:04

## 2020-01-14 RX ADMIN — METHYLPREDNISOLONE SODIUM SUCCINATE 40 MG: 40 INJECTION, POWDER, FOR SOLUTION INTRAMUSCULAR; INTRAVENOUS at 21:05

## 2020-01-14 RX ADMIN — IPRATROPIUM BROMIDE AND ALBUTEROL SULFATE 3 ML: .5; 3 SOLUTION RESPIRATORY (INHALATION) at 13:49

## 2020-01-14 RX ADMIN — METHYLPREDNISOLONE SODIUM SUCCINATE 40 MG: 40 INJECTION, POWDER, FOR SOLUTION INTRAMUSCULAR; INTRAVENOUS at 15:04

## 2020-01-14 RX ADMIN — METHYLPREDNISOLONE SODIUM SUCCINATE 40 MG: 40 INJECTION, POWDER, FOR SOLUTION INTRAMUSCULAR; INTRAVENOUS at 05:27

## 2020-01-14 RX ADMIN — METOPROLOL TARTRATE 25 MG: 25 TABLET, FILM COATED ORAL at 17:15

## 2020-01-14 RX ADMIN — BUSPIRONE HYDROCHLORIDE 7.5 MG: 5 TABLET ORAL at 21:05

## 2020-01-14 RX ADMIN — DOXYCYCLINE 100 MG: 100 TABLET, FILM COATED ORAL at 17:15

## 2020-01-14 RX ADMIN — FLUTICASONE PROPIONATE 88 MCG: 44 AEROSOL, METERED RESPIRATORY (INHALATION) at 07:14

## 2020-01-14 RX ADMIN — FUROSEMIDE 40 MG: 10 INJECTION, SOLUTION INTRAMUSCULAR; INTRAVENOUS at 05:27

## 2020-01-14 RX ADMIN — RIVAROXABAN 20 MG: 20 TABLET, FILM COATED ORAL at 17:15

## 2020-01-14 RX ADMIN — DOXYCYCLINE 100 MG: 100 TABLET, FILM COATED ORAL at 05:27

## 2020-01-14 ASSESSMENT — ENCOUNTER SYMPTOMS
VOMITING: 0
NAUSEA: 0
DIZZINESS: 0
STRIDOR: 0
SHORTNESS OF BREATH: 1
SENSORY CHANGE: 0
ABDOMINAL PAIN: 0
CHILLS: 0
SPUTUM PRODUCTION: 0
COUGH: 1
FOCAL WEAKNESS: 0
FEVER: 0
ORTHOPNEA: 1
BLURRED VISION: 0
MYALGIAS: 0

## 2020-01-14 ASSESSMENT — COGNITIVE AND FUNCTIONAL STATUS - GENERAL
HELP NEEDED FOR BATHING: A LITTLE
MOVING FROM LYING ON BACK TO SITTING ON SIDE OF FLAT BED: UNABLE
MOVING FROM LYING ON BACK TO SITTING ON SIDE OF FLAT BED: UNABLE
TURNING FROM BACK TO SIDE WHILE IN FLAT BAD: A LOT
MOBILITY SCORE: 10
TOILETING: A LITTLE
DRESSING REGULAR UPPER BODY CLOTHING: A LITTLE
SUGGESTED CMS G CODE MODIFIER MOBILITY: CL
STANDING UP FROM CHAIR USING ARMS: A LOT
WALKING IN HOSPITAL ROOM: A LOT
CLIMB 3 TO 5 STEPS WITH RAILING: A LOT
MOVING TO AND FROM BED TO CHAIR: UNABLE
SUGGESTED CMS G CODE MODIFIER DAILY ACTIVITY: CK
SUGGESTED CMS G CODE MODIFIER MOBILITY: CL
MOVING TO AND FROM BED TO CHAIR: UNABLE
PERSONAL GROOMING: A LITTLE
WALKING IN HOSPITAL ROOM: A LITTLE
DRESSING REGULAR LOWER BODY CLOTHING: A LOT
MOBILITY SCORE: 12
DAILY ACTIVITIY SCORE: 18
STANDING UP FROM CHAIR USING ARMS: A LITTLE
TURNING FROM BACK TO SIDE WHILE IN FLAT BAD: A LOT
CLIMB 3 TO 5 STEPS WITH RAILING: A LOT

## 2020-01-14 ASSESSMENT — PULMONARY FUNCTION TESTS
EPAP_CMH2O: 8

## 2020-01-14 ASSESSMENT — GAIT ASSESSMENTS: GAIT LEVEL OF ASSIST: UNABLE TO PARTICIPATE

## 2020-01-14 NOTE — RESPIRATORY CARE
Adult Noninvasive Ventilation    BiPap Day 2  IPAP (cmH2O): (AVAPS) (01/14/20 0207)  EPAP (cm H2O): 8 (01/14/20 0207)  FiO2: 40 (01/14/20 0207)      Events/Summary/Plan: Pt tolerated BiPAP throughout the night, ABG drawn at 0500 1/14/2020    PH         7.35  PCO2    95  PO2       73  HC03      53.4  BE          22

## 2020-01-14 NOTE — PROGRESS NOTES
"Critical Care Progress Note    Date of admission  1/11/2020    Chief Complaint  74 y.o. female admitted 1/11/2020 with acute on chronic hypoxic/hypercapneic respiratory failure    Hospital Course    \"74 y.o. female who presented 1/11/2020 with  history of COPD (2017 PFTS lFEV1 is 0.69 L which is 38% of predicted. FEV1 FVC ratio is reduced at 69%. MVV is severely reduced at 34% of predicted. After administration of an inhaled bronchodilator there is improvement in FEV1 up to 0.94 L which is 53% of predicted. This is a relative 36% improvement.   Total lung capacity is mildly reduced at 76% of predicted. RV/TLC ratio is increased to 154% of predicted. DLCO is 112% of predicted. Airways resistance is increased.  mild to moderate aortic stenosis in the past, paroxysmal atrial fibrillation and anticoagulated with Xarelto, diastolic congestive heart failure, morbid obesity, chronic hypoxemic respiratory failure requiring 2.5 to 4 L of oxygen via nasal cannula at baseline and untreated sleep apnea.  She also has persistent LLL atelectasis     Since 2016 her HCO3 has been increasing from 30 to now she is in the 40s  ABG today shows 7.3/106 paCO2 without complete compensation as her HCO should be higher     She presents today with SOB progressive with symptoms and signs of CHF exacerbation     Patient had further decline while on telemetry unit with increasing somnolence and worsening hypercarbic respiratory failure requiring initiation of NIPPV and transferred to ICU\"      Interval Problem Update  Reviewed last 24 hour events:   - Did well on BiPAP overnight, required several reminders to leave it on   - Neuro: AOx3-4   - HR: 50s-70s   - SBP: 90s-100s   - GI: Tolerating diet today   - UOP: 1.6L/24 hrs   - Conklin: No   - Tm: 36.9   - Lines: PIV   - PPx: GI not indicated, DVT Xarelto   - ABG: pH: 7.36, pCO2: 95, pO2: 73, HCO3: 53.4, BE: 22   - On BiPAP    - CXR (personally reviewed and compared to prior): improved " opacities    Updates: I have discussed goals of care with patient's daughter and patient.  The patient would like to be DNR/DNI however continued medical care, daughter is looking into hospice options.    Review of Systems  Review of Systems   Constitutional: Negative for chills and fever.   Eyes: Negative for blurred vision.   Respiratory: Positive for cough and shortness of breath. Negative for sputum production and stridor.    Cardiovascular: Positive for orthopnea and leg swelling. Negative for chest pain.   Gastrointestinal: Negative for abdominal pain, nausea and vomiting.   Genitourinary: Negative for dysuria.   Musculoskeletal: Negative for myalgias.   Skin: Negative for rash.   Neurological: Negative for dizziness, sensory change and focal weakness.        Vital Signs for last 24 hours   Temp:  [36.5 °C (97.7 °F)-36.8 °C (98.3 °F)] 36.8 °C (98.3 °F)  Pulse:  [58-83] 68  Resp:  [18-42] 22  BP: ()/(41-69) 99/42  SpO2:  [93 %-99 %] 95 %    Hemodynamic parameters for last 24 hours       Respiratory Information for the last 24 hours       Physical Exam   Physical Exam  Vitals signs and nursing note reviewed.   Constitutional:       General: She is in acute distress.      Appearance: She is ill-appearing.   HENT:      Head: Normocephalic.      Right Ear: External ear normal.      Left Ear: External ear normal.      Nose: Nose normal.      Mouth/Throat:      Mouth: Mucous membranes are dry.      Pharynx: Oropharynx is clear.   Eyes:      Extraocular Movements: Extraocular movements intact.      Conjunctiva/sclera: Conjunctivae normal.   Neck:      Musculoskeletal: Normal range of motion and neck supple.   Cardiovascular:      Rate and Rhythm: Normal rate and regular rhythm.      Pulses: Normal pulses.   Pulmonary:      Effort: Tachypnea present. No accessory muscle usage.      Breath sounds: Decreased air movement present. Wheezing present.   Abdominal:      General: Abdomen is flat. Bowel sounds are normal.       Palpations: Abdomen is soft.   Musculoskeletal: Normal range of motion.   Skin:     General: Skin is warm and dry.      Capillary Refill: Capillary refill takes less than 2 seconds.   Neurological:      General: No focal deficit present.      Mental Status: She is alert and oriented to person, place, and time. Mental status is at baseline.      Cranial Nerves: No cranial nerve deficit.      Sensory: No sensory deficit.      Motor: No weakness.      Comments: Slightly confused         Medications  Current Facility-Administered Medications   Medication Dose Route Frequency Provider Last Rate Last Dose   • methylPREDNISolone (SOLU-MEDROL) 40 MG injection 40 mg  40 mg Intravenous Q8HRS Van Hull M.D.   40 mg at 01/14/20 0527   • ipratropium-albuterol (DUONEB) nebulizer solution  3 mL Nebulization 4X/DAY (RT) Aryan Jacobson M.D.   3 mL at 01/14/20 0721   • doxycycline monohydrate (ADOXA) tablet 100 mg  100 mg Oral Q12HRS Aryan Jacobson M.D.   100 mg at 01/14/20 0527   • famotidine (PEPCID) tablet 20 mg  20 mg Oral BID Aryan Jacobson M.D.   20 mg at 01/14/20 0527   • furosemide (LASIX) injection 40 mg  40 mg Intravenous BID DIURETIC Aryan Jacobson M.D.   40 mg at 01/14/20 0527   • rivaroxaban (XARELTO) tablet 20 mg  20 mg Oral PM MEAL Aryan Jacobson M.D.   20 mg at 01/13/20 1717   • busPIRone (BUSPAR) tablet 7.5 mg  7.5 mg Oral QHS Aryan Jacobson M.D.   7.5 mg at 01/13/20 2335   • metoprolol (LOPRESSOR) tablet 25 mg  25 mg Oral BID Aryan Jacobson M.D.   25 mg at 01/14/20 0527   • Respiratory Care per Protocol   Nebulization Continuous RT Aryan Jacobson M.D.       • acetaminophen (TYLENOL) tablet 650 mg  650 mg Oral Q6HRS PRN Aryan Jacobson M.D.   650 mg at 01/12/20 1519   • enalaprilat (VASOTEC) injection 1.25 mg  1.25 mg Intravenous Q6HRS PRN Aryan Jacobson M.D.       • ondansetron (ZOFRAN) syringe/vial injection 4 mg  4 mg Intravenous Q4HRS PRN Aryan Jacobson M.D.       • ondansetron (ZOFRAN ODT) dispertab 4 mg  4 mg Oral Q4HRS PRN  Aryan Jacobson M.D.       • umeclidinium-vilanterol (ANORO ELLIPTA) inhaler 1 Puff  1 Puff Inhalation QDAILY (RT) Aryan Jacobson M.D.   1 Puff at 01/14/20 0714    And   • fluticasone (FLOVENT HFA) 44 MCG/ACT inhaler 88 mcg  2 Puff Inhalation QDAILY (RT) Aryan Jacobson M.D.   88 mcg at 01/14/20 0714       Fluids    Intake/Output Summary (Last 24 hours) at 1/14/2020 0828  Last data filed at 1/14/2020 0600  Gross per 24 hour   Intake 0 ml   Output 1550 ml   Net -1550 ml       Laboratory  Recent Labs     01/11/20  0943  01/13/20  1055  01/13/20  1419 01/13/20  1633 01/14/20  0521   [id number] 7.31*  --  7.17*  --   --   --   --    RSCNNX110J 106.1*  --  >150.0*  --   --   --   --    [id number] 94.4*  --  77.9  --   --   --   --    BMFC2OSZ 96.7  --  94.3  --   --   --   --    ARTHCO3 53*  --  55*  --   --   --   --    ARTBE 21*  --  20*  --   --   --   --    ISTATAPH  --    < >  --    < > 7.166* 7.252* 7.348*   ISTATAPCO2  --    < >  --    < > >100.0* >100.0* 97.1*   ISTATAPO2  --    < >  --    < > 77 82 76   ISTATATCO2  --    < >  --    < > see below see below >50*   [id number]  --    < >  --    < > see below see below 93   ISTATARTHCO3  --    < >  --    < > see below see below 53.4*   ISTATARTBE  --    < >  --    < > see below see below 22*   ISTATTEMP  --    < >  --    < > 96.6 F 96.9 F 97.7 F   ISTATFIO2  --    < >  --    < > 50 50 45   ISTATSPEC  --    < >  --    < > Arterial Arterial Arterial   ISTATAPHTC  --    < >  --    < > 7.181* 7.265* 7.356*   [id number]  --    < >  --    < > 71 77 73    < > = values in this interval not displayed.         Recent Labs     01/12/20 0819 01/13/20 0344 01/14/20  0500   SODIUM 141 141 141   POTASSIUM 4.3 4.2 4.0   CHLORIDE 90* 90* 93*   CO2 46* 44* 46*   BUN 16 19 23*   CREATININE 0.53 0.60 0.47*   MAGNESIUM 1.6  --  1.6   PHOSPHORUS 4.1  --  2.9   CALCIUM 8.7 8.8 8.7     Recent Labs     01/12/20 0819 01/13/20 0344 01/14/20  0500   ALTSGPT 6  --  8   ASTSGOT 17  --  18    ALKPHOSPHAT 46  --  38   TBILIRUBIN 0.6  --  0.6   GLUCOSE 135* 108* 87     Recent Labs     01/12/20  0819 01/14/20  0500   WBC 9.2 4.4*   NEUTSPOLYS 88.30*  --    LYMPHOCYTES 5.20*  --    MONOCYTES 5.70  --    EOSINOPHILS 0.10  --    BASOPHILS 0.30  --    ASTSGOT 17 18   ALTSGPT 6 8   ALKPHOSPHAT 46 38   TBILIRUBIN 0.6 0.6     Recent Labs     01/12/20  0819 01/14/20  0500   RBC 4.02* 3.78*   HEMOGLOBIN 11.4* 10.7*   HEMATOCRIT 42.1 39.0   PLATELETCT 142* 124*   IRON 48  --    FERRITIN 13.3  --    TOTIRONBC 393  --        Imaging  X-Ray:  I have personally reviewed the images and compared with prior images.    Assessment/Plan  * Acute on chronic respiratory failure with hypoxia and hypercapnia (HCC)- (present on admission)  Assessment & Plan  Multifocal  She has very severe obstructive lung disease with FEV1 of 38%, underlying restrictive lung disease due to her kyphosis and obesity, obesity hypoventilation syndrome and  Persistent shunt with the RML atelectasis that appears to be progressing since Jan 2019    She has been compensating suprisingly with HCO3 now in the 40s but also now in acute on chronic resp failure due to CHF secondary to AS and diastolic heart failure     Thus agree she needs treatment for BRIDGETT as if she loses her drive when sleeping she will not be able to compensate and will inevitably progress with her respiratory failure    Explained to the patient that she would need a form of BIPAP - she has agreed to try it but also says if she cannot tolerate it she will not use it (that is why she was not treated for BRIDGETT as she did not tolerate the CPAP mask)    Transferred to ICU with acute deterioration  RT/O2 protocols  Continuous BiPAP with serial ABGs to identify trend  Low threshold for intubation  Continue Solu-Medrol  Aspiration precautions  Procalcitonin has been WNL  Currently on doxycycline with consideration of COPD exacerbation  Patient on Xarelto with low likelihood of PE in setting of  AC    Aortic stenosis- (present on admission)  Assessment & Plan  Noted on echo as moderate  Peak: 55 mmHg, Mean: 34 mmHg    COPD (chronic obstructive pulmonary disease) (HCC)- (present on admission)  Assessment & Plan  With acute exacerbation  RT/O2 protocols  Continue steroids  Complete 5 days of doxycycline  Initiate BiPAP with low threshold to transition to full mechanical ventilatory support    Pulmonary hypertension (HCC)- (present on admission)  Assessment & Plan  Severe RVSP 90  Multifactorial  Maintain O2 saturations greater than 92%  Maintain euvolemic volume balance  Needs compliance with nocturnal BiPAP for BRIDGETT/OHS  Recommend weight loss    Paroxysmal atrial fibrillation (HCC)- (present on admission)  Assessment & Plan  Keep K greater than 4 and mag are in 2  Continue Xarelto  Maintain rate control         VTE:  NOAC  Ulcer: H2 Antagonist  Lines: None    I have performed a physical exam and reviewed and updated ROS and Plan today (1/14/2020). In review of yesterday's note (1/13/2020), there are no changes except as documented above.     Continues with severe acute on chronic respiratory failure and continued noninvasive positive pressure ventilation requirements; high risk of worsening respiratory failure leading to death. This patient is critically ill, at high risk for decompensation leading to worsening vital organ dysfunction and death without critical care interventions.    Discussed patient condition and risk of morbidity and/or mortality with Family, RN, RT, Pharmacy, Dietary, , Code status disscussed, Charge nurse / hot rounds and Patient     The patient remains critically ill.  Critical care time = 32 minutes in directly providing and coordinating critical care and extensive data review.  No time overlap and excludes procedures.

## 2020-01-14 NOTE — DISCHARGE PLANNING
"Care Transition Team Discharge Planning    Anticipated Discharge Disposition: possible home w/ hospice    Action: Lsw met w/ pt's dtr and Jaci AGRAWAL. She reports pt has been fighting the o2 mask, and probably will not maintain high flow o2 as needed. She further indicates if pt d/karyn home w/ hospice, the choice is [facility] and request marketing person \"Bear\" please come to bedside for assessment and admission.     Barriers to Discharge: medical assessment, orders for d/c planning-possible hospice,    Plan: f/u w/ medical team, CCA, pt and family    "

## 2020-01-14 NOTE — PROGRESS NOTES
Patient agreeing to keep BiPAP mask on for now. Will continue treatment with BiPAP and reassess if needs change. Educated family to let RN know if they have any questions or concerns.

## 2020-01-14 NOTE — THERAPY
"Physical Therapy Evaluation completed.   Bed Mobility:  Supine to Sit: Moderate Assist  Transfers: Sit to Stand: Minimal Assist  Gait: Unable to Participate     Plan of Care: Will benefit from Physical Therapy 3 times per week  Discharge Recommendations: Equipment: Will Continue to Assess for Equipment Needs. Post-acute therapy: Anticipate that patient will progress to DC home with HHPT    Pt is 73 y/o F presenting acutely with SOB. PMH includes COPD, aortic stenosis, afib, CHF, morbid obesity, and chronic hypoxemic respiratory failure requiring 2.5-4LPM supplemental O2. Pt eager to transfer to commode at start of session to have BM. Nursing assisted with transfer. Pt demonstrate generalized deconditioning. Pt near baseline per family reports of PLOF. She was amb 30-50ft max in home only prior to hospitalization. She was able to perform STS and bed>commode transfer with min A for balance/safety. She tolerated standing for kenton care for approx 5 min. Educated patient on importance of getting out of bed to a chair for meals.     See \"Rehab Therapy-Acute\" Patient Summary Report for complete documentation.     "

## 2020-01-14 NOTE — CARE PLAN
Problem: Safety  Goal: Will remain free from injury  Intervention: Provide assistance with mobility  Note:   Pt encouraged to use call light for assistance.      Problem: Psychosocial Needs:  Goal: Level of anxiety will decrease  Intervention: Identify and develop with patient strategies to cope with anxiety triggers  Note:   Pt encouraged to develop strategies for coping with anxiety triggers. Family encouraged to stay at bedside for reorientation.

## 2020-01-14 NOTE — DISCHARGE PLANNING
Care Transition Team Assessment  NOK POa dtr Jaci see face sheet.  All assistance provided by  including transportation. Pt may d/c home w/ hospice as refusing to wear high flow o2 mask. Dtr requesting [facility] if ordered.    Information Source  Orientation : Oriented x 4  Information Given By: Patient  Informant's Name: Bryanna  Who is responsible for making decisions for patient? : Adult child(POa dtr Jaci)  Name(s) of Primary Decision Maker: (jaci @ [phone])    Readmission Evaluation  Is this a readmission?: Yes - unplanned readmission    Elopement Risk  Legal Hold: No  Ambulatory or Self Mobile in Wheelchair: No-Not an Elopement Risk  Disoriented: No  Psychiatric Symptoms: None  History of Wandering: No  Elopement this Admit: No  Vocalizing Wanting to Leave: No  Displays Behaviors, Body Language Wanting to Leave: No-Not at Risk for Elopement  Elopement Risk: Not at Risk for Elopement    Interdisciplinary Discharge Planning  Primary Care Physician: Cherry CONNER  Lives with - Patient's Self Care Capacity: Spouse  Patient or legal guardian wants to designate a caregiver (see row info): No  Support Systems: Family Member(s)  Housing / Facility: [address]  Do You Take your Prescribed Medications Regularly: Yes  Mobility Issues: (uses w/c)  Prior Services: None  Durable Medical Equipment: (o2 and w/c)  DME Provider / Phone: preferred    Discharge Preparedness  What is your plan after discharge?: (possible hospice as pt refusing to wear high flow o2 mask )  What are your discharge supports?: Spouse         Finances  Prescription Coverage: Yes    Vision / Hearing Impairment  Vision Impairment : Yes  Right Eye Vision: Impaired, Wears Glasses  Left Eye Vision: Impaired, Wears Glasses  Hearing Impairment : No         Advance Directive  Advance Directive?: DPOA for Health Care    Domestic Abuse  Have you ever been the victim of abuse or violence?: No  Physical Abuse or Sexual Abuse: No  Verbal Abuse  or Emotional Abuse: No  Possible Abuse Reported to:: Not Applicable         Discharge Risks or Barriers  Discharge risks or barriers?: Non-adherence to medication or treatment    Anticipated Discharge Information  Anticipated discharge disposition: (possible hospice )  Discharge Address: [address] [place] [address] [place] [address]

## 2020-01-14 NOTE — CARE PLAN
Problem: Safety  Goal: Will remain free from injury  Intervention: Educate patient and significant other/support system about adaptive mobility strategies and safe transfers  Note:   Educated patient on fall risk, use of call light, and bed alarm. Patient expressed understanding.      Problem: Knowledge Deficit  Goal: Knowledge of disease process/condition, treatment plan, diagnostic tests, and medications will improve  Intervention: Explain information regarding disease process/condition, treatment plan, diagnostic tests, and medications and document in education  Note:   Educated importance of BiPAP and that patient needs to keep the mask on. Patient needs frequent reinforcement.

## 2020-01-15 LAB
ACTION RANGE TRIGGERED IACRT: YES
ALBUMIN SERPL BCP-MCNC: 3.1 G/DL (ref 3.2–4.9)
ALBUMIN/GLOB SERPL: 1.2 G/DL
ALP SERPL-CCNC: 39 U/L (ref 30–99)
ALT SERPL-CCNC: 11 U/L (ref 2–50)
ANION GAP SERPL CALC-SCNC: 0 MMOL/L (ref 0–11.9)
AST SERPL-CCNC: 24 U/L (ref 12–45)
BASE EXCESS BLDA CALC-SCNC: 19 MMOL/L (ref -4–3)
BILIRUB SERPL-MCNC: 0.5 MG/DL (ref 0.1–1.5)
BODY TEMPERATURE: ABNORMAL DEGREES
BUN SERPL-MCNC: 25 MG/DL (ref 8–22)
CALCIUM SERPL-MCNC: 8.7 MG/DL (ref 8.5–10.5)
CHLORIDE SERPL-SCNC: 92 MMOL/L (ref 96–112)
CO2 BLDA-SCNC: >50 MMOL/L (ref 20–33)
CO2 SERPL-SCNC: 47 MMOL/L (ref 20–33)
CREAT SERPL-MCNC: 0.46 MG/DL (ref 0.5–1.4)
ERYTHROCYTE [DISTWIDTH] IN BLOOD BY AUTOMATED COUNT: 49.6 FL (ref 35.9–50)
GLOBULIN SER CALC-MCNC: 2.6 G/DL (ref 1.9–3.5)
GLUCOSE SERPL-MCNC: 107 MG/DL (ref 65–99)
HCO3 BLDA-SCNC: 49 MMOL/L (ref 17–25)
HCT VFR BLD AUTO: 39.1 % (ref 37–47)
HGB BLD-MCNC: 11.4 G/DL (ref 12–16)
HOROWITZ INDEX BLDA+IHG-RTO: 150 MM[HG]
INST. QUALIFIED PATIENT IIQPT: YES
MAGNESIUM SERPL-MCNC: 2.1 MG/DL (ref 1.5–2.5)
MCH RBC QN AUTO: 29 PG (ref 27–33)
MCHC RBC AUTO-ENTMCNC: 29.2 G/DL (ref 33.6–35)
MCV RBC AUTO: 99.5 FL (ref 81.4–97.8)
O2/TOTAL GAS SETTING VFR VENT: 50 %
PCO2 BLDA: 81 MMHG (ref 26–37)
PCO2 TEMP ADJ BLDA: 79.1 MMHG (ref 26–37)
PH BLDA: 7.39 [PH] (ref 7.4–7.5)
PH TEMP ADJ BLDA: 7.4 [PH] (ref 7.4–7.5)
PHOSPHATE SERPL-MCNC: 2.9 MG/DL (ref 2.5–4.5)
PLATELET # BLD AUTO: 130 K/UL (ref 164–446)
PMV BLD AUTO: 10.4 FL (ref 9–12.9)
PO2 BLDA: 75 MMHG (ref 64–87)
PO2 TEMP ADJ BLDA: 72 MMHG (ref 64–87)
POTASSIUM SERPL-SCNC: 4.4 MMOL/L (ref 3.6–5.5)
PROT SERPL-MCNC: 5.7 G/DL (ref 6–8.2)
RBC # BLD AUTO: 3.93 M/UL (ref 4.2–5.4)
SAO2 % BLDA: 93 % (ref 93–99)
SODIUM SERPL-SCNC: 139 MMOL/L (ref 135–145)
SPECIMEN DRAWN FROM PATIENT: ABNORMAL
WBC # BLD AUTO: 4.5 K/UL (ref 4.8–10.8)

## 2020-01-15 PROCEDURE — 84100 ASSAY OF PHOSPHORUS: CPT

## 2020-01-15 PROCEDURE — 700111 HCHG RX REV CODE 636 W/ 250 OVERRIDE (IP): Performed by: INTERNAL MEDICINE

## 2020-01-15 PROCEDURE — 82803 BLOOD GASES ANY COMBINATION: CPT

## 2020-01-15 PROCEDURE — 94003 VENT MGMT INPAT SUBQ DAY: CPT

## 2020-01-15 PROCEDURE — 80053 COMPREHEN METABOLIC PANEL: CPT

## 2020-01-15 PROCEDURE — 770022 HCHG ROOM/CARE - ICU (200)

## 2020-01-15 PROCEDURE — A9270 NON-COVERED ITEM OR SERVICE: HCPCS | Performed by: INTERNAL MEDICINE

## 2020-01-15 PROCEDURE — 700102 HCHG RX REV CODE 250 W/ 637 OVERRIDE(OP): Performed by: INTERNAL MEDICINE

## 2020-01-15 PROCEDURE — 36600 WITHDRAWAL OF ARTERIAL BLOOD: CPT

## 2020-01-15 PROCEDURE — 700101 HCHG RX REV CODE 250: Performed by: INTERNAL MEDICINE

## 2020-01-15 PROCEDURE — 94640 AIRWAY INHALATION TREATMENT: CPT

## 2020-01-15 PROCEDURE — 85027 COMPLETE CBC AUTOMATED: CPT

## 2020-01-15 PROCEDURE — 99291 CRITICAL CARE FIRST HOUR: CPT | Performed by: INTERNAL MEDICINE

## 2020-01-15 PROCEDURE — 83735 ASSAY OF MAGNESIUM: CPT

## 2020-01-15 RX ORDER — FUROSEMIDE 20 MG/1
20 TABLET ORAL EVERY 8 HOURS
Status: DISCONTINUED | OUTPATIENT
Start: 2020-01-15 | End: 2020-01-20 | Stop reason: HOSPADM

## 2020-01-15 RX ADMIN — FUROSEMIDE 20 MG: 20 TABLET ORAL at 15:30

## 2020-01-15 RX ADMIN — UMECLIDINIUM BROMIDE AND VILANTEROL TRIFENATATE 1 PUFF: 62.5; 25 POWDER RESPIRATORY (INHALATION) at 07:10

## 2020-01-15 RX ADMIN — FLUTICASONE PROPIONATE 88 MCG: 44 AEROSOL, METERED RESPIRATORY (INHALATION) at 07:10

## 2020-01-15 RX ADMIN — IPRATROPIUM BROMIDE AND ALBUTEROL SULFATE 3 ML: .5; 3 SOLUTION RESPIRATORY (INHALATION) at 20:00

## 2020-01-15 RX ADMIN — FUROSEMIDE 20 MG: 20 TABLET ORAL at 20:46

## 2020-01-15 RX ADMIN — FAMOTIDINE 20 MG: 20 TABLET ORAL at 05:31

## 2020-01-15 RX ADMIN — METOPROLOL TARTRATE 25 MG: 25 TABLET, FILM COATED ORAL at 16:56

## 2020-01-15 RX ADMIN — FUROSEMIDE 40 MG: 10 INJECTION, SOLUTION INTRAMUSCULAR; INTRAVENOUS at 05:31

## 2020-01-15 RX ADMIN — DOXYCYCLINE 100 MG: 100 TABLET, FILM COATED ORAL at 05:31

## 2020-01-15 RX ADMIN — METOPROLOL TARTRATE 25 MG: 25 TABLET, FILM COATED ORAL at 05:31

## 2020-01-15 RX ADMIN — METHYLPREDNISOLONE SODIUM SUCCINATE 40 MG: 40 INJECTION, POWDER, FOR SOLUTION INTRAMUSCULAR; INTRAVENOUS at 05:31

## 2020-01-15 RX ADMIN — BUSPIRONE HYDROCHLORIDE 7.5 MG: 5 TABLET ORAL at 20:45

## 2020-01-15 RX ADMIN — METHYLPREDNISOLONE SODIUM SUCCINATE 40 MG: 40 INJECTION, POWDER, FOR SOLUTION INTRAMUSCULAR; INTRAVENOUS at 14:19

## 2020-01-15 RX ADMIN — ACETAMINOPHEN 650 MG: 325 TABLET, FILM COATED ORAL at 09:06

## 2020-01-15 RX ADMIN — IPRATROPIUM BROMIDE AND ALBUTEROL SULFATE 3 ML: .5; 3 SOLUTION RESPIRATORY (INHALATION) at 11:08

## 2020-01-15 RX ADMIN — DOXYCYCLINE 100 MG: 100 TABLET, FILM COATED ORAL at 16:56

## 2020-01-15 RX ADMIN — FAMOTIDINE 20 MG: 20 TABLET ORAL at 16:56

## 2020-01-15 RX ADMIN — RIVAROXABAN 20 MG: 20 TABLET, FILM COATED ORAL at 16:56

## 2020-01-15 RX ADMIN — METHYLPREDNISOLONE SODIUM SUCCINATE 40 MG: 40 INJECTION, POWDER, FOR SOLUTION INTRAMUSCULAR; INTRAVENOUS at 20:46

## 2020-01-15 RX ADMIN — IPRATROPIUM BROMIDE AND ALBUTEROL SULFATE 3 ML: .5; 3 SOLUTION RESPIRATORY (INHALATION) at 15:43

## 2020-01-15 RX ADMIN — IPRATROPIUM BROMIDE AND ALBUTEROL SULFATE 3 ML: .5; 3 SOLUTION RESPIRATORY (INHALATION) at 07:07

## 2020-01-15 ASSESSMENT — ENCOUNTER SYMPTOMS
NAUSEA: 0
SHORTNESS OF BREATH: 1
COUGH: 1
MYALGIAS: 0
ABDOMINAL PAIN: 0
VOMITING: 0
BLURRED VISION: 0
DIZZINESS: 0
STRIDOR: 0
ORTHOPNEA: 1
FEVER: 0
SPUTUM PRODUCTION: 0
SENSORY CHANGE: 0
CHILLS: 0
FOCAL WEAKNESS: 0

## 2020-01-15 ASSESSMENT — PULMONARY FUNCTION TESTS
EPAP_CMH2O: 8
EPAP_CMH2O: 8

## 2020-01-15 NOTE — CARE PLAN
Problem: Communication  Goal: The ability to communicate needs accurately and effectively will improve  Intervention: Educate patient and significant other/support system about the plan of care, procedures, treatments, medications and allow for questions  Note:   Educated patient on the importance of communicating needs and wants. Patient expressed understanding.      Problem: Skin Integrity  Goal: Risk for impaired skin integrity will decrease  Intervention: Assess risk factors for impaired skin integrity and/or pressure ulcers  Note:   Assessed patient for signs and symptoms of skin breakdown frequently. Provided Q2H turns, waffle cushion in place, heels floated on pillows.

## 2020-01-15 NOTE — CARE PLAN
Problem: Knowledge Deficit  Goal: Knowledge of disease process/condition, treatment plan, diagnostic tests, and medications will improve  Outcome: PROGRESSING AS EXPECTED   Patient educated on CHF with booklet. Patient given diet restrictions and educated on lasix and swelling patient verbalizes understanding with teach back method. Will continue to educate patient    Problem: Fluid Volume:  Goal: Will maintain balanced intake and output  Outcome: PROGRESSING AS EXPECTED   Patient receiving lasix BID. Educated on frequent urination. Purewick in place. UO adequate. Swelling decreased

## 2020-01-15 NOTE — PROGRESS NOTES
2 RN skin check complete.   Devices in place HFNC, purewick, O2 probe, ecg leads, and bp cuff.  Skin assessed under devices.  Confirmed pressure ulcers found on none.  New potential pressure ulcers noted on back of the left thigh.  Patient has generalized redness, blanching, and bruising. There is a moisture fissure on the sacrum which has became more open and excoriated from yesterday. Skin tear under pannus on the right side.  The following interventions in place: purewick wrapped to pad from pannus, sacral and heel mepilex placed, pillows used for positioning, and barrier paste applied.    Pictures taken and wound consult placed.

## 2020-01-15 NOTE — PROGRESS NOTES
"Critical Care Progress Note    Date of admission  1/11/2020    Chief Complaint  74 y.o. female admitted 1/11/2020 with acute on chronic hypoxic/hypercapneic respiratory failure    Hospital Course    \"74 y.o. female who presented 1/11/2020 with  history of COPD (2017 PFTS lFEV1 is 0.69 L which is 38% of predicted. FEV1 FVC ratio is reduced at 69%. MVV is severely reduced at 34% of predicted. After administration of an inhaled bronchodilator there is improvement in FEV1 up to 0.94 L which is 53% of predicted. This is a relative 36% improvement.   Total lung capacity is mildly reduced at 76% of predicted. RV/TLC ratio is increased to 154% of predicted. DLCO is 112% of predicted. Airways resistance is increased.  mild to moderate aortic stenosis in the past, paroxysmal atrial fibrillation and anticoagulated with Xarelto, diastolic congestive heart failure, morbid obesity, chronic hypoxemic respiratory failure requiring 2.5 to 4 L of oxygen via nasal cannula at baseline and untreated sleep apnea.  She also has persistent LLL atelectasis     Since 2016 her HCO3 has been increasing from 30 to now she is in the 40s  ABG today shows 7.3/106 paCO2 without complete compensation as her HCO should be higher     She presents today with SOB progressive with symptoms and signs of CHF exacerbation     Patient had further decline while on telemetry unit with increasing somnolence and worsening hypercarbic respiratory failure requiring initiation of NIPPV and transferred to ICU\"      Interval Problem Update  Reviewed last 24 hour events:   - Compliant with BiPAP overnight   - Neuro: AOx4   - HR: 60s-80s   - SBP: 100s-120s   - GI: tolerating diet   - UOP: 2.75 L/24hr   - Conklin: no   - Tm: 36.8   - Lines: PIV   - PPx: GI not indicated, DVT Xarelto   - HFNC during the day - attempting to wean, BiPAP qHS and PRN   - CXR (personally reviewed and compared to prior): no new    Yesterday   - Did well on BiPAP overnight, required several " reminders to leave it on   - Neuro: AOx3-4   - HR: 50s-70s   - SBP: 90s-100s   - GI: Tolerating diet today   - UOP: 1.6L/24 hrs   - Conklin: No   - Tm: 36.9   - Lines: PIV   - PPx: GI not indicated, DVT Xarelto   - ABG: pH: 7.36, pCO2: 95, pO2: 73, HCO3: 53.4, BE: 22   - On BiPAP    - CXR (personally reviewed and compared to prior): improved opacities    Updates: I have discussed goals of care with patient's daughter and patient.  The patient would like to be DNR/DNI however continued medical care, daughter is looking into hospice options.    Review of Systems  Review of Systems   Constitutional: Positive for malaise/fatigue. Negative for chills and fever.   Eyes: Negative for blurred vision.   Respiratory: Positive for cough and shortness of breath. Negative for sputum production and stridor.    Cardiovascular: Positive for orthopnea and leg swelling. Negative for chest pain.   Gastrointestinal: Negative for abdominal pain, nausea and vomiting.   Genitourinary: Negative for dysuria.   Musculoskeletal: Negative for myalgias.   Skin: Negative for rash.   Neurological: Negative for dizziness, sensory change and focal weakness.        Vital Signs for last 24 hours   Temp:  [36.6 °C (97.9 °F)-36.8 °C (98.3 °F)] 36.6 °C (97.9 °F)  Pulse:  [52-83] 64  Resp:  [20-42] 32  BP: ()/(42-69) 111/50  SpO2:  [92 %-99 %] 95 %    Hemodynamic parameters for last 24 hours       Respiratory Information for the last 24 hours       Physical Exam   Physical Exam  Vitals signs and nursing note reviewed.   Constitutional:       Appearance: She is ill-appearing.   HENT:      Head: Normocephalic.      Right Ear: External ear normal.      Left Ear: External ear normal.      Nose: Nose normal.      Mouth/Throat:      Mouth: Mucous membranes are dry.      Pharynx: Oropharynx is clear.   Eyes:      Extraocular Movements: Extraocular movements intact.      Conjunctiva/sclera: Conjunctivae normal.   Neck:      Musculoskeletal: Normal range of  motion and neck supple.   Cardiovascular:      Rate and Rhythm: Normal rate and regular rhythm.      Pulses: Normal pulses.   Pulmonary:      Effort: Tachypnea and respiratory distress present. No accessory muscle usage.      Breath sounds: Decreased air movement present. Wheezing present.   Abdominal:      General: Abdomen is flat. Bowel sounds are normal.      Palpations: Abdomen is soft.   Musculoskeletal: Normal range of motion.   Skin:     General: Skin is warm and dry.      Capillary Refill: Capillary refill takes less than 2 seconds.   Neurological:      General: No focal deficit present.      Mental Status: She is alert and oriented to person, place, and time. Mental status is at baseline.      Cranial Nerves: No cranial nerve deficit.      Sensory: No sensory deficit.      Motor: No weakness.      Comments: Slightly confused         Medications  Current Facility-Administered Medications   Medication Dose Route Frequency Provider Last Rate Last Dose   • methylPREDNISolone (SOLU-MEDROL) 40 MG injection 40 mg  40 mg Intravenous Q8HRS Van Hull M.D.   40 mg at 01/15/20 0531   • ipratropium-albuterol (DUONEB) nebulizer solution  3 mL Nebulization 4X/DAY (RT) Aryan Jacobson M.D.   3 mL at 01/15/20 0707   • doxycycline monohydrate (ADOXA) tablet 100 mg  100 mg Oral Q12HRS Aryan Jacobson M.D.   100 mg at 01/15/20 0531   • famotidine (PEPCID) tablet 20 mg  20 mg Oral BID Aryan Jacobson M.D.   20 mg at 01/15/20 0531   • furosemide (LASIX) injection 40 mg  40 mg Intravenous BID DIURETIC Aryan Jacobson M.D.   40 mg at 01/15/20 0531   • rivaroxaban (XARELTO) tablet 20 mg  20 mg Oral PM MEAL Aryan Jacobson M.D.   20 mg at 01/14/20 1715   • busPIRone (BUSPAR) tablet 7.5 mg  7.5 mg Oral QHS Aryan Jacobson M.D.   7.5 mg at 01/14/20 2105   • metoprolol (LOPRESSOR) tablet 25 mg  25 mg Oral BID Aryan Jacobson M.D.   25 mg at 01/15/20 0531   • Respiratory Care per Protocol   Nebulization Continuous RT Aryan Jacobson M.D.       •  acetaminophen (TYLENOL) tablet 650 mg  650 mg Oral Q6HRS PRN Aryan Jacobson M.D.   650 mg at 01/15/20 0906   • enalaprilat (VASOTEC) injection 1.25 mg  1.25 mg Intravenous Q6HRS PRN Aryan Jacobson M.D.       • ondansetron (ZOFRAN) syringe/vial injection 4 mg  4 mg Intravenous Q4HRS PRN Aryan Jacobson M.D.       • ondansetron (ZOFRAN ODT) dispertab 4 mg  4 mg Oral Q4HRS PRN Aryan Jacobson M.D.       • umeclidinium-vilanterol (ANORO ELLIPTA) inhaler 1 Puff  1 Puff Inhalation QDVAMSHI (RT) Aryan Jacobson M.D.   1 Puff at 01/15/20 0710    And   • fluticasone (FLOVENT HFA) 44 MCG/ACT inhaler 88 mcg  2 Puff Inhalation QDAILY (RT) Aryan Jacobson M.D.   88 mcg at 01/15/20 0710       Fluids    Intake/Output Summary (Last 24 hours) at 1/15/2020 0952  Last data filed at 1/15/2020 0800  Gross per 24 hour   Intake 500 ml   Output 3100 ml   Net -2600 ml       Laboratory  Recent Labs     01/13/20  1055  01/13/20  1633 01/14/20  0521 01/14/20  1342   [id number] 7.17*  --   --   --   --    OUXPRJ366K >150.0*  --   --   --   --    [id number] 77.9  --   --   --   --    PYKR0QKF 94.3  --   --   --   --    ARTHCO3 55*  --   --   --   --    ARTBE 20*  --   --   --   --    ISTATAPH  --    < > 7.252* 7.348* 7.335*   ISTATAPCO2  --    < > >100.0* 97.1* 97.1*   ISTATAPO2  --    < > 82 76 77   ISTATATCO2  --    < > see below >50* >50*   [id number]  --    < > see below 93 93   ISTATARTHCO3  --    < > see below 53.4* 51.8*   ISTATARTBE  --    < > see below 22* 20*   ISTATTEMP  --    < > 96.9 F 97.7 F 97.9 F   ISTATFIO2  --    < > 50 45 60   ISTATSPEC  --    < > Arterial Arterial Arterial   ISTATAPHTC  --    < > 7.265* 7.356* 7.341*   [id number]  --    < > 77 73 75    < > = values in this interval not displayed.         Recent Labs     01/13/20  0344 01/14/20  0500 01/15/20  0453   SODIUM 141 141 139   POTASSIUM 4.2 4.0 4.4   CHLORIDE 90* 93* 92*   CO2 44* 46* 47*   BUN 19 23* 25*   CREATININE 0.60 0.47* 0.46*   MAGNESIUM  --  1.6 2.1   PHOSPHORUS  --  2.9 2.9    CALCIUM 8.8 8.7 8.7     Recent Labs     01/13/20  0344 01/14/20  0500 01/15/20  0453   ALTSGPT  --  8 11   ASTSGOT  --  18 24   ALKPHOSPHAT  --  38 39   TBILIRUBIN  --  0.6 0.5   GLUCOSE 108* 87 107*     Recent Labs     01/14/20  0500 01/15/20  0453   WBC 4.4* 4.5*   ASTSGOT 18 24   ALTSGPT 8 11   ALKPHOSPHAT 38 39   TBILIRUBIN 0.6 0.5     Recent Labs     01/14/20  0500 01/15/20  0453   RBC 3.78* 3.93*   HEMOGLOBIN 10.7* 11.4*   HEMATOCRIT 39.0 39.1   PLATELETCT 124* 130*       Imaging  X-Ray:  I have personally reviewed the images and compared with prior images.    Assessment/Plan  * Acute on chronic respiratory failure with hypoxia and hypercapnia (HCC)- (present on admission)  Assessment & Plan  Multifocal  She has very severe obstructive lung disease with FEV1 of 38%, underlying restrictive lung disease due to her severe kyphosis and obesity, obesity hypoventilation syndrome and persistent shunt with the RML atelectasis that appears to be progressing since Jan 2019    She has been compensating suprisingly well with HCO3 now in the 40s but also now in acute on chronic resp failure due to CHF secondary to AS and diastolic heart failure     Thus agree she needs treatment for BRIDGETT as if she loses her drive when sleeping she will not be able to compensate and will inevitably progress with her respiratory failure    Explained to the patient that she will need a form of BIPAP - she has agreed to be compliant with therapies    RT/O2 Protocols  Titrate supplemental FiO2 to maintain SpO2 >88%  qHS BiPAP and PRN/rescue with serial ABGs to identify trend  Low threshold for intubation  Continue Solu-Medrol  Aspiration precautions  Procalcitonin has been WNL  S/P doxycycline x5 days  Patient on Xarelto with low likelihood of PE in setting of AC    Aortic stenosis- (present on admission)  Assessment & Plan  Noted on echo as moderate  Peak: 55 mmHg, Mean: 34 mmHg    COPD (chronic obstructive pulmonary disease) (HCC)- (present  on admission)  Assessment & Plan  With acute exacerbation  RT/O2 protocols  Continue steroids  Completed 5 days of doxycycline  qHS BiPAP with rescue BiPAP PRN    Pulmonary hypertension (HCC)- (present on admission)  Assessment & Plan  Severe RVSP 90  Multifactorial  Maintain O2 saturations greater than 92%  Maintain euvolemic volume balance  Needs compliance with nocturnal BiPAP for BRIDGETT/OHS  Recommend weight loss    Paroxysmal atrial fibrillation (HCC)- (present on admission)  Assessment & Plan  Keep K greater than 4 and mag are in 2  Continue Xarelto  Maintain rate control       VTE:  NOAC  Ulcer: H2 Antagonist  Lines: None    I have performed a physical exam and reviewed and updated ROS and Plan today (1/15/2020). In review of yesterday's note (1/14/2020), there are no changes except as documented above.     Rescue BiPAP ongoing for severe hypercapnia, at high risk of worsening respiratory failure leading to death. This patient is critically ill, at high risk for decompensation leading to worsening vital organ dysfunction and death without critical care interventions.    Discussed patient condition and risk of morbidity and/or mortality with Hospitalist, RN, RT, Pharmacy, Dietary, , Code status disscussed, Charge nurse / hot rounds and Patient     The patient remains critically ill.  Critical care time = 38 minutes in directly providing and coordinating critical care and extensive data review.  No time overlap and excludes procedures.

## 2020-01-16 LAB
ACTION RANGE TRIGGERED IACRT: YES
ALBUMIN SERPL BCP-MCNC: 3.3 G/DL (ref 3.2–4.9)
ALBUMIN/GLOB SERPL: 1.1 G/DL
ALP SERPL-CCNC: 44 U/L (ref 30–99)
ALT SERPL-CCNC: 11 U/L (ref 2–50)
ANION GAP SERPL CALC-SCNC: 7 MMOL/L (ref 0–11.9)
AST SERPL-CCNC: 18 U/L (ref 12–45)
BASE EXCESS BLDA CALC-SCNC: 19 MMOL/L (ref -4–3)
BILIRUB SERPL-MCNC: 0.5 MG/DL (ref 0.1–1.5)
BODY TEMPERATURE: ABNORMAL DEGREES
BUN SERPL-MCNC: 27 MG/DL (ref 8–22)
CALCIUM SERPL-MCNC: 8.9 MG/DL (ref 8.5–10.5)
CHLORIDE SERPL-SCNC: 91 MMOL/L (ref 96–112)
CO2 BLDA-SCNC: >50 MMOL/L (ref 20–33)
CO2 SERPL-SCNC: 45 MMOL/L (ref 20–33)
CREAT SERPL-MCNC: 0.56 MG/DL (ref 0.5–1.4)
ERYTHROCYTE [DISTWIDTH] IN BLOOD BY AUTOMATED COUNT: 51.8 FL (ref 35.9–50)
GLOBULIN SER CALC-MCNC: 3 G/DL (ref 1.9–3.5)
GLUCOSE SERPL-MCNC: 107 MG/DL (ref 65–99)
HCO3 BLDA-SCNC: 50 MMOL/L (ref 17–25)
HCT VFR BLD AUTO: 45 % (ref 37–47)
HGB BLD-MCNC: 12.4 G/DL (ref 12–16)
HOROWITZ INDEX BLDA+IHG-RTO: 1900 MM[HG]
INST. QUALIFIED PATIENT IIQPT: YES
MAGNESIUM SERPL-MCNC: 1.9 MG/DL (ref 1.5–2.5)
MCH RBC QN AUTO: 28.1 PG (ref 27–33)
MCHC RBC AUTO-ENTMCNC: 27.6 G/DL (ref 33.6–35)
MCV RBC AUTO: 101.8 FL (ref 81.4–97.8)
O2/TOTAL GAS SETTING VFR VENT: 4 %
PCO2 BLDA: 95.9 MMHG (ref 26–37)
PCO2 TEMP ADJ BLDA: 96.9 MMHG (ref 26–37)
PH BLDA: 7.33 [PH] (ref 7.4–7.5)
PH TEMP ADJ BLDA: 7.32 [PH] (ref 7.4–7.5)
PHOSPHATE SERPL-MCNC: 3.5 MG/DL (ref 2.5–4.5)
PLATELET # BLD AUTO: 139 K/UL (ref 164–446)
PMV BLD AUTO: 10.8 FL (ref 9–12.9)
PO2 BLDA: 76 MMHG (ref 64–87)
PO2 TEMP ADJ BLDA: 77 MMHG (ref 64–87)
POTASSIUM SERPL-SCNC: 3.9 MMOL/L (ref 3.6–5.5)
PROT SERPL-MCNC: 6.3 G/DL (ref 6–8.2)
RBC # BLD AUTO: 4.42 M/UL (ref 4.2–5.4)
SAO2 % BLDA: 92 % (ref 93–99)
SODIUM SERPL-SCNC: 143 MMOL/L (ref 135–145)
SPECIMEN DRAWN FROM PATIENT: ABNORMAL
WBC # BLD AUTO: 4.7 K/UL (ref 4.8–10.8)

## 2020-01-16 PROCEDURE — 94002 VENT MGMT INPAT INIT DAY: CPT

## 2020-01-16 PROCEDURE — 36600 WITHDRAWAL OF ARTERIAL BLOOD: CPT

## 2020-01-16 PROCEDURE — A9270 NON-COVERED ITEM OR SERVICE: HCPCS | Performed by: INTERNAL MEDICINE

## 2020-01-16 PROCEDURE — 700111 HCHG RX REV CODE 636 W/ 250 OVERRIDE (IP): Performed by: INTERNAL MEDICINE

## 2020-01-16 PROCEDURE — 83735 ASSAY OF MAGNESIUM: CPT

## 2020-01-16 PROCEDURE — 94668 MNPJ CHEST WALL SBSQ: CPT

## 2020-01-16 PROCEDURE — 82803 BLOOD GASES ANY COMBINATION: CPT

## 2020-01-16 PROCEDURE — 94640 AIRWAY INHALATION TREATMENT: CPT

## 2020-01-16 PROCEDURE — 700102 HCHG RX REV CODE 250 W/ 637 OVERRIDE(OP): Performed by: INTERNAL MEDICINE

## 2020-01-16 PROCEDURE — 85027 COMPLETE CBC AUTOMATED: CPT

## 2020-01-16 PROCEDURE — 84100 ASSAY OF PHOSPHORUS: CPT

## 2020-01-16 PROCEDURE — 700101 HCHG RX REV CODE 250: Performed by: INTERNAL MEDICINE

## 2020-01-16 PROCEDURE — 80053 COMPREHEN METABOLIC PANEL: CPT

## 2020-01-16 PROCEDURE — 770020 HCHG ROOM/CARE - TELE (206)

## 2020-01-16 PROCEDURE — 94660 CPAP INITIATION&MGMT: CPT

## 2020-01-16 PROCEDURE — 94003 VENT MGMT INPAT SUBQ DAY: CPT

## 2020-01-16 PROCEDURE — 302146: Performed by: HOSPITALIST

## 2020-01-16 PROCEDURE — 99233 SBSQ HOSP IP/OBS HIGH 50: CPT | Performed by: INTERNAL MEDICINE

## 2020-01-16 RX ORDER — AMOXICILLIN 250 MG
2 CAPSULE ORAL 2 TIMES DAILY
Status: DISCONTINUED | OUTPATIENT
Start: 2020-01-16 | End: 2020-01-20 | Stop reason: HOSPADM

## 2020-01-16 RX ORDER — IPRATROPIUM BROMIDE AND ALBUTEROL SULFATE 2.5; .5 MG/3ML; MG/3ML
3 SOLUTION RESPIRATORY (INHALATION)
Status: DISCONTINUED | OUTPATIENT
Start: 2020-01-16 | End: 2020-01-18

## 2020-01-16 RX ORDER — BISACODYL 10 MG
10 SUPPOSITORY, RECTAL RECTAL
Status: DISCONTINUED | OUTPATIENT
Start: 2020-01-16 | End: 2020-01-20 | Stop reason: HOSPADM

## 2020-01-16 RX ORDER — POLYETHYLENE GLYCOL 3350 17 G/17G
1 POWDER, FOR SOLUTION ORAL
Status: DISCONTINUED | OUTPATIENT
Start: 2020-01-16 | End: 2020-01-20 | Stop reason: HOSPADM

## 2020-01-16 RX ORDER — POTASSIUM CHLORIDE 20 MEQ/1
40 TABLET, EXTENDED RELEASE ORAL ONCE
Status: COMPLETED | OUTPATIENT
Start: 2020-01-16 | End: 2020-01-16

## 2020-01-16 RX ORDER — MAGNESIUM SULFATE HEPTAHYDRATE 40 MG/ML
2 INJECTION, SOLUTION INTRAVENOUS ONCE
Status: COMPLETED | OUTPATIENT
Start: 2020-01-16 | End: 2020-01-16

## 2020-01-16 RX ADMIN — MAGNESIUM SULFATE 2 G: 2 INJECTION INTRAVENOUS at 11:03

## 2020-01-16 RX ADMIN — ZINC OXIDE, PRAMOXINE HYDROCHLORIDE: 8; 1 LOTION TOPICAL at 13:41

## 2020-01-16 RX ADMIN — RIVAROXABAN 20 MG: 20 TABLET, FILM COATED ORAL at 17:12

## 2020-01-16 RX ADMIN — IPRATROPIUM BROMIDE AND ALBUTEROL SULFATE 3 ML: .5; 3 SOLUTION RESPIRATORY (INHALATION) at 19:47

## 2020-01-16 RX ADMIN — BUSPIRONE HYDROCHLORIDE 7.5 MG: 5 TABLET ORAL at 20:59

## 2020-01-16 RX ADMIN — METOPROLOL TARTRATE 25 MG: 25 TABLET, FILM COATED ORAL at 17:12

## 2020-01-16 RX ADMIN — POTASSIUM CHLORIDE 40 MEQ: 1500 TABLET, EXTENDED RELEASE ORAL at 11:02

## 2020-01-16 RX ADMIN — METHYLPREDNISOLONE SODIUM SUCCINATE 40 MG: 40 INJECTION, POWDER, FOR SOLUTION INTRAMUSCULAR; INTRAVENOUS at 04:15

## 2020-01-16 RX ADMIN — METHYLPREDNISOLONE SODIUM SUCCINATE 40 MG: 40 INJECTION, POWDER, FOR SOLUTION INTRAMUSCULAR; INTRAVENOUS at 13:41

## 2020-01-16 RX ADMIN — SENNOSIDES AND DOCUSATE SODIUM 2 TABLET: 8.6; 5 TABLET ORAL at 17:14

## 2020-01-16 RX ADMIN — IPRATROPIUM BROMIDE AND ALBUTEROL SULFATE 3 ML: .5; 3 SOLUTION RESPIRATORY (INHALATION) at 07:25

## 2020-01-16 RX ADMIN — FUROSEMIDE 20 MG: 20 TABLET ORAL at 04:16

## 2020-01-16 RX ADMIN — METHYLPREDNISOLONE SODIUM SUCCINATE 40 MG: 40 INJECTION, POWDER, FOR SOLUTION INTRAMUSCULAR; INTRAVENOUS at 21:00

## 2020-01-16 RX ADMIN — UMECLIDINIUM BROMIDE AND VILANTEROL TRIFENATATE 1 PUFF: 62.5; 25 POWDER RESPIRATORY (INHALATION) at 07:23

## 2020-01-16 RX ADMIN — IPRATROPIUM BROMIDE AND ALBUTEROL SULFATE 3 ML: .5; 3 SOLUTION RESPIRATORY (INHALATION) at 11:22

## 2020-01-16 RX ADMIN — FAMOTIDINE 20 MG: 20 TABLET ORAL at 17:12

## 2020-01-16 RX ADMIN — IPRATROPIUM BROMIDE AND ALBUTEROL SULFATE 3 ML: .5; 3 SOLUTION RESPIRATORY (INHALATION) at 23:50

## 2020-01-16 RX ADMIN — FAMOTIDINE 20 MG: 20 TABLET ORAL at 04:16

## 2020-01-16 RX ADMIN — IPRATROPIUM BROMIDE AND ALBUTEROL SULFATE 3 ML: .5; 3 SOLUTION RESPIRATORY (INHALATION) at 14:44

## 2020-01-16 RX ADMIN — ACETAMINOPHEN 650 MG: 325 TABLET, FILM COATED ORAL at 19:31

## 2020-01-16 RX ADMIN — FUROSEMIDE 20 MG: 20 TABLET ORAL at 21:00

## 2020-01-16 RX ADMIN — FLUTICASONE PROPIONATE 88 MCG: 44 AEROSOL, METERED RESPIRATORY (INHALATION) at 07:23

## 2020-01-16 RX ADMIN — FUROSEMIDE 20 MG: 20 TABLET ORAL at 13:41

## 2020-01-16 ASSESSMENT — ENCOUNTER SYMPTOMS
BLURRED VISION: 0
DIZZINESS: 0
SHORTNESS OF BREATH: 1
FEVER: 0
FOCAL WEAKNESS: 0
SPUTUM PRODUCTION: 0
ABDOMINAL PAIN: 0
SENSORY CHANGE: 0
STRIDOR: 0
NAUSEA: 0
CHILLS: 0
ORTHOPNEA: 1
VOMITING: 0
MYALGIAS: 0

## 2020-01-16 ASSESSMENT — PULMONARY FUNCTION TESTS: EPAP_CMH2O: 8

## 2020-01-16 NOTE — PROGRESS NOTES
"Critical Care Progress Note    Date of admission  1/11/2020    Chief Complaint  74 y.o. female admitted 1/11/2020 with acute on chronic hypoxic/hypercapneic respiratory failure    Hospital Course    \"74 y.o. female who presented 1/11/2020 with  history of COPD (2017 PFTS lFEV1 is 0.69 L which is 38% of predicted. FEV1 FVC ratio is reduced at 69%. MVV is severely reduced at 34% of predicted. After administration of an inhaled bronchodilator there is improvement in FEV1 up to 0.94 L which is 53% of predicted. This is a relative 36% improvement.   Total lung capacity is mildly reduced at 76% of predicted. RV/TLC ratio is increased to 154% of predicted. DLCO is 112% of predicted. Airways resistance is increased.  mild to moderate aortic stenosis in the past, paroxysmal atrial fibrillation and anticoagulated with Xarelto, diastolic congestive heart failure, morbid obesity, chronic hypoxemic respiratory failure requiring 2.5 to 4 L of oxygen via nasal cannula at baseline and untreated sleep apnea.  She also has persistent LLL atelectasis     Since 2016 her HCO3 has been increasing from 30 to now she is in the 40s  ABG today shows 7.3/106 paCO2 without complete compensation as her HCO should be higher     She presents today with SOB progressive with symptoms and signs of CHF exacerbation     Patient had further decline while on telemetry unit with increasing somnolence and worsening hypercarbic respiratory failure requiring initiation of NIPPV and transferred to ICU\"      Interval Problem Update  Reviewed last 24 hour events:   - Doing well this morning, compliant with AVAPS all night   - Neuro: AOx4   - HR: 60s-70s   - SBP: 90s-110s   - GI: tolerating diet   - UOP: 1.3L/24 hrs   - Conklin: no   - Tm: afeb   - Lines: PIV   - PPx: GI not indicated, DVT Xarelto   - AVAPS qHS, on 4L NC during the day   - CXR (personally reviewed and compared to prior): no new    Yesterday   - Compliant with BiPAP overnight   - Neuro: AOx4   - " HR: 60s-80s   - SBP: 100s-120s   - GI: tolerating diet   - UOP: 2.75 L/24hr   - Conklin: no   - Tm: 36.8   - Lines: PIV   - PPx: GI not indicated, DVT Xarelto   - HFNC during the day - attempting to wean, BiPAP qHS and PRN   - CXR (personally reviewed and compared to prior): no new    Review of Systems  Review of Systems   Constitutional: Positive for malaise/fatigue. Negative for chills and fever.   Eyes: Negative for blurred vision.   Respiratory: Positive for shortness of breath (improved). Negative for sputum production and stridor.    Cardiovascular: Positive for orthopnea and leg swelling. Negative for chest pain.   Gastrointestinal: Negative for abdominal pain, nausea and vomiting.   Genitourinary: Negative for dysuria.   Musculoskeletal: Negative for myalgias.   Skin: Negative for rash.   Neurological: Negative for dizziness, sensory change and focal weakness.        Vital Signs for last 24 hours   Temp:  [36.3 °C (97.4 °F)-36.4 °C (97.5 °F)] 36.4 °C (97.5 °F)  Pulse:  [56-93] 68  Resp:  [15-33] 31  BP: ()/(38-60) 91/47  SpO2:  [87 %-100 %] 93 %    Hemodynamic parameters for last 24 hours       Respiratory Information for the last 24 hours       Physical Exam   Physical Exam  Vitals signs and nursing note reviewed.   Constitutional:       Appearance: She is ill-appearing.      Interventions: Nasal cannula in place.   HENT:      Head: Normocephalic.      Right Ear: External ear normal.      Left Ear: External ear normal.      Nose: Nose normal.      Mouth/Throat:      Mouth: Mucous membranes are dry.      Pharynx: Oropharynx is clear.   Eyes:      Extraocular Movements: Extraocular movements intact.      Conjunctiva/sclera: Conjunctivae normal.   Neck:      Musculoskeletal: Normal range of motion and neck supple.   Cardiovascular:      Rate and Rhythm: Normal rate and regular rhythm.      Pulses: Normal pulses.   Pulmonary:      Effort: No tachypnea, accessory muscle usage or respiratory distress.       Breath sounds: Decreased air movement present. Wheezing (improve) present.   Abdominal:      General: Abdomen is flat. Bowel sounds are normal.      Palpations: Abdomen is soft.   Musculoskeletal: Normal range of motion.   Skin:     General: Skin is warm and dry.      Capillary Refill: Capillary refill takes less than 2 seconds.   Neurological:      General: No focal deficit present.      Mental Status: She is alert and oriented to person, place, and time. Mental status is at baseline.      Cranial Nerves: No cranial nerve deficit.      Sensory: No sensory deficit.      Motor: No weakness.      Comments: Slightly confused         Medications  Current Facility-Administered Medications   Medication Dose Route Frequency Provider Last Rate Last Dose   • furosemide (LASIX) tablet 20 mg  20 mg Oral Q8HRS Adria Thomas Jr., D.OSlaty   20 mg at 01/16/20 0416   • methylPREDNISolone (SOLU-MEDROL) 40 MG injection 40 mg  40 mg Intravenous Q8HRS Van Hull M.D.   40 mg at 01/16/20 0415   • ipratropium-albuterol (DUONEB) nebulizer solution  3 mL Nebulization 4X/DAY (RT) Aryan Jacboson M.D.   3 mL at 01/16/20 0725   • famotidine (PEPCID) tablet 20 mg  20 mg Oral BID Aryan Jacobson M.D.   20 mg at 01/16/20 0416   • rivaroxaban (XARELTO) tablet 20 mg  20 mg Oral PM MEAL Aryan Jacobson M.D.   20 mg at 01/15/20 1656   • busPIRone (BUSPAR) tablet 7.5 mg  7.5 mg Oral QHS Aryan Jacobson M.D.   7.5 mg at 01/15/20 2045   • metoprolol (LOPRESSOR) tablet 25 mg  25 mg Oral BID Aryan Jacobson M.D.   Stopped at 01/16/20 0600   • Respiratory Care per Protocol   Nebulization Continuous RT Aryan Jacobson M.D.       • acetaminophen (TYLENOL) tablet 650 mg  650 mg Oral Q6HRS PRN Aryan Jacobson M.D.   650 mg at 01/15/20 0906   • enalaprilat (VASOTEC) injection 1.25 mg  1.25 mg Intravenous Q6HRS PRN Aryan Jacobson M.D.       • ondansetron (ZOFRAN) syringe/vial injection 4 mg  4 mg Intravenous Q4HRS PRN Aryan Jacobson M.D.       • ondansetron (ZOFRAN ODT) dispertab 4 mg   4 mg Oral Q4HRS PRN Aryan Jacobson M.D.       • umeclidinium-vilanterol (ANORO ELLIPTA) inhaler 1 Puff  1 Puff Inhalation QDAILY (RT) Aryan Jacobson M.D.   1 Puff at 01/16/20 0723    And   • fluticasone (FLOVENT HFA) 44 MCG/ACT inhaler 88 mcg  2 Puff Inhalation QDAILY (RT) Aryan Jacobson M.D.   88 mcg at 01/16/20 0723       Fluids    Intake/Output Summary (Last 24 hours) at 1/16/2020 1004  Last data filed at 1/16/2020 0800  Gross per 24 hour   Intake 750 ml   Output 400 ml   Net 350 ml       Laboratory  Recent Labs     01/13/20  1055  01/14/20  0521 01/14/20  1342 01/15/20  1013   HLTOD19P 7.17*  --   --   --   --    [id number] >150.0*  --   --   --   --    OKDXC300U 77.9  --   --   --   --    KMVR2FOA 94.3  --   --   --   --    ARTHCO3 55*  --   --   --   --    ARTBE 20*  --   --   --   --    ISTATAPH  --    < > 7.348* 7.335* 7.390*   ISTATAPCO2  --    < > 97.1* 97.1* 81.0*   ISTATAPO2  --    < > 76 77 75   ISTATATCO2  --    < > >50* >50* >50*   [id number]  --    < > 93 93 93   ISTATARTHCO3  --    < > 53.4* 51.8* 49.0*   ISTATARTBE  --    < > 22* 20* 19*   ISTATTEMP  --    < > 97.7 F 97.9 F 97.6 F   ISTATFIO2  --    < > 45 60 50   ISTATSPEC  --    < > Arterial Arterial Arterial   ISTATAPHTC  --    < > 7.356* 7.341* 7.398*   [id number]  --    < > 73 75 72    < > = values in this interval not displayed.         Recent Labs     01/14/20  0500 01/15/20  0453 01/16/20  0345   SODIUM 141 139 143   POTASSIUM 4.0 4.4 3.9   CHLORIDE 93* 92* 91*   CO2 46* 47* 45*   BUN 23* 25* 27*   CREATININE 0.47* 0.46* 0.56   MAGNESIUM 1.6 2.1 1.9   PHOSPHORUS 2.9 2.9 3.5   CALCIUM 8.7 8.7 8.9     Recent Labs     01/14/20  0500 01/15/20  0453 01/16/20  0345   ALTSGPT 8 11 11   ASTSGOT 18 24 18   ALKPHOSPHAT 38 39 44   TBILIRUBIN 0.6 0.5 0.5   GLUCOSE 87 107* 107*     Recent Labs     01/14/20  0500 01/15/20  0453 01/16/20  0345   WBC 4.4* 4.5* 4.7*   ASTSGOT 18 24 18   ALTSGPT 8 11 11   ALKPHOSPHAT 38 39 44   TBILIRUBIN 0.6 0.5 0.5     Recent  Labs     01/14/20  0500 01/15/20  0453 01/16/20  0345   RBC 3.78* 3.93* 4.42   HEMOGLOBIN 10.7* 11.4* 12.4   HEMATOCRIT 39.0 39.1 45.0   PLATELETCT 124* 130* 139*       Imaging  X-Ray:  I have personally reviewed the images and compared with prior images.    Assessment/Plan  * Acute on chronic respiratory failure with hypoxia and hypercapnia (HCC)- (present on admission)  Assessment & Plan  Multifactorial: very severe obstructive lung disease with FEV1 of 38%, underlying restrictive lung disease due to her severe kyphosis and obesity, OHS and persistent shunt with the RML atelectasis that appears to be progressing since Jan 2019    She has been compensating well with HCO3 now in the 40s but also now in acute on chronic resp failure due to CHF secondary to AS and diastolic heart failure.     Needs treatment for OHS/BRIDGETT, on AVAPS at night    Explained to the patient that she will need a form of BIPAP - she has agreed to be compliant with therapies    RT/O2 Protocols  Titrate supplemental FiO2 to maintain SpO2 >88%  qHS AVAPS, not requiring rescue use  DNI  Continue Solu-Medrol  Aspiration precautions  Procalcitonin has been WNL  S/P doxycycline x5 days  Patient on Xarelto with low likelihood of PE in setting of AC    Aortic stenosis- (present on admission)  Assessment & Plan  Noted on echo as moderate  Peak: 55 mmHg, Mean: 34 mmHg    COPD (chronic obstructive pulmonary disease) (HCC)- (present on admission)  Assessment & Plan  With acute exacerbation  RT/O2 Protocols  Titrate supplemental FiO2 to maintain SpO2 >88%  Continue steroids  Completed 5 days of doxycycline  qHS AVAPS  Scheduled nebs  Anoro Ellipta    Pulmonary hypertension (HCC)- (present on admission)  Assessment & Plan  Severe RVSP 90  Multifactorial  Maintain O2 saturations greater than 92%  Maintain euvolemic volume balance  Needs compliance with nocturnal AVAPS/BiPAP for BRIDGETT/OHS  Recommend weight loss    Paroxysmal atrial fibrillation (HCC)- (present on  admission)  Assessment & Plan  Keep K greater than 4 and mag are in 2  Continue Xarelto  Maintain rate control       VTE:  NOAC  Ulcer: H2 Antagonist  Lines: None    I have performed a physical exam and reviewed and updated ROS and Plan today (1/16/2020). In review of yesterday's note (1/15/2020), there are no changes except as documented above.     No longer requiring rescue noninvasive positive pressure ventilation.  She will continue to require nocturnal AVAPS and aggressive treatment of her multifactorial respiratory failure during the day.  She has diuresed well while in the ICU and edema is improved.    Discussed patient condition and risk of morbidity and/or mortality with Hospitalist, Family, RN, RT, Pharmacy, Dietary, , Code status disscussed, Charge nurse / hot rounds and Patient

## 2020-01-16 NOTE — PROGRESS NOTES
Monitor Summary: .18/.10/.36. Rhythm: SR. Rate: 60-70's.    occasional PVCs        12 hour chart check

## 2020-01-16 NOTE — WOUND TEAM
[facility]  Inpatient Services  Initial Wound and Skin Care Evaluation    Admission Date: 1/11/2020     Consult Date: 1/14 @ 1500   HPI, PMH, SH: Reviewed    Unit where seen by Wound Team: [id number]     WOUND CONSULT RELATED TO:  Sacrum/coccyx, Right back of thigh, left back of thigh      Self Report / Pain Level:  C/o some pain, w/ movement       OBJECTIVE:  Barrier Paste noted on Sacrococcygeal Area     WOUND TYPE, LOCATION, CHARACTERISTICS (Pressure Injuries: location, stage, POA or date identified)              Wound 01/15/20 Partial Thickness Wound Coccyx;Sacrum Moisture Fissure (Active)   Wound Image      Site Assessment Pink    Anna-wound Assessment Intact    Margins Attached edges    Wound Length (cm) 7 cm    Wound Width (cm) 1 cm    Wound Depth (cm) 0.2 cm    Wound Surface Area (cm^2) 7 cm^2    Tunneling 0 cm    Undermining 0 cm    Closure None    Drainage Amount None    Non-staged Wound Description Partial thickness    Treatments Cleansed;Site care    Periwound Protectant Barrier Paste    Dressing Options Open to Air    Dressing Cleansing/Solutions Not Applicable    Dressing Changed New    Dressing Status Clean;Dry;Intact    NEXT Weekly Photo (Inpatient Only) 01/23/20    WOUND NURSE ONLY - Odor None    WOUND NURSE ONLY - Exposed Structures None    WOUND NURSE ONLY - Tissue Type and Percentage 100% pink                Lab Values:    Lab Results   Component Value Date/Time    WBC 4.7 (L) 01/16/2020 03:45 AM    RBC 4.42 01/16/2020 03:45 AM    HEMOGLOBIN 12.4 01/16/2020 03:45 AM    HEMATOCRIT 45.0 01/16/2020 03:45 AM                    No results found for: HBA1C        Culture:  N/A    INTERVENTIONS BY WOUND TEAM:  Patient seen w/ Abelardo RN. Pt is incontinent or urine. Periwick being used. Pt turned to left side, sacrococcygeal area cleansed w/ barrier wipes. Pt noted to have moisture fissure. Right back of thigh wound appears to be healing, might be related to friction. Left back of thigh  assessed, no wounds noted. Measurements and picture done of moisture fissure. Barrier paste applied. Linens changed. Pt positioned back.     Interdisciplinary consultation: Patient, Bedside RN (Abelardo),     EVALUATION: Pt noted to have moisture fissure. Periwick being used. Bedside nursing to continue to use Barrier Z guard paste.     Goals: Steady decrease in wound area and depth weekly.    NURSING PLAN OF CARE ORDERS (X):  Dressing changes: See Dressing Care orders:   Skin care: See Skin Care orders: X  Rectal tube care: See Rectal Tube Care orders:        Other orders:                           RSKIN:   CURRENTLY IN PLACE (X), APPLIED THIS VISIT (A), ORDERED (O):   Q shift Bandar:  X  Q shift pressure point assessments:  X  Pressure redistribution mattress                             Low Airloss      X ICU bed                  Bariatric MADI                     Bariatric foam                        Heel float boots                     Heel Silicone dressing                         Float Heels off Bed with Pillows               Barrier wipes       X  Barrier Cream         Barrier paste        X  Sacral silicone dressing         Silicone O2 tubing         Anchorfast         Cannula fixation Device (Tender )          Gray Foam Ear protectors           Trach with Optifoam split foam                 Waffle cushion        Waffle Overlay         Rectal tube or BMS    Purwick/Condom Cath          Antifungal tx      Interdry          Reposition q 2 hours        Up to chair  X      Ambulate    X  PT/OT        Dietician        Diabetes Education      PO     TF     TPN     NPO   # days   Other        WOUND TEAM PLAN OF CARE (X):   Dressing changes by wound team:          Follow up 1-2 times weekly:               Follow up 3 times weekly:                NPWT change 3 times weekly:     Follow up as needed:     X  Other (explain):     Anticipated discharge plans (X):   LTACH:        SNF/Rehab:                  Home Care:            Outpatient Wound Center:            Self Care:            Other:         X pt unlikely to need outpatient wound care for moisture fissure

## 2020-01-16 NOTE — CARE PLAN
Problem: Safety  Goal: Will remain free from injury  Outcome: PROGRESSING AS EXPECTED  Note:   Patient calls appropriately, ambulated with one assist. FWW in hospital room, baseline FWW at home. Patient undrstands fall educations. Calls before getting up      Problem: Urinary Elimination:  Goal: Ability to reestablish a normal urinary elimination pattern will improve  Outcome: PROGRESSING AS EXPECTED  Note:   Patient with adequate urine output. Incontinence, using purewick. Patient states at home has issues with incontinence and this is not new.

## 2020-01-16 NOTE — CARE PLAN
Problem: Respiratory:  Goal: Respiratory status will improve  Outcome: PROGRESSING SLOWER THAN EXPECTED    Patient using 5-6L nasal cannula or BiPap when she is sleeping.  Here for respiratory failure, continuing to remind patient the importance of wearing BiPap when she sleeps.  She needs frequent reminding and sometimes attempts to remove the mask.  Very lethargic and some confusion at night regarding respiratory needs.  Lung sounds severely diminished throughout, but seems to be improving overall.     Problem: Skin Integrity  Goal: Risk for impaired skin integrity will decrease  Outcome: PROGRESSING SLOWER THAN EXPECTED     Patient with incontinence, reminding patient to alert staff when she is not clean/dry.  Using purewick to help with incontinence issues as well as frequently checking patient to ensure she is clean/dry.  Does have a moisture fissure in her coccyx/sacrum region r/t incontinence and moisture issues.

## 2020-01-17 LAB
ALBUMIN SERPL BCP-MCNC: 2.8 G/DL (ref 3.2–4.9)
ALBUMIN/GLOB SERPL: 1 G/DL
ALP SERPL-CCNC: 35 U/L (ref 30–99)
ALT SERPL-CCNC: 10 U/L (ref 2–50)
ANION GAP SERPL CALC-SCNC: 4 MMOL/L (ref 0–11.9)
AST SERPL-CCNC: 22 U/L (ref 12–45)
BILIRUB SERPL-MCNC: 0.5 MG/DL (ref 0.1–1.5)
BUN SERPL-MCNC: 26 MG/DL (ref 8–22)
CALCIUM SERPL-MCNC: 8.4 MG/DL (ref 8.5–10.5)
CHLORIDE SERPL-SCNC: 94 MMOL/L (ref 96–112)
CO2 SERPL-SCNC: 44 MMOL/L (ref 20–33)
CREAT SERPL-MCNC: 0.54 MG/DL (ref 0.5–1.4)
ERYTHROCYTE [DISTWIDTH] IN BLOOD BY AUTOMATED COUNT: 50.5 FL (ref 35.9–50)
GLOBULIN SER CALC-MCNC: 2.7 G/DL (ref 1.9–3.5)
GLUCOSE SERPL-MCNC: 104 MG/DL (ref 65–99)
HCT VFR BLD AUTO: 40.4 % (ref 37–47)
HGB BLD-MCNC: 11.1 G/DL (ref 12–16)
MAGNESIUM SERPL-MCNC: 2.1 MG/DL (ref 1.5–2.5)
MCH RBC QN AUTO: 28 PG (ref 27–33)
MCHC RBC AUTO-ENTMCNC: 27.5 G/DL (ref 33.6–35)
MCV RBC AUTO: 101.8 FL (ref 81.4–97.8)
PHOSPHATE SERPL-MCNC: 3.4 MG/DL (ref 2.5–4.5)
PLATELET # BLD AUTO: 119 K/UL (ref 164–446)
PMV BLD AUTO: 10.7 FL (ref 9–12.9)
POTASSIUM SERPL-SCNC: 4.6 MMOL/L (ref 3.6–5.5)
PROT SERPL-MCNC: 5.5 G/DL (ref 6–8.2)
RBC # BLD AUTO: 3.97 M/UL (ref 4.2–5.4)
SODIUM SERPL-SCNC: 142 MMOL/L (ref 135–145)
WBC # BLD AUTO: 4.8 K/UL (ref 4.8–10.8)

## 2020-01-17 PROCEDURE — 83735 ASSAY OF MAGNESIUM: CPT

## 2020-01-17 PROCEDURE — 94669 MECHANICAL CHEST WALL OSCILL: CPT

## 2020-01-17 PROCEDURE — 94640 AIRWAY INHALATION TREATMENT: CPT

## 2020-01-17 PROCEDURE — 700102 HCHG RX REV CODE 250 W/ 637 OVERRIDE(OP): Performed by: INTERNAL MEDICINE

## 2020-01-17 PROCEDURE — 770020 HCHG ROOM/CARE - TELE (206)

## 2020-01-17 PROCEDURE — 94760 N-INVAS EAR/PLS OXIMETRY 1: CPT

## 2020-01-17 PROCEDURE — 85027 COMPLETE CBC AUTOMATED: CPT

## 2020-01-17 PROCEDURE — 80053 COMPREHEN METABOLIC PANEL: CPT

## 2020-01-17 PROCEDURE — A9270 NON-COVERED ITEM OR SERVICE: HCPCS | Performed by: INTERNAL MEDICINE

## 2020-01-17 PROCEDURE — 84100 ASSAY OF PHOSPHORUS: CPT

## 2020-01-17 PROCEDURE — 700111 HCHG RX REV CODE 636 W/ 250 OVERRIDE (IP): Performed by: INTERNAL MEDICINE

## 2020-01-17 PROCEDURE — 700101 HCHG RX REV CODE 250: Performed by: INTERNAL MEDICINE

## 2020-01-17 PROCEDURE — 99233 SBSQ HOSP IP/OBS HIGH 50: CPT | Performed by: HOSPITALIST

## 2020-01-17 PROCEDURE — 94660 CPAP INITIATION&MGMT: CPT

## 2020-01-17 PROCEDURE — 99233 SBSQ HOSP IP/OBS HIGH 50: CPT | Performed by: INTERNAL MEDICINE

## 2020-01-17 PROCEDURE — 94668 MNPJ CHEST WALL SBSQ: CPT

## 2020-01-17 RX ORDER — METHYLPREDNISOLONE SODIUM SUCCINATE 40 MG/ML
40 INJECTION, POWDER, LYOPHILIZED, FOR SOLUTION INTRAMUSCULAR; INTRAVENOUS EVERY 12 HOURS
Status: DISCONTINUED | OUTPATIENT
Start: 2020-01-18 | End: 2020-01-18

## 2020-01-17 RX ADMIN — FAMOTIDINE 20 MG: 20 TABLET ORAL at 16:59

## 2020-01-17 RX ADMIN — IPRATROPIUM BROMIDE AND ALBUTEROL SULFATE 3 ML: .5; 3 SOLUTION RESPIRATORY (INHALATION) at 22:35

## 2020-01-17 RX ADMIN — RIVAROXABAN 20 MG: 20 TABLET, FILM COATED ORAL at 16:59

## 2020-01-17 RX ADMIN — IPRATROPIUM BROMIDE AND ALBUTEROL SULFATE 3 ML: .5; 3 SOLUTION RESPIRATORY (INHALATION) at 18:48

## 2020-01-17 RX ADMIN — IPRATROPIUM BROMIDE AND ALBUTEROL SULFATE 3 ML: .5; 3 SOLUTION RESPIRATORY (INHALATION) at 15:22

## 2020-01-17 RX ADMIN — IPRATROPIUM BROMIDE AND ALBUTEROL SULFATE 3 ML: .5; 3 SOLUTION RESPIRATORY (INHALATION) at 07:14

## 2020-01-17 RX ADMIN — METHYLPREDNISOLONE SODIUM SUCCINATE 40 MG: 40 INJECTION, POWDER, FOR SOLUTION INTRAMUSCULAR; INTRAVENOUS at 14:00

## 2020-01-17 RX ADMIN — ACETAMINOPHEN 650 MG: 325 TABLET, FILM COATED ORAL at 16:09

## 2020-01-17 RX ADMIN — FAMOTIDINE 20 MG: 20 TABLET ORAL at 04:50

## 2020-01-17 RX ADMIN — METOPROLOL TARTRATE 25 MG: 25 TABLET, FILM COATED ORAL at 04:50

## 2020-01-17 RX ADMIN — UMECLIDINIUM BROMIDE AND VILANTEROL TRIFENATATE 1 PUFF: 62.5; 25 POWDER RESPIRATORY (INHALATION) at 07:16

## 2020-01-17 RX ADMIN — FUROSEMIDE 20 MG: 20 TABLET ORAL at 14:00

## 2020-01-17 RX ADMIN — METHYLPREDNISOLONE SODIUM SUCCINATE 40 MG: 40 INJECTION, POWDER, FOR SOLUTION INTRAMUSCULAR; INTRAVENOUS at 04:49

## 2020-01-17 RX ADMIN — FUROSEMIDE 20 MG: 20 TABLET ORAL at 22:30

## 2020-01-17 RX ADMIN — METHYLPREDNISOLONE SODIUM SUCCINATE 40 MG: 40 INJECTION, POWDER, FOR SOLUTION INTRAMUSCULAR; INTRAVENOUS at 22:30

## 2020-01-17 RX ADMIN — METOPROLOL TARTRATE 25 MG: 25 TABLET, FILM COATED ORAL at 16:59

## 2020-01-17 RX ADMIN — FUROSEMIDE 20 MG: 20 TABLET ORAL at 04:49

## 2020-01-17 RX ADMIN — SENNOSIDES AND DOCUSATE SODIUM 2 TABLET: 8.6; 5 TABLET ORAL at 04:49

## 2020-01-17 RX ADMIN — IPRATROPIUM BROMIDE AND ALBUTEROL SULFATE 3 ML: .5; 3 SOLUTION RESPIRATORY (INHALATION) at 12:57

## 2020-01-17 RX ADMIN — FLUTICASONE PROPIONATE 88 MCG: 44 AEROSOL, METERED RESPIRATORY (INHALATION) at 07:16

## 2020-01-17 RX ADMIN — BUSPIRONE HYDROCHLORIDE 7.5 MG: 5 TABLET ORAL at 21:07

## 2020-01-17 RX ADMIN — IPRATROPIUM BROMIDE AND ALBUTEROL SULFATE 3 ML: .5; 3 SOLUTION RESPIRATORY (INHALATION) at 02:42

## 2020-01-17 ASSESSMENT — ENCOUNTER SYMPTOMS
MYALGIAS: 0
CHILLS: 0
DIZZINESS: 0
FOCAL WEAKNESS: 0
SPUTUM PRODUCTION: 0
SENSORY CHANGE: 0
SHORTNESS OF BREATH: 1
COUGH: 0
FEVER: 0
STRIDOR: 0
VOMITING: 0
ABDOMINAL PAIN: 0
BLURRED VISION: 0
COUGH: 1
ORTHOPNEA: 1
NAUSEA: 0
ROS GI COMMENTS: TOLERATING A DIET

## 2020-01-17 ASSESSMENT — CHA2DS2 SCORE
HYPERTENSION: NO
SEX: FEMALE
VASCULAR DISEASE: NO
AGE 75 OR GREATER: NO
CHF OR LEFT VENTRICULAR DYSFUNCTION: NO
CHA2DS2 VASC SCORE: 2
PRIOR STROKE OR TIA OR THROMBOEMBOLISM: NO
DIABETES: NO
AGE 65 TO 74: YES

## 2020-01-17 NOTE — CARE PLAN
Problem: Respiratory:  Goal: Respiratory status will improve  Outcome: PROGRESSING AS EXPECTED  Note:   Weaning o2;pt tolerating 2L NC during day;continued bipap at NOC     Problem: Bowel/Gastric:  Goal: Normal bowel function is maintained or improved  Outcome: MET  Note:   Pt has had 2 BM's today.

## 2020-01-17 NOTE — PROGRESS NOTES
"Critical Care Progress Note    Date of admission  1/11/2020    Chief Complaint  74 y.o. female admitted 1/11/2020 with acute on chronic hypoxic/hypercapneic respiratory failure    Hospital Course    \"74 y.o. female who presented 1/11/2020 with  history of COPD (2017 PFTS lFEV1 is 0.69 L which is 38% of predicted. FEV1 FVC ratio is reduced at 69%. MVV is severely reduced at 34% of predicted. After administration of an inhaled bronchodilator there is improvement in FEV1 up to 0.94 L which is 53% of predicted. This is a relative 36% improvement.   Total lung capacity is mildly reduced at 76% of predicted. RV/TLC ratio is increased to 154% of predicted. DLCO is 112% of predicted. Airways resistance is increased.  mild to moderate aortic stenosis in the past, paroxysmal atrial fibrillation and anticoagulated with Xarelto, diastolic congestive heart failure, morbid obesity, chronic hypoxemic respiratory failure requiring 2.5 to 4 L of oxygen via nasal cannula at baseline and untreated sleep apnea.  She also has persistent LLL atelectasis     Since 2016 her HCO3 has been increasing from 30 to now she is in the 40s  ABG today shows 7.3/106 paCO2 without complete compensation as her HCO should be higher     She presents today with SOB progressive with symptoms and signs of CHF exacerbation     Patient had further decline while on telemetry unit with increasing somnolence and worsening hypercarbic respiratory failure requiring initiation of NIPPV and transferred to ICU\"      Interval Problem Update  Reviewed last 24 hour events:   -Compliant with AVAPS overnight, remains dyspneic   - Neuro: AOx4   - HR: 50s-70s   - SBP: 90s-110s   - GI: tolerating diet   - UOP: 1.2L/24 hrs   - Conklin: no   - Tm: 36.7   - Lines: PIV   - PPx: GI not indicated, DVT Xarelto   - 3L, AVAPS qHS   - CXR (personally reviewed and compared to prior): no new    Updates: The patient's daughter Jaci (a ) is working with the patient's insurance " to arrange nocturnal noninvasive trilogy ventilator on discharge from the hospital.  They are well equipped to continue in-home care and PT. I have asked our RN  to work with the patient, Jaci and her insurance company to help arrange transition to in-home care.  Nocturnal noninvasive positive pressure ventilation orders have been placed in the chart and will need to be completed and submitted to insurance prior to discharge.  The patient's dyspnea is improving and she is progressing towards stability for discharge.    Yesterday   - Doing well this morning, compliant with AVAPS all night   - Neuro: AOx4   - HR: 60s-70s   - SBP: 90s-110s   - GI: tolerating diet   - UOP: 1.3L/24 hrs   - Conklin: no   - Tm: afeb   - Lines: PIV   - PPx: GI not indicated, DVT Xarelto   - AVAPS qHS, on 4L NC during the day   - CXR (personally reviewed and compared to prior): no new    Review of Systems  Review of Systems   Constitutional: Positive for malaise/fatigue. Negative for chills and fever.   Eyes: Negative for blurred vision.   Respiratory: Positive for shortness of breath (slightly improved). Negative for cough, sputum production and stridor.    Cardiovascular: Positive for orthopnea and leg swelling. Negative for chest pain.   Gastrointestinal: Negative for abdominal pain, nausea and vomiting.   Genitourinary: Negative for dysuria.   Musculoskeletal: Negative for myalgias.   Skin: Negative for rash.   Neurological: Negative for dizziness, sensory change and focal weakness.        Vital Signs for last 24 hours   Temp:  [36.1 °C (97 °F)-36.7 °C (98 °F)] 36.1 °C (97 °F)  Pulse:  [57-80] 57  Resp:  [19-37] 25  BP: ()/(38-55) 100/45  SpO2:  [87 %-100 %] 97 %    Hemodynamic parameters for last 24 hours       Respiratory Information for the last 24 hours       Physical Exam   Physical Exam  Vitals signs and nursing note reviewed.   Constitutional:       Appearance: She is obese. She is ill-appearing.      Interventions:  Nasal cannula in place.   HENT:      Head: Normocephalic.      Right Ear: External ear normal.      Left Ear: External ear normal.      Nose: Nose normal.      Mouth/Throat:      Mouth: Mucous membranes are dry.      Pharynx: Oropharynx is clear.   Eyes:      Extraocular Movements: Extraocular movements intact.      Conjunctiva/sclera: Conjunctivae normal.   Neck:      Musculoskeletal: Normal range of motion and neck supple.   Cardiovascular:      Rate and Rhythm: Normal rate and regular rhythm.      Pulses: Normal pulses.   Pulmonary:      Effort: No tachypnea, accessory muscle usage or respiratory distress.      Breath sounds: Decreased air movement present. Wheezing (improving) present.      Comments: Dyspneic with any exertion  Abdominal:      General: Abdomen is flat. Bowel sounds are normal.      Palpations: Abdomen is soft.      Comments: Morbidly obese   Musculoskeletal: Normal range of motion.      Comments: Kyphoscoliosis   Skin:     General: Skin is warm and dry.      Capillary Refill: Capillary refill takes less than 2 seconds.   Neurological:      General: No focal deficit present.      Mental Status: She is alert and oriented to person, place, and time. Mental status is at baseline.      Cranial Nerves: No cranial nerve deficit.      Sensory: No sensory deficit.      Motor: No weakness.      Comments: Confusion resolved         Medications  Current Facility-Administered Medications   Medication Dose Route Frequency Provider Last Rate Last Dose   • pramoxine-calamine 1-8% (CALADRYL) lotion   Topical 4X/DAY PRN Adria Thomas Jr., D.O.       • senna-docusate (PERICOLACE or SENOKOT S) 8.6-50 MG per tablet 2 Tab  2 Tab Oral BID Adria Thomas Jr., D.O.   2 Tab at 01/17/20 0449    And   • polyethylene glycol/lytes (MIRALAX) PACKET 1 Packet  1 Packet Oral QDAY PRN Adria Thomas Jr., D.O.        And   • magnesium hydroxide (MILK OF MAGNESIA) suspension 30 mL  30 mL Oral QDAY PRN Adria Thomas Jr., D.O.         And   • bisacodyl (DULCOLAX) suppository 10 mg  10 mg Rectal QDAY PRN Adria Thomas Jr., D.O.       • ipratropium-albuterol (DUONEB) nebulizer solution  3 mL Nebulization Q4HRS (RT) Adria Thomas Jr., D.O.   3 mL at 01/17/20 0714   • furosemide (LASIX) tablet 20 mg  20 mg Oral Q8HRS Adria Thomas Jr., D.O.   20 mg at 01/17/20 0449   • methylPREDNISolone (SOLU-MEDROL) 40 MG injection 40 mg  40 mg Intravenous Q8HRS Van Hull M.D.   40 mg at 01/17/20 0449   • famotidine (PEPCID) tablet 20 mg  20 mg Oral BID Aryan Jacobson M.D.   20 mg at 01/17/20 0450   • rivaroxaban (XARELTO) tablet 20 mg  20 mg Oral PM MEAL Aryan Jacobson M.D.   20 mg at 01/16/20 1712   • busPIRone (BUSPAR) tablet 7.5 mg  7.5 mg Oral QHS Aryan Jacobson M.D.   7.5 mg at 01/16/20 2059   • metoprolol (LOPRESSOR) tablet 25 mg  25 mg Oral BID Aryan Jacobson M.D.   25 mg at 01/17/20 0450   • Respiratory Care per Protocol   Nebulization Continuous RT Aryan Jacobson M.D.       • acetaminophen (TYLENOL) tablet 650 mg  650 mg Oral Q6HRS PRN Aryan Jacobson M.D.   650 mg at 01/16/20 1931   • enalaprilat (VASOTEC) injection 1.25 mg  1.25 mg Intravenous Q6HRS PRN Aryan Jacobson M.D.       • ondansetron (ZOFRAN) syringe/vial injection 4 mg  4 mg Intravenous Q4HRS PRN Aryan Jacobson M.D.       • ondansetron (ZOFRAN ODT) dispertab 4 mg  4 mg Oral Q4HRS PRN Aryan Jacobson M.D.       • umeclidinium-vilanterol (ANORO ELLIPTA) inhaler 1 Puff  1 Puff Inhalation QDAILY (RT) Aryan Jacobson M.D.   1 Puff at 01/17/20 0716    And   • fluticasone (FLOVENT HFA) 44 MCG/ACT inhaler 88 mcg  2 Puff Inhalation QDAILY (RT) Aryan Jacobson M.D.   88 mcg at 01/17/20 0716       Fluids    Intake/Output Summary (Last 24 hours) at 1/17/2020 0839  Last data filed at 1/17/2020 0600  Gross per 24 hour   Intake 1350 ml   Output 1200 ml   Net 150 ml       Laboratory  Recent Labs     01/14/20  1342 01/15/20  1013 01/16/20  1054   ISTATAPH 7.335* 7.390* 7.325*   ISTATAPCO2 97.1* 81.0* 95.9*   ISTATAPO2 77  75 76   ISTATATCO2 >50* >50* >50*   [id number] 93 93 92*   ISTATARTHCO3 51.8* 49.0* 50.0*   ISTATARTBE 20* 19* 19*   ISTATTEMP 97.9 F 97.6 F 99.0 F   ISTATFIO2 60 50 4   ISTATSPEC Arterial Arterial Arterial   ISTATAPHTC 7.341* 7.398* 7.321*   [id number] 75 72 77         Recent Labs     01/15/20  0453 01/16/20 0345 01/17/20  0419   SODIUM 139 143 142   POTASSIUM 4.4 3.9 4.6   CHLORIDE 92* 91* 94*   CO2 47* 45* 44*   BUN 25* 27* 26*   CREATININE 0.46* 0.56 0.54   MAGNESIUM 2.1 1.9 2.1   PHOSPHORUS 2.9 3.5 3.4   CALCIUM 8.7 8.9 8.4*     Recent Labs     01/15/20  0453 01/16/20 0345 01/17/20  0419   ALTSGPT 11 11 10   ASTSGOT 24 18 22   ALKPHOSPHAT 39 44 35   TBILIRUBIN 0.5 0.5 0.5   GLUCOSE 107* 107* 104*     Recent Labs     01/15/20  0453 01/16/20 0345 01/17/20  0419   WBC 4.5* 4.7* 4.8   ASTSGOT 24 18 22   ALTSGPT 11 11 10   ALKPHOSPHAT 39 44 35   TBILIRUBIN 0.5 0.5 0.5     Recent Labs     01/15/20  0453 01/16/20 0345 01/17/20 0419   RBC 3.93* 4.42 3.97*   HEMOGLOBIN 11.4* 12.4 11.1*   HEMATOCRIT 39.1 45.0 40.4   PLATELETCT 130* 139* 119*       Imaging  X-Ray:  I have personally reviewed the images and compared with prior images.    Assessment/Plan  * Acute on chronic respiratory failure with hypoxia and hypercapnia (HCC)- (present on admission)  Assessment & Plan  Multifactorial: very severe obstructive lung disease with FEV1 of 38%, underlying restrictive lung disease due to her severe kyphosis and obesity, OHS and persistent shunt with the RML atelectasis that appears to be progressing since Jan 2019.    I have discussed the patient's prognosis with her and her daughter; they have decided to transition to DNR/DNI    She has been compensating well with HCO3 now in the 40s but also now in acute on chronic resp failure due to CHF secondary to AS and diastolic heart failure.     Needs treatment for OHS/BRIDGETT, on AVAPS at night. ?  Home with trilogy vent    Explained to the patient that she will need a form of  BIPAP - she has agreed to be compliant with therapies    RT/O2 Protocols  Titrate supplemental FiO2 to maintain SpO2 >88%  qHS AVAPS, not requiring rescue use during the day  Weaning Solu-Medrol, transition to prednisone soon  Aspiration precautions  Procalcitonin has been WNL  S/P doxycycline x5 days  Patient on Xarelto with low likelihood of PE in setting of AC    Aortic stenosis- (present on admission)  Assessment & Plan  Noted on echo as moderate  Peak: 55 mmHg, Mean: 34 mmHg    COPD (chronic obstructive pulmonary disease) (HCC)- (present on admission)  Assessment & Plan  With acute exacerbation  RT/O2 Protocols  Titrate supplemental FiO2 to maintain SpO2 >88%  Weaning steroids  Completed 5 days of doxycycline  qHS AVAPS due to severe CO2 retention  Avoid sedatives  Scheduled nebs  Anoro Ellipta while hospitalized, on Breo Ellipta at home    Pulmonary hypertension (HCC)- (present on admission)  Assessment & Plan  Severe RVSP 90  Multifactorial  Maintain O2 saturations greater than 92%  Maintain euvolemic volume balance  Agrees to compliance with nocturnal AVAPS/BiPAP for BRIDGETT/OHS  Recommend weight loss    Morbid obesity (HCC)- (present on admission)  Assessment & Plan  Weight loss encouraged    Paroxysmal atrial fibrillation (HCC)- (present on admission)  Assessment & Plan  Keep K >4 and mag >2  Continue Xarelto and Lopressor  Maintain rate control       VTE:  NOAC  Ulcer: H2 Antagonist  Lines: None    I have performed a physical exam and reviewed and updated ROS and Plan today (1/17/2020). In review of yesterday's note (1/16/2020), there are no changes except as documented above.     No longer requiring rescue noninvasive positive pressure ventilation.  She will continue to require nocturnal AVAPS and aggressive treatment of her multifactorial respiratory failure during the day.  She has diuresed well while in the ICU and edema is improved. Stable to transfer patient out of ICU today. Please call with  questions.    Discussed patient condition and risk of morbidity and/or mortality with Hospitalist, Family, RN, RT, Pharmacy, Dietary, , Code status disscussed, Charge nurse / hot rounds and Patient

## 2020-01-17 NOTE — CARE PLAN
Problem: Oxygenation:  Goal: Maintain adequate oxygenation dependent on patient condition  Outcome: PROGRESSING AS EXPECTED     NOC BIPAP (AVAPS) Vt (350 - 450 mLs) EPAP 8, RR 12 40%      Problem: Bronchoconstriction:  Goal: Improve in air movement and diminished wheezing  Outcome: PROGRESSING AS EXPECTED     Duoneb Q4  Anoro QD  Flovent QD

## 2020-01-17 NOTE — CARE PLAN
Problem: Oxygenation:  Goal: Maintain adequate oxygenation dependent on patient condition  Note:   5lpm NC    BiPAP at noc AVAPS      Problem: Bronchoconstriction:  Goal: Improve in air movement and diminished wheezing  Note:   DUO QID     Problem: Bronchopulmonary Hygiene:  Goal: Increase mobilization of retained secretions  Note:   Flutter QID

## 2020-01-17 NOTE — CARE PLAN
Problem: Safety  Goal: Will remain free from injury  Note:   Call light within reach, treaded socks in place, bed in lowest position and locked.  Pt is 1x assist. Calls appropriately for assistance. Hourly rounding in progress      Problem: Skin Integrity  Goal: Risk for impaired skin integrity will decrease  Note:   Pt turned and repositioned q2, heels floated with pillows, pillow in use to support bony prominences. Purewick in place for incontinence. Barrier cream applied to sacral/perineal area.

## 2020-01-17 NOTE — PROGRESS NOTES
[facility] Medicine Daily Progress Note    Date of Service  1/17/2020    Chief Complaint  74 y.o. female admitted 1/11/2020 with shortness of breath.     Hospital Course    Ms. Mcintyre has a past medical history of COPD as well as atrial fibrillation on Xarelto therapy that is usually on anywhere between 2.5 to 4 L of oxygen baseline the present in the emergency room with progressive shortness of breath that had been worsening over the prior few months.  She was admitted to the medical floor and was seen by pulmonology.  It was the discretion of pulmonology that she needs positive pressure given her struct of sleep apnea though she had been refusing it.  An ABG revealed a PCO2 of 106 she was transferred to the intensive care unit as she remained acidotic at 7.3.      Interval Problem Update  1/17/2020: Patient seen and evaluated in the intensive care unit. Her family is at bedside and we discussed her condition. The are looking into a non-invasive Trilogy.  She remains short of breath with any exertion.    Consultants/Specialty  Pulmonology    Code Status  DNR/DNI    Disposition  tele    Review of Systems  Review of Systems   Constitutional: Negative for chills and fever.   Respiratory: Positive for cough and shortness of breath.    Cardiovascular: Positive for leg swelling. Negative for chest pain.   Gastrointestinal: Negative for nausea and vomiting.        Tolerating a diet   All other systems reviewed and are negative.       Physical Exam  Temp:  [36.1 °C (97 °F)-36.7 °C (98 °F)] 36.1 °C (97 °F)  Pulse:  [60-80] 75  Resp:  [19-37] 28  BP: ()/(38-55) 100/45  SpO2:  [87 %-100 %] 99 %    Physical Exam  Vitals signs and nursing note reviewed.   Constitutional:       Appearance: Normal appearance.   HENT:      Head: Normocephalic and atraumatic.      Mouth/Throat:      Mouth: Mucous membranes are dry.   Eyes:      General: No scleral icterus.     Conjunctiva/sclera: Conjunctivae normal.   Neck:       Musculoskeletal: Normal range of motion and neck supple.   Cardiovascular:      Heart sounds: Murmur present.      Comments: Sinus rhythm   Pulmonary:      Comments: Decreased air movement, normal work of breathing  Abdominal:      General: There is distension.      Comments: Large umbilical hernia  Tympanic    Musculoskeletal:      Right lower leg: Edema present.      Left lower leg: Edema present.   Skin:     General: Skin is warm and dry.   Neurological:      General: No focal deficit present.      Mental Status: She is oriented to person, place, and time.   Psychiatric:         Mood and Affect: Mood normal.         Behavior: Behavior normal.         Fluids    Intake/Output Summary (Last 24 hours) at 1/17/2020 0703  Last data filed at 1/17/2020 0600  Gross per 24 hour   Intake 1350 ml   Output 1200 ml   Net 150 ml       Laboratory  Recent Labs     01/15/20  0453 01/16/20  0345 01/17/20  0419   WBC 4.5* 4.7* 4.8   RBC 3.93* 4.42 3.97*   HEMOGLOBIN 11.4* 12.4 11.1*   HEMATOCRIT 39.1 45.0 40.4   MCV 99.5* 101.8* 101.8*   MCH 29.0 28.1 28.0   MCHC 29.2* 27.6* 27.5*   RDW 49.6 51.8* 50.5*   PLATELETCT 130* 139* 119*   MPV 10.4 10.8 10.7     Recent Labs     01/15/20  0453 01/16/20  0345 01/17/20  0419   SODIUM 139 143 142   POTASSIUM 4.4 3.9 4.6   CHLORIDE 92* 91* 94*   CO2 47* 45* 44*   GLUCOSE 107* 107* 104*   BUN 25* 27* 26*   CREATININE 0.46* 0.56 0.54   CALCIUM 8.7 8.9 8.4*                   Imaging  DX-CHEST-PORTABLE (1 VIEW)   Final Result         1.  Pulmonary edema and/or infiltrates are identified, which are somewhat decreased since the prior exam.   2.  Trace bilateral pleural effusions, decreased since prior   3.  Cardiomegaly      DX-CHEST-PORTABLE (1 VIEW)   Final Result      1.  Enlarged cardiac silhouette with changes of edema/congestive failure.      DX-CHEST-LIMITED (1 VIEW)   Final Result      No significant interval change.      EC-ECHOCARDIOGRAM COMPLETE W/O CONT   Final Result      US-EXTREMITY  VENOUS LOWER BILAT   Final Result      DX-CHEST-PORTABLE (1 VIEW)   Final Result         1.  Pulmonary edema and/or infiltrates.   2.  Stable right infrahilar opacity, likely corresponding with chronic consolidation/atelectasis of the lingula visualized on prior CT January 18, 2019           Assessment/Plan  * Acute on chronic respiratory failure with hypoxia and hypercapnia (HCC)- (present on admission)  Assessment & Plan  Due to CHF/ COPD exacerbation/ obesity hypoventilation syndrome  Hypoxic as well as hypercapnic aspiratory failure  Required transfer to the intensive care unit              Demand ischemia (HCC)- (present on admission)  Assessment & Plan  Consistent with demand ischemia      Aortic stenosis- (present on admission)  Assessment & Plan  Echo showed normal EF with mod AS; poor candidate for aggressive intervention; continue supportive care  At some point she may be a candidate for a TAVR    COPD (chronic obstructive pulmonary disease) (HCC)- (present on admission)  Assessment & Plan  Oxygen dependent COPD with exacerbation  IV solumedrol respiratory protocol with bronchodilators  Supplemental oxygen  Pulmonology evaluation  Close outpatient pulmonology follow-up    Obesity (BMI 35.0-39.9 without comorbidity) (HCC)  Assessment & Plan  Body mass index is 44.92 kg/m².    Macrocytic anemia- (present on admission)  Assessment & Plan  Iron studies, folic acid, B12 are normal    Pulmonary hypertension (HCC)- (present on admission)  Assessment & Plan   Severe  Echo done here, RVSP 90  Lasix as she is at very high risk of volume overload    Morbid obesity (HCC)- (present on admission)  Assessment & Plan  Body mass index is 44.92 kg/m².   Advised gradual wt loss    Paroxysmal atrial fibrillation (HCC)- (present on admission)  Assessment & Plan  Continue metoprolol, Xarelto; rate controlled  Monitor on telemetry       VTE prophylaxis: Xarelto

## 2020-01-17 NOTE — DISCHARGE PLANNING
· This RNCM met the patient and her family at the bedside to discuss discharge plan  · Per Pt's daughter, Jaci, Pt would benefit from the outpatient [facility] program  · [facility] referral form faxed to [phone], Fax receipt received  · Tani with [facility] updated  · Per Sebastian, [facility] will follow up with Kristi  · Jaci spoke to Pt's oxygen complany, [employer], and states that Pt might qualify for a Trilogy unit at home  · This RNCM spoke to Fatou Marshall, with [employer]  · Per Fatou, Trilogy order needs to be completed and MD needs to note benefits of Trilogy for Pt and COPD/CRF  · Dr. Thomas updated and given order form  · Per PT/OT recommendation Pt qualifies for home health   · Pt chose [facility]  · Choice form faxed to FLOYD Knapp, at extension [phone]  · Dr. Thomas updated on need for face to face and HH order

## 2020-01-18 LAB
ALBUMIN SERPL BCP-MCNC: 3.2 G/DL (ref 3.2–4.9)
ALBUMIN/GLOB SERPL: 1.3 G/DL
ALP SERPL-CCNC: 42 U/L (ref 30–99)
ALT SERPL-CCNC: 7 U/L (ref 2–50)
ANION GAP SERPL CALC-SCNC: 7 MMOL/L (ref 0–11.9)
AST SERPL-CCNC: 12 U/L (ref 12–45)
BILIRUB SERPL-MCNC: 0.5 MG/DL (ref 0.1–1.5)
BUN SERPL-MCNC: 24 MG/DL (ref 8–22)
CALCIUM SERPL-MCNC: 8.9 MG/DL (ref 8.5–10.5)
CHLORIDE SERPL-SCNC: 92 MMOL/L (ref 96–112)
CO2 SERPL-SCNC: 43 MMOL/L (ref 20–33)
CREAT SERPL-MCNC: 0.52 MG/DL (ref 0.5–1.4)
ERYTHROCYTE [DISTWIDTH] IN BLOOD BY AUTOMATED COUNT: 50 FL (ref 35.9–50)
GLOBULIN SER CALC-MCNC: 2.4 G/DL (ref 1.9–3.5)
GLUCOSE SERPL-MCNC: 115 MG/DL (ref 65–99)
HCT VFR BLD AUTO: 42 % (ref 37–47)
HGB BLD-MCNC: 11.7 G/DL (ref 12–16)
MAGNESIUM SERPL-MCNC: 1.8 MG/DL (ref 1.5–2.5)
MCH RBC QN AUTO: 27.9 PG (ref 27–33)
MCHC RBC AUTO-ENTMCNC: 27.9 G/DL (ref 33.6–35)
MCV RBC AUTO: 100 FL (ref 81.4–97.8)
PHOSPHATE SERPL-MCNC: 2.1 MG/DL (ref 2.5–4.5)
PLATELET # BLD AUTO: 124 K/UL (ref 164–446)
PMV BLD AUTO: 10.6 FL (ref 9–12.9)
POTASSIUM SERPL-SCNC: 4.5 MMOL/L (ref 3.6–5.5)
PROT SERPL-MCNC: 5.6 G/DL (ref 6–8.2)
RBC # BLD AUTO: 4.2 M/UL (ref 4.2–5.4)
SODIUM SERPL-SCNC: 142 MMOL/L (ref 135–145)
WBC # BLD AUTO: 6 K/UL (ref 4.8–10.8)

## 2020-01-18 PROCEDURE — 700101 HCHG RX REV CODE 250: Performed by: INTERNAL MEDICINE

## 2020-01-18 PROCEDURE — A9270 NON-COVERED ITEM OR SERVICE: HCPCS | Performed by: INTERNAL MEDICINE

## 2020-01-18 PROCEDURE — 36415 COLL VENOUS BLD VENIPUNCTURE: CPT

## 2020-01-18 PROCEDURE — 84100 ASSAY OF PHOSPHORUS: CPT

## 2020-01-18 PROCEDURE — 80053 COMPREHEN METABOLIC PANEL: CPT

## 2020-01-18 PROCEDURE — 700102 HCHG RX REV CODE 250 W/ 637 OVERRIDE(OP): Performed by: INTERNAL MEDICINE

## 2020-01-18 PROCEDURE — 99233 SBSQ HOSP IP/OBS HIGH 50: CPT | Performed by: HOSPITALIST

## 2020-01-18 PROCEDURE — 94668 MNPJ CHEST WALL SBSQ: CPT

## 2020-01-18 PROCEDURE — 83735 ASSAY OF MAGNESIUM: CPT

## 2020-01-18 PROCEDURE — 700111 HCHG RX REV CODE 636 W/ 250 OVERRIDE (IP): Performed by: INTERNAL MEDICINE

## 2020-01-18 PROCEDURE — 97116 GAIT TRAINING THERAPY: CPT

## 2020-01-18 PROCEDURE — 94760 N-INVAS EAR/PLS OXIMETRY 1: CPT

## 2020-01-18 PROCEDURE — 85027 COMPLETE CBC AUTOMATED: CPT

## 2020-01-18 PROCEDURE — 99233 SBSQ HOSP IP/OBS HIGH 50: CPT | Performed by: INTERNAL MEDICINE

## 2020-01-18 PROCEDURE — 770020 HCHG ROOM/CARE - TELE (206)

## 2020-01-18 PROCEDURE — 94660 CPAP INITIATION&MGMT: CPT

## 2020-01-18 PROCEDURE — 700111 HCHG RX REV CODE 636 W/ 250 OVERRIDE (IP): Performed by: HOSPITALIST

## 2020-01-18 PROCEDURE — 94640 AIRWAY INHALATION TREATMENT: CPT

## 2020-01-18 PROCEDURE — 97530 THERAPEUTIC ACTIVITIES: CPT

## 2020-01-18 RX ORDER — IPRATROPIUM BROMIDE AND ALBUTEROL SULFATE 2.5; .5 MG/3ML; MG/3ML
3 SOLUTION RESPIRATORY (INHALATION)
Status: DISCONTINUED | OUTPATIENT
Start: 2020-01-18 | End: 2020-01-20 | Stop reason: HOSPADM

## 2020-01-18 RX ORDER — PREDNISONE 20 MG/1
40 TABLET ORAL DAILY
Status: DISCONTINUED | OUTPATIENT
Start: 2020-01-19 | End: 2020-01-20 | Stop reason: HOSPADM

## 2020-01-18 RX ORDER — DIGOXIN 0.25 MG/ML
250 INJECTION INTRAMUSCULAR; INTRAVENOUS EVERY 6 HOURS
Status: COMPLETED | OUTPATIENT
Start: 2020-01-18 | End: 2020-01-18

## 2020-01-18 RX ADMIN — FLUTICASONE PROPIONATE 88 MCG: 44 AEROSOL, METERED RESPIRATORY (INHALATION) at 08:31

## 2020-01-18 RX ADMIN — METOPROLOL TARTRATE 25 MG: 25 TABLET, FILM COATED ORAL at 17:50

## 2020-01-18 RX ADMIN — RIVAROXABAN 20 MG: 20 TABLET, FILM COATED ORAL at 17:51

## 2020-01-18 RX ADMIN — IPRATROPIUM BROMIDE AND ALBUTEROL SULFATE 3 ML: .5; 3 SOLUTION RESPIRATORY (INHALATION) at 10:55

## 2020-01-18 RX ADMIN — UMECLIDINIUM BROMIDE AND VILANTEROL TRIFENATATE 1 PUFF: 62.5; 25 POWDER RESPIRATORY (INHALATION) at 08:31

## 2020-01-18 RX ADMIN — FUROSEMIDE 20 MG: 20 TABLET ORAL at 20:33

## 2020-01-18 RX ADMIN — ACETAMINOPHEN 650 MG: 325 TABLET, FILM COATED ORAL at 06:45

## 2020-01-18 RX ADMIN — FAMOTIDINE 20 MG: 20 TABLET ORAL at 06:45

## 2020-01-18 RX ADMIN — DIGOXIN 250 MCG: 0.25 INJECTION INTRAMUSCULAR; INTRAVENOUS at 00:38

## 2020-01-18 RX ADMIN — DIGOXIN 250 MCG: 0.25 INJECTION INTRAMUSCULAR; INTRAVENOUS at 13:54

## 2020-01-18 RX ADMIN — DIGOXIN 250 MCG: 0.25 INJECTION INTRAMUSCULAR; INTRAVENOUS at 06:47

## 2020-01-18 RX ADMIN — IPRATROPIUM BROMIDE AND ALBUTEROL SULFATE 3 ML: .5; 3 SOLUTION RESPIRATORY (INHALATION) at 08:24

## 2020-01-18 RX ADMIN — IPRATROPIUM BROMIDE AND ALBUTEROL SULFATE 3 ML: .5; 3 SOLUTION RESPIRATORY (INHALATION) at 16:46

## 2020-01-18 RX ADMIN — ACETAMINOPHEN 650 MG: 325 TABLET, FILM COATED ORAL at 17:55

## 2020-01-18 RX ADMIN — FAMOTIDINE 20 MG: 20 TABLET ORAL at 17:51

## 2020-01-18 RX ADMIN — FUROSEMIDE 20 MG: 20 TABLET ORAL at 13:54

## 2020-01-18 RX ADMIN — METOPROLOL TARTRATE 25 MG: 25 TABLET, FILM COATED ORAL at 06:47

## 2020-01-18 RX ADMIN — METHYLPREDNISOLONE SODIUM SUCCINATE 40 MG: 40 INJECTION, POWDER, FOR SOLUTION INTRAMUSCULAR; INTRAVENOUS at 17:51

## 2020-01-18 RX ADMIN — METHYLPREDNISOLONE SODIUM SUCCINATE 40 MG: 40 INJECTION, POWDER, FOR SOLUTION INTRAMUSCULAR; INTRAVENOUS at 06:52

## 2020-01-18 RX ADMIN — DIGOXIN 250 MCG: 0.25 INJECTION INTRAMUSCULAR; INTRAVENOUS at 20:30

## 2020-01-18 RX ADMIN — BUSPIRONE HYDROCHLORIDE 7.5 MG: 5 TABLET ORAL at 20:30

## 2020-01-18 RX ADMIN — FUROSEMIDE 20 MG: 20 TABLET ORAL at 06:47

## 2020-01-18 ASSESSMENT — COGNITIVE AND FUNCTIONAL STATUS - GENERAL
WALKING IN HOSPITAL ROOM: A LITTLE
MOBILITY SCORE: 12
MOVING FROM LYING ON BACK TO SITTING ON SIDE OF FLAT BED: A LOT
STANDING UP FROM CHAIR USING ARMS: A LITTLE
SUGGESTED CMS G CODE MODIFIER MOBILITY: CL
TURNING FROM BACK TO SIDE WHILE IN FLAT BAD: A LOT
MOVING TO AND FROM BED TO CHAIR: UNABLE
CLIMB 3 TO 5 STEPS WITH RAILING: TOTAL

## 2020-01-18 ASSESSMENT — ENCOUNTER SYMPTOMS
MYALGIAS: 0
VOMITING: 0
DIZZINESS: 0
FEVER: 0
ORTHOPNEA: 1
CHILLS: 0
STRIDOR: 0
SHORTNESS OF BREATH: 1
COUGH: 0
SENSORY CHANGE: 0
FOCAL WEAKNESS: 0
NAUSEA: 0
SPUTUM PRODUCTION: 0
ROS GI COMMENTS: TOLERATING A DIET
PALPITATIONS: 1
BLURRED VISION: 0
COUGH: 1
ABDOMINAL PAIN: 0

## 2020-01-18 ASSESSMENT — GAIT ASSESSMENTS
DISTANCE (FEET): 10
GAIT LEVEL OF ASSIST: MINIMAL ASSIST
ASSISTIVE DEVICE: FRONT WHEEL WALKER

## 2020-01-18 NOTE — PROGRESS NOTES
Report given to telemetry nurse Allison CAPONE. Pt belongings gathered. Transferred by wheelchair by this RN to T [phone] (2).

## 2020-01-18 NOTE — PROGRESS NOTES
Monitor Summary    SR 71-84  Rare PACs, Rare PVCs, Rare Couplets, a 1.3 second pause  .16/.14/.38

## 2020-01-18 NOTE — PROGRESS NOTES
Patient has been sustaining Afib with a rate in the 130s. Paged the on-call hospitalist. Dr. Bowman returned page and ordered to start digoxin on the patient. Orders read back and entered into the system. Will continue to monitor patient.

## 2020-01-18 NOTE — PROGRESS NOTES
"Pulmonary Care Progress Note    Date of admission  1/11/2020    Chief Complaint  74 y.o. female admitted 1/11/2020 with acute on chronic hypoxic/hypercapneic respiratory failure    Hospital Course    \"74 y.o. female who presented 1/11/2020 with  history of COPD (2017 PFTS lFEV1 is 0.69 L which is 38% of predicted. FEV1 FVC ratio is reduced at 69%. MVV is severely reduced at 34% of predicted. After administration of an inhaled bronchodilator there is improvement in FEV1 up to 0.94 L which is 53% of predicted. This is a relative 36% improvement.   Total lung capacity is mildly reduced at 76% of predicted. RV/TLC ratio is increased to 154% of predicted. DLCO is 112% of predicted. Airways resistance is increased.  mild to moderate aortic stenosis in the past, paroxysmal atrial fibrillation and anticoagulated with Xarelto, diastolic congestive heart failure, morbid obesity, chronic hypoxemic respiratory failure requiring 2.5 to 4 L of oxygen via nasal cannula at baseline and untreated sleep apnea.  She also has persistent LLL atelectasis     Since 2016 her HCO3 has been increasing from 30 to now she is in the 40s  ABG today shows 7.3/106 paCO2 without complete compensation as her HCO should be higher     She presents today with SOB progressive with symptoms and signs of CHF exacerbation     Patient had further decline while on telemetry unit with increasing somnolence and worsening hypercarbic respiratory failure requiring initiation of NIPPV and transferred to ICU\"      Interval Problem Update  Chart review from the past 24 hours includes imaging, laboratory studies, vital signs and notes available.  Pertinent data for today's visit includes Transfer to tele; very alert, communicates, no distress and willing to comply with home AV APS      Updates: The patient's daughter Jaci (a ) is working with the patient's insurance to arrange nocturnal noninvasive trilogy ventilator on discharge from the hospital.  They " are well equipped to continue in-home care and PT. I have asked our RN  to work with the patient, Jaci and her insurance company to help arrange transition to in-home care.  Nocturnal noninvasive positive pressure ventilation orders have been placed in the chart and will need to be completed and submitted to insurance prior to discharge.  The patient's dyspnea is improving and she is progressing towards stability for discharge.        Review of Systems  Review of Systems   Constitutional: Positive for malaise/fatigue. Negative for chills and fever.   Eyes: Negative for blurred vision.   Respiratory: Positive for shortness of breath (slightly improved). Negative for cough, sputum production and stridor.    Cardiovascular: Positive for orthopnea and leg swelling. Negative for chest pain.   Gastrointestinal: Negative for abdominal pain, nausea and vomiting.   Genitourinary: Negative for dysuria.   Musculoskeletal: Negative for myalgias.   Skin: Negative for rash.   Neurological: Negative for dizziness, sensory change and focal weakness.        Vital Signs for last 24 hours   Temp:  [36.3 °C (97.3 °F)-37.3 °C (99.1 °F)] 37.3 °C (99.1 °F)  Pulse:  [57-91] 91  Resp:  [14-24] 22  BP: ()/(40-56) 99/51  SpO2:  [90 %-100 %] 90 %      Physical Exam  Vitals signs and nursing note reviewed.   Constitutional:       Appearance: She is obese. She is ill-appearing.      Interventions: Nasal cannula in place.   HENT:      Head: Normocephalic.      Right Ear: External ear normal.      Left Ear: External ear normal.      Nose: Nose normal.      Mouth/Throat:      Mouth: Mucous membranes are dry.      Pharynx: Oropharynx is clear.   Eyes:      Extraocular Movements: Extraocular movements intact.      Conjunctiva/sclera: Conjunctivae normal.   Neck:      Musculoskeletal: Normal range of motion and neck supple.   Cardiovascular:      Rate and Rhythm: Normal rate and regular rhythm.      Pulses: Normal pulses.   Pulmonary:       Effort: No tachypnea, accessory muscle usage or respiratory distress.      Breath sounds: Decreased air movement present. Wheezing (improving) present.      Comments: Dyspneic with any exertion  Abdominal:      General: Abdomen is flat. Bowel sounds are normal.      Palpations: Abdomen is soft.      Comments: Morbidly obese   Musculoskeletal: Normal range of motion.      Comments: Kyphoscoliosis   Skin:     General: Skin is warm and dry.      Capillary Refill: Capillary refill takes less than 2 seconds.   Neurological:      General: No focal deficit present.      Mental Status: She is alert and oriented to person, place, and time. Mental status is at baseline.      Cranial Nerves: No cranial nerve deficit.      Sensory: No sensory deficit.      Motor: No weakness.      Comments: Confusion resolved         Medications  Current Facility-Administered Medications   Medication Dose Route Frequency Provider Last Rate Last Dose   • digoxin (LANOXIN) injection 250 mcg  250 mcg Intravenous Q6HR Shauna Bowman M.D.   250 mcg at 01/18/20 0647   • methylPREDNISolone (SOLU-MEDROL) 40 MG injection 40 mg  40 mg Intravenous Q12HRS Adria Thomas Jr., D.O.   40 mg at 01/18/20 0652   • pramoxine-calamine 1-8% (CALADRYL) lotion   Topical 4X/DAY PRN Adria Thomas Jr., D.O.       • senna-docusate (PERICOLACE or SENOKOT S) 8.6-50 MG per tablet 2 Tab  2 Tab Oral BID Adria Thomas Jr., D.O.   2 Tab at 01/17/20 0449    And   • polyethylene glycol/lytes (MIRALAX) PACKET 1 Packet  1 Packet Oral QDAY PRN Adria Thomas Jr., D.O.        And   • magnesium hydroxide (MILK OF MAGNESIA) suspension 30 mL  30 mL Oral QDAY PRN Adria Thomas Jr., D.O.        And   • bisacodyl (DULCOLAX) suppository 10 mg  10 mg Rectal QDAY PRN Adria Thomas Jr., D.O.       • ipratropium-albuterol (DUONEB) nebulizer solution  3 mL Nebulization Q4HRS (RT) Adria Thomas Jr. D.O.   3 mL at 01/17/20 7434   • furosemide (LASIX) tablet 20 mg  20 mg Oral  Q8HRS Adria Thomas Jr. DNIKUNJ   20 mg at 01/18/20 0647   • famotidine (PEPCID) tablet 20 mg  20 mg Oral BID Aryan Jacobson M.D.   20 mg at 01/18/20 0645   • rivaroxaban (XARELTO) tablet 20 mg  20 mg Oral PM MEAL Aryan Jacobson M.D.   20 mg at 01/17/20 1659   • busPIRone (BUSPAR) tablet 7.5 mg  7.5 mg Oral QHS Aryan Jacobson M.D.   7.5 mg at 01/17/20 2107   • metoprolol (LOPRESSOR) tablet 25 mg  25 mg Oral BID Aryan Jacobson M.D.   25 mg at 01/18/20 0647   • Respiratory Care per Protocol   Nebulization Continuous RT Aryan Jacobson M.D.       • acetaminophen (TYLENOL) tablet 650 mg  650 mg Oral Q6HRS PRN Aryan Jacobson M.D.   650 mg at 01/18/20 0645   • enalaprilat (VASOTEC) injection 1.25 mg  1.25 mg Intravenous Q6HRS PRN Aryan Jacobson M.D.       • ondansetron (ZOFRAN) syringe/vial injection 4 mg  4 mg Intravenous Q4HRS PRN Aryan Jacobson M.D.       • ondansetron (ZOFRAN ODT) dispertab 4 mg  4 mg Oral Q4HRS PRN Aryan Jacobson M.D.       • umeclidinium-vilanterol (ANORO ELLIPTA) inhaler 1 Puff  1 Puff Inhalation QDAILY (RT) Aryan Jacobson M.D.   1 Puff at 01/17/20 0716    And   • fluticasone (FLOVENT HFA) 44 MCG/ACT inhaler 88 mcg  2 Puff Inhalation QDAILY (RT) Aryan Jacobson M.D.   88 mcg at 01/17/20 0716       Fluids    Intake/Output Summary (Last 24 hours) at 1/18/2020 0744  Last data filed at 1/17/2020 2145  Gross per 24 hour   Intake 1320 ml   Output --   Net 1320 ml       Laboratory  Recent Labs     01/15/20  1013 01/16/20  1054   ISTATAPH 7.390* 7.325*   ISTATAPCO2 81.0* 95.9*   ISTATAPO2 75 76   ISTATATCO2 >50* >50*   NOGEYRQ0LXZ 93 92*   ISTATARTHCO3 49.0* 50.0*   ISTATARTBE 19* 19*   ISTATTEMP 97.6 F 99.0 F   ISTATFIO2 50 4   ISTATSPEC Arterial Arterial   ISTATAPHTC 7.398* 7.321*   FAYQSPHK2UV 72 77         Recent Labs     01/16/20  0345 01/17/20  0419 01/18/20  0322   SODIUM 143 142 142   POTASSIUM 3.9 4.6 4.5   CHLORIDE 91* 94* 92*   CO2 45* 44* 43*   BUN 27* 26* 24*   CREATININE 0.56 0.54 0.52   MAGNESIUM 1.9 2.1 1.8    PHOSPHORUS 3.5 3.4 2.1*   CALCIUM 8.9 8.4* 8.9     Recent Labs     01/16/20  0345 01/17/20  0419 01/18/20  0322   ALTSGPT 11 10 7   ASTSGOT 18 22 12   ALKPHOSPHAT 44 35 42   TBILIRUBIN 0.5 0.5 0.5   GLUCOSE 107* 104* 115*     Recent Labs     01/16/20  0345 01/17/20  0419 01/18/20  0322   WBC 4.7* 4.8 6.0   ASTSGOT 18 22 12   ALTSGPT 11 10 7   ALKPHOSPHAT 44 35 42   TBILIRUBIN 0.5 0.5 0.5     Recent Labs     01/16/20  0345 01/17/20  0419 01/18/20  0322   RBC 4.42 3.97* 4.20   HEMOGLOBIN 12.4 11.1* 11.7*   HEMATOCRIT 45.0 40.4 42.0   PLATELETCT 139* 119* 124*       Imaging  X-Ray:  I have personally reviewed the images and compared with prior images.    Assessment/Plan  * Acute on chronic respiratory failure with hypoxia and hypercapnia (HCC)- (present on admission)  Assessment & Plan  Multifactorial: very severe obstructive lung disease with FEV1 of 38%, underlying restrictive lung disease due to her severe kyphosis and obesity, OHS and persistent shunt with the RML atelectasis that appears to be progressing since Jan 2019.      She has been compensating well with HCO3 now in the 40s but also now in acute on chronic resp failure due to CHF secondary to AS and diastolic heart failure.     Needs treatment for OHS/BRIDGETT, on AVAPS at night. ?  Home with [facility] vent    Explained to the patient that she will need a form of BIPAP - she has agreed to be compliant with therapies    RT/O2 Protocols  Titrate supplemental FiO2 to maintain SpO2 >88%  qHS AVAPS, not requiring rescue use during the day  Weaning Solu-Medrol, transition to prednisone soon  Aspiration precautions  Procalcitonin has been WNL  S/P doxycycline x5 days  Patient on Xarelto with low likelihood of PE in setting of AC    Aortic stenosis- (present on admission)  Assessment & Plan  Noted on echo as moderate  Peak: 55 mmHg, Mean: 34 mmHg    COPD (chronic obstructive pulmonary disease) (HCC)- (present on admission)  Assessment & Plan  With acute  exacerbation  RT/O2 Protocols  Titrate supplemental FiO2 to maintain SpO2 >88%  Weaning steroids  Completed 5 days of doxycycline  qHS AVAPS due to severe CO2 retention  Avoid sedatives  Scheduled nebs  Anoro Ellipta while hospitalized, on Breo Ellipta at home    Pulmonary hypertension (HCC)- (present on admission)  Assessment & Plan  Severe RVSP 90  Multifactorial  Maintain O2 saturations greater than 92%  Maintain euvolemic volume balance  Agrees to compliance with nocturnal AVAPS/BiPAP for BRIDGETT/OHS  Recommend weight loss    Morbid obesity (HCC)- (present on admission)  Assessment & Plan  Weight loss encouraged    Paroxysmal atrial fibrillation (HCC)- (present on admission)  Assessment & Plan  Keep K >4 and mag >2  Continue Xarelto and Lopressor  Maintain rate control       I have performed a physical exam and reviewed and updated ROS and Plan today (1/18/2020). In review of yesterday's note (1/17/2020), there are no changes except as documented above.     No longer requiring rescue noninvasive positive pressure ventilation.  She will continue to require nocturnal AVAPS and aggressive treatment of her multifactorial respiratory failure during the day.      Argenis Miller MD , FCCP, Pulmonary Service

## 2020-01-18 NOTE — CARE PLAN
Problem: Respiratory:  Goal: Respiratory status will improve  Outcome: PROGRESSING AS EXPECTED   Pt wearing Bipap at night, and 2.5-4 L NC when awake.    Problem: Skin Integrity  Goal: Risk for impaired skin integrity will decrease  Outcome: PROGRESSING AS EXPECTED   Barrier cream in place.

## 2020-01-18 NOTE — CARE PLAN
Problem: Oxygenation:  Goal: Maintain adequate oxygenation dependent on patient condition  Note:   AVAPS at noc      Problem: Bronchoconstriction:  Goal: Improve in air movement and diminished wheezing  Note:   DUO Q4

## 2020-01-18 NOTE — PROGRESS NOTES
2 RN Skin Check    2 RN skin check complete.   Devices in place: Nasal Cannula.  Skin assessed under devices: yes; grey foam used over O2 line.  Confirmed pressure ulcers found on: NA.  New potential pressure ulcers noted on NA. Wound consult placed N/A. Wound team already following patient.  The following interventions in place Pillows, Mepilex and Barrier cream.    No pressure ulcers found on patient.  Skin tears found and documented on to the Left and Right posterior thighs; skin dusky and blanchable.  Skin tear found to right pannus; skin red and blanchable. Barrier cream reapplied.  Moisture associated skin damage noted to the sacral and kenton-area: excoriated and red. New purewick and barrier cream applied. Patient turns easily in assistance with toileting measures.  Bruising scattered throughout skin. Patient reports easy bruising since placed on a blood thinner.

## 2020-01-18 NOTE — PROGRESS NOTES
Patient care assumed, skin assessment performed, vitals and assessment as charted. Patient adjusted in bed and given call light. Water and personal belongings within reach of patient.

## 2020-01-19 PROBLEM — Z66 DNR (DO NOT RESUSCITATE): Status: ACTIVE | Noted: 2020-01-19

## 2020-01-19 LAB
ALBUMIN SERPL BCP-MCNC: 3.2 G/DL (ref 3.2–4.9)
ALBUMIN/GLOB SERPL: 1.3 G/DL
ALP SERPL-CCNC: 40 U/L (ref 30–99)
ALT SERPL-CCNC: 8 U/L (ref 2–50)
ANION GAP SERPL CALC-SCNC: 3 MMOL/L (ref 0–11.9)
AST SERPL-CCNC: 15 U/L (ref 12–45)
BILIRUB SERPL-MCNC: 0.5 MG/DL (ref 0.1–1.5)
BUN SERPL-MCNC: 21 MG/DL (ref 8–22)
CALCIUM SERPL-MCNC: 8.7 MG/DL (ref 8.5–10.5)
CHLORIDE SERPL-SCNC: 94 MMOL/L (ref 96–112)
CO2 SERPL-SCNC: 44 MMOL/L (ref 20–33)
CREAT SERPL-MCNC: 0.48 MG/DL (ref 0.5–1.4)
ERYTHROCYTE [DISTWIDTH] IN BLOOD BY AUTOMATED COUNT: 52.3 FL (ref 35.9–50)
GLOBULIN SER CALC-MCNC: 2.4 G/DL (ref 1.9–3.5)
GLUCOSE SERPL-MCNC: 102 MG/DL (ref 65–99)
HCT VFR BLD AUTO: 44.1 % (ref 37–47)
HGB BLD-MCNC: 12.1 G/DL (ref 12–16)
MAGNESIUM SERPL-MCNC: 2 MG/DL (ref 1.5–2.5)
MCH RBC QN AUTO: 28.3 PG (ref 27–33)
MCHC RBC AUTO-ENTMCNC: 27.4 G/DL (ref 33.6–35)
MCV RBC AUTO: 103.3 FL (ref 81.4–97.8)
PHOSPHATE SERPL-MCNC: 4.2 MG/DL (ref 2.5–4.5)
PLATELET # BLD AUTO: 127 K/UL (ref 164–446)
PMV BLD AUTO: 10.3 FL (ref 9–12.9)
POTASSIUM SERPL-SCNC: 4.6 MMOL/L (ref 3.6–5.5)
PROT SERPL-MCNC: 5.6 G/DL (ref 6–8.2)
RBC # BLD AUTO: 4.27 M/UL (ref 4.2–5.4)
SODIUM SERPL-SCNC: 141 MMOL/L (ref 135–145)
WBC # BLD AUTO: 6.5 K/UL (ref 4.8–10.8)

## 2020-01-19 PROCEDURE — 99239 HOSP IP/OBS DSCHRG MGMT >30: CPT | Performed by: HOSPITALIST

## 2020-01-19 PROCEDURE — 700111 HCHG RX REV CODE 636 W/ 250 OVERRIDE (IP): Performed by: HOSPITALIST

## 2020-01-19 PROCEDURE — 94660 CPAP INITIATION&MGMT: CPT

## 2020-01-19 PROCEDURE — A9270 NON-COVERED ITEM OR SERVICE: HCPCS | Performed by: INTERNAL MEDICINE

## 2020-01-19 PROCEDURE — 36415 COLL VENOUS BLD VENIPUNCTURE: CPT

## 2020-01-19 PROCEDURE — 700102 HCHG RX REV CODE 250 W/ 637 OVERRIDE(OP): Performed by: INTERNAL MEDICINE

## 2020-01-19 PROCEDURE — 94760 N-INVAS EAR/PLS OXIMETRY 1: CPT

## 2020-01-19 PROCEDURE — 700101 HCHG RX REV CODE 250: Performed by: INTERNAL MEDICINE

## 2020-01-19 PROCEDURE — 84100 ASSAY OF PHOSPHORUS: CPT

## 2020-01-19 PROCEDURE — 83735 ASSAY OF MAGNESIUM: CPT

## 2020-01-19 PROCEDURE — 80053 COMPREHEN METABOLIC PANEL: CPT

## 2020-01-19 PROCEDURE — 94668 MNPJ CHEST WALL SBSQ: CPT

## 2020-01-19 PROCEDURE — 94640 AIRWAY INHALATION TREATMENT: CPT

## 2020-01-19 PROCEDURE — 85027 COMPLETE CBC AUTOMATED: CPT

## 2020-01-19 PROCEDURE — 99231 SBSQ HOSP IP/OBS SF/LOW 25: CPT | Performed by: INTERNAL MEDICINE

## 2020-01-19 PROCEDURE — 770020 HCHG ROOM/CARE - TELE (206)

## 2020-01-19 PROCEDURE — 94669 MECHANICAL CHEST WALL OSCILL: CPT

## 2020-01-19 RX ADMIN — RIVAROXABAN 20 MG: 20 TABLET, FILM COATED ORAL at 17:57

## 2020-01-19 RX ADMIN — METOPROLOL TARTRATE 25 MG: 25 TABLET, FILM COATED ORAL at 05:00

## 2020-01-19 RX ADMIN — ACETAMINOPHEN 650 MG: 325 TABLET, FILM COATED ORAL at 05:00

## 2020-01-19 RX ADMIN — FUROSEMIDE 20 MG: 20 TABLET ORAL at 22:11

## 2020-01-19 RX ADMIN — FAMOTIDINE 20 MG: 20 TABLET ORAL at 05:00

## 2020-01-19 RX ADMIN — PREDNISONE 40 MG: 20 TABLET ORAL at 05:01

## 2020-01-19 RX ADMIN — METOPROLOL TARTRATE 25 MG: 25 TABLET, FILM COATED ORAL at 17:57

## 2020-01-19 RX ADMIN — IPRATROPIUM BROMIDE AND ALBUTEROL SULFATE 3 ML: .5; 3 SOLUTION RESPIRATORY (INHALATION) at 14:38

## 2020-01-19 RX ADMIN — BUSPIRONE HYDROCHLORIDE 7.5 MG: 5 TABLET ORAL at 22:11

## 2020-01-19 RX ADMIN — IPRATROPIUM BROMIDE AND ALBUTEROL SULFATE 3 ML: .5; 3 SOLUTION RESPIRATORY (INHALATION) at 06:45

## 2020-01-19 RX ADMIN — FUROSEMIDE 20 MG: 20 TABLET ORAL at 15:09

## 2020-01-19 RX ADMIN — IPRATROPIUM BROMIDE AND ALBUTEROL SULFATE 3 ML: .5; 3 SOLUTION RESPIRATORY (INHALATION) at 09:56

## 2020-01-19 RX ADMIN — FAMOTIDINE 20 MG: 20 TABLET ORAL at 17:57

## 2020-01-19 RX ADMIN — FUROSEMIDE 20 MG: 20 TABLET ORAL at 05:00

## 2020-01-19 ASSESSMENT — CHA2DS2 SCORE
CHA2DS2 VASC SCORE: 2
CHF OR LEFT VENTRICULAR DYSFUNCTION: NO
HYPERTENSION: NO
DIABETES: NO
SEX: FEMALE
PRIOR STROKE OR TIA OR THROMBOEMBOLISM: NO
AGE 75 OR GREATER: NO
AGE 65 TO 74: YES
VASCULAR DISEASE: NO

## 2020-01-19 ASSESSMENT — ENCOUNTER SYMPTOMS
CHILLS: 0
ORTHOPNEA: 1
MYALGIAS: 0
VOMITING: 0
MEMORY LOSS: 1
STRIDOR: 0
NAUSEA: 0
PALPITATIONS: 1
SPUTUM PRODUCTION: 0
COUGH: 0
COUGH: 1
ROS GI COMMENTS: TOLERATING A DIET
FEVER: 0
BLURRED VISION: 0
ABDOMINAL PAIN: 0
SENSORY CHANGE: 0
SHORTNESS OF BREATH: 1
DIZZINESS: 0
FOCAL WEAKNESS: 0

## 2020-01-19 NOTE — FACE TO FACE
Face to Face Note  -  Durable Medical Equipment    David Moulton M.D. - NPI: [id number]  I certify that this patient is under my care and that they have had a durable medical equipment(DME)face to face encounter by myself that meets the physician DME face-to-face encounter requirements with this patient on:    Date of encounter:   Patient:                    MRN:                       YOB: 2019  Kishan Mcintyre  [id number]  1945     The encounter with the patient was in whole, or in part, for the following medical condition, which is the primary reason for durable medical equipment:  CHF    I certify that, based on my findings, the following durable medical equipment is medically necessary:  Other DME Equipment - Trilogy.    HOME O2 Saturation Measurements:(Values must be present for Home Oxygen orders)         ,     ,         My Clinical findings support the need for the above equipment due to:  Hypoxia    Supporting Symptoms: severe pulmonary hypertension, end stage COPD, hypoxia     ------------------------------------------------------------------------------------------------------------------    Face to Face Supporting Documentation - Home Health    The encounter with this patient was in whole or in part the primary reason for home health admission.    Date of encounter:   Patient:                    MRN:                       YOB: 2019  Kishan Mcintyre  [id number]  1945     Home health to see patient for:  Home health aide    Skilled need for:  Exacerbation of Chronic Disease State pulmonary hypertension.     Skilled nursing interventions to include:  Comment: severe hypoxia and inability to ambulate without assistance    Homebound evidenced status by:  Needs the assistance of another person in order to leave the home. Leaving home must require a considerable and taxing effort. There must exist a normal inability to leave the home.    Community  Physician to provide follow up care: nAa Hartley M.D.     Optional Interventions    Wound information & treatment:    Home Infusion Therapy orders:    Line/Drain/Airway:    I certify the face to face encounter for this home care referral meets the CMS requirements and the encounter/clinical assessment with the patient was, in whole, or in part, for the medical condition(s) listed above, which is the primary reason for home health care. Based on my clinical findings: the service(s) are medically necessary, support the need for home health care, and the homebound criteria are met.  I certify that this patient has had a face to face encounter by myself.  David Moulton M.D. - NPI: [id number]    *Debility, frailty and advanced age in the absence of an acute deterioration or exacerbation of a condition do not qualify a patient for home health.

## 2020-01-19 NOTE — CARE PLAN
Problem: Safety  Goal: Will remain free from falls  Outcome: PROGRESSING AS EXPECTED  Note:   Pt remains free from falls at this time. Safety precautions in place. Pt educated on calling for assistance when needed.        Problem: Knowledge Deficit  Goal: Knowledge of disease process/condition, treatment plan, diagnostic tests, and medications will improve  Outcome: PROGRESSING SLOWER THAN EXPECTED  Note:   Pt updated on POC, tests, and medications. Pt verbalizes understanding and has no further questions at this time. Pt educated on calling for any more questions.

## 2020-01-19 NOTE — PROGRESS NOTES
Daughter present at bedside reporting that he mom is confused and is in resp distress. Pt is able to answer all mentation questions. RR is 20 breaths per min. Lungs are clear and diminished. Pt reports feeling intermittently short of breath but does not feel short of breath now. Pt is on 5 L/min via NC. RT notified and they will administer a breathing tx. MD notified and saw the pt at bedside. Charge RN notified that daughter is upset that the pt was not forced to wear Bpap last night

## 2020-01-19 NOTE — DISCHARGE SUMMARY
Discharge Summary    CHIEF COMPLAINT ON ADMISSION  Chief Complaint   Patient presents with   • Shortness of Breath       Reason for Admission  EMS     Admission Date  1/11/2020    CODE STATUS  DNAR/DNI    HPI & HOSPITAL COURSE  This is a 74 y.o. female here with shortness of breath    Ms. Mcintyre has a past medical history of COPD as well as atrial fibrillation on Xarelto therapy that is usually on anywhere between 2.5 to 4 L of oxygen baseline the present in the emergency room with progressive shortness of breath that had been worsening over the prior few months.  She was admitted to the medical floor and was seen by pulmonology.  It was the discretion of pulmonology that she needs positive pressure given her struct of sleep apnea though she had been refusing it.  An ABG revealed a PCO2 of 106 she was transferred to the intensive care unit as she remained acidotic at 7.3.   She was treated with steroids during her hospitalization initially IV solumedrol followed by prednisone.   She had an echocardiogram on 1/19/19 and her RVSP was 55. Echo this hospitalization is now revealing RVSP 90 consistent with severe pulmonary hypertension. She was initiated on nocturnal BiPAP in the hospital though did not tolerate it well and tended to take it off during the night.   POLST filled out for DNR/DNI  Ms. Mcintyre is open to Hospice and a referral was placed.   A home Trilogy has been ordered but family understands that it may not be available for the near future and that they may need to rent it.   Ms. Mcintyre is quite adamant that she is not to go to a SNF and insists on home with her . Today I met with Jaci and her  and they agree with home with hospice.     Therefore, she is discharged in guarded and stable condition to home with close outpatient follow-up.    The patient met 2-midnight criteria for an inpatient stay at the time of discharge.    Discharge Date  1/20    FOLLOW UP ITEMS POST DISCHARGE  Home  oxygen  Home Trilogy ordered  Home Health ordered  Hospice ordered  DISCHARGE DIAGNOSES  Principal Problem:    Acute on chronic respiratory failure with hypoxia and hypercapnia (HCC) POA: Yes  Active Problems:    COPD (chronic obstructive pulmonary disease) (HCC) POA: Yes    Aortic stenosis POA: Yes    Demand ischemia (HCC) POA: Yes    Paroxysmal atrial fibrillation (HCC) POA: Yes    Morbid obesity (HCC) POA: Yes    Pulmonary hypertension (HCC) POA: Yes    Macrocytic anemia POA: Yes  Resolved Problems:    Right middle lobe syndrome POA: Unknown      FOLLOW UP  Future Appointments   Date Time Provider Department Center   2/6/2020  1:15 PM V EXAM 9 ECHO [facility]   3/5/2020  3:20 PM Rufus Rodrigez M.D. [facility] None   3/19/2020  2:15 PM Sammy Martinez M.D. PULM None     Ana Hartley M.D.  [address] [place] [id number]  [phone]    Schedule an appointment as soon as possible for a visit in 1 week        MEDICATIONS ON DISCHARGE     Medication List      CONTINUE taking these medications      Instructions   * albuterol 2.5mg/3ml Nebu solution for nebulization  Commonly known as:  PROVENTIL   Doctor's comments:  Advise patient not use use Albuterol inhaler concurrently with this medication.  USE 3 ML BY NEBULIZATION ROUTE EVERY 6 HOURS AS NEEDED FOR SHORTNESS OF BREATH.     * albuterol 108 (90 Base) MCG/ACT Aers inhalation aerosol   Inhale 2 Puffs by mouth every 6 hours as needed for Shortness of Breath.  Dose:  2 Puff     busPIRone 7.5 MG tablet  Commonly known as:  BUSPAR   Take 7.5 mg by mouth every bedtime.  Dose:  7.5 mg     CALCIUM PO   Take 1 Tab by mouth every day.  Dose:  1 Tab     CULTURELLE PO   Take 1 Cap by mouth every morning.  Dose:  1 Cap     Fluticasone-Umeclidin-Vilant 100-62.5-25 MCG/INH Aepb  Commonly known as:  TRELEGY ELLIPTA   Inhale 1 Puff by mouth every day.  Dose:  1 Puff     furosemide 20 MG Tabs  Commonly known as:  LASIX   Take 3 Tabs by mouth every  day.  Dose:  60 mg     lidocaine 5 % Ptch  Commonly known as:  LIDODERM   Apply 1 Patch to skin as directed every 24 hours. Apply to knee  Dose:  1 Patch     metoprolol 25 MG Tabs  Commonly known as:  LOPRESSOR   Take 1 Tab by mouth 2 Times a Day.  Dose:  25 mg     MULTI-VITAMIN PO   Take 1 Tab by mouth every morning.  Dose:  1 Tab     omeprazole 20 MG delayed-release capsule  Commonly known as:  PRILOSEC   Take 1 Cap by mouth every day.  Dose:  20 mg     potassium chloride SA 20 MEQ Tbcr  Commonly known as:  Kdur   Take 1 Tab by mouth every morning.  Dose:  20 mEq     rivaroxaban 20 MG Tabs tablet  Commonly known as:  XARELTO   Doctor's comments:  SAMPLES PLEASE (or use copay card)  Take 1 Tab by mouth with dinner.  Dose:  20 mg     triamcinolone acetonide 0.1 % Crea  Commonly known as:  KENALOG   Apply  to affected area(s) as needed (RASHES).     vitamin D 1000 UNIT Tabs  Commonly known as:  cholecalciferol   Take 5,000 Units by mouth every day.  Dose:  5,000 Units         * This list has 2 medication(s) that are the same as other medications prescribed for you. Read the directions carefully, and ask your doctor or other care provider to review them with you.                Allergies  Allergies   Allergen Reactions   • Bee Anaphylaxis   • Fosamax Unspecified     Bone pain   • Iodine Rash     RASH   • Pcn [Penicillins] Rash     Rash years ago   • Sulfa Drugs Rash     Terrible rash  Tolerates furosemide 1/20  Tolerates acetazolamide 1/20   • Other Drug Unspecified     Also allergies to Actenol   • Tape Unspecified     Pulls my skin       DIET  Orders Placed This Encounter   Procedures   • Diet Order Regular, 2 Gram Sodium     Standing Status:   Standing     Number of Occurrences:   1     Order Specific Question:   Diet:     Answer:   Regular [1]     Order Specific Question:   Diet:     Answer:   2 Gram Sodium [7]       ACTIVITY  As tolerated    CONSULTATIONS  Pulmonology      PROCEDURES  Echocardiogram:     CONCLUSIONS  Compared to prior echocardiogram 1/19/2019 worsened PA pressure  Normal left ventricular chamber size.  Left ventricular ejection fraction is visually estimated to be 60%.  Moderately dilated left atrium.  Trace mitral regurgitation.  Moderate aortic stenosis.  Transvalvular gradients are - Peak: 55 mmHg, Mean: 34 mmHg.  Estimated right ventricular systolic pressure  is 90 mmHg    LABORATORY  Lab Results   Component Value Date    SODIUM 141 01/19/2020    POTASSIUM 4.6 01/19/2020    CHLORIDE 94 (L) 01/19/2020    CO2 44 (HH) 01/19/2020    GLUCOSE 102 (H) 01/19/2020    BUN 21 01/19/2020    CREATININE 0.48 (L) 01/19/2020    CREATININE 0.71 04/19/2011        Lab Results   Component Value Date    WBC 6.5 01/19/2020    HEMOGLOBIN 12.1 01/19/2020    HEMATOCRIT 44.1 01/19/2020    PLATELETCT 127 (L) 01/19/2020    imaging studies:  DX-CHEST-PORTABLE (1 VIEW)   Final Result         1.  Pulmonary edema and/or infiltrates are identified, which are somewhat decreased since the prior exam.   2.  Trace bilateral pleural effusions, decreased since prior   3.  Cardiomegaly      DX-CHEST-PORTABLE (1 VIEW)   Final Result      1.  Enlarged cardiac silhouette with changes of edema/congestive failure.      DX-CHEST-LIMITED (1 VIEW)   Final Result      No significant interval change.      EC-ECHOCARDIOGRAM COMPLETE W/O CONT   Final Result      US-EXTREMITY VENOUS LOWER BILAT   Final Result      DX-CHEST-PORTABLE (1 VIEW)   Final Result         1.  Pulmonary edema and/or infiltrates.   2.  Stable right infrahilar opacity, likely corresponding with chronic consolidation/atelectasis of the lingula visualized on prior CT January 18, 2019        Prognosis is quite guarded and family is aware.    Total time of the discharge process exceeds 32 minutes.

## 2020-01-19 NOTE — FLOWSHEET NOTE
01/19/20 0649   Events/Summary/Plan   Events/Summary/Plan Pt off bipap Called RN to bedside pt less responsive, Placed back on pt BiPAP TX held    Therapy Not Performed   Type of Therapy Not Performed SVN;MDI   Reason Therapy Not Performed Not Available   SVN Group   #SVN Performed Yes   Given By: Mouthpiece   Respiratory WDL   Respiratory (WDL) X   Chest Exam   Respiration (!) 22   Pulse 62   Breath Sounds   Pre/Post Intervention Pre Intervention Assessment   RUL Breath Sounds Diminished   RML Breath Sounds Diminished   RLL Breath Sounds Diminished   JACQUI Breath Sounds Diminished   LLL Breath Sounds Diminished   Oximetry   #Pulse Oximetry (Single Determination) Yes   Oxygen   Pulse Oximetry 92 %   O2 (LPM) 4   O2 Daily Delivery Respiratory  Silicone Nasal Cannula

## 2020-01-19 NOTE — PROGRESS NOTES
Patient off and on bipap throughout the night complaining of the mask fit and general dislike for bipap. When reeducated on the need for bipap, patient always gave it another try. This morning's CO2 came back from lab: 44, consistent over the last 4 days.

## 2020-01-19 NOTE — PROGRESS NOTES
"Pulmonary Care Progress Note    Date of admission  1/11/2020    Chief Complaint  74 y.o. female admitted 1/11/2020 with acute on chronic hypoxic/hypercapneic respiratory failure    Hospital Course    \"74 y.o. female who presented 1/11/2020 with  history of COPD (2017 PFTS lFEV1 is 0.69 L which is 38% of predicted. FEV1 FVC ratio is reduced at 69%. MVV is severely reduced at 34% of predicted. After administration of an inhaled bronchodilator there is improvement in FEV1 up to 0.94 L which is 53% of predicted. This is a relative 36% improvement.   Total lung capacity is mildly reduced at 76% of predicted. RV/TLC ratio is increased to 154% of predicted. DLCO is 112% of predicted. Airways resistance is increased.  mild to moderate aortic stenosis in the past, paroxysmal atrial fibrillation and anticoagulated with Xarelto, diastolic congestive heart failure, morbid obesity, chronic hypoxemic respiratory failure requiring 2.5 to 4 L of oxygen via nasal cannula at baseline and untreated sleep apnea.  She also has persistent LLL atelectasis     Since 2016 her HCO3 has been increasing from 30 to now she is in the 40s  ABG today shows 7.3/106 paCO2 without complete compensation as her HCO should be higher     She presents today with SOB progressive with symptoms and signs of CHF exacerbation     Patient had further decline while on telemetry unit with increasing somnolence and worsening hypercarbic respiratory failure requiring initiation of NIPPV and transferred to ICU\"      Interval Problem Update  1/18,  Transfer to tele; very alert, communicates, no distress and willing to comply with home AVAPS  1/19, Chart review from the past 24 hours includes imaging, laboratory studies, vital signs and notes available.  Pertinent data for today's visit includes Move to Hospice, PUlm sign off.        Review of Systems  Review of Systems   Constitutional: Positive for malaise/fatigue. Negative for chills and fever.   Eyes: Negative for " blurred vision.   Respiratory: Positive for shortness of breath (slightly improved). Negative for cough, sputum production and stridor.    Cardiovascular: Positive for orthopnea and leg swelling. Negative for chest pain.   Gastrointestinal: Negative for abdominal pain, nausea and vomiting.   Genitourinary: Negative for dysuria.   Musculoskeletal: Negative for myalgias.   Skin: Negative for rash.   Neurological: Negative for dizziness, sensory change and focal weakness.        Vital Signs for last 24 hours   Temp:  [36.6 °C (97.9 °F)-37 °C (98.6 °F)] 36.7 °C (98 °F)  Pulse:  [58-84] 58  Resp:  [16-22] 18  BP: (107-127)/(43-66) 107/51  SpO2:  [92 %-98 %] 98 %      Physical Exam  Vitals signs and nursing note reviewed.   Constitutional:       Appearance: She is obese. She is ill-appearing.      Interventions: Nasal cannula in place.   HENT:      Head: Normocephalic.      Right Ear: External ear normal.      Left Ear: External ear normal.      Nose: Nose normal.      Mouth/Throat:      Mouth: Mucous membranes are dry.      Pharynx: Oropharynx is clear.   Eyes:      Extraocular Movements: Extraocular movements intact.      Conjunctiva/sclera: Conjunctivae normal.   Neck:      Musculoskeletal: Normal range of motion and neck supple.   Cardiovascular:      Rate and Rhythm: Normal rate and regular rhythm.      Pulses: Normal pulses.   Pulmonary:      Effort: No tachypnea, accessory muscle usage or respiratory distress.      Breath sounds: Decreased air movement present. Wheezing (improving) present.      Comments: Dyspneic with any exertion  Abdominal:      General: Abdomen is flat. Bowel sounds are normal.      Palpations: Abdomen is soft.      Comments: Morbidly obese   Musculoskeletal: Normal range of motion.      Comments: Kyphoscoliosis   Skin:     General: Skin is warm and dry.      Capillary Refill: Capillary refill takes less than 2 seconds.   Neurological:      General: No focal deficit present.      Mental Status:  She is alert and oriented to person, place, and time. Mental status is at baseline.      Cranial Nerves: No cranial nerve deficit.      Sensory: No sensory deficit.      Motor: No weakness.      Comments: Confusion resolved         Medications  Current Facility-Administered Medications   Medication Dose Route Frequency Provider Last Rate Last Dose   • ipratropium-albuterol (DUONEB) nebulizer solution  3 mL Nebulization 4X/DAY (RT) Aryan Jacobson M.D.   3 mL at 01/19/20 0645   • predniSONE (DELTASONE) tablet 40 mg  40 mg Oral DAILY David Moulton M.D.   40 mg at 01/19/20 0501   • pramoxine-calamine 1-8% (CALADRYL) lotion   Topical 4X/DAY PRN Adria Thomas Jr., D.O.       • senna-docusate (PERICOLACE or SENOKOT S) 8.6-50 MG per tablet 2 Tab  2 Tab Oral BID Adria Thomas Jr. D.O.   2 Tab at 01/17/20 0449    And   • polyethylene glycol/lytes (MIRALAX) PACKET 1 Packet  1 Packet Oral QDAY PRN Adria Thomas Jr., D.O.        And   • magnesium hydroxide (MILK OF MAGNESIA) suspension 30 mL  30 mL Oral QDAY PRN Adria Thomas Jr., D.O.        And   • bisacodyl (DULCOLAX) suppository 10 mg  10 mg Rectal QDAY PRN Adria Thomas Jr., D.O.       • furosemide (LASIX) tablet 20 mg  20 mg Oral Q8HRS Adria Thomas Jr., D.O.   20 mg at 01/19/20 0500   • famotidine (PEPCID) tablet 20 mg  20 mg Oral BID Aryan Jacobson M.D.   20 mg at 01/19/20 0500   • rivaroxaban (XARELTO) tablet 20 mg  20 mg Oral PM MEAL Aryan Jacobson M.D.   20 mg at 01/18/20 1751   • busPIRone (BUSPAR) tablet 7.5 mg  7.5 mg Oral QHS Aryan Jacobson M.D.   7.5 mg at 01/18/20 2030   • metoprolol (LOPRESSOR) tablet 25 mg  25 mg Oral BID Aryan Jacobson M.D.   25 mg at 01/19/20 0500   • Respiratory Care per Protocol   Nebulization Continuous RT Aryan Jacobson M.D.       • acetaminophen (TYLENOL) tablet 650 mg  650 mg Oral Q6HRS PRN Aryan Jacobson M.D.   650 mg at 01/19/20 0500   • enalaprilat (VASOTEC) injection 1.25 mg  1.25 mg Intravenous Q6HRS PRN Aryan Jacobson M.D.       •  ondansetron (ZOFRAN) syringe/vial injection 4 mg  4 mg Intravenous Q4HRS PRN Aryan Jacobson M.D.       • ondansetron (ZOFRAN ODT) dispertab 4 mg  4 mg Oral Q4HRS PRN Aryan Jacobson M.D.       • umeclidinium-vilanterol (ANORO ELLIPTA) inhaler 1 Puff  1 Puff Inhalation QDAILY (RT) Aryan Jacobson M.D.   Stopped at 01/19/20 0645    And   • fluticasone (FLOVENT HFA) 44 MCG/ACT inhaler 88 mcg  2 Puff Inhalation QDAILY (RT) Aryan Jacobson M.D.   Stopped at 01/19/20 0800       Fluids    Intake/Output Summary (Last 24 hours) at 1/19/2020 0923  Last data filed at 1/19/2020 0500  Gross per 24 hour   Intake 720 ml   Output 1600 ml   Net -880 ml       Laboratory  Recent Labs     01/16/20  1054   ISTATAPH 7.325*   ISTATAPCO2 95.9*   ISTATAPO2 76   ISTATATCO2 >50*   [id number] 92*   ISTATARTHCO3 50.0*   ISTATARTBE 19*   ISTATTEMP 99.0 F   ISTATFIO2 4   ISTATSPEC Arterial   ISTATAPHTC 7.321*   [id number] 77         Recent Labs     01/17/20  0419 01/18/20  0322 01/19/20  0316   SODIUM 142 142 141   POTASSIUM 4.6 4.5 4.6   CHLORIDE 94* 92* 94*   CO2 44* 43* 44*   BUN 26* 24* 21   CREATININE 0.54 0.52 0.48*   MAGNESIUM 2.1 1.8 2.0   PHOSPHORUS 3.4 2.1* 4.2   CALCIUM 8.4* 8.9 8.7     Recent Labs     01/17/20  0419 01/18/20  0322 01/19/20  0316   ALTSGPT 10 7 8   ASTSGOT 22 12 15   ALKPHOSPHAT 35 42 40   TBILIRUBIN 0.5 0.5 0.5   GLUCOSE 104* 115* 102*     Recent Labs     01/17/20  0419 01/18/20  0322 01/19/20  0316   WBC 4.8 6.0 6.5   ASTSGOT 22 12 15   ALTSGPT 10 7 8   ALKPHOSPHAT 35 42 40   TBILIRUBIN 0.5 0.5 0.5     Recent Labs     01/17/20  0419 01/18/20  0322 01/19/20  0316   RBC 3.97* 4.20 4.27   HEMOGLOBIN 11.1* 11.7* 12.1   HEMATOCRIT 40.4 42.0 44.1   PLATELETCT 119* 124* 127*       Imaging  X-Ray:  I have personally reviewed the images and compared with prior images.    Assessment/Plan  * Acute on chronic respiratory failure with hypoxia and hypercapnia (HCC)- (present on admission)  Assessment & Plan  Multifactorial: very severe  obstructive lung disease with FEV1 of 38%, underlying restrictive lung disease due to her severe kyphosis and obesity, OHS and persistent shunt with the RML atelectasis that appears to be progressing since Jan 2019.      She has been compensating well with HCO3 now in the 40s but also now in acute on chronic resp failure due to CHF secondary to AS and diastolic heart failure.     Needs treatment for OHS/BRIDGETT, on AVAPS at night. ?  Home with trilogy vent    Explained to the patient that she will need a form of BIPAP - she has agreed to be compliant with therapies    RT/O2 Protocols  Titrate supplemental FiO2 to maintain SpO2 >88%  qHS AVAPS, not requiring rescue use during the day  Weaning Solu-Medrol, transition to prednisone soon  Aspiration precautions  Procalcitonin has been WNL  S/P doxycycline x5 days  Patient on Xarelto with low likelihood of PE in setting of AC    Aortic stenosis- (present on admission)  Assessment & Plan  Noted on echo as moderate  Peak: 55 mmHg, Mean: 34 mmHg    COPD (chronic obstructive pulmonary disease) (HCC)- (present on admission)  Assessment & Plan  With acute exacerbation  RT/O2 Protocols  Titrate supplemental FiO2 to maintain SpO2 >88%  Weaning steroids  Completed 5 days of doxycycline  qHS AVAPS due to severe CO2 retention  Avoid sedatives  Scheduled nebs  Anoro Ellipta while hospitalized, on Breo Ellipta at home    Pulmonary hypertension (HCC)- (present on admission)  Assessment & Plan  Severe RVSP 90  Multifactorial  Maintain O2 saturations greater than 92%  Maintain euvolemic volume balance  Agrees to compliance with nocturnal AVAPS/BiPAP for BRIDGETT/OHS  Recommend weight loss    Morbid obesity (HCC)- (present on admission)  Assessment & Plan  Weight loss encouraged    Paroxysmal atrial fibrillation (HCC)- (present on admission)  Assessment & Plan  Keep K >4 and mag >2  Continue Xarelto and Lopressor  Maintain rate control       I have performed a physical exam and reviewed and  updated ROS and Plan today (1/19/2020). In review of yesterday's note (1/18/2020), there are no changes except as documented above.     No longer requiring rescue noninvasive positive pressure ventilation.  She will continue to require nocturnal AVAPS and aggressive treatment of her multifactorial respiratory failure during the day.      Argenis Miller MD , FCCP, Pulmonary Service

## 2020-01-19 NOTE — PROGRESS NOTES
Monitor Summary    SR 63-75  Frequent PVCs, Rare PACs, Rare Couplets, Occasional Bigeminy, Rare Trigeminy  .18/.08/.38

## 2020-01-19 NOTE — THERAPY
"Physical Therapy Treatment completed.   Bed Mobility:  Supine to Sit: Minimal Assist  Transfers: Sit to Stand: Minimal Assist  Gait: Level Of Assist: Minimal Assist with Front-Wheel Walker       Plan of Care: Will benefit from Physical Therapy 3 times per week  Discharge Recommendations: Equipment: No Equipment Needed. Pt family enquiring about BSC. Post-acute therapy Recommend home health transitional care for continued physical therapy services.     See \"Rehab Therapy-Acute\" Patient Summary Report for complete documentation.     Pt progressing well w/ therapy. Pt was able to perform most mobility at a Heath level. Pt w/ hands on just for safety as pt is very deconditioned. Pt able to ambulate 10 feet w/ the FWW. Pt demonstrating increased WOB and needing a couple minutes to rebound. Per dtr, pt is normally able to ambulate 3x that. Pts gait is very slow and shuffled w/ a kyphotic posture. Per pt, this is her normal walking pattern and although she was able to ambulate 30 feet it would take a very long time. Pt educated on increasing OOB activity w/ pt receptive. Per family, they will be able to assist 24/7 and dtr is a SW that is assisting w/ arranging more support at home. Pt has progressed to a level in which her erhab can be managed by HH.  "

## 2020-01-19 NOTE — PROGRESS NOTES
[facility] Medicine Daily Progress Note    Date of Service  1/19/2020    Chief Complaint  74 y.o. female admitted 1/11/2020 with shortness of breath.     Hospital Course    Ms. Mcintyre has a past medical history of COPD as well as atrial fibrillation on Xarelto therapy that is usually on anywhere between 2.5 to 4 L of oxygen baseline the present in the emergency room with progressive shortness of breath that had been worsening over the prior few months.  She was admitted to the medical floor and was seen by pulmonology.  It was the discretion of pulmonology that she needs positive pressure given her struct of sleep apnea though she had been refusing it.  An ABG revealed a PCO2 of 106 she was transferred to the intensive care unit as she remained acidotic at 7.3.      Interval Problem Update  1/17/2020: Patient seen and evaluated in the intensive care unit. Her family is at bedside and we discussed her condition. The are looking into a non-invasive Trilogy.  She remains short of breath with any exertion.  1/18/2020: Patient seen and evaluated on telemetry floor.  Met with patient earlier in the morning and she was quite adamant that she went to go home and did not want to go to skilled nursing facility.  I met with patient, her , and daughter at bedside the afternoon we had a long discussion.  They all agree that they want to take her home as she does not want to go to skilled nursing facility and they want to respect her wishes.  They will work on getting her the home trilogy which could be some work and we agreed to meet again tomorrow in the morning the  and make sure all the paperwork was filled out prior to discharge.    1/19: Patient seen and evaluated on the telemetry floor.  A long discussion with patient, her daughter, and her  about her very guarded condition and the fact that her right heart pressure went from 55 mmHg a year ago now to 90.  She did not tolerate her BiPAP last night  Taken off.  Does not have recollections of the nights events.  Her daughter is quite angry that she did not get called last night as she feels that if she is called she can convince her mother to wear her BiPAP at night.  I had the  come up and we arrange for paperwork for home health and a trilogy.  Long discussion with family members and patient about hospice she is agreeable to this and placed the order for hospice.  Daughter does not feel she is stable enough to go home until hospice is ready been arranged.  Patient seems accepting of her condition is amenable to hospice but wants to make sure that she is comfortable.  35 minutes were spent that of which over 50% of time was spent in counseling at bedside about end-of-life.  Consultants/Specialty  Pulmonology    Code Status  DNR/DNI    Disposition  tele    Review of Systems  Review of Systems   Constitutional: Negative for fever.   Respiratory: Positive for cough.         Shortness of breath with exertion   Cardiovascular: Positive for palpitations and leg swelling. Negative for chest pain.   Gastrointestinal: Negative for nausea and vomiting.        Tolerating a diet   Psychiatric/Behavioral: Positive for memory loss.   All other systems reviewed and are negative.       Physical Exam  Temp:  [36.7 °C (98 °F)-37 °C (98.6 °F)] 36.9 °C (98.5 °F)  Pulse:  [58-77] 68  Resp:  [16-22] 18  BP: (107-127)/(49-66) 122/49  SpO2:  [92 %-98 %] 97 %    Physical Exam  Vitals signs and nursing note reviewed.   Constitutional:       Appearance: She is obese.      Comments: Kyphotic   HENT:      Head: Normocephalic and atraumatic.      Mouth/Throat:      Mouth: Mucous membranes are dry.      Pharynx: Oropharynx is clear.   Eyes:      General: No scleral icterus.     Conjunctiva/sclera: Conjunctivae normal.   Neck:      Musculoskeletal: Normal range of motion and neck supple.   Cardiovascular:      Heart sounds: Murmur present.      Comments: Sinus rhythm   Pulmonary:       Comments: Decreased air movement, normal work of breathing  Abdominal:      General: There is distension.      Comments: Large umbilical hernia  Tympanic    Musculoskeletal:      Right lower leg: Edema present.      Left lower leg: Edema present.   Skin:     General: Skin is warm and dry.   Neurological:      General: No focal deficit present.      Mental Status: She is alert and oriented to person, place, and time.      Comments: She is not confused   Psychiatric:         Mood and Affect: Mood normal.         Behavior: Behavior normal.         Thought Content: Thought content normal.         Fluids    Intake/Output Summary (Last 24 hours) at 1/19/2020 1247  Last data filed at 1/19/2020 0500  Gross per 24 hour   Intake 720 ml   Output 1600 ml   Net -880 ml       Laboratory  Recent Labs     01/17/20 0419 01/18/20  0322 01/19/20  0316   WBC 4.8 6.0 6.5   RBC 3.97* 4.20 4.27   HEMOGLOBIN 11.1* 11.7* 12.1   HEMATOCRIT 40.4 42.0 44.1   .8* 100.0* 103.3*   MCH 28.0 27.9 28.3   MCHC 27.5* 27.9* 27.4*   RDW 50.5* 50.0 52.3*   PLATELETCT 119* 124* 127*   MPV 10.7 10.6 10.3     Recent Labs     01/17/20 0419 01/18/20  0322 01/19/20  0316   SODIUM 142 142 141   POTASSIUM 4.6 4.5 4.6   CHLORIDE 94* 92* 94*   CO2 44* 43* 44*   GLUCOSE 104* 115* 102*   BUN 26* 24* 21   CREATININE 0.54 0.52 0.48*   CALCIUM 8.4* 8.9 8.7                   Imaging  DX-CHEST-PORTABLE (1 VIEW)   Final Result         1.  Pulmonary edema and/or infiltrates are identified, which are somewhat decreased since the prior exam.   2.  Trace bilateral pleural effusions, decreased since prior   3.  Cardiomegaly      DX-CHEST-PORTABLE (1 VIEW)   Final Result      1.  Enlarged cardiac silhouette with changes of edema/congestive failure.      DX-CHEST-LIMITED (1 VIEW)   Final Result      No significant interval change.      EC-ECHOCARDIOGRAM COMPLETE W/O CONT   Final Result      US-EXTREMITY VENOUS LOWER BILAT   Final Result      DX-CHEST-PORTABLE (1  VIEW)   Final Result         1.  Pulmonary edema and/or infiltrates.   2.  Stable right infrahilar opacity, likely corresponding with chronic consolidation/atelectasis of the lingula visualized on prior CT January 18, 2019           Assessment/Plan  * Acute on chronic respiratory failure with hypoxia and hypercapnia (HCC)- (present on admission)  Assessment & Plan  Due to CHF/ COPD exacerbation/ obesity hypoventilation syndrome  Hypoxic as well as hypercapnic aspiratory failure  Required transfer to the intensive care unit              Demand ischemia (HCC)- (present on admission)  Assessment & Plan  Consistent with demand ischemia      Aortic stenosis- (present on admission)  Assessment & Plan  Echo showed normal EF with mod AS; poor candidate for aggressive intervention; continue supportive care  She is unlikely to be a candidate for a TAVR    COPD (chronic obstructive pulmonary disease) (HCC)- (present on admission)  Assessment & Plan  Oxygen dependent COPD with exacerbation  IV solumedrol will transition to prednisone 40 mg daily respiratory protocol with bronchodilators  Supplemental oxygen  Pulmonology evaluation  Close outpatient pulmonology follow-up    DNR (do not resuscitate)- (present on admission)  Assessment & Plan  POLST filled out  Hospice consulted.    Obesity (BMI 35.0-39.9 without comorbidity) ([facility])  Assessment & Plan  Body mass index is 44.92 kg/m².    Macrocytic anemia- (present on admission)  Assessment & Plan  Iron studies, folic acid, B12 are normal    Pulmonary hypertension (HCC)- (present on admission)  Assessment & Plan   Severe  Echo done here, RVSP 90  Lasix as she is at very high risk of volume overload    Morbid obesity (HCC)- (present on admission)  Assessment & Plan  Body mass index is 44.92 kg/m².   Advised gradual wt loss    Paroxysmal atrial fibrillation (HCC)- (present on admission)  Assessment & Plan  Continue metoprolol, Xarelto; rate controlled  Monitor on telemetry  She had an  episode of rapid ventricular response and was loaded with IV digoxin on 1/18/2020     Now Hospice, Pulm sign off    Argenis Miller MD , FCCP, Pulmonary Service

## 2020-01-19 NOTE — PROGRESS NOTES
Dr Moulton notified that the pt's daughter is refusing to allow the pt to be discharged today and will only take the pt home tomorrow when hospice agreed to meet.

## 2020-01-19 NOTE — CARE PLAN
Problem: Bowel/Gastric:  Goal: Will not experience complications related to bowel motility  Outcome: PROGRESSING AS EXPECTED   Patient had a bowel movement today.     Problem: Fluid Volume:  Goal: Will maintain balanced intake and output  Outcome: PROGRESSING AS EXPECTED   Pitting edema still present; monitoring I&Os.    Problem: Respiratory:  Goal: Respiratory status will improve  Outcome: PROGRESSING SLOWER THAN EXPECTED   Continues to need bipap at night. 2.5-4 L NC during the day to maintain adequate oxygen saturation.

## 2020-01-19 NOTE — DISCHARGE PLANNING
Anticipated Discharge Disposition: Home with Hospice    Action: LSW obtained choice for hospice from pt and pt's daughter, Jaci. LSW sent choice form to [facility]. Pt's daughter stated that she does not want her mom to discharge until 1/20/20 due to hospice being unable to see pt today. LSW informed bedside RN and MD.    Barriers to Discharge: TBD    Plan: LSW will continue to assess pt for discharge needs.

## 2020-01-19 NOTE — DISCHARGE PLANNING
Received Choice form at [id number]  Agency/Facility Name: [facility]  Referral sent per Choice form @ 0752

## 2020-01-19 NOTE — PROGRESS NOTES
Bedside report received from night shift RN. PT alert sitting up in bed with no complaints of pain at this time. No signs or symptoms of resp distress noted or reported on 5 L/min via NC. Bed in low and locked position, call button within reach and fall precautions are in place

## 2020-01-19 NOTE — PROGRESS NOTES
[facility] Medicine Daily Progress Note    Date of Service  1/18/2020    Chief Complaint  74 y.o. female admitted 1/11/2020 with shortness of breath.     Hospital Course    Ms. Mcintyre has a past medical history of COPD as well as atrial fibrillation on Xarelto therapy that is usually on anywhere between 2.5 to 4 L of oxygen baseline the present in the emergency room with progressive shortness of breath that had been worsening over the prior few months.  She was admitted to the medical floor and was seen by pulmonology.  It was the discretion of pulmonology that she needs positive pressure given her struct of sleep apnea though she had been refusing it.  An ABG revealed a PCO2 of 106 she was transferred to the intensive care unit as she remained acidotic at 7.3.      Interval Problem Update  1/17/2020: Patient seen and evaluated in the intensive care unit. Her family is at bedside and we discussed her condition. The are looking into a non-invasive Trilogy.  She remains short of breath with any exertion.  1/18/2020: Patient seen and evaluated on telemetry floor.  Met with patient earlier in the morning and she was quite adamant that she went to go home and did not want to go to skilled nursing facility.  I met with patient, her , and daughter at bedside the afternoon we had a long discussion.  They all agree that they want to take her home as she does not want to go to skilled nursing facility and they want to respect her wishes.  They will work on getting her the home trilogy which could be some work and we agreed to meet again tomorrow in the morning the  and make sure all the paperwork was filled out prior to discharge.  Did discuss she is a very high risk of falling she goes home patient family acknowledged this and accept this risk.  3 5 minutes were spent that of which over 50% time was spent in counseling at bedside about her condition and placement  options.  Consultants/Specialty  Pulmonology    Code Status  DNR/DNI    Disposition  tele    Review of Systems  Review of Systems   Constitutional: Negative for fever.   Respiratory: Positive for cough.         Shortness of breath with exertion   Cardiovascular: Positive for palpitations and leg swelling. Negative for chest pain.   Gastrointestinal: Negative for nausea and vomiting.        Tolerating a diet   All other systems reviewed and are negative.       Physical Exam  Temp:  [36.3 °C (97.3 °F)-37.3 °C (99.1 °F)] 36.8 °C (98.2 °F)  Pulse:  [68-91] 77  Resp:  [16-24] 16  BP: ()/(43-69) 127/57  SpO2:  [90 %-98 %] 96 %    Physical Exam  Vitals signs and nursing note reviewed.   Constitutional:       Appearance: She is obese.   HENT:      Head: Normocephalic and atraumatic.      Mouth/Throat:      Mouth: Mucous membranes are dry.      Pharynx: Oropharynx is clear.   Eyes:      General: No scleral icterus.     Conjunctiva/sclera: Conjunctivae normal.   Neck:      Musculoskeletal: Normal range of motion and neck supple.   Cardiovascular:      Heart sounds: Murmur present.      Comments: Sinus rhythm   Pulmonary:      Comments: Decreased air movement, normal work of breathing  Abdominal:      General: There is distension.      Comments: Large umbilical hernia  Tympanic    Musculoskeletal:      Right lower leg: Edema present.      Left lower leg: Edema present.   Skin:     General: Skin is warm and dry.   Neurological:      General: No focal deficit present.      Mental Status: She is alert and oriented to person, place, and time.   Psychiatric:         Mood and Affect: Mood normal.         Behavior: Behavior normal.         Fluids    Intake/Output Summary (Last 24 hours) at 1/18/2020 1806  Last data filed at 1/18/2020 1600  Gross per 24 hour   Intake 980 ml   Output --   Net 980 ml       Laboratory  Recent Labs     01/16/20  0345 01/17/20  0419 01/18/20  0322   WBC 4.7* 4.8 6.0   RBC 4.42 3.97* 4.20   HEMOGLOBIN  12.4 11.1* 11.7*   HEMATOCRIT 45.0 40.4 42.0   .8* 101.8* 100.0*   MCH 28.1 28.0 27.9   MCHC 27.6* 27.5* 27.9*   RDW 51.8* 50.5* 50.0   PLATELETCT 139* 119* 124*   MPV 10.8 10.7 10.6     Recent Labs     01/16/20  0345 01/17/20  0419 01/18/20  0322   SODIUM 143 142 142   POTASSIUM 3.9 4.6 4.5   CHLORIDE 91* 94* 92*   CO2 45* 44* 43*   GLUCOSE 107* 104* 115*   BUN 27* 26* 24*   CREATININE 0.56 0.54 0.52   CALCIUM 8.9 8.4* 8.9                   Imaging  DX-CHEST-PORTABLE (1 VIEW)   Final Result         1.  Pulmonary edema and/or infiltrates are identified, which are somewhat decreased since the prior exam.   2.  Trace bilateral pleural effusions, decreased since prior   3.  Cardiomegaly      DX-CHEST-PORTABLE (1 VIEW)   Final Result      1.  Enlarged cardiac silhouette with changes of edema/congestive failure.      DX-CHEST-LIMITED (1 VIEW)   Final Result      No significant interval change.      EC-ECHOCARDIOGRAM COMPLETE W/O CONT   Final Result      US-EXTREMITY VENOUS LOWER BILAT   Final Result      DX-CHEST-PORTABLE (1 VIEW)   Final Result         1.  Pulmonary edema and/or infiltrates.   2.  Stable right infrahilar opacity, likely corresponding with chronic consolidation/atelectasis of the lingula visualized on prior CT January 18, 2019           Assessment/Plan  * Acute on chronic respiratory failure with hypoxia and hypercapnia (HCC)- (present on admission)  Assessment & Plan  Due to CHF/ COPD exacerbation/ obesity hypoventilation syndrome  Hypoxic as well as hypercapnic aspiratory failure  Required transfer to the intensive care unit              Demand ischemia (HCC)- (present on admission)  Assessment & Plan  Consistent with demand ischemia      Aortic stenosis- (present on admission)  Assessment & Plan  Echo showed normal EF with mod AS; poor candidate for aggressive intervention; continue supportive care  At some point she may be a candidate for a TAVR    COPD (chronic obstructive pulmonary disease)  ([facility])- (present on admission)  Assessment & Plan  Oxygen dependent COPD with exacerbation  IV solumedrol will transition to prednisone 40 mg daily respiratory protocol with bronchodilators  Supplemental oxygen  Pulmonology evaluation  Close outpatient pulmonology follow-up    Obesity (BMI 35.0-39.9 without comorbidity) ([facility])  Assessment & Plan  Body mass index is 44.92 kg/m².    Macrocytic anemia- (present on admission)  Assessment & Plan  Iron studies, folic acid, B12 are normal    Pulmonary hypertension ([facility])- (present on admission)  Assessment & Plan   Severe  Echo done here, RVSP 90  Lasix as she is at very high risk of volume overload    Morbid obesity ([facility])- (present on admission)  Assessment & Plan  Body mass index is 44.92 kg/m².   Advised gradual wt loss    Paroxysmal atrial fibrillation ([facility])- (present on admission)  Assessment & Plan  Continue metoprolol, Xarelto; rate controlled  Monitor on telemetry  She had an episode of rapid ventricular response and was loaded with IV digoxin on 1/18/2020       VTE prophylaxis: Xarelto

## 2020-01-19 NOTE — CARE PLAN
Pt stable. Continue to monitor.   Monitor strips: afib converted to SR at 1100am. SR with PACs and PVCs. HR   0.18/0.08/0.34

## 2020-01-20 VITALS
SYSTOLIC BLOOD PRESSURE: 109 MMHG | OXYGEN SATURATION: 92 % | DIASTOLIC BLOOD PRESSURE: 65 MMHG | HEART RATE: 80 BPM | RESPIRATION RATE: 18 BRPM | TEMPERATURE: 98.6 F | HEIGHT: 60 IN | BODY MASS INDEX: 43.63 KG/M2 | WEIGHT: 222.22 LBS

## 2020-01-20 LAB
ALBUMIN SERPL BCP-MCNC: 2.8 G/DL (ref 3.2–4.9)
ALBUMIN/GLOB SERPL: 1.2 G/DL
ALP SERPL-CCNC: 38 U/L (ref 30–99)
ALT SERPL-CCNC: 10 U/L (ref 2–50)
ANION GAP SERPL CALC-SCNC: 3 MMOL/L (ref 0–11.9)
AST SERPL-CCNC: 13 U/L (ref 12–45)
BILIRUB SERPL-MCNC: 0.6 MG/DL (ref 0.1–1.5)
BUN SERPL-MCNC: 21 MG/DL (ref 8–22)
CALCIUM SERPL-MCNC: 8.5 MG/DL (ref 8.5–10.5)
CHLORIDE SERPL-SCNC: 91 MMOL/L (ref 96–112)
CO2 SERPL-SCNC: 49 MMOL/L (ref 20–33)
CREAT SERPL-MCNC: 0.49 MG/DL (ref 0.5–1.4)
ERYTHROCYTE [DISTWIDTH] IN BLOOD BY AUTOMATED COUNT: 51.4 FL (ref 35.9–50)
GLOBULIN SER CALC-MCNC: 2.4 G/DL (ref 1.9–3.5)
GLUCOSE SERPL-MCNC: 83 MG/DL (ref 65–99)
HCT VFR BLD AUTO: 44.4 % (ref 37–47)
HGB BLD-MCNC: 11.9 G/DL (ref 12–16)
MAGNESIUM SERPL-MCNC: 1.8 MG/DL (ref 1.5–2.5)
MCH RBC QN AUTO: 27.8 PG (ref 27–33)
MCHC RBC AUTO-ENTMCNC: 26.8 G/DL (ref 33.6–35)
MCV RBC AUTO: 103.7 FL (ref 81.4–97.8)
PHOSPHATE SERPL-MCNC: 2.2 MG/DL (ref 2.5–4.5)
PLATELET # BLD AUTO: 112 K/UL (ref 164–446)
PMV BLD AUTO: 10.3 FL (ref 9–12.9)
POTASSIUM SERPL-SCNC: 3.5 MMOL/L (ref 3.6–5.5)
PROT SERPL-MCNC: 5.2 G/DL (ref 6–8.2)
RBC # BLD AUTO: 4.28 M/UL (ref 4.2–5.4)
SODIUM SERPL-SCNC: 143 MMOL/L (ref 135–145)
WBC # BLD AUTO: 9.9 K/UL (ref 4.8–10.8)

## 2020-01-20 PROCEDURE — 700111 HCHG RX REV CODE 636 W/ 250 OVERRIDE (IP): Performed by: HOSPITALIST

## 2020-01-20 PROCEDURE — 80053 COMPREHEN METABOLIC PANEL: CPT

## 2020-01-20 PROCEDURE — A9270 NON-COVERED ITEM OR SERVICE: HCPCS | Performed by: INTERNAL MEDICINE

## 2020-01-20 PROCEDURE — 700102 HCHG RX REV CODE 250 W/ 637 OVERRIDE(OP): Performed by: INTERNAL MEDICINE

## 2020-01-20 PROCEDURE — 700101 HCHG RX REV CODE 250: Performed by: INTERNAL MEDICINE

## 2020-01-20 PROCEDURE — 94640 AIRWAY INHALATION TREATMENT: CPT

## 2020-01-20 PROCEDURE — 94664 DEMO&/EVAL PT USE INHALER: CPT

## 2020-01-20 PROCEDURE — 94668 MNPJ CHEST WALL SBSQ: CPT

## 2020-01-20 PROCEDURE — 83735 ASSAY OF MAGNESIUM: CPT

## 2020-01-20 PROCEDURE — 94669 MECHANICAL CHEST WALL OSCILL: CPT

## 2020-01-20 PROCEDURE — 36415 COLL VENOUS BLD VENIPUNCTURE: CPT

## 2020-01-20 PROCEDURE — 84100 ASSAY OF PHOSPHORUS: CPT

## 2020-01-20 PROCEDURE — 85027 COMPLETE CBC AUTOMATED: CPT

## 2020-01-20 RX ADMIN — IPRATROPIUM BROMIDE AND ALBUTEROL SULFATE 3 ML: .5; 3 SOLUTION RESPIRATORY (INHALATION) at 13:04

## 2020-01-20 RX ADMIN — ACETAMINOPHEN 650 MG: 325 TABLET, FILM COATED ORAL at 05:11

## 2020-01-20 RX ADMIN — FLUTICASONE PROPIONATE 88 MCG: 44 AEROSOL, METERED RESPIRATORY (INHALATION) at 07:33

## 2020-01-20 RX ADMIN — UMECLIDINIUM BROMIDE AND VILANTEROL TRIFENATATE 1 PUFF: 62.5; 25 POWDER RESPIRATORY (INHALATION) at 08:31

## 2020-01-20 RX ADMIN — METOPROLOL TARTRATE 25 MG: 25 TABLET, FILM COATED ORAL at 05:11

## 2020-01-20 RX ADMIN — IPRATROPIUM BROMIDE AND ALBUTEROL SULFATE 3 ML: .5; 3 SOLUTION RESPIRATORY (INHALATION) at 07:30

## 2020-01-20 RX ADMIN — FAMOTIDINE 20 MG: 20 TABLET ORAL at 05:12

## 2020-01-20 RX ADMIN — PREDNISONE 40 MG: 20 TABLET ORAL at 05:11

## 2020-01-20 RX ADMIN — FUROSEMIDE 20 MG: 20 TABLET ORAL at 05:11

## 2020-01-20 NOTE — PROGRESS NOTES
Monitor Summary    SR 60-71  Frequent Bigeminy, Frequent PVCs, Rare PACs, Rare Trigeminy, Multifocal Couplets  .18/.08/.40

## 2020-01-20 NOTE — THERAPY
OT tx not completed discussed w/RN pt to d/c today home w/hospice, no further acute OT needs at this time

## 2020-01-20 NOTE — CARE PLAN
Problem: Skin Integrity  Goal: Risk for impaired skin integrity will decrease  Outcome: PROGRESSING AS EXPECTED     Problem: Respiratory:  Goal: Respiratory status will improve  Outcome: PROGRESSING SLOWER THAN EXPECTED   Patient requiring bipap the majority of the shift. Complaints of shortness of breath at the beginning of shift and increased O2 demand.

## 2020-01-20 NOTE — CARE PLAN
Problem: Safety  Goal: Will remain free from injury  Outcome: PROGRESSING AS EXPECTED   Safety precautions and fall prevention interventions in place. Fall prevention education provided, pt verbalized understanding. Bed in low/locked position, treaded socks on pt, call bell is within reach. Appropriate devices provided with ambulation assistance. Pt calls appropriately for help.   Problem: Knowledge Deficit  Goal: Knowledge of disease process/condition, treatment plan, diagnostic tests, and medications will improve  Outcome: PROGRESSING AS EXPECTED  Pt and family updated on POC, including medications, treatment and discharge planning. Questions answered as needed, pt and family verbalized understanding. Encouraged to voice further questions/concerns.

## 2020-01-20 NOTE — DISCHARGE PLANNING
[facility] set up for today at 1500 unless [facility] needs more time for equipment. Daughter, spouse and pt notified.

## 2020-01-20 NOTE — PROGRESS NOTES
Pt sitting up in bed with no complaints of pain. Pt intermittently reports feeling SOB and asks for her O2 to be adjusted PRN. Pt is currently on 5 L/min via NC. [place] in place. Bed in low and locked position, call button within reach and fall precautions are in place

## 2020-01-20 NOTE — DISCHARGE PLANNING
Received Transport Form @ 1040  Spoke to Wilbert @ [facility]    Transport is scheduled for 1/20 @1530 going to Home.    @1032  Agency/Facility Name: [facility]  Spoke To: Sylvester  Outcome: Cannot transport hospice patients home.    @1020  Agency/Facility Name: [facility]  Spoke To: Admissions  Outcome: Accepted since patient also has Medicare part A.    @0933  Agency/Facility Name: [facility]  Spoke To: Maame  Outcome: Not in network with [facility] so will not cover them.  RN CM informed.

## 2020-01-20 NOTE — DISCHARGE INSTRUCTIONS
Discharge Instructions    Discharged to home by medical transportation with relative. Discharged via wheelchair, hospital escort: Yes.  Special equipment needed: Not Applicable    Be sure to schedule a follow-up appointment with your primary care doctor or any specialists as instructed.     Discharge Plan:   Diet Plan: Discussed  Activity Level: Discussed  Confirmed Follow up Appointment: Appointment Scheduled  Confirmed Symptoms Management: Discussed  Medication Reconciliation Updated: Yes  Influenza Vaccine Indication: Not indicated: Previously immunized this influenza season and > 8 years of age(9/19)    I understand that a diet low in cholesterol, fat, and sodium is recommended for good health. Unless I have been given specific instructions below for another diet, I accept this instruction as my diet prescription.   Other diet: cardiac.    Special Instructions:   HF Patient Discharge Instructions  · Monitor your weight daily, and maintain a weight chart, to track your weight changes.   · Activity as tolerated, unless your Doctor has ordered otherwise.   · Follow a low fat, low cholesterol, low salt diet unless instructed otherwise by your Doctor. Read the labels on the back of food products and track your intake of fat, cholesterol and salt.   · Fluid Restriction No. If a Fluid Restriction has been ordered by your Doctor, measure fluids with a measuring cup to ensure that you are not exceeding the restriction.   · No smoking.  · Oxygen Yes. If your Doctor has ordered that you wear Oxygen at home, it is important to wear it as ordered.  · Did you receive an explanation from staff on the importance of taking each of your medications and why it is necessary to keep taking them unless your doctor says to stop? Yes  · Were all of your questions answered about how to manage your heart failure and what to do if you have increased signs and symptoms after you go home? Yes  · Do you feel like your heart failure care team  involved you in the care treatment plan and allowed you to make decisions regarding your care while in the hospital and addressed any discharge needs you might have? Yes    See the educational handout provided at discharge for more information on monitoring your daily weight, activity and diet. This also explains more about Heart Failure, symptoms of a flare-up and some of the tests that you have undergone.     Warning Signs of a Flare-Up include:  · Swelling in the ankles or lower legs.  · Shortness of breath, while at rest, or while doing normal activities.   · Shortness of breath at night when in bed, or coughing in bed.   · Requiring more pillows to sleep at night, or needing to sit up at night to sleep.  · Feeling weak, dizzy or fatigued.     When to call your Doctor:  · Call [facility] seven days a week from 8:00 a.m. to 8:00 p.m. for medical questions [phone].  · Call your Primary Care Physician or Cardiologist if:   1. You experience any pain radiating to your jaw or neck.  2. You have any difficulty breathing.  3. You experience weight gain of 3 lbs in a day or 5 lbs in a week.   4. You feel any palpitations or irregular heartbeats.  5. You become dizzy or lose consciousness.   If you have had an angiogram or had a pacemaker or AICD placed, and experience:  1. Bleeding, drainage or swelling at the surgical / puncture site.  2. Fever greater than 100.0 F  3. Shock from internal defibrillator.  4. Cool and / or numb extremities.      · Is patient discharged on Warfarin / Coumadin?   No     Depression / Suicide Risk    As you are discharged from this [facility], it is important to learn how to keep safe from harming yourself.    Recognize the warning signs:  · Abrupt changes in personality, positive or negative- including increase in energy   · Giving away possessions  · Change in eating patterns- significant weight changes-  positive or negative  · Change in sleeping patterns-  unable to sleep or sleeping all the time   · Unwillingness or inability to communicate  · Depression  · Unusual sadness, discouragement and loneliness  · Talk of wanting to die  · Neglect of personal appearance   · Rebelliousness- reckless behavior  · Withdrawal from people/activities they love  · Confusion- inability to concentrate     If you or a loved one observes any of these behaviors or has concerns about self-harm, here's what you can do:  · Talk about it- your feelings and reasons for harming yourself  · Remove any means that you might use to hurt yourself (examples: pills, rope, extension cords, firearm)  · Get professional help from the community (Mental Health, Substance Abuse, psychological counseling)  · Do not be alone:Call your Safe Contact- someone whom you trust who will be there for you.  · Call your local CRISIS HOTLINE 601-2443 or 943-849-5120  · Call your local Children's Mobile Crisis Response Team [place] (192) 997-2355 or www.English Helper  · Call the toll free National Suicide Prevention Hotlines   · National Suicide Prevention Lifeline 242-841-SSAH (4878)  · National Hope Line Network 800-SUICIDE (907-3931)          Acute Respiratory Failure, Adult  Acute respiratory failure occurs when there is not enough oxygen passing from your lungs to your body. When this happens, your lungs have trouble removing carbon dioxide from the blood. This causes your blood oxygen level to drop too low as carbon dioxide builds up.  Acute respiratory failure is a medical emergency. It can develop quickly, but it is temporary if treated promptly. Your lung capacity, or how much air your lungs can hold, may improve with time, exercise, and treatment.  What are the causes?  There are many possible causes of acute respiratory failure, including:  · Lung injury.  · Chest injury or damage to the ribs or tissues near the lungs.  · Lung conditions that affect the flow of air and blood into and out of the  lungs, such as pneumonia, acute respiratory distress syndrome, and cystic fibrosis.  · Medical conditions, such as strokes or spinal cord injuries, that affect the muscles and nerves that control breathing.  · Blood infection (sepsis).  · Inflammation of the pancreas (pancreatitis).  · A blood clot in the lungs (pulmonary embolism).  · A large-volume blood transfusion.  · Burns.  · Near-drowning.  · Seizure.  · Smoke inhalation.  · Reaction to medicines.  · Alcohol or drug overdose.  What increases the risk?  This condition is more likely to develop in people who have:  · A blocked airway.  · Asthma.  · A condition or disease that damages or weakens the muscles, nerves, bones, or tissues that are involved in breathing.  · A serious infection.  · A health problem that blocks the unconscious reflex that is involved in breathing, such as hypothyroidism or sleep apnea.  · A lung injury or trauma.  What are the signs or symptoms?  Trouble breathing is the main symptom of acute respiratory failure. Symptoms may also include:  · Rapid breathing.  · Restlessness or anxiety.  · Skin, lips, or fingernails that appear blue (cyanosis).  · Rapid heart rate.  · Abnormal heart rhythms (arrhythmias).  · Confusion or changes in behavior.  · Tiredness or loss of energy.  · Feeling sleepy or having a loss of consciousness.  How is this diagnosed?  Your health care provider can diagnose acute respiratory failure with a medical history and physical exam. During the exam, your health care provider will listen to your heart and check for crackling or wheezing sounds in your lungs. Your may also have tests to confirm the diagnosis and determine what is causing respiratory failure. These tests may include:  · Measuring the amount of oxygen in your blood (pulse oximetry). The measurement comes from a small device that is placed on your finger, earlobe, or toe.  · Other blood tests to measure blood gases and to look for signs of  infection.  · Sampling your cerebral spinal fluid or tracheal fluid to check for infections.  · Chest X-ray to look for fluid in spaces that should be filled with air.  · Electrocardiogram (ECG) to look at the heart's electrical activity.  How is this treated?  Treatment for this condition usually takes places in a hospital intensive care unit (ICU). Treatment depends on what is causing the condition. It may include one or more treatments until your symptoms improve. Treatment may include:  · Supplemental oxygen. Extra oxygen is given through a tube in the nose, a face mask, or a carrero.  · A device such as a continuous positive airway pressure (CPAP) or bi-level positive airway pressure (BiPAP or BPAP) machine. This treatment uses mild air pressure to keep the airways open. A mask or other device will be placed over your nose or mouth. A tube that is connected to a motor will deliver oxygen through the mask.  · Ventilator. This treatment helps move air into and out of the lungs. This may be done with a bag and mask or a machine. For this treatment, a tube is placed in your windpipe (trachea) so air and oxygen can flow to the lungs.  · Extracorporeal membrane oxygenation (ECMO). This treatment temporarily takes over the function of the heart and lungs, supplying oxygen and removing carbon dioxide. ECMO gives the lungs a chance to recover. It may be used if a ventilator is not effective.  · Tracheostomy. This is a procedure that creates a hole in the neck to insert a breathing tube.  · Receiving fluids and medicines.  · Rocking the bed to help breathing.  Follow these instructions at home:  · Take over-the-counter and prescription medicines only as told by your health care provider.  · Return to normal activities as told by your health care provider. Ask your health care provider what activities are safe for you.  · Keep all follow-up visits as told by your health care provider. This is important.  How is this  prevented?  Treating infections and medical conditions that may lead to acute respiratory failure can help prevent the condition from developing.  Contact a health care provider if:  · You have a fever.  · Your symptoms do not improve or they get worse.  Get help right away if:  · You are having trouble breathing.  · You lose consciousness.  · Your have cyanosis or turn blue.  · You develop a rapid heart rate.  · You are confused.  These symptoms may represent a serious problem that is an emergency. Do not wait to see if the symptoms will go away. Get medical help right away. Call your local emergency services (911 in the [place] Do not drive yourself to the hospital.   This information is not intended to replace advice given to you by your health care provider. Make sure you discuss any questions you have with your health care provider.  Document Released: 12/23/2014 Document Revised: 07/15/2017 Document Reviewed: 07/05/2017  [employer] Interactive Patient Education © 2017 [employer]      Chronic Obstructive Pulmonary Disease Exacerbation  Chronic obstructive pulmonary disease (COPD) is a common lung condition in which airflow from the lungs is limited. COPD is a general term that can be used to describe many different lung problems that limit airflow, including chronic bronchitis and emphysema. COPD exacerbations are episodes when breathing symptoms become much worse and require extra treatment. Without treatment, COPD exacerbations can be life threatening, and frequent COPD exacerbations can cause further damage to your lungs.  What are the causes?  · Respiratory infections.  · Exposure to smoke.  · Exposure to air pollution, chemical fumes, or dust.  Sometimes there is no apparent cause or trigger.  What increases the risk?  · Smoking cigarettes.  · Older age.  · Frequent prior COPD exacerbations.  What are the signs or symptoms?  · Increased coughing.  · Increased thick spit (sputum) production.  · Increased  wheezing.  · Increased shortness of breath.  · Rapid breathing.  · Chest tightness.  How is this diagnosed?  Your medical history, a physical exam, and tests will help your health care provider make a diagnosis. Tests may include:  · A chest X-ray.  · Basic lab tests.  · Sputum testing.  · An arterial blood gas test.  How is this treated?  Depending on the severity of your COPD exacerbation, you may need to be admitted to a hospital for treatment. Some of the treatments commonly used to treat COPD exacerbations are:  · Antibiotic medicines.  · Bronchodilators. These are drugs that expand the air passages. They may be given with an inhaler or nebulizer. Spacer devices may be needed to help improve drug delivery.  · Corticosteroid medicines.  · Supplemental oxygen therapy.  · Airway clearing techniques, such as noninvasive ventilation (NIV) and positive expiratory pressure (PEP). These provide respiratory support through a mask or other noninvasive device.  Follow these instructions at home:  · Do not smoke. Quitting smoking is very important to prevent COPD from getting worse and exacerbations from happening as often.  · Avoid exposure to all substances that irritate the airway, especially to tobacco smoke.  · If you were prescribed an antibiotic medicine, finish it all even if you start to feel better.  · Take all medicines as directed by your health care provider. It is important to use correct technique with inhaled medicines.  · Drink enough fluids to keep your urine clear or pale yellow (unless you have a medical condition that requires fluid restriction).  · Use a cool mist vaporizer. This makes it easier to clear your chest when you cough.  · If you have a home nebulizer and oxygen, continue to use them as directed.  · Maintain all necessary vaccinations to prevent infections.  · Exercise regularly.  · Eat a healthy diet.  · Keep all follow-up appointments as directed by your health care provider.  Get help  right away if:  · You have worsening shortness of breath.  · You have trouble talking.  · You have severe chest pain.  · You have blood in your sputum.  · You have a fever.  · You have weakness, vomit repeatedly, or faint.  · You feel confused.  · You continue to get worse.  This information is not intended to replace advice given to you by your health care provider. Make sure you discuss any questions you have with your health care provider.  Document Released: 10/14/2008 Document Revised: 05/25/2017 Document Reviewed: 08/22/2014  [employer] Interactive Patient Education © 2017 [employer]        Pulmonary Hypertension  Pulmonary hypertension is high blood pressure within the arteries in your lungs (pulmonary arteries). It is different than having high blood pressure elsewhere in your body, such as blood pressure that is measured with a blood pressure cuff. Pulmonary hypertension makes it harder for blood to flow through the lungs. As a result, the heart must work harder to pump blood through the lungs, and it may be harder for you to breathe. Over time, this can weaken the heart muscle. Pulmonary hypertension is a serious condition and it can be fatal.  What are the causes?  Many different medical conditions can cause pulmonary hypertension. Pulmonary hypertension can be categorized by cause into five groups:  Group 1   Pulmonary hypertension that is caused by abnormal growth of small blood vessels in the lungs (pulmonary arterial hypertension). The abnormal blood vessel growth may have no known cause, or it may be:  · Passed along from a parent (hereditary).  · Caused by another disease, such as a connective tissue disease (including lupus or scleroderma) or HIV.  · Caused by certain drugs or toxins.  Group 2   Pulmonary hypertension that is caused by weakness of the main chamber of the heart (left ventricle) or heart valve disease.  Group 3   Pulmonary hypertension that is caused by lung disease or low oxygen  levels. Causes in this group include:  · Emphysema or chronic obstructive pulmonary disease (COPD).  · Untreated sleep apnea.  · Pulmonary fibrosis.  Group 4   Pulmonary hypertension that is caused by blood clots in the lungs (pulmonary emboli).  Group 5   Other causes of pulmonary hypertension, such as sickle cell anemia, or a mix of multiple causes.  What are the signs or symptoms?  Symptoms of this condition include:  · Shortness of breath. You may notice shortness of breath with:  ¨ Activity, such as walking.  ¨ No activity.  · Tiredness and fatigue.  · Dizziness or fainting.  · Rapid heartbeat or feeling your heart flutter or skip a beat (palpitations).  · Neck vein enlargement.  · Bluish color to your lips and fingertips.  How is this diagnosed?  This condition may be diagnosed by:  · Chest X-ray.  · Arterial blood gases. This test checks the acidity of your blood as well as your blood oxygen and carbon dioxide levels.  · CT scan. This test can provide detailed images of your lungs.  · Pulmonary function test. This test measures how much air your lungs can hold. It also tests how well air moves in and out of your lungs.  · Electrocardiogram (ECG). This test traces the electrical activity of your heart.  · Echocardiogram. This test is used to look at your heart in motion and check how it is functioning.  · Heart catheterization. This test can measure the pressure in your pulmonary artery and the right side of your heart.  · Lung biopsy. This procedure involves checking a sample of lung tissue to find underlying causes.  How is this treated?  There is no cure for pulmonary hypertension, but treatment can help to relieve symptoms and slow the progress of the condition. Treatment can involve:  · Medicines, such as:  ¨ Blood pressure medicines.  ¨ Medicines to relax (dilate) the pulmonary blood vessels.  ¨ Water pills to get rid of extra fluid (diuretic medicines).  ¨ Blood-thinning medicines.  · Surgery. For  severe pulmonary hypertension that does not respond to medical treatment, heart-lung or lung transplant may be needed.  Follow these instructions at home:  · Take medicines only as directed by your health care provider. These include over-the-counter medicines and prescription medicines. Take all medicines exactly as instructed. Do not change or stop medicines without first checking with your health care provider.  · Do not smoke. If you need help quitting, ask your health care provider.  · Eat a healthy diet.  · Limit your salt (sodium) intake to less than 2,300 mg per day.  · Stay as active as possible. Exercise as directed by your health care provider. Talk with your health care provider about what type of exercise is safe for you.  · Avoid high altitudes.  · Avoid hot tubs and saunas.  · Avoid becoming pregnant, if this applies. Talk with your health care provider about safe methods of birth control.  · Keep all follow-up visits as directed by your health care provider. This is important.  Get help right away if:  · You have severe shortness of breath.  · You develop chest pain or pressure in your chest.  · You cough up blood.  · You develop swelling of your feet or legs.  · You have a significant increase in weight within 1-2 days.  This information is not intended to replace advice given to you by your health care provider. Make sure you discuss any questions you have with your health care provider.  Document Released: 10/14/2008 Document Revised: 07/07/2017 Document Reviewed: 06/09/2014  [employer] Interactive Patient Education © 2017 [employer].      Discharge Instructions per David Moulton M.D.    Please contact the pulmonary office about the home [facility]    DIET: low salt    ACTIVITY: as tolerated    DIAGNOSIS: pulmonary hypertension    Return to ER if symptoms worsen

## 2020-01-20 NOTE — PROGRESS NOTES
Bedside report received from LIBORIO Cooper. Assumed care of pt. Pt awake, laying in bed. A/Ox4, VSS. No concerns, complaints or distress. Pt educated to call before getting out of bed. POC reviewed and white board updated. Tele box on. Afflut 86 on the monitor. Call light in reach. Bed locked in lowest position with 2 upper bed rails up. Bed alarm on.

## 2020-01-21 NOTE — PROGRESS NOTES
Patient discharged. A&O x 4. Discharge instructions, personal belongings in possession of a patient. PIV and tele monitor removed.  Copy of discharge instructions in the patient chart, signed and reviewed. Patient verbalizes the understanding of the discharge instructions. Questions / concerns addressed prior to leaving the unit. Patient is instructed to follow up with PCP and cardiology. Transported via [employer] App. Patient is discharged to home with [facility] . Family is present.

## 2020-01-29 DIAGNOSIS — Z79.01 CHRONIC ANTICOAGULATION: ICD-10-CM

## 2020-02-06 ENCOUNTER — APPOINTMENT (OUTPATIENT)
Dept: CARDIOLOGY | Facility: MEDICAL CENTER | Age: 75
End: 2020-02-06
Attending: INTERNAL MEDICINE
Payer: MEDICARE

## 2020-02-06 ENCOUNTER — TELEPHONE (OUTPATIENT)
Dept: CARDIOLOGY | Facility: MEDICAL CENTER | Age: 75
End: 2020-02-06

## 2020-02-06 DIAGNOSIS — J44.9 CHRONIC OBSTRUCTIVE PULMONARY DISEASE, UNSPECIFIED COPD TYPE (HCC): ICD-10-CM

## 2020-02-06 DIAGNOSIS — I48.0 PAROXYSMAL ATRIAL FIBRILLATION (HCC): ICD-10-CM

## 2020-02-06 NOTE — TELEPHONE ENCOUNTER
REHANA Lindsey is requesting prescription for samples of Xarelto be sent to [facility]. She would please like a call back when completed at [phone].

## 2020-02-06 NOTE — TELEPHONE ENCOUNTER
Have we ever prescribed this med? Yes.  If yes, what date? 11/18/19    Last OV: 07/11/19    Next OV: none scheduled    DX: COPD    Medications: Trelegy -samples

## 2020-02-07 NOTE — TELEPHONE ENCOUNTER
"Spoke with pt, she is stated \"its all good\" \"ill figure it out\" \"im on hospice now\" Told pt we would be able to send her the patient assistance paperwork to fill out to try and get some of the cost covered. Advised pt we would send one more month of samples while she gets a chance to fill out the patient assistance paperwork. Paperwork mailed to her with highlighted instructions to return to our office once completed.    "

## 2020-02-21 DIAGNOSIS — J44.9 CHRONIC OBSTRUCTIVE PULMONARY DISEASE, UNSPECIFIED COPD TYPE (HCC): ICD-10-CM

## 2020-02-21 NOTE — TELEPHONE ENCOUNTER
Have we ever prescribed this med? Yes.  If yes, what date? 2/7/2020    Last OV: 07/11/2019 - DR. MCCORMACK     Next OV: none scheduled; was to follow up in 4 months     DX: COPD    Medications: Trelegy

## 2020-03-13 DIAGNOSIS — I48.0 PAROXYSMAL ATRIAL FIBRILLATION (HCC): ICD-10-CM

## 2020-03-19 ENCOUNTER — TELEPHONE (OUTPATIENT)
Dept: VASCULAR LAB | Facility: MEDICAL CENTER | Age: 75
End: 2020-03-19

## 2020-03-19 NOTE — TELEPHONE ENCOUNTER
Most recent creatinine 0.49, actual cr cl 132.9 ml/min. Pt on appropriate Xarelto dose of 20 mg daily. Will f/u in 6 months.    Emilie TATE

## 2020-04-21 ENCOUNTER — ANTICOAGULATION MONITORING (OUTPATIENT)
Dept: VASCULAR LAB | Facility: MEDICAL CENTER | Age: 75
End: 2020-04-21

## 2020-04-21 DIAGNOSIS — I48.0 PAROXYSMAL ATRIAL FIBRILLATION (HCC): ICD-10-CM

## 2020-04-21 DIAGNOSIS — Z79.01 CHRONIC ANTICOAGULATION: ICD-10-CM

## 2020-12-07 NOTE — ASSESSMENT & PLAN NOTE
Continue metoprolol, Xarelto; rate controlled  Monitor on telemetry  She had an episode of rapid ventricular response and was loaded with IV digoxin on 1/18/2020   Refill Routing Note   Medication(s) are not appropriate for processing by [facility] for the following reason(s):     - Outside of protocol  [facility] action(s):  Route        Medication reconciliation completed: No   Automatic Epic Generated Protocol Data:        Requested Prescriptions   Pending Prescriptions Disp Refills    ALPRAZolam (XANAX) 0.5 MG tablet [Pharmacy Med Name: ALPRAZOLAM 0.5MG TABLETS] 30 tablet      Sig: TAKE 1 TABLET BY MOUTH EVERY NIGHT AS NEEDED FOR ANXIETY       There is no refill protocol information for this order           Appointments  past 12m or future 3m with PCP    Date Provider   Last Visit   5/26/2020 Ethel Velez MD   Next Visit   Visit date not found Ethel Velez MD   ED visits in past 90 days: 0        Note composed:7:11 AM 12/07/2020

## 2021-01-15 DIAGNOSIS — Z23 NEED FOR VACCINATION: ICD-10-CM

## 2021-10-13 NOTE — PROGRESS NOTES
Spoke with patient on 2/7/19. Patient reports her breathing is at her baseline, no worsening SOB occurring at this time. Patient has followed up at the discharge clinic and cardiology. Patient is part of the [facility] program for COPD. Patient also mentioned her daughter is a , so she has good family support and access to resources. Will follow up with patient again next week.   Erythromycin Pregnancy And Lactation Text: This medication is Pregnancy Category B and is considered safe during pregnancy. It is also excreted in breast milk.

## 2021-11-18 NOTE — PROCEDURES
1530- RN and assist of other RN, Shauna, attempted to assist the patient to walk using gaitbelt and 2 wheeled walker. Patient stated that she was tired and that she \"would try later\". Will continue to encourage patient to ambulate and reassess later.     1715- RN and CNA attempted to help patient to stand using 2 wheeled walker. Patient able to stand with maximal assist of 2. When patient was instructed to march step, patient sat back down in recliner.    Moderate Sedation for BARBARA    After informed consent I appliied moderate sedation using propofol and stayed the bedside continously monitoring from 12:15 pm to 12:50 pm. I used propofol 30 mg IV X 3 with continous observation of end tidal, sats, BP ,heart and mental status. No complications

## 2022-03-18 NOTE — PROGRESS NOTES
CC: Intraarticular cortisone shot of the right knee/others.    HPI:   Kishan presents today to discuss the following medical issues:    She has been having chronic right knee pain from probably a chronic osteoarthritis, she is here to get steroid intraarticular knee joint injection, she has been taking it every 3-4 month and has been helping.last one had it 4 month ago . She has h/o bilateral osteoarthritis of the knee joints, associated with effusion.    Chronic obstructive pulmonary disease, she has been symptomatic, usually has SOB with excessive exertion.Continue on Albuterol as needed ( uses it once daily), recently Breo Ellipta was added by pulmonlogist.Has h/o pulmonary hypertension, which is probably as a result of Cor pulmonale.Her last echo showed normal EF( 55%), high RVSP ( 50 mmHg), and mild to moderate AS.Has been on lasix 60 mg daily.    Obesity ,BMI is 38.Patient is counseled about the medical rationale for weight loss in obese individuals is that obesity is associated with a significant increase in mortality, and many health risks including type 2 diabetes mellitus, hypertension, dyslipidemia, and coronary heart disease. The benefits of weight loss include a reduction in the rate of progression from impaired glucose tolerance to diabetes, blood pressure in hypertensive patients, and lipid levels in higher risk patients. Other noncardiac benefits of weight loss include reductions in urinary incontinence, sleep apnea, and depression, and improvements in quality of life, physical functioning, and mobility.Recommend lifestyle modification: exercise 30 minutes per day 5 days per week. Recommend also portion control.         Patient Active Problem List    Diagnosis Date Noted   • Morbid obesity (CMS-HCC) 06/16/2015     Priority: High   • Diastolic congestive heart failure (CMS-HCC) 12/05/2011     Priority: High   • COPD (chronic obstructive pulmonary disease) (CMS-HCC) 04/13/2011     Priority: High   •  If A1DO isn't much different than A1DM we will finalize the moist instead. Aortic stenosis 06/07/2012     Priority: Medium   • Aortic regurgitation 12/05/2011     Priority: Medium   • Obesity (BMI 35.0-39.9 without comorbidity) ([facility]) 11/20/2017   • Anxiety 04/04/2016   • Carotid bruit 08/23/2013   • [facility] 11/09/2012   • DJD (degenerative joint disease) 10/19/2012   • Obesity 04/13/2011   • Osteoporosis, idiopathic 04/13/2011       Current Outpatient Prescriptions   Medication Sig Dispense Refill   • Fluticasone Furoate-Vilanterol (BREO ELLIPTA) 200-25 MCG/INH AEROSOL POWDER, BREATH ACTIVATED Inhale 1 Puff by mouth every day. Rinse mouth after use. 2 Each 0   • potassium chloride SA (KDUR) 20 MEQ Tab CR Take 1 Tab by mouth every day. 90 Tab 1   • furosemide (LASIX) 20 MG Tab Take 1 Tab by mouth 3 times a day. 270 Tab 1   • budesonide-formoterol (SYMBICORT) 160-4.5 MCG/ACT Aerosol Inhale 2 Puffs by mouth 2 Times a Day. Use with spacer.  Rinse mouth after each use. 1 Inhaler 11   • busPIRone (BUSPAR) 7.5 MG tablet Take 1 Tab by mouth every day. TAKE 1 TAB BY MOUTH EVERY DAY. 90 Tab 1   • Ibuprofen (ADVIL) 200 MG Cap Take  by mouth.     • albuterol 108 (90 BASE) MCG/ACT Aero Soln inhalation aerosol Inhale 2 Puffs by mouth every 6 hours as needed. 3 Inhaler 0   • tiotropium (SPIRIVA HANDIHALER) 18 MCG Cap INHALE 1 CAP BY MOUTH EVERY DAY. 90 Cap 2   • albuterol (PROVENTIL) 2.5mg/3ml Nebu Soln solution for nebulization USE 3 ML BY NEBULIZATION ROUTE EVERY 6 HOURS AS NEEDED FOR SHORTNESS OF BREATH. 375 mL 2   • triamcinolone acetonide (KENALOG) 0.025 % Cream APPLY TO AFFECTED AREA(S) 2 TIMES A DAY. 15 g 1   • Misc. Devices Misc Oxygen concentrator to be used at night and prn at 2 lpm. Dx: COPD, CHF, hypoxemia. 1 Units 0   • ciprofloxacin (CILOXIN) 0.3 % Solution Place 1 Drop in both eyes every 12 hours. 1 Bottle 0   • omeprazole (PRILOSEC) 20 MG delayed-release capsule Take 1 Cap by mouth every day. 90 Cap 1   • clobetasol (TEMOVATE) 0.05 % external solution Apply sparingly bid 50 mL 0   •  clotrimazole-betamethasone (LOTRISONE) 1-0.05 % CREA Apply 1 Application to affected area(s) 2 times a day. 1 Tube 1   • Lactobacillus Rhamnosus, GG, (CULTURELLE PO) Take  by mouth.     • Clobetasol Propionate 0.05 % LOTN Apply to affected area bid 1 Bottle 1   • Misc. Devices MISC 4 wheel walker, needed for osteoarthritis of the knees. 1 Each 0   • vitamin D (CHOLECALCIFEROL) 1000 UNIT TABS Take 2,000 Units by mouth every day.     • Multiple Vitamin (MULTI-VITAMIN PO) Take  by mouth.     • aspirin 81 MG tablet Take 81 mg by mouth every day.       Current Facility-Administered Medications   Medication Dose Route Frequency Provider Last Rate Last Dose   • methylPREDNISolone acetate (DEPO-MEDROL) injection 80 mg  80 mg Intramuscular Once Ana Hartley M.D.             Allergies as of 11/20/2017 - Reviewed 11/20/2017   Allergen Reaction Noted   • Fosamax  09/15/2011   • Other drug  07/06/2013   • Pcn [penicillins]  04/13/2011   • Sulfa drugs  04/13/2011        ROS: Denies any chest pain, Shortness of breath, Changes bowel or bladder, Lower extremity edema.    Physical Exam:    /72   Pulse 72   Temp 36.8 °C (98.2 °F)   Resp 16   Ht 1.524 m (5')   Wt 90.3 kg (199 lb)   SpO2 94%   BMI 38.86 kg/m²   Gen.: Well-developed, well-nourished, no apparent distress,pleasant and cooperative with the examination  Neck:  No JVD.  Cor: Regular rate and rhythm without murmur, gallop or rub.  Lungs: Respirations unlabored.Clear to auscultation with equal breath sounds bilaterally. No wheezes, rhonchi.  Extremities: No cyanosis, clubbing or edema.  Right Knee joint: Swelling( effusion),no tenderness,  decrease in ROM       Procedure (Knee injection):  Patient was consented. Risks and benefits discussed.  Rt knee was exposed. Site of injection was marked ( A point between lateral patellar ligament, lateral tibial plateau, and lateral femoral condyle). The skin was sterilized with (2% chlorhexidine , and 70% Isopropyl  alcohol), the area was numbed with ethyl cholride , then 1 ml of DepoMedrol 80 mg + 4 ml of Lidocaine 1% were injected into the joint.         Assessment and Plan.   72 y.o. female     1. Chronic pain of right knee/ Intra articular cortisone shot  Intra-articular injection of the Right knee joint is given.Patient joint movement improved to about 50% immediately after the injection.Precaustion was given to avoid bleeding . Patient advised to to uses worm compresses 2 times daily for 3 days, and RTC if any continuous pain on the joint    - methylPREDNISolone acetate (DEPO-MEDROL) injection 80 mg; 1 mL by Intramuscular route Once.  - AK DRAIN/INJECT LARGE JOINT/BURSA    2. Chronic obstructive pulmonary disease, unspecified COPD type (CMS-HCC)  Symptomatic with excessive exertion.Continue on Albuterol as needed ( uses it once daily), recently Breo Ellipta was added by pulmonlogist.    3. Obesity (BMI 30-39.9)  BMI is 38.  Patient is counseled about life style modification.    - Patient identified as having weight management issue.  Appropriate orders and counseling given.    4. Pulmonary hypertension  Probably as a result of Cor pulmonale.  Last echo showed normal EF( 55%), high RVSP ( 50 mmHg), and mild to moderate AS.  Continue on lasix 60 mg daily.

## 2023-03-20 NOTE — TELEPHONE ENCOUNTER
Called and left msg for pt to call back to establish care regarding Anticoagulation referral for Adinrelto from [facility] on 2/05/19.    [facility] at [phone], fax [phone]    Suzette Camarena, ReginaD       Name band;

## 2024-08-05 ENCOUNTER — DOCUMENTATION (OUTPATIENT)
Dept: HEALTH INFORMATION MANAGEMENT | Facility: OTHER | Age: 79
End: 2024-08-05